# Patient Record
Sex: MALE | Race: WHITE | NOT HISPANIC OR LATINO | Employment: OTHER | URBAN - METROPOLITAN AREA
[De-identification: names, ages, dates, MRNs, and addresses within clinical notes are randomized per-mention and may not be internally consistent; named-entity substitution may affect disease eponyms.]

---

## 2017-01-23 ENCOUNTER — OFFICE VISIT (OUTPATIENT)
Dept: FAMILY MEDICINE CLINIC | Facility: CLINIC | Age: 62
End: 2017-01-23

## 2017-01-23 VITALS
HEIGHT: 68 IN | TEMPERATURE: 98.8 F | DIASTOLIC BLOOD PRESSURE: 76 MMHG | SYSTOLIC BLOOD PRESSURE: 140 MMHG | BODY MASS INDEX: 22.58 KG/M2 | WEIGHT: 149 LBS

## 2017-01-23 DIAGNOSIS — J01.00 ACUTE NON-RECURRENT MAXILLARY SINUSITIS: Primary | ICD-10-CM

## 2017-01-23 PROCEDURE — 99213 OFFICE O/P EST LOW 20 MIN: CPT | Performed by: FAMILY MEDICINE

## 2017-01-23 RX ORDER — AZELASTINE HYDROCHLORIDE, FLUTICASONE PROPIONATE 137; 50 UG/1; UG/1
1 SPRAY, METERED NASAL 2 TIMES DAILY
Qty: 1 BOTTLE | Refills: 11 | Status: SHIPPED | OUTPATIENT
Start: 2017-01-23 | End: 2019-01-04

## 2017-01-23 RX ORDER — AMOXICILLIN 500 MG/1
1000 CAPSULE ORAL 3 TIMES DAILY
Qty: 30 CAPSULE | Refills: 0 | Status: SHIPPED | OUTPATIENT
Start: 2017-01-23 | End: 2017-02-16 | Stop reason: SDUPTHER

## 2017-01-23 RX ORDER — IBUPROFEN 800 MG/1
TABLET ORAL
COMMUNITY
Start: 2017-01-17 | End: 2017-04-18

## 2017-01-23 NOTE — PROGRESS NOTES
Chief Complaint and HPI    I have reviewed the patient's medical history in detail and updated the computerized patient record.    Subjective: Gonzales Kaur Sr. is a 61 y.o. male presents   here today for     Sinusitis (c/o sinus pressure, nasal congestion, mild cough, dull headache, fever, does not remember the temp)  sick 6 days. ER in [facility] with nose spray and motrin  Review of Systems   Constitutional: Negative.    HENT: Positive for congestion (nasal), postnasal drip and sinus pressure.    Eyes: Negative.    Respiratory: Positive for cough (mild).    Cardiovascular: Negative.    Gastrointestinal: Negative.    Endocrine: Negative.    Genitourinary: Negative.    Musculoskeletal: Negative.    Skin: Negative.    Allergic/Immunologic: Negative.    Neurological: Positive for headaches (dull).   Hematological: Negative.    Psychiatric/Behavioral: Negative.        Physical Exam   Constitutional: He appears well-developed and well-nourished.   HENT:   Maxillary sinusitis   Cardiovascular: Normal rate, regular rhythm, normal heart sounds and intact distal pulses.    Pulmonary/Chest: Effort normal and breath sounds normal.   Vitals reviewed.      Procedures    Assessment:   Diagnosis Plan   1. Acute non-recurrent maxillary sinusitis         Plan:  No orders of the defined types were placed in this encounter.      Requested Prescriptions     Signed Prescriptions Disp Refills   • amoxicillin (AMOXIL) 500 MG capsule 30 capsule 0     Sig: Take 2 capsules by mouth 3 (Three) Times a Day.   • Azelastine-Fluticasone (DYMISTA) 137-50 MCG/ACT suspension 1 bottle 11     Si spray into each nostril 2 (Two) Times a Day.       All tests and consults since last visit reviewed with patient    No Follow-up on file.

## 2017-01-23 NOTE — MR AVS SNAPSHOT
Gonzales Kaur Sr.   1/23/2017 11:30 AM   Office Visit    Dept Phone:  [phone]   Encounter #:  [id number]    Provider:  Suhail Pineda MD   Department:  [facility] FAMILY AND INTERNAL MEDICINE                Your Full Care Plan              Today's Medication Changes          These changes are accurate as of: 1/23/17  1:36 PM.  If you have any questions, ask your nurse or doctor.               New Medication(s)Ordered:     amoxicillin 500 MG capsule   Commonly known as:  AMOXIL   Take 2 capsules by mouth 3 (Three) Times a Day.   Started by:  Suhail Pineda MD       Azelastine-Fluticasone 137-50 MCG/ACT suspension   Commonly known as:  DYMISTA   1 spray into each nostril 2 (Two) Times a Day.   Started by:  Suhail Pineda MD         Medication(s)that have changed:     aclidinium bromide 400 MCG/ACT aerosol powder  powder for inhalation   Commonly known as:  TUDORZA PRESSAIR   Inhale 1 puff.   What changed:  Another medication with the same name was removed. Continue taking this medication, and follow the directions you see here.   Changed by:  Suhail Pineda MD            Where to Get Your Medications      These medications were sent to [place] [phone] [place] [phone] [address] [address]     Phone:  [phone]     amoxicillin 500 MG capsule         You can get these medications from any pharmacy     Bring a paper prescription for each of these medications     Azelastine-Fluticasone 137-50 MCG/ACT suspension                  Your Updated Medication List          This list is accurate as of: 1/23/17  1:36 PM.  Always use your most recent med list.                aclidinium bromide 400 MCG/ACT aerosol powder  powder for inhalation   Commonly known as:  TUDORZA PRESSAIR       amoxicillin 500 MG capsule   Commonly known as:  AMOXIL   Take 2 capsules by  mouth 3 (Three) Times a Day.       ANDROGEL PUMP 20.25 MG/ACT (1.62%) gel   Generic drug:  Testosterone       Azelastine-Fluticasone 137-50 MCG/ACT suspension   Commonly known as:  DYMISTA   1 spray into each nostril 2 (Two) Times a Day.       ibuprofen 800 MG tablet   Commonly known as:  ADVIL,MOTRIN       sildenafil 20 MG tablet   Commonly known as:  REVATIO   Take 1 tablet by mouth 3 (Three) Times a Day.       SYMBICORT 160-4.5 MCG/ACT inhaler   Generic drug:  budesonide-formoterol       tamsulosin 0.4 MG capsule 24 hr capsule   Commonly known as:  FLOMAX   Take 1 capsule by mouth 2 (Two) Times a Day.       VIAGRA 100 MG tablet   Generic drug:  sildenafil               You Were Diagnosed With        Codes Comments    Acute non-recurrent maxillary sinusitis    -  Primary ICD-10-CM: J01.00  ICD-9-CM: 461.0       Instructions     None    Patient Instructions History      Upcoming Appointments     Visit Type Date Time Department    ACUTE           2017 11:30 AM [facility]    [facility]      SharedReviews Signup     [facility] SharedReviews allows you to send messages to your doctor, view your test results, renew your prescriptions, schedule appointments, and more. To sign up, go to [employer] and click on the Sign Up Now link in the New User? box. Enter your SharedReviews Activation Code exactly as it appears below along with the last four digits of your Social Security Number and your Date of Birth () to complete the sign-up process. If you do not sign up before the expiration date, you must request a new code.    SharedReviews Activation Code: [email or web address]  Expires: 2017  1:36 PM    If you have questions, you can email [email or web address] or call [phone] to talk to our SharedReviews staff. Remember, SharedReviews is NOT to be used for urgent needs. For medical emergencies, dial 911.               Other Info from Your Visit           Your Appointments     May 03,  "2017 11:45 AM EDT   Follow Up with Suhail Pineda MD   [facility] FAMILY AND INTERNAL MEDICINE (--)    [address] [address] [phone]           Arrive 15 minutes prior to appointment.              Allergies     No Known Allergies      Reason for Visit     Sinusitis c/o sinus pressure, nasal congestion, mild cough, dull headache, fever, does not remember the temp      Vital Signs     Blood Pressure Temperature Height    140/76 (BP Location: Right arm, Patient Position: Sitting, Cuff Size: Adult) 98.8 °F (37.1 °C) (Oral) 68\" (172.7 cm)    Weight Body Mass Index Smoking Status    149 lb (67.6 kg) 22.66 kg/m2 Current Every Day Smoker      Problems and Diagnoses Noted     Acute non-recurrent maxillary sinusitis        "

## 2017-02-16 RX ORDER — AMOXICILLIN 500 MG/1
1000 CAPSULE ORAL 3 TIMES DAILY
Qty: 30 CAPSULE | Refills: 0 | Status: SHIPPED | OUTPATIENT
Start: 2017-02-16 | End: 2017-04-18

## 2017-04-18 ENCOUNTER — OFFICE VISIT (OUTPATIENT)
Dept: FAMILY MEDICINE CLINIC | Facility: CLINIC | Age: 62
End: 2017-04-18

## 2017-04-18 VITALS
HEIGHT: 68 IN | BODY MASS INDEX: 22.91 KG/M2 | DIASTOLIC BLOOD PRESSURE: 88 MMHG | TEMPERATURE: 98.8 F | WEIGHT: 151.2 LBS | OXYGEN SATURATION: 98 % | HEART RATE: 82 BPM | SYSTOLIC BLOOD PRESSURE: 142 MMHG

## 2017-04-18 DIAGNOSIS — H66.91 RIGHT OTITIS MEDIA, UNSPECIFIED CHRONICITY, UNSPECIFIED OTITIS MEDIA TYPE: ICD-10-CM

## 2017-04-18 DIAGNOSIS — J30.1 SEASONAL ALLERGIC RHINITIS DUE TO POLLEN: Primary | ICD-10-CM

## 2017-04-18 DIAGNOSIS — J01.41 ACUTE RECURRENT PANSINUSITIS: ICD-10-CM

## 2017-04-18 PROBLEM — J02.9 ACUTE PHARYNGITIS: Status: RESOLVED | Noted: 2017-04-18 | Resolved: 2017-04-18

## 2017-04-18 PROBLEM — J02.9 ACUTE PHARYNGITIS: Status: ACTIVE | Noted: 2017-04-18

## 2017-04-18 PROCEDURE — 99213 OFFICE O/P EST LOW 20 MIN: CPT | Performed by: FAMILY MEDICINE

## 2017-04-18 RX ORDER — AMOXICILLIN AND CLAVULANATE POTASSIUM 875; 125 MG/1; MG/1
1 TABLET, FILM COATED ORAL 2 TIMES DAILY
Qty: 20 TABLET | Refills: 0 | Status: SHIPPED | OUTPATIENT
Start: 2017-04-18 | End: 2018-03-16

## 2017-04-18 NOTE — PROGRESS NOTES
"  Chief Complaint   Patient presents with   • Earache     pt since last a week has some earche on the right side and went to urgent care they gave prednisone tablets but still has pain and hearing lost       Subjective.../HPI  Patient present today with sinus congestion and right ear ache. Seen in [facility] and given prednisone and still having problems. Finished pred today. Taking zyrtec to dry up sinuses    I have reviewed the patient's medical history in detail and updated the computerized patient record.    Family History   Problem Relation Age of Onset   • Diabetes Mother    • Dementia Father        Social History     Social History   • Marital status:      Spouse name: N/A   • Number of children: N/A   • Years of education: N/A     Occupational History   • Not on file.     Social History Main Topics   • Smoking status: Current Every Day Smoker   • Smokeless tobacco: Never Used   • Alcohol use No   • Drug use: No   • Sexual activity: Defer     Other Topics Concern   • Not on file     Social History Narrative       Review of Systems:   Review of Systems   Constitutional: Negative.    HENT: Positive for congestion, ear discharge, ear pain, rhinorrhea and sinus pressure.    Eyes: Negative.    Respiratory: Positive for cough and shortness of breath.    Cardiovascular: Negative.    Gastrointestinal: Negative.    Endocrine: Negative.    Genitourinary: Negative.    Musculoskeletal: Negative.    Skin: Negative.    Allergic/Immunologic: Positive for environmental allergies.   Neurological: Negative.    Hematological: Negative.    Psychiatric/Behavioral: Negative.        Objective:  Vital Signs     Vitals:    04/18/17 1321   BP: 142/88   BP Location: Left arm   Patient Position: Sitting   Pulse: 82   Temp: 98.8 °F (37.1 °C)   TempSrc: Oral   SpO2: 98%   Weight: 151 lb 3.2 oz (68.6 kg)   Height: 68\" (172.7 cm)     Physical Exam   Constitutional: He is oriented to person, place, and time. He appears well-developed and " well-nourished.   HENT:   Head: Normocephalic.   Left Ear: External ear normal.   Pond. [place]. Sinus congestion. Right ear with decreased light reflex   Eyes: Pupils are equal, round, and reactive to light.   Neck: Normal range of motion.   Cardiovascular: Normal rate and regular rhythm.    Pulmonary/Chest: Effort normal.   Abdominal: Soft.   Musculoskeletal: Normal range of motion.   Neurological: He is alert and oriented to person, place, and time.   Skin: Skin is warm and dry.        Results Review:      REVIEWED AND DISCUSSED CLINICAL RESULTS WITH PATIENT                          Current Outpatient Prescriptions:   •  aclidinium bromide (TUDORZA PRESSAIR) 400 MCG/ACT aerosol powder  powder for inhalation, Inhale 1 puff., Disp: , Rfl:   •  ANDROGEL PUMP 20.25 MG/ACT (1.62%) gel, , Disp: , Rfl: 0  •  Azelastine-Fluticasone (DYMISTA) 137-50 MCG/ACT suspension, 1 spray into each nostril 2 (Two) Times a Day., Disp: 1 bottle, Rfl: 11  •  budesonide-formoterol (SYMBICORT) 160-4.5 MCG/ACT inhaler, Take 1 puff by mouth 2 (Two) Times a Day., Disp: , Rfl:   •  sildenafil (REVATIO) 20 MG tablet, Take 1 tablet by mouth 3 (Three) Times a Day., Disp: 90 tablet, Rfl: 5  •  sildenafil (VIAGRA) 100 MG tablet, Take  by mouth As Needed., Disp: , Rfl:   •  tamsulosin (FLOMAX) 0.4 MG capsule 24 hr capsule, Take 1 capsule by mouth 2 (Two) Times a Day., Disp: 60 capsule, Rfl: 11  •  amoxicillin-clavulanate (AUGMENTIN) 875-125 MG per tablet, Take 1 tablet by mouth 2 (Two) Times a Day., Disp: 20 tablet, Rfl: 0    Procedures    Assessment/Plan     Diagnoses and all orders for this visit:    Seasonal allergic rhinitis due to pollen    Right otitis media, unspecified chronicity, unspecified otitis media type    Acute recurrent pansinusitis    Other orders  -     amoxicillin-clavulanate (AUGMENTIN) 875-125 MG per tablet; Take 1 tablet by mouth 2 (Two) Times a Day.         Enrique Hodges Jr., MD  04/18/17  1:49 PM

## 2017-05-01 RX ORDER — ACLIDINIUM BROMIDE 400 UG/1
POWDER, METERED RESPIRATORY (INHALATION)
Qty: 1 EACH | Refills: 1 | Status: SHIPPED | OUTPATIENT
Start: 2017-05-01 | End: 2017-05-15 | Stop reason: SDUPTHER

## 2017-05-15 ENCOUNTER — OFFICE VISIT (OUTPATIENT)
Dept: FAMILY MEDICINE CLINIC | Facility: CLINIC | Age: 62
End: 2017-05-15

## 2017-05-15 VITALS
DIASTOLIC BLOOD PRESSURE: 72 MMHG | SYSTOLIC BLOOD PRESSURE: 128 MMHG | TEMPERATURE: 98.4 F | HEIGHT: 66 IN | BODY MASS INDEX: 23.96 KG/M2 | HEART RATE: 61 BPM | WEIGHT: 149.1 LBS | OXYGEN SATURATION: 97 %

## 2017-05-15 DIAGNOSIS — J43.9 PULMONARY EMPHYSEMA, UNSPECIFIED EMPHYSEMA TYPE (HCC): Primary | ICD-10-CM

## 2017-05-15 DIAGNOSIS — N40.1 BENIGN NON-NODULAR PROSTATIC HYPERPLASIA WITH LOWER URINARY TRACT SYMPTOMS: ICD-10-CM

## 2017-05-15 DIAGNOSIS — Z79.899 MEDICATION MANAGEMENT: ICD-10-CM

## 2017-05-15 DIAGNOSIS — Z13.220 SCREENING FOR LIPOID DISORDERS: Primary | ICD-10-CM

## 2017-05-15 PROCEDURE — 99213 OFFICE O/P EST LOW 20 MIN: CPT | Performed by: NURSE PRACTITIONER

## 2017-05-15 RX ORDER — TAMSULOSIN HYDROCHLORIDE 0.4 MG/1
2 CAPSULE ORAL 2 TIMES DAILY
Qty: 60 CAPSULE | Refills: 11 | Status: SHIPPED | OUTPATIENT
Start: 2017-05-15 | End: 2018-05-15 | Stop reason: SDUPTHER

## 2017-05-15 RX ORDER — BUDESONIDE AND FORMOTEROL FUMARATE DIHYDRATE 160; 4.5 UG/1; UG/1
1 AEROSOL RESPIRATORY (INHALATION) 2 TIMES DAILY
Qty: 1 INHALER | Refills: 5 | Status: SHIPPED | OUTPATIENT
Start: 2017-05-15 | End: 2018-01-03 | Stop reason: SDUPTHER

## 2017-12-20 RX ORDER — ACLIDINIUM BROMIDE 400 UG/1
POWDER, METERED RESPIRATORY (INHALATION)
Refills: 1 | OUTPATIENT
Start: 2017-12-20

## 2017-12-20 RX ORDER — BUDESONIDE AND FORMOTEROL FUMARATE DIHYDRATE 160; 4.5 UG/1; UG/1
AEROSOL RESPIRATORY (INHALATION)
Qty: 10.2 G | Refills: 5 | OUTPATIENT
Start: 2017-12-20

## 2017-12-21 RX ORDER — BUDESONIDE AND FORMOTEROL FUMARATE DIHYDRATE 160; 4.5 UG/1; UG/1
AEROSOL RESPIRATORY (INHALATION)
Qty: 10.2 G | Refills: 5 | OUTPATIENT
Start: 2017-12-21

## 2017-12-22 RX ORDER — ACLIDINIUM BROMIDE 400 UG/1
POWDER, METERED RESPIRATORY (INHALATION)
Refills: 1 | OUTPATIENT
Start: 2017-12-22

## 2018-01-03 RX ORDER — BUDESONIDE AND FORMOTEROL FUMARATE DIHYDRATE 160; 4.5 UG/1; UG/1
1 AEROSOL RESPIRATORY (INHALATION)
Qty: 1 INHALER | Refills: 5 | Status: SHIPPED | OUTPATIENT
Start: 2018-01-03 | End: 2018-06-21 | Stop reason: SDUPTHER

## 2018-01-03 RX ORDER — ACLIDINIUM BROMIDE 400 UG/1
POWDER, METERED RESPIRATORY (INHALATION)
Refills: 1 | Status: CANCELLED | OUTPATIENT
Start: 2018-01-03

## 2018-01-03 RX ORDER — BUDESONIDE AND FORMOTEROL FUMARATE DIHYDRATE 160; 4.5 UG/1; UG/1
AEROSOL RESPIRATORY (INHALATION)
Qty: 10.2 G | Refills: 5 | Status: CANCELLED | OUTPATIENT
Start: 2018-01-03

## 2018-03-16 ENCOUNTER — HOSPITAL ENCOUNTER (EMERGENCY)
Facility: HOSPITAL | Age: 63
Discharge: HOME | End: 2018-03-16
Attending: EMERGENCY MEDICINE | Admitting: EMERGENCY MEDICINE

## 2018-03-16 VITALS
SYSTOLIC BLOOD PRESSURE: 145 MMHG | WEIGHT: 150 LBS | HEART RATE: 76 BPM | HEIGHT: 67 IN | OXYGEN SATURATION: 95 % | BODY MASS INDEX: 23.54 KG/M2 | DIASTOLIC BLOOD PRESSURE: 107 MMHG | RESPIRATION RATE: 16 BRPM | TEMPERATURE: 98.2 F

## 2018-03-16 DIAGNOSIS — N30.01 ACUTE CYSTITIS WITH HEMATURIA: Primary | ICD-10-CM

## 2018-03-16 LAB
ALBUMIN SERPL-MCNC: 4.1 G/DL (ref 3.5–5.2)
ALBUMIN/GLOB SERPL: 1.3 G/DL
ALP SERPL-CCNC: 70 U/L (ref 39–117)
ALT SERPL W P-5'-P-CCNC: 15 U/L (ref 1–41)
ANION GAP SERPL CALCULATED.3IONS-SCNC: 11.1 MMOL/L
AST SERPL-CCNC: 17 U/L (ref 1–40)
BACTERIA UR QL AUTO: ABNORMAL /HPF
BASOPHILS # BLD AUTO: 0.03 10*3/MM3 (ref 0–0.2)
BASOPHILS NFR BLD AUTO: 0.4 % (ref 0–1.5)
BILIRUB SERPL-MCNC: 0.4 MG/DL (ref 0.1–1.2)
BILIRUB UR QL STRIP: NEGATIVE
BUN BLD-MCNC: 23 MG/DL (ref 8–23)
BUN/CREAT SERPL: 24 (ref 7–25)
CALCIUM SPEC-SCNC: 9.3 MG/DL (ref 8.6–10.5)
CHLORIDE SERPL-SCNC: 102 MMOL/L (ref 98–107)
CLARITY UR: ABNORMAL
CO2 SERPL-SCNC: 28.9 MMOL/L (ref 22–29)
COLOR UR: ABNORMAL
CREAT BLD-MCNC: 0.96 MG/DL (ref 0.76–1.27)
DEPRECATED RDW RBC AUTO: 51.4 FL (ref 37–54)
EOSINOPHIL # BLD AUTO: 0.1 10*3/MM3 (ref 0–0.7)
EOSINOPHIL NFR BLD AUTO: 1.2 % (ref 0.3–6.2)
ERYTHROCYTE [DISTWIDTH] IN BLOOD BY AUTOMATED COUNT: 13.7 % (ref 11.5–14.5)
GFR SERPL CREATININE-BSD FRML MDRD: 80 ML/MIN/1.73
GLOBULIN UR ELPH-MCNC: 3.2 GM/DL
GLUCOSE BLD-MCNC: 102 MG/DL (ref 65–99)
GLUCOSE UR STRIP-MCNC: NEGATIVE MG/DL
HCT VFR BLD AUTO: 44.6 % (ref 40.4–52.2)
HGB BLD-MCNC: 14.6 G/DL (ref 13.7–17.6)
HGB UR QL STRIP.AUTO: ABNORMAL
HYALINE CASTS UR QL AUTO: ABNORMAL /LPF
IMM GRANULOCYTES # BLD: 0.04 10*3/MM3 (ref 0–0.03)
IMM GRANULOCYTES NFR BLD: 0.5 % (ref 0–0.5)
KETONES UR QL STRIP: ABNORMAL
LEUKOCYTE ESTERASE UR QL STRIP.AUTO: ABNORMAL
LYMPHOCYTES # BLD AUTO: 1.65 10*3/MM3 (ref 0.9–4.8)
LYMPHOCYTES NFR BLD AUTO: 19.3 % (ref 19.6–45.3)
MCH RBC QN AUTO: 33.7 PG (ref 27–32.7)
MCHC RBC AUTO-ENTMCNC: 32.7 G/DL (ref 32.6–36.4)
MCV RBC AUTO: 103 FL (ref 79.8–96.2)
MONOCYTES # BLD AUTO: 1.03 10*3/MM3 (ref 0.2–1.2)
MONOCYTES NFR BLD AUTO: 12 % (ref 5–12)
NEUTROPHILS # BLD AUTO: 5.71 10*3/MM3 (ref 1.9–8.1)
NEUTROPHILS NFR BLD AUTO: 66.6 % (ref 42.7–76)
NITRITE UR QL STRIP: NEGATIVE
PH UR STRIP.AUTO: 5.5 [PH] (ref 5–8)
PLATELET # BLD AUTO: 171 10*3/MM3 (ref 140–500)
PMV BLD AUTO: 11.5 FL (ref 6–12)
POTASSIUM BLD-SCNC: 4.5 MMOL/L (ref 3.5–5.2)
PROT SERPL-MCNC: 7.3 G/DL (ref 6–8.5)
PROT UR QL STRIP: ABNORMAL
RBC # BLD AUTO: 4.33 10*6/MM3 (ref 4.6–6)
RBC # UR: ABNORMAL /HPF
REF LAB TEST METHOD: ABNORMAL
SODIUM BLD-SCNC: 142 MMOL/L (ref 136–145)
SP GR UR STRIP: 1.03 (ref 1–1.03)
SQUAMOUS #/AREA URNS HPF: ABNORMAL /HPF
UROBILINOGEN UR QL STRIP: ABNORMAL
WBC NRBC COR # BLD: 8.56 10*3/MM3 (ref 4.5–10.7)
WBC UR QL AUTO: ABNORMAL /HPF

## 2018-03-16 PROCEDURE — 85025 COMPLETE CBC W/AUTO DIFF WBC: CPT | Performed by: NURSE PRACTITIONER

## 2018-03-16 PROCEDURE — 51798 US URINE CAPACITY MEASURE: CPT

## 2018-03-16 PROCEDURE — 80053 COMPREHEN METABOLIC PANEL: CPT | Performed by: NURSE PRACTITIONER

## 2018-03-16 PROCEDURE — 36415 COLL VENOUS BLD VENIPUNCTURE: CPT | Performed by: NURSE PRACTITIONER

## 2018-03-16 PROCEDURE — 87086 URINE CULTURE/COLONY COUNT: CPT | Performed by: NURSE PRACTITIONER

## 2018-03-16 PROCEDURE — 81001 URINALYSIS AUTO W/SCOPE: CPT | Performed by: NURSE PRACTITIONER

## 2018-03-16 PROCEDURE — 99283 EMERGENCY DEPT VISIT LOW MDM: CPT

## 2018-03-16 RX ORDER — CIPROFLOXACIN 500 MG/1
500 TABLET, FILM COATED ORAL 2 TIMES DAILY
Qty: 14 TABLET | Refills: 0 | Status: SHIPPED | OUTPATIENT
Start: 2018-03-16 | End: 2018-12-10

## 2018-03-16 RX ORDER — PHENAZOPYRIDINE HYDROCHLORIDE 100 MG/1
100 TABLET, FILM COATED ORAL 3 TIMES DAILY PRN
Qty: 9 TABLET | Refills: 0 | Status: SHIPPED | OUTPATIENT
Start: 2018-03-16 | End: 2018-12-10

## 2018-03-16 NOTE — ED TRIAGE NOTES
Pt c/o blood in urine with burning with urination since last night. Pt was seen at urgent care in [place] and Pt had a KUB done. MD stated that Pt had 2 kidney stones and gave Pt a choice to be admitted in the hospital there or come back to home town and go to the ED, so Pt came here. Pt was in [place] for work. Pt denies flank pain at time but states that he does have bilateral flank pain intermittently.

## 2018-03-16 NOTE — ED NOTES
Re-eval complete.. Urine cup provided to pt since none was. Pt updated on wait and poc     Chloé Boles RN  03/16/18 1946

## 2018-03-16 NOTE — ED PROVIDER NOTES
EMERGENCY DEPARTMENT ENCOUNTER    CHIEF COMPLAINT  Chief Complaint: Hematuria  History given by: Pt  History limited by: Nothing  Room Number: 06/06  PMD: Suhail Pineda MD      HPI:  Pt is a 62 y.o. male who presents complaining of hematuria that began last night.  Pt reports associated urgency and burning with urination.   Pt states that the pain worsens immediately after urination.  Pt denies blood clots, fevers, or nausea. Pt reports that he had an XR done at urgent care today that shows stones in his bladder.  Pt has a history of kidney stones an he states that this does not feel like a kidney stone.    Duration:  One day  Onset: Sudden  Timing: Constant  Intensity/Severity: Moderate  Progression: Unchanged  Associated Symptoms:  Pt reports associated urgency and burning with urination.   Aggravating Factors: None  Alleviating Factors: None  Previous Episodes: Pt reports history of kidney stones  Treatment before arrival: None    PAST MEDICAL HISTORY  Active Ambulatory Problems     Diagnosis Date Noted   • COPD (chronic obstructive pulmonary disease) with emphysema 10/28/2016   • Benign non-nodular prostatic hyperplasia with lower urinary tract symptoms 10/28/2016   • Acute non-recurrent maxillary sinusitis 01/23/2017     Resolved Ambulatory Problems     Diagnosis Date Noted   • Acute pharyngitis 04/18/2017     Past Medical History:   Diagnosis Date   • Allergic    • BPH (benign prostatic hyperplasia)    • COPD (chronic obstructive pulmonary disease)    • Tobacco use disorder        PAST SURGICAL HISTORY  History reviewed. No pertinent surgical history.    FAMILY HISTORY  Family History   Problem Relation Age of Onset   • Diabetes Mother    • Dementia Father        SOCIAL HISTORY  Social History     Social History   • Marital status:      Spouse name: N/A   • Number of children: N/A   • Years of education: N/A     Occupational History   • Not on file.     Social History Main Topics   • Smoking  status: Current Every Day Smoker     Types: Cigarettes   • Smokeless tobacco: Never Used   • Alcohol use No   • Drug use: No   • Sexual activity: Defer     Other Topics Concern   • Not on file     Social History Narrative   • No narrative on file       ALLERGIES  Review of patient's allergies indicates no known allergies.    REVIEW OF SYSTEMS  Review of Systems   Constitutional: Negative for activity change, appetite change and fever.   HENT: Negative for congestion and sore throat.    Eyes: Negative.    Respiratory: Negative for cough and shortness of breath.    Cardiovascular: Negative for chest pain and leg swelling.   Gastrointestinal: Negative for abdominal pain, diarrhea and vomiting.   Endocrine: Negative.    Genitourinary: Positive for dysuria, hematuria and urgency. Negative for decreased urine volume.   Musculoskeletal: Negative for neck pain.   Skin: Negative for rash and wound.   Allergic/Immunologic: Negative.    Neurological: Negative for weakness, numbness and headaches.   Hematological: Negative.    Psychiatric/Behavioral: Negative.    All other systems reviewed and are negative.      PHYSICAL EXAM  ED Triage Vitals   Temp Heart Rate Resp BP SpO2   03/16/18 1534 03/16/18 1534 03/16/18 1604 03/16/18 1604 03/16/18 1534   99.4 °F (37.4 °C) 99 16 141/73 92 %      Temp src Heart Rate Source Patient Position BP Location FiO2 (%)   03/16/18 1938 03/16/18 1604 03/16/18 1604 03/16/18 1604 --   Tympanic Monitor Sitting Left arm        Physical Exam   Constitutional: He is oriented to person, place, and time and well-developed, well-nourished, and in no distress.   HENT:   Head: Normocephalic and atraumatic.   Eyes: EOM are normal. Pupils are equal, round, and reactive to light.   Neck: Normal range of motion. Neck supple.   Cardiovascular: Normal rate, regular rhythm and normal heart sounds.    Pulmonary/Chest: Effort normal and breath sounds normal. No respiratory distress.   Abdominal: Soft. There is no  tenderness. There is no rebound and no guarding.   Musculoskeletal: Normal range of motion. He exhibits no edema.   Neurological: He is alert and oriented to person, place, and time. He has normal sensation and normal strength.   Skin: Skin is warm and dry.   Psychiatric: Mood and affect normal.   Nursing note and vitals reviewed.      LAB RESULTS  Lab Results (last 24 hours)     Procedure Component Value Units Date/Time    CBC & Differential [[id number]] Collected:  03/16/18 1631    Specimen:  Blood Updated:  03/16/18 1654    Narrative:       The following orders were created for panel order CBC & Differential.  Procedure                               Abnormality         Status                     ---------                               -----------         ------                     CBC Auto Differential[[id number]]         Abnormal            Final result                 Please view results for these tests on the individual orders.    Comprehensive Metabolic Panel [[id number]]  (Abnormal) Collected:  03/16/18 1631    Specimen:  Blood Updated:  03/16/18 1711     Glucose 102 (H) mg/dL      BUN 23 mg/dL      Creatinine 0.96 mg/dL      Sodium 142 mmol/L      Potassium 4.5 mmol/L      Chloride 102 mmol/L      CO2 28.9 mmol/L      Calcium 9.3 mg/dL      Total Protein 7.3 g/dL      Albumin 4.10 g/dL      ALT (SGPT) 15 U/L      AST (SGOT) 17 U/L      Alkaline Phosphatase 70 U/L      Total Bilirubin 0.4 mg/dL      eGFR Non African Amer 80 mL/min/1.73      Globulin 3.2 gm/dL      A/G Ratio 1.3 g/dL      BUN/Creatinine Ratio 24.0     Anion Gap 11.1 mmol/L     CBC Auto Differential [[id number]]  (Abnormal) Collected:  03/16/18 1631    Specimen:  Blood Updated:  03/16/18 1654     WBC 8.56 10*3/mm3      RBC 4.33 (L) 10*6/mm3      Hemoglobin 14.6 g/dL      Hematocrit 44.6 %      .0 (H) fL      MCH 33.7 (H) pg      MCHC 32.7 g/dL      RDW 13.7 %      RDW-SD 51.4 fl      MPV 11.5 fL      Platelets 171 10*3/mm3      Neutrophil %  66.6 %      Lymphocyte % 19.3 (L) %      Monocyte % 12.0 %      Eosinophil % 1.2 %      Basophil % 0.4 %      Immature Grans % 0.5 %      Neutrophils, Absolute 5.71 10*3/mm3      Lymphocytes, Absolute 1.65 10*3/mm3      Monocytes, Absolute 1.03 10*3/mm3      Eosinophils, Absolute 0.10 10*3/mm3      Basophils, Absolute 0.03 10*3/mm3      Immature Grans, Absolute 0.04 (H) 10*3/mm3     Urinalysis With / Culture If Indicated - Urine, Clean Catch [[id number]]  (Abnormal) Collected:  03/16/18 1944    Specimen:  Urine from Urine, Clean Catch Updated:  03/16/18 2006     Color, UA Red (A)     Comment: Any Substance that causes an abnormal urine color can alter the accuracy of the chemical reactions.        Appearance, UA Cloudy (A)     pH, UA 5.5     Specific Gravity, UA 1.028     Glucose, UA Negative     Ketones, UA 15 mg/dL (1+) (A)     Bilirubin, UA Negative     Blood, UA Large (3+) (A)     Protein,  mg/dL (2+) (A)     Leuk Esterase, UA Moderate (2+) (A)     Nitrite, UA Negative     Urobilinogen, UA 1.0 E.U./dL    Urinalysis, Microscopic Only - Urine, Clean Catch [[id number]]  (Abnormal) Collected:  03/16/18 1944    Specimen:  Urine from Urine, Clean Catch Updated:  03/16/18 2006     RBC, UA Too Numerous to Count (A) /HPF      WBC, UA  /HPF      Unable to determine due to loaded field (A)     Bacteria, UA  /HPF      Unable to determine due to loaded field (A)     Squamous Epithelial Cells, UA  /HPF      Unable to determine due to loaded field (A)     Hyaline Casts, UA  /LPF      Unable to determine due to loaded field     Methodology Manual Light Microscopy    Urine Culture - Urine, Urine, Clean Catch [[id number]] Collected:  03/16/18 1944    Specimen:  Urine from Urine, Clean Catch Updated:  03/16/18 1953          I ordered the above labs and reviewed the results    PROCEDURES  Procedures      PROGRESS AND CONSULTS  ED Course   Medications - No data to display    2015  Discussed urinalysis results.  Discussed options for  treatment or further evaluation.  I gave Pt the option to get a CT scan and a bladder scan.  Pt decided to get the bladder scan.     2026  Bladder scan shows no urinary retention.        MEDICAL DECISION MAKING  Results were reviewed/discussed with the patient and they were also made aware of online access. Pt also made aware that some labs, such as cultures, will not be resulted during ER visit and follow up with PMD is necessary.     MDM  Number of Diagnoses or Management Options  Acute cystitis with hematuria:      Amount and/or Complexity of Data Reviewed  Clinical lab tests: reviewed (Urinalysis shows blood in urine. )    Patient Progress  Patient progress: stable         DIAGNOSIS  Final diagnoses:   Acute cystitis with hematuria       DISPOSITION  DISCHARGE    Patient discharged in stable condition.    Reviewed implications of results, diagnosis, meds, responsibility to follow up, warning signs and symptoms of possible worsening, potential complications and reasons to return to ER.    Patient/Family voiced understanding of above instructions.    Discussed plan for discharge, as there is no emergent indication for admission. Patient referred to primary care provider for BP management due to today's BP. Pt/family is agreeable and understands need for follow up and repeat testing.  Pt is aware that discharge does not mean that nothing is wrong but it indicates no emergency is present that requires admission and they must continue care with follow-up as given below or physician of their choice.     FOLLOW-UP  Suhail Pineda MD  [address] [facility] [address]  [facility] [address] [phone]    Schedule an appointment as soon as possible for a visit       Ramin Madsen MD  [address] [facility] [address] [phone]    Schedule an appointment as soon as possible for a visit   As needed         Medication List      New Prescriptions    ciprofloxacin 500 MG tablet  Commonly known as:  CIPRO  Take 1  tablet by mouth 2 (Two) Times a Day.     phenazopyridine 100 MG tablet  Commonly known as:  PYRIDIUM  Take 1 tablet by mouth 3 (Three) Times a Day As Needed for bladder spasms.        Changed    tamsulosin 0.4 MG capsule 24 hr capsule  Commonly known as:  FLOMAX  Take 2 capsules by mouth 2 (Two) Times a Day.  What changed:  how much to take        Stop    amoxicillin-clavulanate 875-125 MG per tablet  Commonly known as:  AUGMENTIN              Latest Documented Vital Signs:  As of 8:34 PM  BP- 144/79 HR- 76 Temp- 98.2 °F (36.8 °C) (Tympanic) O2 sat- 100%    --  Documentation assistance provided by katelyn Howard for Dr. Pan.  Information recorded by the scribe was done at my direction and has been verified and validated by me.            Florinda Howard  03/16/18 3936       Ruslna Pan MD  03/16/18 2493

## 2018-03-17 NOTE — DISCHARGE INSTRUCTIONS
Please return to the ED if you develop a fever, increasing pain or if you are unable to be urinate.

## 2018-03-18 LAB
BACTERIA SPEC AEROBE CULT: NORMAL
BACTERIA SPEC AEROBE CULT: NORMAL

## 2018-05-15 RX ORDER — TAMSULOSIN HYDROCHLORIDE 0.4 MG/1
CAPSULE ORAL
Qty: 60 CAPSULE | Refills: 11 | Status: SHIPPED | OUTPATIENT
Start: 2018-05-15 | End: 2019-05-20 | Stop reason: SDUPTHER

## 2018-06-07 ENCOUNTER — OFFICE VISIT (OUTPATIENT)
Dept: INTERNAL MEDICINE | Facility: CLINIC | Age: 63
End: 2018-06-07

## 2018-06-07 VITALS
RESPIRATION RATE: 16 BRPM | DIASTOLIC BLOOD PRESSURE: 65 MMHG | HEIGHT: 67 IN | HEART RATE: 57 BPM | BODY MASS INDEX: 22.85 KG/M2 | WEIGHT: 145.6 LBS | OXYGEN SATURATION: 94 % | TEMPERATURE: 98.1 F | SYSTOLIC BLOOD PRESSURE: 128 MMHG

## 2018-06-07 DIAGNOSIS — B33.24 VIRAL CARDIOMYOPATHY (HCC): ICD-10-CM

## 2018-06-07 DIAGNOSIS — N40.1 BENIGN NON-NODULAR PROSTATIC HYPERPLASIA WITH LOWER URINARY TRACT SYMPTOMS: Primary | ICD-10-CM

## 2018-06-07 DIAGNOSIS — R53.82 CHRONIC FATIGUE: ICD-10-CM

## 2018-06-07 DIAGNOSIS — R06.09 DYSPNEA ON EXERTION: ICD-10-CM

## 2018-06-07 DIAGNOSIS — G47.33 OSA (OBSTRUCTIVE SLEEP APNEA): ICD-10-CM

## 2018-06-07 PROCEDURE — 99213 OFFICE O/P EST LOW 20 MIN: CPT | Performed by: FAMILY MEDICINE

## 2018-06-07 NOTE — PROGRESS NOTES
CC:1) DOE2) tobacco abuse 3) decresing  memory    Subjective.../HPI  Patient present today with1) 6 mons with MARTIN, decresed fatigue  2) tobacco abuse  I have reviewed the patient's medical history in detail and updated the computerized patient record.1) MARTIN -dcecreased stamina    Past Medical History:   Diagnosis Date   • Allergic    • BPH (benign prostatic hyperplasia)    • COPD (chronic obstructive pulmonary disease)    • Tobacco use disorder        No past surgical history on file.    Family History   Problem Relation Age of Onset   • Diabetes Mother    • Dementia Father        Social History     Social History   • Marital status:      Spouse name: N/A   • Number of children: N/A   • Years of education: N/A     Occupational History   • Not on file.     Social History Main Topics   • Smoking status: Current Every Day Smoker     Types: Cigarettes   • Smokeless tobacco: Never Used   • Alcohol use No   • Drug use: No   • Sexual activity: Defer     Other Topics Concern   • Not on file     Social History Narrative   • No narrative on file         There is no immunization history on file for this patient.    Review of Systems:   Review of Systems   Constitutional: Negative.    HENT: Negative.    Eyes: Negative.    Respiratory: Negative.    Cardiovascular: Negative.    Gastrointestinal: Negative.    Endocrine: Positive for cold intolerance.   Genitourinary: Negative.    Musculoskeletal: Negative.    Skin: Negative.          Physical Exam   Constitutional: He is oriented to person, place, and time. He appears well-developed and well-nourished.   Cardiovascular: Normal rate and regular rhythm.    Pulmonary/Chest: Effort normal and breath sounds normal.   Neurological: He is alert and oriented to person, place, and time.   Psychiatric: He has a normal mood and affect. His behavior is normal.         Vital Signs     Vitals:    06/07/18 1201   BP: 128/65   BP Location: Left arm   Patient Position: Sitting   Cuff Size:  "Adult   Pulse: 57   Resp: 16   Temp: 98.1 °F (36.7 °C)   TempSrc: Oral   SpO2: 94%   Weight: 66 kg (145 lb 9.6 oz)   Height: 170.2 cm (67\")          Results Review:      REVIEWED AND DISCUSSED CLINICAL RESULTS WITH PATIENT      Requested Prescriptions      No prescriptions requested or ordered in this encounter         Current Outpatient Prescriptions:   •  aclidinium bromide (TUDORZA PRESSAIR) 400 MCG/ACT aerosol powder  powder for inhalation, Inhale 1 puff 2 (Two) Times a Day., Disp: 1 each, Rfl: 5  •  ANDROGEL PUMP 20.25 MG/ACT (1.62%) gel, Apply  topically Daily., Disp: , Rfl: 0  •  Azelastine-Fluticasone (DYMISTA) 137-50 MCG/ACT suspension, 1 spray into each nostril 2 (Two) Times a Day., Disp: 1 bottle, Rfl: 11  •  ciprofloxacin (CIPRO) 500 MG tablet, Take 1 tablet by mouth 2 (Two) Times a Day., Disp: 14 tablet, Rfl: 0  •  phenazopyridine (PYRIDIUM) 100 MG tablet, Take 1 tablet by mouth 3 (Three) Times a Day As Needed for bladder spasms., Disp: 9 tablet, Rfl: 0  •  sildenafil (REVATIO) 20 MG tablet, Take 1 tablet by mouth 3 (Three) Times a Day., Disp: 90 tablet, Rfl: 5  •  sildenafil (VIAGRA) 100 MG tablet, Take  by mouth As Needed., Disp: , Rfl:   •  tamsulosin (FLOMAX) 0.4 MG capsule 24 hr capsule, take 1 capsule by mouth twice a day, Disp: 60 capsule, Rfl: 11  •  amoxicillin (AMOXIL) 500 MG capsule, Take 1 capsule by mouth 3 (Three) Times a Day., Disp: 30 capsule, Rfl: 0  •  SYMBICORT 160-4.5 MCG/ACT inhaler, inhale 1 puff by mouth twice a day, Disp: 10.2 g, Rfl: 3  •  valACYclovir (VALTREX) 500 MG tablet, Take 1 tablet by mouth 2 (Two) Times a Day., Disp: 10 tablet, Rfl: 0    Procedures          Diagnoses and all orders for this visit:    Benign non-nodular prostatic hyperplasia with lower urinary tract symptoms    MICHAEL (obstructive sleep apnea)  -     Ambulatory Referral to Sleep Medicine    Viral cardiomyopathy  -     Adult Transthoracic Echo Complete W/ Cont if Necessary Per Protocol; Future    Chronic " fatigue  -     CBC & Differential  -     Comprehensive Metabolic Panel  -     TSH  -     Vitamin B12  -     Folate    Dyspnea on exertion         Return for Recheck.    Suhail Pineda M.D  06/27/18  8:22 PM

## 2018-06-08 LAB
ALBUMIN SERPL-MCNC: 4.4 G/DL (ref 3.5–5.2)
ALBUMIN/GLOB SERPL: 1.5 G/DL
ALP SERPL-CCNC: 79 U/L (ref 39–117)
ALT SERPL-CCNC: 14 U/L (ref 1–41)
AST SERPL-CCNC: 13 U/L (ref 1–40)
BASOPHILS # BLD AUTO: 0.05 10*3/MM3 (ref 0–0.2)
BASOPHILS NFR BLD AUTO: 0.6 % (ref 0–1.5)
BILIRUB SERPL-MCNC: 0.4 MG/DL (ref 0.1–1.2)
BUN SERPL-MCNC: 15 MG/DL (ref 8–23)
BUN/CREAT SERPL: 18.3 (ref 7–25)
CALCIUM SERPL-MCNC: 9.1 MG/DL (ref 8.6–10.5)
CHLORIDE SERPL-SCNC: 101 MMOL/L (ref 98–107)
CO2 SERPL-SCNC: 29.8 MMOL/L (ref 22–29)
CREAT SERPL-MCNC: 0.82 MG/DL (ref 0.76–1.27)
EOSINOPHIL # BLD AUTO: 0.2 10*3/MM3 (ref 0–0.7)
EOSINOPHIL NFR BLD AUTO: 2.4 % (ref 0.3–6.2)
ERYTHROCYTE [DISTWIDTH] IN BLOOD BY AUTOMATED COUNT: 14.2 % (ref 11.5–14.5)
FOLATE SERPL-MCNC: 19.01 NG/ML (ref 4.78–24.2)
GFR SERPLBLD CREATININE-BSD FMLA CKD-EPI: 116 ML/MIN/1.73
GFR SERPLBLD CREATININE-BSD FMLA CKD-EPI: 96 ML/MIN/1.73
GLOBULIN SER CALC-MCNC: 2.9 GM/DL
GLUCOSE SERPL-MCNC: 87 MG/DL (ref 65–99)
HCT VFR BLD AUTO: 46.2 % (ref 40.4–52.2)
HGB BLD-MCNC: 15.2 G/DL (ref 13.7–17.6)
IMM GRANULOCYTES # BLD: 0.06 10*3/MM3 (ref 0–0.03)
IMM GRANULOCYTES NFR BLD: 0.7 % (ref 0–0.5)
LYMPHOCYTES # BLD AUTO: 1.75 10*3/MM3 (ref 0.9–4.8)
LYMPHOCYTES NFR BLD AUTO: 21.4 % (ref 19.6–45.3)
MCH RBC QN AUTO: 33.4 PG (ref 27–32.7)
MCHC RBC AUTO-ENTMCNC: 32.9 G/DL (ref 32.6–36.4)
MCV RBC AUTO: 101.5 FL (ref 79.8–96.2)
MONOCYTES # BLD AUTO: 0.9 10*3/MM3 (ref 0.2–1.2)
MONOCYTES NFR BLD AUTO: 11 % (ref 5–12)
NEUTROPHILS # BLD AUTO: 5.28 10*3/MM3 (ref 1.9–8.1)
NEUTROPHILS NFR BLD AUTO: 64.6 % (ref 42.7–76)
PLATELET # BLD AUTO: 183 10*3/MM3 (ref 140–500)
POTASSIUM SERPL-SCNC: 5 MMOL/L (ref 3.5–5.2)
PROT SERPL-MCNC: 7.3 G/DL (ref 6–8.5)
RBC # BLD AUTO: 4.55 10*6/MM3 (ref 4.6–6)
SODIUM SERPL-SCNC: 143 MMOL/L (ref 136–145)
TSH SERPL DL<=0.005 MIU/L-ACNC: 1.68 MIU/ML (ref 0.27–4.2)
VIT B12 SERPL-MCNC: 586 PG/ML (ref 211–946)
WBC # BLD AUTO: 8.18 10*3/MM3 (ref 4.5–10.7)

## 2018-06-08 RX ORDER — DOXYCYCLINE 50 MG/1
100 CAPSULE ORAL EVERY 12 HOURS SCHEDULED
Qty: 28 CAPSULE | Refills: 0 | Status: SHIPPED | OUTPATIENT
Start: 2018-06-08 | End: 2018-06-08

## 2018-06-08 RX ORDER — AMOXICILLIN 500 MG/1
500 CAPSULE ORAL 3 TIMES DAILY
Qty: 30 CAPSULE | Refills: 0 | Status: SHIPPED | OUTPATIENT
Start: 2018-06-08 | End: 2018-12-10

## 2018-06-08 RX ORDER — VALACYCLOVIR HYDROCHLORIDE 500 MG/1
500 TABLET, FILM COATED ORAL 2 TIMES DAILY
Qty: 10 TABLET | Refills: 0 | Status: SHIPPED | OUTPATIENT
Start: 2018-06-08 | End: 2019-01-04

## 2018-06-11 ENCOUNTER — HOSPITAL ENCOUNTER (OUTPATIENT)
Dept: CARDIOLOGY | Facility: HOSPITAL | Age: 63
Discharge: HOME | End: 2018-06-11
Attending: FAMILY MEDICINE | Admitting: FAMILY MEDICINE

## 2018-06-11 VITALS
DIASTOLIC BLOOD PRESSURE: 64 MMHG | BODY MASS INDEX: 22.76 KG/M2 | SYSTOLIC BLOOD PRESSURE: 130 MMHG | WEIGHT: 145 LBS | HEART RATE: 70 BPM | HEIGHT: 67 IN

## 2018-06-11 DIAGNOSIS — B33.24 VIRAL CARDIOMYOPATHY (HCC): ICD-10-CM

## 2018-06-11 LAB
BH CV ECHO MEAS - ACS: 2.1 CM
BH CV ECHO MEAS - AO MAX PG (FULL): 3.4 MMHG
BH CV ECHO MEAS - AO MAX PG: 11.2 MMHG
BH CV ECHO MEAS - AO MEAN PG (FULL): 2.1 MMHG
BH CV ECHO MEAS - AO MEAN PG: 5.7 MMHG
BH CV ECHO MEAS - AO ROOT AREA (BSA CORRECTED): 1.9
BH CV ECHO MEAS - AO ROOT AREA: 9.1 CM^2
BH CV ECHO MEAS - AO ROOT DIAM: 3.4 CM
BH CV ECHO MEAS - AO V2 MAX: 167.4 CM/SEC
BH CV ECHO MEAS - AO V2 MEAN: 110.3 CM/SEC
BH CV ECHO MEAS - AO V2 VTI: 35 CM
BH CV ECHO MEAS - AVA(I,A): 2.6 CM^2
BH CV ECHO MEAS - AVA(I,D): 2.6 CM^2
BH CV ECHO MEAS - AVA(V,A): 2.3 CM^2
BH CV ECHO MEAS - AVA(V,D): 2.3 CM^2
BH CV ECHO MEAS - BSA(HAYCOCK): 1.8 M^2
BH CV ECHO MEAS - BSA: 1.8 M^2
BH CV ECHO MEAS - BZI_BMI: 22.7 KILOGRAMS/M^2
BH CV ECHO MEAS - BZI_METRIC_HEIGHT: 170.2 CM
BH CV ECHO MEAS - BZI_METRIC_WEIGHT: 65.8 KG
BH CV ECHO MEAS - CONTRAST EF (2CH): 67.2 ML/M^2
BH CV ECHO MEAS - CONTRAST EF 4CH: 67.6 ML/M^2
BH CV ECHO MEAS - EDV(MOD-SP2): 64 ML
BH CV ECHO MEAS - EDV(MOD-SP4): 68 ML
BH CV ECHO MEAS - EDV(TEICH): 133.1 ML
BH CV ECHO MEAS - EF(CUBED): 76.9 %
BH CV ECHO MEAS - EF(MOD-BP): 68 %
BH CV ECHO MEAS - EF(MOD-SP2): 67.2 %
BH CV ECHO MEAS - EF(MOD-SP4): 67.6 %
BH CV ECHO MEAS - EF(TEICH): 68.6 %
BH CV ECHO MEAS - ESV(MOD-SP2): 21 ML
BH CV ECHO MEAS - ESV(MOD-SP4): 22 ML
BH CV ECHO MEAS - ESV(TEICH): 41.8 ML
BH CV ECHO MEAS - FS: 38.7 %
BH CV ECHO MEAS - IVS/LVPW: 0.96
BH CV ECHO MEAS - IVSD: 0.92 CM
BH CV ECHO MEAS - LAT PEAK E' VEL: 8 CM/SEC
BH CV ECHO MEAS - LV DIASTOLIC VOL/BSA (35-75): 38.6 ML/M^2
BH CV ECHO MEAS - LV MASS(C)D: 182.8 GRAMS
BH CV ECHO MEAS - LV MASS(C)DI: 103.6 GRAMS/M^2
BH CV ECHO MEAS - LV MAX PG: 7.8 MMHG
BH CV ECHO MEAS - LV MEAN PG: 3.6 MMHG
BH CV ECHO MEAS - LV SYSTOLIC VOL/BSA (12-30): 12.5 ML/M^2
BH CV ECHO MEAS - LV V1 MAX: 139.4 CM/SEC
BH CV ECHO MEAS - LV V1 MEAN: 87.1 CM/SEC
BH CV ECHO MEAS - LV V1 VTI: 32.8 CM
BH CV ECHO MEAS - LVIDD: 5.3 CM
BH CV ECHO MEAS - LVIDS: 3.2 CM
BH CV ECHO MEAS - LVLD AP2: 6.5 CM
BH CV ECHO MEAS - LVLD AP4: 6.5 CM
BH CV ECHO MEAS - LVLS AP2: 5.3 CM
BH CV ECHO MEAS - LVLS AP4: 5.1 CM
BH CV ECHO MEAS - LVOT AREA (M): 2.8 CM^2
BH CV ECHO MEAS - LVOT AREA: 2.8 CM^2
BH CV ECHO MEAS - LVOT DIAM: 1.9 CM
BH CV ECHO MEAS - LVPWD: 0.96 CM
BH CV ECHO MEAS - MED PEAK E' VEL: 5 CM/SEC
BH CV ECHO MEAS - MV A DUR: 0.11 SEC
BH CV ECHO MEAS - MV A MAX VEL: 71.8 CM/SEC
BH CV ECHO MEAS - MV DEC SLOPE: 331.5 CM/SEC^2
BH CV ECHO MEAS - MV DEC TIME: 0.24 SEC
BH CV ECHO MEAS - MV E MAX VEL: 82 CM/SEC
BH CV ECHO MEAS - MV E/A: 1.1
BH CV ECHO MEAS - MV MAX PG: 3.2 MMHG
BH CV ECHO MEAS - MV MEAN PG: 1.4 MMHG
BH CV ECHO MEAS - MV P1/2T MAX VEL: 81.5 CM/SEC
BH CV ECHO MEAS - MV P1/2T: 72 MSEC
BH CV ECHO MEAS - MV V2 MAX: 90.1 CM/SEC
BH CV ECHO MEAS - MV V2 MEAN: 55.8 CM/SEC
BH CV ECHO MEAS - MV V2 VTI: 24.5 CM
BH CV ECHO MEAS - MVA P1/2T LCG: 2.7 CM^2
BH CV ECHO MEAS - MVA(P1/2T): 3.1 CM^2
BH CV ECHO MEAS - MVA(VTI): 3.7 CM^2
BH CV ECHO MEAS - PA MAX PG (FULL): 2 MMHG
BH CV ECHO MEAS - PA MAX PG: 4.6 MMHG
BH CV ECHO MEAS - PA V2 MAX: 107.2 CM/SEC
BH CV ECHO MEAS - PULM A REVS DUR: 0.11 SEC
BH CV ECHO MEAS - PULM A REVS VEL: 43.2 CM/SEC
BH CV ECHO MEAS - PULM DIAS VEL: 40.7 CM/SEC
BH CV ECHO MEAS - PULM S/D: 1.4
BH CV ECHO MEAS - PULM SYS VEL: 58.7 CM/SEC
BH CV ECHO MEAS - PVA(V,A): 2.8 CM^2
BH CV ECHO MEAS - PVA(V,D): 2.8 CM^2
BH CV ECHO MEAS - QP/QS: 0.61
BH CV ECHO MEAS - RV MAX PG: 2.6 MMHG
BH CV ECHO MEAS - RV MEAN PG: 1.6 MMHG
BH CV ECHO MEAS - RV V1 MAX: 81 CM/SEC
BH CV ECHO MEAS - RV V1 MEAN: 59.2 CM/SEC
BH CV ECHO MEAS - RV V1 VTI: 15 CM
BH CV ECHO MEAS - RVOT AREA: 3.7 CM^2
BH CV ECHO MEAS - RVOT DIAM: 2.2 CM
BH CV ECHO MEAS - SI(AO): 179.5 ML/M^2
BH CV ECHO MEAS - SI(CUBED): 63.6 ML/M^2
BH CV ECHO MEAS - SI(LVOT): 51.3 ML/M^2
BH CV ECHO MEAS - SI(MOD-SP2): 24.4 ML/M^2
BH CV ECHO MEAS - SI(MOD-SP4): 26.1 ML/M^2
BH CV ECHO MEAS - SI(TEICH): 51.8 ML/M^2
BH CV ECHO MEAS - SUP REN AO DIAM: 2 CM
BH CV ECHO MEAS - SV(AO): 316.6 ML
BH CV ECHO MEAS - SV(CUBED): 112.1 ML
BH CV ECHO MEAS - SV(LVOT): 90.5 ML
BH CV ECHO MEAS - SV(MOD-SP2): 43 ML
BH CV ECHO MEAS - SV(MOD-SP4): 46 ML
BH CV ECHO MEAS - SV(RVOT): 55.1 ML
BH CV ECHO MEAS - SV(TEICH): 91.3 ML
BH CV ECHO MEAS - TAPSE (>1.6): 2.9 CM2
BH CV ECHO MEASUREMENTS AVERAGE E/E' RATIO: 12.62
BH CV XLRA - RV BASE: 2.8 CM
BH CV XLRA - TDI S': 11 CM/SEC
LEFT ATRIUM VOLUME INDEX: 25 ML/M2
LV EF 2D ECHO EST: 68 %
MAXIMAL PREDICTED HEART RATE: 159 BPM
SINUS: 3.5 CM
STJ: 3 CM
STRESS TARGET HR: 135 BPM

## 2018-06-11 PROCEDURE — 93306 TTE W/DOPPLER COMPLETE: CPT | Performed by: INTERNAL MEDICINE

## 2018-06-11 PROCEDURE — 93306 TTE W/DOPPLER COMPLETE: CPT

## 2018-06-12 ENCOUNTER — TELEPHONE (OUTPATIENT)
Dept: INTERNAL MEDICINE | Facility: CLINIC | Age: 63
End: 2018-06-12

## 2018-06-12 NOTE — TELEPHONE ENCOUNTER
Please call pt about labs comp 6/7/18 and echocardiogram comp  6/11/18 leave a message if he does not

## 2018-06-21 RX ORDER — BUDESONIDE AND FORMOTEROL FUMARATE DIHYDRATE 160; 4.5 UG/1; UG/1
AEROSOL RESPIRATORY (INHALATION)
Qty: 10.2 G | Refills: 3 | Status: SHIPPED | OUTPATIENT
Start: 2018-06-21 | End: 2018-10-10 | Stop reason: SDUPTHER

## 2018-07-05 ENCOUNTER — OFFICE VISIT (OUTPATIENT)
Dept: SLEEP MEDICINE | Facility: HOSPITAL | Age: 63
End: 2018-07-05
Attending: FAMILY MEDICINE

## 2018-07-05 VITALS
DIASTOLIC BLOOD PRESSURE: 75 MMHG | HEIGHT: 67 IN | BODY MASS INDEX: 23.07 KG/M2 | HEART RATE: 75 BPM | WEIGHT: 147 LBS | OXYGEN SATURATION: 95 % | SYSTOLIC BLOOD PRESSURE: 115 MMHG

## 2018-07-05 DIAGNOSIS — R06.81 WITNESSED APNEIC SPELLS: ICD-10-CM

## 2018-07-05 DIAGNOSIS — G47.10 HYPERSOMNIA: Primary | ICD-10-CM

## 2018-07-05 DIAGNOSIS — J44.9 CHRONIC OBSTRUCTIVE PULMONARY DISEASE, UNSPECIFIED COPD TYPE (HCC): ICD-10-CM

## 2018-07-05 PROCEDURE — G0463 HOSPITAL OUTPT CLINIC VISIT: HCPCS

## 2018-07-05 NOTE — PROGRESS NOTES
[facility]  Telephone: [phone] / Fax: [phone] [place]  Telephone: [phone] / Fax: [phone] [place]    Referring Physician:  Suhail Pineda MD PCP:  Primary Care Provider Suhail Pineda MD    Reason for consult:  sleep apnea    Gonzales Kaur Sr. is a 62 y.o.male  was seen in the Sleep Disorders Center today for evaluation of sleep apnea. He goes to bed at 10pm and is up at 5:30am. He falls asleep within 10min and wakes up feeling tired in the morning.  His wife reports that he snores loudly and is struggling to breathe at night. He wakes himself up gasping/choking from sleep. He reports no weight fluctuation.     He has COPD and is on Symbicort and Tudorza. He is seeing Dr. Pineda for COPD. He is a current smoker-but has decreased to 1 or less cig per day,  He did smoke 1ppd age [age].     ROS- + nasal congestion, + post nasal drip, + cough, + shortness of breath, + excessive thirst.    Gonzales Kaur Sr.  has a past medical history of Allergic; BPH (benign prostatic hyperplasia); COPD (chronic obstructive pulmonary disease) (CMS/[facility]); and Tobacco use disorder.    Current Medications:    Current Outpatient Prescriptions:   •  aclidinium bromide (TUDORZA PRESSAIR) 400 MCG/ACT aerosol powder  powder for inhalation, Inhale 1 puff 2 (Two) Times a Day., Disp: 1 each, Rfl: 5  •  amoxicillin (AMOXIL) 500 MG capsule, Take 1 capsule by mouth 3 (Three) Times a Day., Disp: 30 capsule, Rfl: 0  •  ANDROGEL PUMP 20.25 MG/ACT (1.62%) gel, Apply  topically Daily., Disp: , Rfl: 0  •  Azelastine-Fluticasone (DYMISTA) 137-50 MCG/ACT suspension, 1 spray into each nostril 2 (Two) Times a Day., Disp: 1 bottle, Rfl: 11  •  ciprofloxacin (CIPRO) 500 MG tablet, Take 1 tablet by mouth 2 (Two) Times a Day., Disp: 14 tablet, Rfl: 0  •  phenazopyridine (PYRIDIUM) 100 MG tablet, Take 1 tablet by mouth 3 (Three) Times a Day As Needed for bladder spasms., Disp: 9 tablet, Rfl: 0  •   "sildenafil (REVATIO) 20 MG tablet, Take 1 tablet by mouth 3 (Three) Times a Day., Disp: 90 tablet, Rfl: 5  •  sildenafil (VIAGRA) 100 MG tablet, Take  by mouth As Needed., Disp: , Rfl:   •  SYMBICORT 160-4.5 MCG/ACT inhaler, inhale 1 puff by mouth twice a day, Disp: 10.2 g, Rfl: 3  •  tamsulosin (FLOMAX) 0.4 MG capsule 24 hr capsule, take 1 capsule by mouth twice a day, Disp: 60 capsule, Rfl: 11  •  valACYclovir (VALTREX) 500 MG tablet, Take 1 tablet by mouth 2 (Two) Times a Day., Disp: 10 tablet, Rfl: 0    I have reviewed Past Medical History, Past Surgical History, Medication List, Social History and Family History as entered in Sleep Questionnaire and EPIC.    ESS  7   Vital Signs /75 (BP Location: Left arm, Patient Position: Sitting)   Pulse 75   Ht 170.2 cm (67\")   Wt 66.7 kg (147 lb)   SpO2 95%   BMI 23.02 kg/m²  Body mass index is 23.02 kg/m².    General Alert and oriented. No acute distress noted   Pharynx/Throat Class IV Mallampati airway, large tongue, no evidence of redundant lateral pharyngeal tissue. No oral lesions. No thrush. Moist mucous membranes..   Head Normocephalic. Symmetrical. Atraumatic.    Nose No septal deviation. No drainage   Chest Wall Normal shape. Symmetric expansion with respiration. No tenderness.   Neck Trachea midline, no thyromegaly or adenopathy    Lungs Clear to auscultation bilaterally. No wheezes. No rhonchi. No rales. Respirations regular, even and unlabored.   Heart Regular rhythm and normal rate. Normal S1 and S2. No murmur   Abdomen Soft, non-tender and non-distended. Normal bowel sounds. No masses.   Extremities Moves all extremities well. No edema   Psychiatric Normal mood and affect.       Impression:  1. Hypersomnia    2. Witnessed apneic spells    3. Chronic obstructive pulmonary disease, unspecified COPD type (CMS/HCC)          Plan:  I discussed the pathophysiology of obstructive sleep apnea with the patient.  We discussed the adverse outcomes associated " with untreated sleep-disordered breathing.  We discussed treatment modalities of obstructive sleep apnea including CPAP device as well as oral mandibular advancement device. Sleep study will be scheduled to establish definitive diagnosis of sleep disorder breathing.  Weight loss will be strongly beneficial in order to reduce the severity of sleep-disordered breathing.  Patient has narrow oropharyngeal structure.  Caution during activities that require prolonged concentration is strongly advised.  Patient will be notified of sleep study results after sleep study is completed.  If sleep apnea is only mild,  oral mandibular advancement device may be one of the treatment options.  However if sleep apnea is moderately severe, CPAP treatment will be strongly encouraged.  The patient is not opposed to treatment with CPAP device if we confirm significant obstructive sleep apnea on polysomnography.     I gave him proair inhaler to use prn. Continue Symbicort and Tudorza.    Thank you for allowing me to participate in your patient's care.    The patient will follow up with Dr. Buenrostro after completion of polysomnography.    SHAJI Reardon  [facility]  Phone: [phone]      Part of this note may be an electronic transcription/translation of spoken language to printed text using the Dragon Dictation System. Some errors may exist even though the document was edited.

## 2018-07-09 RX ORDER — ACLIDINIUM BROMIDE 400 UG/1
POWDER, METERED RESPIRATORY (INHALATION)
Qty: 1 EACH | Refills: 2 | Status: SHIPPED | OUTPATIENT
Start: 2018-07-09 | End: 2018-10-10 | Stop reason: SDUPTHER

## 2018-07-20 ENCOUNTER — HOSPITAL ENCOUNTER (OUTPATIENT)
Dept: SLEEP MEDICINE | Facility: HOSPITAL | Age: 63
Discharge: HOME | End: 2018-07-20
Admitting: NURSE PRACTITIONER

## 2018-07-20 DIAGNOSIS — R06.81 WITNESSED APNEIC SPELLS: ICD-10-CM

## 2018-07-20 DIAGNOSIS — G47.10 HYPERSOMNIA: ICD-10-CM

## 2018-07-20 PROCEDURE — 95806 SLEEP STUDY UNATT&RESP EFFT: CPT

## 2018-08-08 ENCOUNTER — TELEPHONE (OUTPATIENT)
Dept: SLEEP MEDICINE | Facility: HOSPITAL | Age: 63
End: 2018-08-08

## 2018-08-08 NOTE — TELEPHONE ENCOUNTER
Tech called home # no answer and no vm available. Tech then called mobile #, no answer. Tech left vm informing pt that study results are in and Dr. Buenrostro would like to F/U with patient to discuss testing results. Patient to call back to schedule F/U appt. MAB

## 2018-08-17 ENCOUNTER — OFFICE VISIT (OUTPATIENT)
Dept: SLEEP MEDICINE | Facility: HOSPITAL | Age: 63
End: 2018-08-17
Attending: INTERNAL MEDICINE

## 2018-08-17 VITALS
OXYGEN SATURATION: 93 % | BODY MASS INDEX: 22.7 KG/M2 | HEIGHT: 67 IN | DIASTOLIC BLOOD PRESSURE: 70 MMHG | HEART RATE: 72 BPM | WEIGHT: 144.6 LBS | SYSTOLIC BLOOD PRESSURE: 125 MMHG

## 2018-08-17 DIAGNOSIS — G47.33 OSA (OBSTRUCTIVE SLEEP APNEA): Primary | ICD-10-CM

## 2018-08-17 DIAGNOSIS — F17.200 CURRENT SMOKER: ICD-10-CM

## 2018-08-17 DIAGNOSIS — G47.10 HYPERSOMNIA: ICD-10-CM

## 2018-08-17 DIAGNOSIS — J44.9 CHRONIC OBSTRUCTIVE PULMONARY DISEASE, UNSPECIFIED COPD TYPE (HCC): ICD-10-CM

## 2018-08-17 PROCEDURE — G0463 HOSPITAL OUTPT CLINIC VISIT: HCPCS

## 2018-08-17 RX ORDER — ZOLPIDEM TARTRATE 5 MG/1
TABLET ORAL
Qty: 2 TABLET | Refills: 0 | Status: SHIPPED | OUTPATIENT
Start: 2018-08-17 | End: 2019-01-04

## 2018-08-17 NOTE — PROGRESS NOTES
[facility] SLEEP MEDICINE  [address]  3rd Floor  [place] [address]  [phone]    PCP: Suhail Pineda MD    Reason for visit:  Sleep disorders: Snoring    Gonzales is a 62 y.o.male who was seen in the Sleep Disorders Center today. His snoring has been long standing. He reports EDS with ESS 16. He sleeps from 10p to 5:30a. He wakes up feeling okay. He feels tired during day. He reports SOA only with severe exertion. He reports wheezing with smoking. Has nasal congestion. He is on inhalers through PCP for COPD.    [place] Sleepiness Scale is 16.     SH- no caffeine during the day. Alcohol - 0 per week.    Gonzales  reports that he has been smoking Cigarettes.  He has never used smokeless tobacco.    Pertinent Positive Review of Systems of nasal congestion, SOA, wheezing, cough  Rest of Review of Systems was negative as recorded in Sleep Questionnaire.    Patient  has a past medical history of Allergic; BPH (benign prostatic hyperplasia); COPD (chronic obstructive pulmonary disease) (CMS/[facility]); and Tobacco use disorder.     Current Medications:    Current Outpatient Prescriptions:   •  amoxicillin (AMOXIL) 500 MG capsule, Take 1 capsule by mouth 3 (Three) Times a Day., Disp: 30 capsule, Rfl: 0  •  ANDROGEL PUMP 20.25 MG/ACT (1.62%) gel, Apply  topically Daily., Disp: , Rfl: 0  •  Azelastine-Fluticasone (DYMISTA) 137-50 MCG/ACT suspension, 1 spray into each nostril 2 (Two) Times a Day., Disp: 1 bottle, Rfl: 11  •  ciprofloxacin (CIPRO) 500 MG tablet, Take 1 tablet by mouth 2 (Two) Times a Day., Disp: 14 tablet, Rfl: 0  •  phenazopyridine (PYRIDIUM) 100 MG tablet, Take 1 tablet by mouth 3 (Three) Times a Day As Needed for bladder spasms., Disp: 9 tablet, Rfl: 0  •  sildenafil (REVATIO) 20 MG tablet, Take 1 tablet by mouth 3 (Three) Times a Day., Disp: 90 tablet, Rfl: 5  •  sildenafil (VIAGRA) 100 MG tablet, Take  by mouth As Needed., Disp: , Rfl:   •  SYMBICORT 160-4.5 MCG/ACT inhaler, inhale 1  "puff by mouth twice a day, Disp: 10.2 g, Rfl: 3  •  tamsulosin (FLOMAX) 0.4 MG capsule 24 hr capsule, take 1 capsule by mouth twice a day, Disp: 60 capsule, Rfl: 11  •  TUDORZA PRESSAIR 400 MCG/ACT aerosol powder  powder for inhalation, inhale 1 puff by mouth twice a day, Disp: 1 each, Rfl: 2  •  valACYclovir (VALTREX) 500 MG tablet, Take 1 tablet by mouth 2 (Two) Times a Day., Disp: 10 tablet, Rfl: 0   also entered in Sleep Questionnaire         Vital Signs: /70 (BP Location: Left arm, Patient Position: Sitting)   Pulse 72   Ht 170.2 cm (67\")   Wt 65.6 kg (144 lb 9.6 oz)   SpO2 93%   BMI 22.65 kg/m²     Body mass index is 22.65 kg/m².       Tongue: normal      Dentition: good       Pharynx: Posterior pharyngeal pillars are narrow   Mallampatti: III (soft and hard palate and base of uvula visible)        General: Alert. Cooperative. Well developed. No acute distress.             Head:  Normocephalic. Symmetrical. Atraumatic.              Nose: No septal deviation. No drainage.          Throat: No oral lesions. No thrush. Moist mucous membranes.    Chest Wall:  Normal shape. Symmetric expansion with respiration. No tenderness.             Neck:  Trachea midline.           Lungs:  Clear to auscultation bilaterally. No wheezes. No rhonchi. No rales. Respirations regular, even and unlabored.            Heart:  Regular rhythm and normal rate. Normal S1 and S2. No murmur.     Abdomen:  Soft, non-tender and non-distended. Normal bowel sounds. No masses.  Extremities:  Moves all extremities well. No edema.    Psychiatric: Normal mood and affect.    Study:  8/2/18- Diagnostic findings: The apnea hypopneas index(AHI) was 1.5 per sleep hour.  The AHI during supine position was 3.6 per sleep hour. Mean heart rate of 59.2 BPM.  Snoring was noted 42.8% of sleep time. Lowest oxygen saturation during the study was 87%. Saturation below 89% was noted for 2.8 mins.     Testing:  · none    DME Company: " none    Impression:  1. MICHAEL (obstructive sleep apnea)    2. Hypersomnia    3. Chronic obstructive pulmonary disease, unspecified COPD type (CMS/HCC)    4. Current smoker        Plan:  Gonzales has no evidence of MICHAEL on HST. However, he did not sleep in supine position during the study. He has significant daytime sleepiness. His wife observes him having difficulty with breathing during sleep. Will check PSG in the lab. He also has COPD. He was advised to quit smoking. He will continue managing the COPD with Dr. Pineda.    Patient will follow up in this clinic after in lab study.    Thank you for allowing me to participate in your patient's care.    Adams Buenrostro MD    Part of this note may be an electronic transcription/translation of spoken language to printed text using the Dragon Dictation System.

## 2018-09-16 ENCOUNTER — HOSPITAL ENCOUNTER (OUTPATIENT)
Dept: SLEEP MEDICINE | Facility: HOSPITAL | Age: 63
Discharge: HOME | End: 2018-09-16
Attending: INTERNAL MEDICINE | Admitting: INTERNAL MEDICINE

## 2018-09-16 DIAGNOSIS — G47.33 OSA (OBSTRUCTIVE SLEEP APNEA): ICD-10-CM

## 2018-09-16 PROCEDURE — 95810 POLYSOM 6/> YRS 4/> PARAM: CPT

## 2018-09-25 ENCOUNTER — TELEPHONE (OUTPATIENT)
Dept: SLEEP MEDICINE | Facility: HOSPITAL | Age: 63
End: 2018-09-25

## 2018-09-25 NOTE — TELEPHONE ENCOUNTER
Tech left message for pt to CB to discuss sleep study results and available treatment options. OMAD + HST vs AutoCPAP trial.  See Split titration results (REM related MICHAEL). jbo

## 2018-09-25 NOTE — TELEPHONE ENCOUNTER
Pt called back and tech went over study results and treatment options. Pt and spouse were both on phone and both could not figure what therapy would be most beneficial. Tech scheduled Follow up appt with sleep Dr to go over study results and again and treatment options. MAB

## 2018-10-10 RX ORDER — ACLIDINIUM BROMIDE 400 UG/1
POWDER, METERED RESPIRATORY (INHALATION)
Qty: 1 EACH | Refills: 2 | Status: SHIPPED | OUTPATIENT
Start: 2018-10-10 | End: 2019-01-12 | Stop reason: SDUPTHER

## 2018-10-10 RX ORDER — BUDESONIDE AND FORMOTEROL FUMARATE DIHYDRATE 160; 4.5 UG/1; UG/1
AEROSOL RESPIRATORY (INHALATION)
Qty: 10.2 G | Refills: 3 | Status: SHIPPED | OUTPATIENT
Start: 2018-10-10 | End: 2019-02-11 | Stop reason: SDUPTHER

## 2018-10-26 ENCOUNTER — OFFICE VISIT (OUTPATIENT)
Dept: SLEEP MEDICINE | Facility: HOSPITAL | Age: 63
End: 2018-10-26
Attending: INTERNAL MEDICINE

## 2018-10-26 VITALS
WEIGHT: 148 LBS | SYSTOLIC BLOOD PRESSURE: 126 MMHG | HEIGHT: 67 IN | BODY MASS INDEX: 23.23 KG/M2 | DIASTOLIC BLOOD PRESSURE: 62 MMHG | HEART RATE: 65 BPM

## 2018-10-26 DIAGNOSIS — G47.33 OBSTRUCTIVE SLEEP APNEA: Primary | ICD-10-CM

## 2018-10-26 DIAGNOSIS — Z72.0 TOBACCO ABUSE: ICD-10-CM

## 2018-10-26 DIAGNOSIS — J44.9 CHRONIC OBSTRUCTIVE PULMONARY DISEASE, UNSPECIFIED COPD TYPE (HCC): ICD-10-CM

## 2018-10-26 PROCEDURE — G0463 HOSPITAL OUTPT CLINIC VISIT: HCPCS

## 2018-10-26 NOTE — PROGRESS NOTES
[facility] SLEEP MEDICINE  [address]  3rd Floor  [place] [address]  [phone]    PCP: Suhail Pineda MD    Reason for visit:  Sleep disorders: MICHAEL    Gonzales is a 63 y.o.male who was seen in the Sleep Disorders Center today. MICHAEL by RDI criteria, here to discuss PSG. He sleeps from 10p to 5:30a. He wakes up feeling tired and un-refreshed.  [place] Sleepiness Scale is 11. Caffeine 0 per day. Alcohol 0 per week.    Gonzales  reports that he has been smoking Cigarettes.  He started smoking about 42 years ago. He has been smoking about 1.00 pack per day. He has never used smokeless tobacco.    Pertinent Positive Review of Systems of nasal congestion, PND, SOA, wheezing, cough  Rest of Review of Systems was negative as recorded in Sleep Questionnaire.    Patient  has a past medical history of Allergic; BPH (benign prostatic hyperplasia); COPD (chronic obstructive pulmonary disease) (CMS/[facility]); and Tobacco use disorder.     Current Medications:    Current Outpatient Prescriptions:   •  amoxicillin (AMOXIL) 500 MG capsule, Take 1 capsule by mouth 3 (Three) Times a Day., Disp: 30 capsule, Rfl: 0  •  ANDROGEL PUMP 20.25 MG/ACT (1.62%) gel, Apply  topically Daily., Disp: , Rfl: 0  •  Azelastine-Fluticasone (DYMISTA) 137-50 MCG/ACT suspension, 1 spray into each nostril 2 (Two) Times a Day., Disp: 1 bottle, Rfl: 11  •  ciprofloxacin (CIPRO) 500 MG tablet, Take 1 tablet by mouth 2 (Two) Times a Day., Disp: 14 tablet, Rfl: 0  •  phenazopyridine (PYRIDIUM) 100 MG tablet, Take 1 tablet by mouth 3 (Three) Times a Day As Needed for bladder spasms., Disp: 9 tablet, Rfl: 0  •  sildenafil (REVATIO) 20 MG tablet, Take 1 tablet by mouth 3 (Three) Times a Day., Disp: 90 tablet, Rfl: 5  •  sildenafil (VIAGRA) 100 MG tablet, Take  by mouth As Needed., Disp: , Rfl:   •  SYMBICORT 160-4.5 MCG/ACT inhaler, inhale 1 puff by mouth twice a day, Disp: 10.2 g, Rfl: 3  •  tamsulosin (FLOMAX) 0.4 MG capsule 24 hr capsule, take  "1 capsule by mouth twice a day, Disp: 60 capsule, Rfl: 11  •  TUDORZA PRESSAIR 400 MCG/ACT aerosol powder  powder for inhalation, inhale 1 puff by mouth twice a day, Disp: 1 each, Rfl: 2  •  valACYclovir (VALTREX) 500 MG tablet, Take 1 tablet by mouth 2 (Two) Times a Day., Disp: 10 tablet, Rfl: 0  •  zolpidem (AMBIEN) 5 MG tablet, Bring to sleep lab DO NOT use at home. PLEASE take if not asleep within 30 mins of start of study., Disp: 2 tablet, Rfl: 0   also entered in Sleep Questionnaire         Vital Signs: /62   Pulse 65   Ht 170.2 cm (67\")   Wt 67.1 kg (148 lb)   BMI 23.18 kg/m²     Body mass index is 23.18 kg/m².       Tongue: large      Dentition: good       Pharynx: Posterior pharyngeal pillars are narrow   Mallampatti: IV (only hard palate visible)        General: Alert. Cooperative. Well developed. No acute distress.             Head:  Normocephalic. Symmetrical. Atraumatic.              Nose: No septal deviation. No drainage.          Throat: No oral lesions. No thrush. Moist mucous membranes.    Chest Wall:  Normal shape. Symmetric expansion with respiration. No tenderness.             Neck:  Trachea midline.           Lungs:  Clear to auscultation bilaterally. No wheezes. No rhonchi. No rales. Respirations regular, even and unlabored.   Decreased BS. Mild wheeze.            Heart:  Regular rhythm and normal rate. Normal S1 and S2. No murmur.     Abdomen:  Soft, non-tender and non-distended. Normal bowel sounds. No masses.  Extremities:  Moves all extremities well. No edema.    Psychiatric: Normal mood and affect.    Study:  · 8/2/18  Diagnostic findings: The apnea hypopneas index(AHI) was 1.5 per sleep hour.  The AHI during supine position was 3.6 per sleep hour. Mean heart rate of 59.2 BPM.  Snoring was noted 42.8% of sleep time. Lowest oxygen saturation during the study was 87%. Saturation below 89% was noted for 2.8 mins.   · 9/16/18  Overnight diagnostic polysomnogram study from 9:25 PM to " 3:39 AM.  Sleep efficiency poor at 71%.  However of note patient requested study to be ended early due to work schedule.  Sleep distribution shows reduced slow-wave sleep and REM sleep.  AHI index 1.3 with RDI 6.2.  Patient had 194 minutes of supine sleep.  AHI 1.9 with RDI 7.8.  In 17 minutes of REM sleep AHI 14.1 with RDI 17.6.  Arousal index 9.9.  Patient had 1.79% time snoring.    Testing:  · Not available    DME Company: Not yet set up    Impression:  1. Obstructive sleep apnea    2. Chronic obstructive pulmonary disease, unspecified COPD type (CMS/[facility])    3. Tobacco abuse        Plan:  Gonzales will try to get autoCPAP for MICHAEL by RDI criteria.  I would like to see if his daytime sleepiness improves with treatment.  Order will be faxed to DME.    Patient is a current smoker.  Audible wheezing heard on exam.  He was advised pulmonary follow-up.  Will obtain chest x-rays and PFTs in our office and thereafter follow-up with my nurse practitioner next week.  Necessity of quitting smoking was discussed.  Further aggressive management of COPD was discussed.    I reiterated the importance of effective treatment of obstructive sleep apnea with PAP machine.  Cardiovascular health risks of untreated sleep apnea were again reviewed.  Patient was asked to remain cautious if there is persistent hypersomnolence. The benefit of weight loss in reducing severity of obstructive sleep apnea was discussed.  Patient would benefit from adhering to a strict diet to achieve ideal BMI.     Change of PAP supplies regularly is important for effective use.  Change of cushion on the mask or plugs on nasal pillows along with disposable filters once every month and change of mask frame, tubing, headgear and Velcro straps every 6 months at the minimum was reiterated.    This patient is compliant with PAP machine and benefits from its use.  Apnea hypopneas index is corrected/improved.  Daytime hypersomnolence has resolved.     Patient will  follow up in this clinic in 2 months.    Thank you for allowing me to participate in your patient's care.    Adams Buenrostro MD    Part of this note may be an electronic transcription/translation of spoken language to printed text using the Dragon Dictation System.

## 2018-11-05 ENCOUNTER — TELEPHONE (OUTPATIENT)
Dept: SLEEP MEDICINE | Facility: HOSPITAL | Age: 63
End: 2018-11-05

## 2018-11-05 NOTE — TELEPHONE ENCOUNTER
Left another message on home number.  Reached pt on his mobile, but was told to contact his wife, who would choose DME.  Spoke to pt's wife who wanted to call [employer] before choose DME provider.  Asked her to CB with DME provider choice and to make follow up appt for compliance. [email or web address]

## 2018-11-09 ENCOUNTER — TELEPHONE (OUTPATIENT)
Dept: SLEEP MEDICINE | Facility: HOSPITAL | Age: 63
End: 2018-11-09

## 2018-11-09 NOTE — TELEPHONE ENCOUNTER
Left message for pt's wife asking if she has chosen a DME supplier per our conversation 11/5/2018.  She was asked to CB to let us know so that the set up can be sent out. [email or web address]

## 2018-11-12 ENCOUNTER — TELEPHONE (OUTPATIENT)
Dept: SLEEP MEDICINE | Facility: HOSPITAL | Age: 63
End: 2018-11-12

## 2018-11-12 NOTE — TELEPHONE ENCOUNTER
Tech called pt's wife again in hopes of getting pt's DME choice.  Per patient, tech was asked to defer to wife's decision as it pertains to CPAP set up.  Pt's wife states that they are unable to act on PAP set up until pt comes home on December 3, 2018.  Per Mrs Kaur, set up and compliance is impossible prior to his return.  Pt's wife insists that CPAP set up be postponed until pt returns to [place] 12/3/18. Pt's wife states that they will call us when they are willing to undertake treatment and request orders to be sent to a DME at that time.  Pt's wife would not commit to a dme choice or allow orders to be sent to an arbitrary participating supplier.  We will wait to hear from patient to move forward. [email or web address]

## 2018-11-30 ENCOUNTER — TELEPHONE (OUTPATIENT)
Dept: SLEEP MEDICINE | Facility: HOSPITAL | Age: 63
End: 2018-11-30

## 2018-11-30 NOTE — TELEPHONE ENCOUNTER
Pt's wife called in today to give [facility] her 's decision to go ahead with CPAP treatment as planned and requested [facility] as DME provider.  Paperwork faxed to [facility].  Pt's wife was reminded that pt will need to schedule follow up visit with MD for compliance.  Pt's wife declined to make appt today, as Mr. Kaur's schedule is very unpredictable.  Tech made certain pt's wife understood importance of follow up with MD and the timing of appt ideally. Pt's wife voiced understanding of all.  maryjo

## 2018-12-10 ENCOUNTER — OFFICE VISIT (OUTPATIENT)
Dept: INTERNAL MEDICINE | Facility: CLINIC | Age: 63
End: 2018-12-10

## 2018-12-10 VITALS
BODY MASS INDEX: 23.95 KG/M2 | WEIGHT: 152.6 LBS | SYSTOLIC BLOOD PRESSURE: 148 MMHG | HEIGHT: 67 IN | HEART RATE: 68 BPM | DIASTOLIC BLOOD PRESSURE: 81 MMHG | OXYGEN SATURATION: 93 % | RESPIRATION RATE: 16 BRPM | TEMPERATURE: 98.7 F

## 2018-12-10 DIAGNOSIS — I15.9 SECONDARY HYPERTENSION: Primary | ICD-10-CM

## 2018-12-10 PROCEDURE — 99213 OFFICE O/P EST LOW 20 MIN: CPT | Performed by: NURSE PRACTITIONER

## 2018-12-10 RX ORDER — LISINOPRIL 10 MG/1
10 TABLET ORAL DAILY
Qty: 40 TABLET | Refills: 0 | Status: SHIPPED | OUTPATIENT
Start: 2018-12-10 | End: 2019-01-04 | Stop reason: SDUPTHER

## 2018-12-10 NOTE — PROGRESS NOTES
"Subjective   Gonzales Kaur Sr. is a 63 y.o. male.   Chief Complaint   Patient presents with   • Hypertension       Patient presents for hypertension evaluation.  This is a 63-year-old male patient of Dr. Pineda with a history of COPD, BPH, tobacco use.  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  He states his blood pressure was in the upper 140s over 80s at that time.  He states that he has not been checking it at home, due to his wrist BP cuff being broken.  Today he presents in the office with blood pressure 148/81.  He denies symptoms of hypertension including shortness of breath, chest pain, visual disturbance, or headache.  He currently takes no medications for hypertension.  He denies swelling in the bilateral legs.  He does state that his diet has \"not been that good recently.\"  He states that he has been eating foods very high in fat and salt.  He states that he does not get exercise outside of work, he works on the [employer].  He denies development of any other new issues at this time.         The following portions of the patient's history were reviewed and updated as appropriate: allergies, current medications, past family history, past medical history, past social history, past surgical history and problem list.    Review of Systems   Constitutional: Negative for activity change, chills, fatigue, fever, unexpected weight gain and unexpected weight loss.   HENT: Negative for congestion, hearing loss, postnasal drip, sinus pressure, sneezing, sore throat and tinnitus.    Eyes: Negative for photophobia, pain and visual disturbance.   Respiratory: Negative for cough, chest tightness, shortness of breath and wheezing.    Cardiovascular: Negative for chest pain, palpitations and leg swelling.   Gastrointestinal: Negative for abdominal distention, abdominal pain, constipation, diarrhea, nausea and vomiting. " "  Endocrine: Negative for polydipsia, polyphagia and polyuria.   Genitourinary: Negative for dysuria, frequency, hematuria and urgency.   Neurological: Negative for dizziness, weakness, numbness and headache.   All other systems reviewed and are negative.      Objective    /81 (BP Location: Left arm, Patient Position: Sitting, Cuff Size: Small Adult)   Pulse 68   Temp 98.7 °F (37.1 °C) (Oral)   Resp 16   Ht 170.2 cm (67\")   Wt 69.2 kg (152 lb 9.6 oz)   SpO2 93%   BMI 23.90 kg/m²     Physical Exam   Constitutional: He is oriented to person, place, and time. He appears well-developed and well-nourished. No distress.   HENT:   Head: Normocephalic and atraumatic.   Right Ear: External ear normal.   Left Ear: External ear normal.   Nose: Nose normal.   Mouth/Throat: Oropharynx is clear and moist.   Eyes: Conjunctivae and EOM are normal. Pupils are equal, round, and reactive to light.   Neck: Normal range of motion. Neck supple.   Cardiovascular: Normal rate, regular rhythm, normal heart sounds and intact distal pulses. Exam reveals no gallop and no friction rub.   No murmur heard.  No edema to bilateral legs   Pulmonary/Chest: Effort normal and breath sounds normal. No stridor. No respiratory distress. He has no wheezes. He has no rales. He exhibits no tenderness.   Lungs are CTA bilaterally   Abdominal: Soft. Bowel sounds are normal. He exhibits no distension. There is no tenderness.   Musculoskeletal: Normal range of motion.   Neurological: He is alert and oriented to person, place, and time.   Skin: Skin is warm and dry. Capillary refill takes less than 2 seconds. He is not diaphoretic.   Psychiatric: He has a normal mood and affect. His behavior is normal. Judgment and thought content normal.   Nursing note and vitals reviewed.        Assessment/Plan   Gonzales was seen today for hypertension.    Diagnoses and all orders for this visit:    Secondary hypertension  -     lisinopril (PRINIVIL,ZESTRIL) 10 MG " tablet; Take 1 tablet by mouth Daily.      - We will start lisinopril 10 mg daily for hypertension.  Asked patient to monitor his blood pressure daily at home and record in a log for follow-up in one month.  We also discussed increasing exercise and making dietary modifications, including decreasing sodium and fat intake in the diet and increasing lean meats in place of red meat.    - Follow-up in 1 month on hypertension and lisinopril.  F/u up if symptoms persist or worsen.

## 2019-01-03 DIAGNOSIS — I15.9 SECONDARY HYPERTENSION: ICD-10-CM

## 2019-01-04 ENCOUNTER — OFFICE VISIT (OUTPATIENT)
Dept: INTERNAL MEDICINE | Facility: CLINIC | Age: 64
End: 2019-01-04

## 2019-01-04 VITALS
SYSTOLIC BLOOD PRESSURE: 115 MMHG | HEIGHT: 67 IN | TEMPERATURE: 98.7 F | RESPIRATION RATE: 16 BRPM | OXYGEN SATURATION: 94 % | WEIGHT: 152.8 LBS | DIASTOLIC BLOOD PRESSURE: 71 MMHG | HEART RATE: 82 BPM | BODY MASS INDEX: 23.98 KG/M2

## 2019-01-04 DIAGNOSIS — I10 ESSENTIAL HYPERTENSION: Primary | ICD-10-CM

## 2019-01-04 DIAGNOSIS — I15.9 SECONDARY HYPERTENSION: ICD-10-CM

## 2019-01-04 PROCEDURE — 99213 OFFICE O/P EST LOW 20 MIN: CPT | Performed by: NURSE PRACTITIONER

## 2019-01-04 RX ORDER — LISINOPRIL 10 MG/1
10 TABLET ORAL DAILY
Qty: 30 TABLET | Refills: 3 | Status: SHIPPED | OUTPATIENT
Start: 2019-01-04 | End: 2019-01-04 | Stop reason: SDUPTHER

## 2019-01-04 RX ORDER — LISINOPRIL 10 MG/1
TABLET ORAL
Qty: 30 TABLET | Refills: 3 | Status: SHIPPED | OUTPATIENT
Start: 2019-01-04 | End: 2019-09-16 | Stop reason: SDUPTHER

## 2019-01-04 NOTE — PROGRESS NOTES
Subjective   Gonzales Kaur Sr. is a 63 y.o. male.   Chief Complaint   Patient presents with   • Follow-up     BP 1 Month       Patient presents for one-month follow-up on hypertension.  This is a 63-year-old male patient of Dr. Pineda.  He was seen by me on 12/10/18 with complaints of blood pressure readings in the upper 140s and 150s systolically.  He previously had not been on any medication for blood pressure.  He was started on lisinopril 10 mg daily.  He has been taking his blood pressure every day, and in response to the medication has been consistently getting readings in the 120s over 60s and 70s.  He presents to the office today with a blood pressure of 115/71.  He reports excellent toleration of the medication, and reports no side effects.  He reports no abnormally high or low blood pressures since starting the medication.  He denies headache, chest pain, shortness of breath, visual changes.  He does state that he is trying to avoid salty foods.  He denies development of any other new issues today.         The following portions of the patient's history were reviewed and updated as appropriate: allergies, current medications, past family history, past medical history, past social history, past surgical history and problem list.    Review of Systems   Constitutional: Negative for activity change, chills, fatigue, fever, unexpected weight gain and unexpected weight loss.   HENT: Negative for congestion, hearing loss, postnasal drip, sinus pressure, sneezing, sore throat and tinnitus.    Eyes: Negative for photophobia, pain and visual disturbance.   Respiratory: Negative for cough, chest tightness, shortness of breath and wheezing.    Cardiovascular: Negative for chest pain, palpitations and leg swelling.   Gastrointestinal: Negative for abdominal distention, abdominal pain, constipation, diarrhea, nausea and vomiting.   Endocrine: Negative for polydipsia, polyphagia and polyuria.   Genitourinary:  "Negative for dysuria, frequency, hematuria and urgency.   Neurological: Negative for dizziness, weakness, numbness and headache.   All other systems reviewed and are negative.      Objective    /71 (BP Location: Left arm, Patient Position: Sitting, Cuff Size: Small Adult)   Pulse 82   Temp 98.7 °F (37.1 °C) (Oral)   Resp 16   Ht 170.2 cm (67\")   Wt 69.3 kg (152 lb 12.8 oz)   SpO2 94%   BMI 23.93 kg/m²     Physical Exam   Constitutional: He is oriented to person, place, and time. He appears well-developed and well-nourished. No distress.   HENT:   Head: Normocephalic and atraumatic.   Right Ear: External ear normal.   Left Ear: External ear normal.   Nose: Nose normal.   Mouth/Throat: Oropharynx is clear and moist. No oropharyngeal exudate.   Eyes: Conjunctivae and EOM are normal. Pupils are equal, round, and reactive to light.   Neck: Normal range of motion. Neck supple. No JVD present. No tracheal deviation present. No thyromegaly present.   Cardiovascular: Normal rate, regular rhythm, normal heart sounds and intact distal pulses. Exam reveals no gallop and no friction rub.   No murmur heard.  No swelling to bilateral lower legs   Pulmonary/Chest: Effort normal and breath sounds normal. No stridor. No respiratory distress. He has no wheezes. He has no rales. He exhibits no tenderness.   Lungs are CTA bilaterally   Musculoskeletal: Normal range of motion.   Lymphadenopathy:     He has no cervical adenopathy.   Neurological: He is alert and oriented to person, place, and time.   Skin: Skin is warm and dry. Capillary refill takes less than 2 seconds. He is not diaphoretic.   Psychiatric: He has a normal mood and affect. His behavior is normal. Judgment and thought content normal.   Nursing note and vitals reviewed.        Assessment/Plan   Gonzales was seen today for follow-up.    Diagnoses and all orders for this visit:    Essential hypertension    Secondary hypertension  -     lisinopril " (PRINIVIL,ZESTRIL) 10 MG tablet; Take 1 tablet by mouth Daily.      -Essential hypertension: Continue lisinopril 10 mg daily.  He is tolerating this well.  Continue to monitor blood pressure periodically at home, 3-4 times per week.  Report any abnormally high or low readings.  He has a follow-up scheduled with his PCP, Dr. Pineda, in March 2019.  Follow-up when necessary in the meantime.

## 2019-01-14 RX ORDER — ACLIDINIUM BROMIDE 400 UG/1
POWDER, METERED RESPIRATORY (INHALATION)
Qty: 1 EACH | Refills: 2 | Status: SHIPPED | OUTPATIENT
Start: 2019-01-14 | End: 2019-04-12 | Stop reason: SDUPTHER

## 2019-02-08 ENCOUNTER — TELEPHONE (OUTPATIENT)
Dept: SLEEP MEDICINE | Facility: HOSPITAL | Age: 64
End: 2019-02-08

## 2019-02-11 RX ORDER — BUDESONIDE AND FORMOTEROL FUMARATE DIHYDRATE 160; 4.5 UG/1; UG/1
AEROSOL RESPIRATORY (INHALATION)
Qty: 10.2 G | Refills: 3 | Status: SHIPPED | OUTPATIENT
Start: 2019-02-11 | End: 2019-06-07 | Stop reason: SDUPTHER

## 2019-02-28 ENCOUNTER — OFFICE VISIT (OUTPATIENT)
Dept: INTERNAL MEDICINE | Facility: CLINIC | Age: 64
End: 2019-02-28

## 2019-02-28 VITALS
TEMPERATURE: 97.2 F | HEART RATE: 70 BPM | SYSTOLIC BLOOD PRESSURE: 119 MMHG | BODY MASS INDEX: 25.74 KG/M2 | WEIGHT: 164 LBS | HEIGHT: 67 IN | DIASTOLIC BLOOD PRESSURE: 70 MMHG | RESPIRATION RATE: 16 BRPM | OXYGEN SATURATION: 93 %

## 2019-02-28 DIAGNOSIS — Z11.59 NEED FOR HEPATITIS C SCREENING TEST: ICD-10-CM

## 2019-02-28 DIAGNOSIS — I10 ESSENTIAL HYPERTENSION: ICD-10-CM

## 2019-02-28 DIAGNOSIS — J43.9 PULMONARY EMPHYSEMA, UNSPECIFIED EMPHYSEMA TYPE (HCC): ICD-10-CM

## 2019-02-28 DIAGNOSIS — J30.9 ALLERGIC RHINITIS, UNSPECIFIED SEASONALITY, UNSPECIFIED TRIGGER: Primary | ICD-10-CM

## 2019-02-28 PROCEDURE — 99214 OFFICE O/P EST MOD 30 MIN: CPT | Performed by: NURSE PRACTITIONER

## 2019-02-28 RX ORDER — FLUTICASONE PROPIONATE 50 MCG
1 SPRAY, SUSPENSION (ML) NASAL DAILY
Refills: 0 | COMMUNITY
Start: 2019-01-17 | End: 2019-02-28 | Stop reason: SDUPTHER

## 2019-02-28 RX ORDER — FLUTICASONE PROPIONATE 50 MCG
1 SPRAY, SUSPENSION (ML) NASAL DAILY
Qty: 15.8 ML | Refills: 2 | Status: SHIPPED | OUTPATIENT
Start: 2019-02-28 | End: 2022-06-29

## 2019-02-28 RX ORDER — ALBUTEROL SULFATE 90 UG/1
2 AEROSOL, METERED RESPIRATORY (INHALATION)
COMMUNITY

## 2019-02-28 RX ORDER — MONTELUKAST SODIUM 10 MG/1
10 TABLET ORAL NIGHTLY
Qty: 30 TABLET | Refills: 1 | Status: SHIPPED | OUTPATIENT
Start: 2019-02-28 | End: 2023-02-02

## 2019-02-28 NOTE — PROGRESS NOTES
Subjective   Gonzales Kaur Sr. is a 63 y.o. male.   Chief Complaint   Patient presents with   • Follow-up     6 MONTHS        Patient presents for six-month follow-up.  This is a 63-year-old male patient of Dr. Pineda with a history of COPD, hypertension, MICHAEL, hypertension, BPH.    COPD: He is a former smoker, and completely stopped smoking in November 2018.  He reports that he is breathing much more clearly and is feeling much better overall since his smoking cessation.  He does take Symbicort and Tudorza, and has an albuterol inhaler as needed.  He has not been having to use the albuterol inhaler.  He follows with Dr. Buenrostro, pulmonology, about every 6 months.    Hypertension: He takes lisinopril 10 mg daily and reports excellent compliance and toleration of this medication.  His blood pressures at home have been in the 1 teens over 70s, and he presents with a blood pressure of 119/70 today.  He reports no headaches, visual changes, shortness of breath, chest pain.    MICHAEL: He follows with Dr. Buenrostro for this as well.  He reports that in December 2018 he began using a CPAP machine at home, and this has helped greatly.    BPH: He follows with urology, Dr. Caballero.  He takes Flomax 0.4 mg daily.  Reports excellent compliance and toleration with this medication, and denies any new urinary symptoms.    Allergic rhinitis: He states that this is an ongoing issue for him.  He does take Zyrtec daily, but reports that he does still have persistent postnasal drip and rhinorrhea.  He does not identify any particular allergic trigger.    He denies development of any other new issues today.         The following portions of the patient's history were reviewed and updated as appropriate: allergies, current medications, past family history, past medical history, past social history, past surgical history and problem list.    Review of Systems   Constitutional: Negative for activity change, chills, fatigue, fever, unexpected  "weight gain and unexpected weight loss.   HENT: Positive for postnasal drip and rhinorrhea. Negative for congestion, hearing loss, sinus pressure, sneezing, sore throat and tinnitus.    Eyes: Negative for photophobia, pain and visual disturbance.   Respiratory: Negative for cough, chest tightness, shortness of breath and wheezing.    Cardiovascular: Negative for chest pain, palpitations and leg swelling.   Gastrointestinal: Negative for abdominal distention, abdominal pain, constipation, diarrhea, nausea and vomiting.   Endocrine: Negative for polydipsia, polyphagia and polyuria.   Genitourinary: Negative for dysuria, frequency, hematuria and urgency.   Neurological: Negative for dizziness, weakness, numbness and headache.   All other systems reviewed and are negative.      Objective    /70 (BP Location: Left arm, Patient Position: Sitting, Cuff Size: Small Adult)   Pulse 70   Temp 97.2 °F (36.2 °C) (Tympanic)   Resp 16   Ht 170.2 cm (67\")   Wt 74.4 kg (164 lb)   SpO2 93%   BMI 25.69 kg/m²     Physical Exam   Constitutional: He is oriented to person, place, and time. He appears well-developed and well-nourished. No distress.   HENT:   Head: Normocephalic and atraumatic.   Right Ear: External ear normal.   Left Ear: External ear normal.   Nose: Nose normal.   Mouth/Throat: Oropharynx is clear and moist. No oropharyngeal exudate.   Eyes: Conjunctivae and EOM are normal. Pupils are equal, round, and reactive to light. Right eye exhibits no discharge. Left eye exhibits no discharge.   Neck: Normal range of motion. Neck supple. No JVD present. No tracheal deviation present. No thyromegaly present.   Cardiovascular: Normal rate, regular rhythm, normal heart sounds and intact distal pulses. Exam reveals no gallop and no friction rub.   No murmur heard.  Pulmonary/Chest: Effort normal and breath sounds normal. No stridor. No respiratory distress. He has no wheezes. He has no rales. He exhibits no tenderness. "   Lungs are CTA bilaterally   Abdominal: Soft. Bowel sounds are normal. He exhibits no distension and no mass. There is no tenderness. There is no rebound and no guarding. No hernia.   Musculoskeletal: Normal range of motion.   Lymphadenopathy:     He has no cervical adenopathy.   Neurological: He is alert and oriented to person, place, and time.   Skin: Skin is warm and dry. Capillary refill takes less than 2 seconds. He is not diaphoretic.   Psychiatric: He has a normal mood and affect. His behavior is normal. Judgment and thought content normal.   Nursing note and vitals reviewed.        Assessment/Plan   Gonzales was seen today for follow-up.    Diagnoses and all orders for this visit:    Allergic rhinitis, unspecified seasonality, unspecified trigger  -     montelukast (SINGULAIR) 10 MG tablet; Take 1 tablet by mouth Every Night.    Pulmonary emphysema, unspecified emphysema type (CMS/HCC)  -     Hepatitis C antibody  -     Tdap (ADACEL) 5-2-15.5 LF-MCG/0.5 injection; Inject 0.5 mL into the appropriate muscle as directed by prescriber 1 (One) Time for 1 dose.  -     CBC & Differential  -     Comprehensive metabolic panel  -     Urinalysis With Microscopic - Urine, Clean Catch  -     montelukast (SINGULAIR) 10 MG tablet; Take 1 tablet by mouth Every Night.  -     fluticasone (FLONASE) 50 MCG/ACT nasal spray; 1 spray by Each Nare route Daily.    Essential hypertension  -     Hepatitis C antibody  -     Tdap (ADACEL) 5-2-15.5 LF-MCG/0.5 injection; Inject 0.5 mL into the appropriate muscle as directed by prescriber 1 (One) Time for 1 dose.  -     CBC & Differential  -     Comprehensive metabolic panel  -     Urinalysis With Microscopic - Urine, Clean Catch  -     montelukast (SINGULAIR) 10 MG tablet; Take 1 tablet by mouth Every Night.  -     fluticasone (FLONASE) 50 MCG/ACT nasal spray; 1 spray by Each Nare route Daily.    Need for hepatitis C screening test  -     Hepatitis C antibody      -Hypertension: Continue  lisinopril 10 mg daily and periodic blood pressure monitoring at home.    -COPD, MICHAEL: Continue to follow routinely with pulmonology, Dr. Buenrostro.  Continue Symbicort and Tudorza, and albuterol as needed.  Continue CPAP nightly.    -Allergic rhinitis: We will add Singulair.  He may continue Zyrtec, and also have asked him to consistently use Flonase.  He will let us know if this condition improves or worsens.    -BPH: Continue Flomax.  Continue to follow with urology, Dr. Caballero.    -He is due for a Tdap today, which he has agreed to.  He works for the [employer], and discussed the importance of maintaining up-to-date status with tetanus boosters.  We discussed getting a flu shot, zoster vaccine, and pneumonia vaccine which she refuses at this time.  He states that he will further discuss this with Dr. Pineda at his next follow-up.    -We will get routine labs today including CBC, CMP, urinalysis, lipid panel, and hep C screen.  We discussed healthy diet habits and exercise, which he has tried to improve, and is making good progress.    -Follow-up in 6 months with Dr. Pineda.  Follow-up PRN in the meantime.

## 2019-03-01 LAB
ALBUMIN SERPL-MCNC: 4.3 G/DL (ref 3.5–5.2)
ALBUMIN/GLOB SERPL: 1.9 G/DL
ALP SERPL-CCNC: 60 U/L (ref 39–117)
ALT SERPL-CCNC: 15 U/L (ref 1–41)
APPEARANCE UR: CLEAR
AST SERPL-CCNC: 13 U/L (ref 1–40)
BACTERIA #/AREA URNS HPF: NORMAL /HPF
BASOPHILS # BLD AUTO: 0.06 10*3/MM3 (ref 0–0.2)
BASOPHILS NFR BLD AUTO: 0.8 % (ref 0–1.5)
BILIRUB SERPL-MCNC: 0.3 MG/DL (ref 0.1–1.2)
BILIRUB UR QL STRIP: NEGATIVE
BUN SERPL-MCNC: 15 MG/DL (ref 8–23)
BUN/CREAT SERPL: 17 (ref 7–25)
CALCIUM SERPL-MCNC: 9.7 MG/DL (ref 8.6–10.5)
CASTS URNS MICRO: NORMAL
CHLORIDE SERPL-SCNC: 103 MMOL/L (ref 98–107)
CO2 SERPL-SCNC: 29.2 MMOL/L (ref 22–29)
COLOR UR: YELLOW
CREAT SERPL-MCNC: 0.88 MG/DL (ref 0.76–1.27)
EOSINOPHIL # BLD AUTO: 0.1 10*3/MM3 (ref 0–0.4)
EOSINOPHIL NFR BLD AUTO: 1.3 % (ref 0.3–6.2)
EPI CELLS #/AREA URNS HPF: NORMAL /HPF
ERYTHROCYTE [DISTWIDTH] IN BLOOD BY AUTOMATED COUNT: 14.1 % (ref 12.3–15.4)
GLOBULIN SER CALC-MCNC: 2.3 GM/DL
GLUCOSE SERPL-MCNC: 95 MG/DL (ref 65–99)
GLUCOSE UR QL: NEGATIVE
HCT VFR BLD AUTO: 44.9 % (ref 37.5–51)
HCV AB S/CO SERPL IA: 0.1 S/CO RATIO (ref 0–0.9)
HGB BLD-MCNC: 14.4 G/DL (ref 13–17.7)
HGB UR QL STRIP: NEGATIVE
IMM GRANULOCYTES # BLD AUTO: 0.07 10*3/MM3 (ref 0–0.05)
IMM GRANULOCYTES NFR BLD AUTO: 0.9 % (ref 0–0.5)
KETONES UR QL STRIP: NEGATIVE
LEUKOCYTE ESTERASE UR QL STRIP: NEGATIVE
LYMPHOCYTES # BLD AUTO: 1.46 10*3/MM3 (ref 0.7–3.1)
LYMPHOCYTES NFR BLD AUTO: 19.2 % (ref 19.6–45.3)
MCH RBC QN AUTO: 33.6 PG (ref 26.6–33)
MCHC RBC AUTO-ENTMCNC: 32.1 G/DL (ref 31.5–35.7)
MCV RBC AUTO: 104.7 FL (ref 79–97)
MONOCYTES # BLD AUTO: 0.78 10*3/MM3 (ref 0.1–0.9)
MONOCYTES NFR BLD AUTO: 10.2 % (ref 5–12)
NEUTROPHILS # BLD AUTO: 5.14 10*3/MM3 (ref 1.4–7)
NEUTROPHILS NFR BLD AUTO: 67.6 % (ref 42.7–76)
NITRITE UR QL STRIP: NEGATIVE
NRBC BLD AUTO-RTO: 0 /100 WBC (ref 0–0)
PH UR STRIP: 7 [PH] (ref 5–8)
PLATELET # BLD AUTO: 156 10*3/MM3 (ref 140–450)
POTASSIUM SERPL-SCNC: 4.9 MMOL/L (ref 3.5–5.2)
PROT SERPL-MCNC: 6.6 G/DL (ref 6–8.5)
PROT UR QL STRIP: NEGATIVE
RBC # BLD AUTO: 4.29 10*6/MM3 (ref 4.14–5.8)
RBC #/AREA URNS HPF: NORMAL /HPF
SODIUM SERPL-SCNC: 142 MMOL/L (ref 136–145)
SP GR UR: 1.01 (ref 1–1.03)
UROBILINOGEN UR STRIP-MCNC: (no result) MG/DL
WBC # BLD AUTO: 7.61 10*3/MM3 (ref 3.4–10.8)
WBC #/AREA URNS HPF: NORMAL /HPF

## 2019-04-12 RX ORDER — ACLIDINIUM BROMIDE 400 UG/1
POWDER, METERED RESPIRATORY (INHALATION)
Qty: 1 EACH | Refills: 2 | Status: SHIPPED | OUTPATIENT
Start: 2019-04-12 | End: 2019-07-14 | Stop reason: SDUPTHER

## 2019-05-11 ENCOUNTER — HOSPITAL ENCOUNTER (EMERGENCY)
Facility: HOSPITAL | Age: 64
Discharge: HOME | End: 2019-05-11
Attending: EMERGENCY MEDICINE | Admitting: EMERGENCY MEDICINE

## 2019-05-11 VITALS
WEIGHT: 162.8 LBS | SYSTOLIC BLOOD PRESSURE: 132 MMHG | OXYGEN SATURATION: 97 % | HEART RATE: 59 BPM | TEMPERATURE: 97.7 F | BODY MASS INDEX: 25.55 KG/M2 | RESPIRATION RATE: 16 BRPM | HEIGHT: 67 IN | DIASTOLIC BLOOD PRESSURE: 71 MMHG

## 2019-05-11 DIAGNOSIS — N39.0 URINARY TRACT INFECTION WITH HEMATURIA, SITE UNSPECIFIED: Primary | ICD-10-CM

## 2019-05-11 DIAGNOSIS — I15.9 SECONDARY HYPERTENSION: ICD-10-CM

## 2019-05-11 DIAGNOSIS — R31.9 URINARY TRACT INFECTION WITH HEMATURIA, SITE UNSPECIFIED: Primary | ICD-10-CM

## 2019-05-11 LAB
BACTERIA UR QL AUTO: ABNORMAL /HPF
BILIRUB UR QL STRIP: NEGATIVE
CLARITY UR: ABNORMAL
COLOR UR: ABNORMAL
GLUCOSE UR STRIP-MCNC: NEGATIVE MG/DL
HGB UR QL STRIP.AUTO: ABNORMAL
HYALINE CASTS UR QL AUTO: ABNORMAL /LPF
KETONES UR QL STRIP: NEGATIVE
LEUKOCYTE ESTERASE UR QL STRIP.AUTO: ABNORMAL
NITRITE UR QL STRIP: NEGATIVE
PH UR STRIP.AUTO: 6 [PH] (ref 5–8)
PROT UR QL STRIP: ABNORMAL
RBC # UR: ABNORMAL /HPF
REF LAB TEST METHOD: ABNORMAL
SP GR UR STRIP: 1.02 (ref 1–1.03)
SQUAMOUS #/AREA URNS HPF: ABNORMAL /HPF
UROBILINOGEN UR QL STRIP: ABNORMAL
WBC UR QL AUTO: ABNORMAL /HPF

## 2019-05-11 PROCEDURE — 99283 EMERGENCY DEPT VISIT LOW MDM: CPT

## 2019-05-11 PROCEDURE — 81001 URINALYSIS AUTO W/SCOPE: CPT | Performed by: EMERGENCY MEDICINE

## 2019-05-11 RX ORDER — PHENAZOPYRIDINE HYDROCHLORIDE 100 MG/1
200 TABLET, FILM COATED ORAL ONCE
Status: COMPLETED | OUTPATIENT
Start: 2019-05-11 | End: 2019-05-11

## 2019-05-11 RX ORDER — SULFAMETHOXAZOLE AND TRIMETHOPRIM 800; 160 MG/1; MG/1
1 TABLET ORAL 2 TIMES DAILY
Qty: 20 TABLET | Refills: 0 | Status: SHIPPED | OUTPATIENT
Start: 2019-05-11 | End: 2019-11-21

## 2019-05-11 RX ORDER — PHENAZOPYRIDINE HYDROCHLORIDE 200 MG/1
200 TABLET, FILM COATED ORAL 3 TIMES DAILY PRN
Qty: 10 TABLET | Refills: 0 | Status: SHIPPED | OUTPATIENT
Start: 2019-05-11 | End: 2019-11-21

## 2019-05-11 RX ORDER — SULFAMETHOXAZOLE AND TRIMETHOPRIM 800; 160 MG/1; MG/1
1 TABLET ORAL ONCE
Status: COMPLETED | OUTPATIENT
Start: 2019-05-11 | End: 2019-05-11

## 2019-05-11 RX ADMIN — PHENAZOPYRIDINE HYDROCHLORIDE 200 MG: 100 TABLET ORAL at 03:16

## 2019-05-11 RX ADMIN — SULFAMETHOXAZOLE AND TRIMETHOPRIM 160 MG: 800; 160 TABLET ORAL at 03:16

## 2019-05-14 RX ORDER — LISINOPRIL 10 MG/1
TABLET ORAL
Qty: 30 TABLET | Refills: 3 | Status: SHIPPED | OUTPATIENT
Start: 2019-05-14 | End: 2019-09-15 | Stop reason: SDUPTHER

## 2019-05-20 RX ORDER — TAMSULOSIN HYDROCHLORIDE 0.4 MG/1
CAPSULE ORAL
Qty: 60 CAPSULE | Refills: 11 | Status: SHIPPED | OUTPATIENT
Start: 2019-05-20 | End: 2020-02-24 | Stop reason: SDUPTHER

## 2019-06-07 RX ORDER — BUDESONIDE AND FORMOTEROL FUMARATE DIHYDRATE 160; 4.5 UG/1; UG/1
AEROSOL RESPIRATORY (INHALATION)
Qty: 10.2 G | Refills: 3 | Status: SHIPPED | OUTPATIENT
Start: 2019-06-07 | End: 2019-09-25 | Stop reason: SDUPTHER

## 2019-07-09 ENCOUNTER — TRANSCRIBE ORDERS (OUTPATIENT)
Dept: ADMINISTRATIVE | Facility: HOSPITAL | Age: 64
End: 2019-07-09

## 2019-07-09 DIAGNOSIS — Z12.2 ENCOUNTER FOR SCREENING FOR LUNG CANCER: Primary | ICD-10-CM

## 2019-07-15 RX ORDER — ACLIDINIUM BROMIDE 400 UG/1
POWDER, METERED RESPIRATORY (INHALATION)
Qty: 1 EACH | Refills: 2 | Status: SHIPPED | OUTPATIENT
Start: 2019-07-15 | End: 2019-11-21

## 2019-07-19 ENCOUNTER — APPOINTMENT (OUTPATIENT)
Dept: PET IMAGING | Facility: HOSPITAL | Age: 64
End: 2019-07-19

## 2019-07-22 ENCOUNTER — HOSPITAL ENCOUNTER (OUTPATIENT)
Dept: PET IMAGING | Facility: HOSPITAL | Age: 64
Discharge: HOME | End: 2019-07-22
Admitting: INTERNAL MEDICINE

## 2019-07-22 DIAGNOSIS — Z12.2 ENCOUNTER FOR SCREENING FOR LUNG CANCER: ICD-10-CM

## 2019-07-22 PROCEDURE — G0297 LDCT FOR LUNG CA SCREEN: HCPCS

## 2019-09-15 DIAGNOSIS — I15.9 SECONDARY HYPERTENSION: ICD-10-CM

## 2019-09-16 RX ORDER — LISINOPRIL 10 MG/1
TABLET ORAL
Qty: 120 TABLET | Refills: 1 | Status: SHIPPED | OUTPATIENT
Start: 2019-09-16 | End: 2019-11-21 | Stop reason: DRUGHIGH

## 2019-09-25 RX ORDER — BUDESONIDE AND FORMOTEROL FUMARATE DIHYDRATE 160; 4.5 UG/1; UG/1
AEROSOL RESPIRATORY (INHALATION)
Qty: 10.2 G | Refills: 3 | Status: SHIPPED | OUTPATIENT
Start: 2019-09-25 | End: 2022-11-29

## 2019-09-30 ENCOUNTER — TELEPHONE (OUTPATIENT)
Dept: INTERNAL MEDICINE | Facility: CLINIC | Age: 64
End: 2019-09-30

## 2019-09-30 NOTE — TELEPHONE ENCOUNTER
Pt hasnt been seen by you since 06/07/2018     He is asking for a refill on Tudorza Pressair 400 MCG/ACT    Please Advise?

## 2019-11-21 ENCOUNTER — OFFICE VISIT (OUTPATIENT)
Dept: INTERNAL MEDICINE | Facility: CLINIC | Age: 64
End: 2019-11-21

## 2019-11-21 VITALS
TEMPERATURE: 98.4 F | WEIGHT: 170 LBS | HEIGHT: 67 IN | OXYGEN SATURATION: 93 % | BODY MASS INDEX: 26.68 KG/M2 | HEART RATE: 77 BPM | SYSTOLIC BLOOD PRESSURE: 143 MMHG | DIASTOLIC BLOOD PRESSURE: 74 MMHG | RESPIRATION RATE: 16 BRPM

## 2019-11-21 DIAGNOSIS — I10 ESSENTIAL HYPERTENSION: Primary | ICD-10-CM

## 2019-11-21 PROCEDURE — 99213 OFFICE O/P EST LOW 20 MIN: CPT | Performed by: NURSE PRACTITIONER

## 2019-11-21 RX ORDER — LISINOPRIL 20 MG/1
20 TABLET ORAL DAILY
Qty: 60 TABLET | Refills: 2 | Status: SHIPPED | OUTPATIENT
Start: 2019-11-21 | End: 2020-02-24 | Stop reason: SDUPTHER

## 2019-11-21 RX ORDER — FINASTERIDE 5 MG/1
5 TABLET, FILM COATED ORAL DAILY
COMMUNITY
End: 2021-05-04

## 2019-11-21 NOTE — PROGRESS NOTES
Subjective   Gonzales Kaur Sr. is a 64 y.o. male.   CC: Hypertension    Patient presents for follow-up on hypertension.  This is a 64-year-old male patient of Dr. Pineda.  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  He had this appointment earlier today and blood pressure was 143 systolically over 70s diastolically per patient.  Currently taking lisinopril 10 mg daily for blood pressure.  No headaches or visual changes, shortness of breath or chest discomfort.  He has gained about 8 pounds since May 2019 when seen last.  He reports that he does not routinely check his blood pressure at home.  He does state that he eats a lot of canned soups and salty foods.  He denies development of any other new issues today.         The following portions of the patient's history were reviewed and updated as appropriate: allergies, current medications, past family history, past medical history, past social history, past surgical history and problem list.    Review of Systems   Constitutional: Negative for activity change, chills, fatigue, fever, unexpected weight gain and unexpected weight loss.   HENT: Negative for congestion, hearing loss, postnasal drip, sinus pressure, sneezing, sore throat, swollen glands and tinnitus.    Eyes: Negative for photophobia, pain and visual disturbance.   Respiratory: Negative for cough, chest tightness, shortness of breath and wheezing.    Cardiovascular: Negative for chest pain, palpitations and leg swelling.   Gastrointestinal: Negative for abdominal distention, abdominal pain, constipation, diarrhea, nausea and vomiting.   Endocrine: Negative for polydipsia, polyphagia and polyuria.   Genitourinary: Negative for dysuria, frequency, hematuria and urgency.   Neurological: Negative for dizziness, weakness, numbness and headache.   All other systems reviewed and are negative.      Objective    /74 (BP Location: Left arm, Patient Position: Sitting, Cuff Size:  "Adult)   Pulse 77   Temp 98.4 °F (36.9 °C) (Oral)   Resp 16   Ht 170.2 cm (67\")   Wt 77.1 kg (170 lb)   SpO2 93%   BMI 26.63 kg/m²     Physical Exam   Constitutional: He is oriented to person, place, and time. He appears well-developed and well-nourished. No distress.   HENT:   Head: Normocephalic and atraumatic.   Right Ear: External ear normal.   Left Ear: External ear normal.   Nose: Nose normal.   Mouth/Throat: Oropharynx is clear and moist.   Eyes: EOM are normal. Pupils are equal, round, and reactive to light.   Neck: Normal range of motion. Neck supple. No JVD present. No tracheal deviation present. No thyromegaly present.   No carotid bruits auscultated   Cardiovascular: Normal rate, regular rhythm, normal heart sounds and intact distal pulses. Exam reveals no gallop and no friction rub.   No murmur heard.  No peripheral edema.  Posterior tib pulses 2+ and equal bilaterally.   Pulmonary/Chest: Effort normal and breath sounds normal. No stridor. No respiratory distress. He has no wheezes. He has no rales. He exhibits no tenderness.   Lungs are CTA bilaterally   Abdominal: Soft. Bowel sounds are normal. He exhibits no distension. There is no tenderness.   Musculoskeletal: Normal range of motion.   Lymphadenopathy:     He has no cervical adenopathy.   Neurological: He is alert and oriented to person, place, and time.   Skin: Skin is warm and dry. Capillary refill takes less than 2 seconds. He is not diaphoretic.   Psychiatric: He has a normal mood and affect. His behavior is normal. Judgment and thought content normal.   Nursing note and vitals reviewed.    Current outpatient and discharge medications have been reconciled for the patient.  Reviewed by: SHAJI Higgins      Assessment/Plan   Gonzales was seen today for blood pressure check.    Diagnoses and all orders for this visit:    Essential hypertension  -     lisinopril (PRINIVIL,ZESTRIL) 20 MG tablet; Take 1 tablet by mouth Daily.    -We discussed " dietary modifications regarding hypertension management.  He will try to cut down on his sodium as well as increase physical activity, striving for 30 minutes/day of cardiovascular exercise 5 days/week.  We will increase lisinopril from 10 to 20 mg daily as well.  I did recommend that he monitor his blood pressures daily in response to this change and thereafter 2-3 times weekly.    -He is provided with written education regarding blood pressure parameters as well as lifestyle modifications.    -Follow-up PRN and we will see him back in 3 months for a physical.

## 2019-11-21 NOTE — PATIENT INSTRUCTIONS

## 2019-12-30 ENCOUNTER — OFFICE VISIT (OUTPATIENT)
Dept: INTERNAL MEDICINE | Facility: CLINIC | Age: 64
End: 2019-12-30

## 2019-12-30 VITALS
SYSTOLIC BLOOD PRESSURE: 145 MMHG | OXYGEN SATURATION: 93 % | WEIGHT: 169 LBS | HEART RATE: 84 BPM | HEIGHT: 67 IN | DIASTOLIC BLOOD PRESSURE: 79 MMHG | BODY MASS INDEX: 26.53 KG/M2 | TEMPERATURE: 97.8 F | RESPIRATION RATE: 16 BRPM

## 2019-12-30 DIAGNOSIS — J01.00 ACUTE NON-RECURRENT MAXILLARY SINUSITIS: Primary | ICD-10-CM

## 2019-12-30 PROCEDURE — 99213 OFFICE O/P EST LOW 20 MIN: CPT | Performed by: NURSE PRACTITIONER

## 2019-12-30 RX ORDER — AMOXICILLIN AND CLAVULANATE POTASSIUM 875; 125 MG/1; MG/1
1 TABLET, FILM COATED ORAL 2 TIMES DAILY
Qty: 20 TABLET | Refills: 0 | Status: SHIPPED | OUTPATIENT
Start: 2019-12-30 | End: 2020-01-09

## 2019-12-30 RX ORDER — METHYLPREDNISOLONE 4 MG/1
TABLET ORAL
Qty: 21 TABLET | Refills: 0 | Status: SHIPPED | OUTPATIENT
Start: 2019-12-30 | End: 2020-01-29

## 2019-12-30 NOTE — PROGRESS NOTES
"Subjective   Gonzales Kaur Sr. is a 64 y.o. male.   CC: Cough, congestion, left ear pain    Patient presents for evaluation of left ear pain and cough with sputum production.  This is a 64-year-old male patient of Dr. Pineda.  He does have COPD and is maintained on daily Symbicort and as needed albuterol.  Symptoms began 7 to 8 days ago.  He endorses cough with production of thick, dark green sputum.  He endorses left ear plugged sensation and \"ache.\"  He has had a bit of wheezing as well.  Denies shortness of breath, chest discomfort, fever or chills.  Denies over-the-counter remedies.  He denies development of any other new issues today.       The following portions of the patient's history were reviewed and updated as appropriate: allergies, current medications, past family history, past medical history, past social history, past surgical history and problem list.    Review of Systems   Constitutional: Negative for activity change, chills, fatigue, fever, unexpected weight gain and unexpected weight loss.   HENT: Positive for congestion, ear pain (  Left), postnasal drip, rhinorrhea and sinus pressure. Negative for hearing loss, sneezing, sore throat, swollen glands and tinnitus.    Eyes: Negative for photophobia, pain and visual disturbance.   Respiratory: Positive for cough and wheezing. Negative for chest tightness and shortness of breath.    Cardiovascular: Negative for chest pain, palpitations and leg swelling.   Gastrointestinal: Negative for abdominal distention, abdominal pain, constipation, diarrhea, nausea and vomiting.   Endocrine: Negative for polydipsia, polyphagia and polyuria.   Genitourinary: Negative for dysuria, frequency, hematuria and urgency.   Neurological: Negative for dizziness, weakness, numbness and headache.   All other systems reviewed and are negative.      Objective    /79 (BP Location: Left arm, Patient Position: Sitting, Cuff Size: Adult)   Pulse 84   Temp 97.8 °F " "(36.6 °C) (Oral)   Resp 16   Ht 170.2 cm (67\")   Wt 76.7 kg (169 lb)   SpO2 93%   BMI 26.47 kg/m²     Physical Exam   Constitutional: He is oriented to person, place, and time. He appears well-developed and well-nourished. No distress.   HENT:   Head: Normocephalic and atraumatic.   Right Ear: External ear normal. Tympanic membrane is not erythematous, not retracted and not bulging. A middle ear effusion is present.   Left Ear: External ear normal. Tympanic membrane is erythematous. Tympanic membrane is not retracted and not bulging. A middle ear effusion is present.   Nose: Mucosal edema, rhinorrhea and congestion present. Right sinus exhibits maxillary sinus tenderness. Right sinus exhibits no frontal sinus tenderness. Left sinus exhibits maxillary sinus tenderness. Left sinus exhibits no frontal sinus tenderness.   Mouth/Throat: Uvula is midline and mucous membranes are normal. Posterior oropharyngeal erythema present. No oropharyngeal exudate, posterior oropharyngeal edema or tonsillar abscesses.   Eyes: Pupils are equal, round, and reactive to light. EOM are normal.   Neck: Normal range of motion. Neck supple. No JVD present. No tracheal deviation present. No thyromegaly present.   Cardiovascular: Normal rate, regular rhythm, normal heart sounds and intact distal pulses. Exam reveals no gallop and no friction rub.   No murmur heard.  Pulmonary/Chest: Effort normal. No stridor. No respiratory distress. He has wheezes ( Right upper lobe). He has no rales. He exhibits no tenderness.   Musculoskeletal: Normal range of motion.   Lymphadenopathy:     He has no cervical adenopathy.   Neurological: He is alert and oriented to person, place, and time.   Skin: Skin is warm and dry. Capillary refill takes less than 2 seconds. He is not diaphoretic.   Psychiatric: He has a normal mood and affect. His behavior is normal. Judgment and thought content normal.   Nursing note and vitals reviewed.    Current outpatient and " discharge medications have been reconciled for the patient.  Reviewed by: SHAJI Higgins    Assessment/Plan   Gonzales was seen today for nasal congestion.    Diagnoses and all orders for this visit:    Acute non-recurrent maxillary sinusitis  -     amoxicillin-clavulanate (AUGMENTIN) 875-125 MG per tablet; Take 1 tablet by mouth 2 (Two) Times a Day for 10 days.  -     methylPREDNISolone (MEDROL, REILLY,) 4 MG tablet; Take as directed on package instructions.      -Sinusitis: We will treat with Augmentin 875-125 twice daily x10 days and Medrol Dosepak.  He will continue to use Flonase, PRN albuterol and his daily Symbicort.  I did recommend adding Mucinex along with increasing fluid intake to help thin secretions.    -Follow-up PRN if symptoms persist or worsen and I will see him back in February for his previously scheduled health maintenance visit.

## 2020-01-29 ENCOUNTER — OFFICE VISIT (OUTPATIENT)
Dept: INTERNAL MEDICINE | Facility: CLINIC | Age: 65
End: 2020-01-29

## 2020-01-29 VITALS
OXYGEN SATURATION: 96 % | HEART RATE: 91 BPM | SYSTOLIC BLOOD PRESSURE: 106 MMHG | BODY MASS INDEX: 26.68 KG/M2 | DIASTOLIC BLOOD PRESSURE: 70 MMHG | RESPIRATION RATE: 16 BRPM | TEMPERATURE: 98.9 F | WEIGHT: 170 LBS | HEIGHT: 67 IN

## 2020-01-29 DIAGNOSIS — J40 BRONCHITIS: Primary | ICD-10-CM

## 2020-01-29 PROCEDURE — 99213 OFFICE O/P EST LOW 20 MIN: CPT | Performed by: NURSE PRACTITIONER

## 2020-01-29 RX ORDER — AMOXICILLIN AND CLAVULANATE POTASSIUM 875; 125 MG/1; MG/1
1 TABLET, FILM COATED ORAL 2 TIMES DAILY
Qty: 20 TABLET | Refills: 0 | Status: SHIPPED | OUTPATIENT
Start: 2020-01-29 | End: 2020-02-08

## 2020-01-29 RX ORDER — METHYLPREDNISOLONE 4 MG/1
TABLET ORAL
Qty: 21 TABLET | Refills: 0 | Status: SHIPPED | OUTPATIENT
Start: 2020-01-29 | End: 2020-06-05

## 2020-01-29 RX ORDER — CETIRIZINE HYDROCHLORIDE 10 MG/1
10 TABLET ORAL DAILY
Qty: 90 TABLET | Refills: 1 | Status: SHIPPED | OUTPATIENT
Start: 2020-01-29 | End: 2020-09-14

## 2020-01-29 NOTE — PROGRESS NOTES
Subjective   Gonzales Kaur Sr. is a 64 y.o. male.   Chief Complaint   Patient presents with   • URI     Pt presents here today with congestion, nasal drip, green flem, and fever.       Patient presents for evaluation of cough and congestion.  This is a 64-year-old male former patient of Dr. Pineda.  He has COPD, MICHAEL, hypertension and BPH.  His symptoms have been present over 1 week.  He endorses cough with production of thick, dark green sputum.  Endorses sore throat, achiness and sinus pressure.  He does take his InCruz Ellipta inhaler daily for maintenance therapy for his COPD as well as albuterol for as needed use.  He denies any increasing shortness of breath.  He has had chills off and on for the past week and states that over the past week he has woken up drenched in sweat at night at times.  He denies visual changes, chest discomfort or palpitations.  He has tried Halls lozenges over-the-counter which have helped with the sore throat as well as Advil.  He denies development of any other new issues today.       The following portions of the patient's history were reviewed and updated as appropriate: allergies, current medications, past family history, past medical history, past social history, past surgical history and problem list.    Review of Systems   Constitutional: Positive for chills. Negative for activity change, fatigue, fever, unexpected weight gain and unexpected weight loss.   HENT: Positive for congestion, postnasal drip, rhinorrhea and sinus pressure. Negative for hearing loss, sneezing, sore throat, swollen glands and tinnitus.    Eyes: Negative for photophobia, pain and visual disturbance.   Respiratory: Positive for cough and wheezing. Negative for chest tightness and shortness of breath.    Cardiovascular: Negative for chest pain, palpitations and leg swelling.   Gastrointestinal: Negative for abdominal distention, abdominal pain, constipation, diarrhea, nausea and vomiting.   Endocrine:  "Negative for polydipsia, polyphagia and polyuria.   Genitourinary: Negative for dysuria, frequency, hematuria and urgency.   Neurological: Negative for dizziness, weakness, numbness and headache.   All other systems reviewed and are negative.      Objective    /70 (BP Location: Left arm, Patient Position: Sitting, Cuff Size: Adult)   Pulse 91   Temp 98.9 °F (37.2 °C) (Oral)   Resp 16   Ht 170.2 cm (67\")   Wt 77.1 kg (170 lb)   SpO2 96%   BMI 26.63 kg/m²     Physical Exam   Constitutional: He is oriented to person, place, and time. He appears well-developed and well-nourished. No distress.   HENT:   Head: Normocephalic and atraumatic.   Eyes: Pupils are equal, round, and reactive to light. EOM are normal.   Neck: Normal range of motion. Neck supple.   Cardiovascular: Normal rate, regular rhythm, normal heart sounds and intact distal pulses. Exam reveals no gallop and no friction rub.   No murmur heard.  No peripheral edema.  Posterior tib pulses 2+ and equal bilaterally.   Pulmonary/Chest: Effort normal. No stridor. No respiratory distress. He has wheezes ( Bilateral upper lobe wheezing). He has no rales. He exhibits no tenderness.   Abdominal: Soft. Bowel sounds are normal.   Musculoskeletal: Normal range of motion.   Neurological: He is alert and oriented to person, place, and time.   Skin: Skin is warm and dry. Capillary refill takes less than 2 seconds. He is not diaphoretic.   Psychiatric: He has a normal mood and affect. His behavior is normal. Judgment and thought content normal.   Nursing note and vitals reviewed.    Current outpatient and discharge medications have been reconciled for the patient.  Reviewed by: SHAJI Higgins      Assessment/Plan   Gonzales was seen today for uri.    Diagnoses and all orders for this visit:    Bronchitis  -     cetirizine (zyrTEC) 10 MG tablet; Take 1 tablet by mouth Daily.  -     amoxicillin-clavulanate (AUGMENTIN) 875-125 MG per tablet; Take 1 tablet by mouth " 2 (Two) Times a Day for 10 days.  -     methylPREDNISolone (MEDROL, REILLY,) 4 MG tablet; Take as directed on package instructions.    -Bronchitis: We will treat with Augmentin 875-125 twice daily x10 days along with Medrol Dosepak.  I have asked him to use Mucinex over-the-counter.    -Follow-up PRN if symptoms persist or worsen and I will see him back next month for his previously scheduled appointment.

## 2020-02-24 DIAGNOSIS — I10 ESSENTIAL HYPERTENSION: ICD-10-CM

## 2020-02-24 RX ORDER — TAMSULOSIN HYDROCHLORIDE 0.4 MG/1
CAPSULE ORAL
Qty: 60 CAPSULE | Refills: 11 | Status: SHIPPED | OUTPATIENT
Start: 2020-02-24 | End: 2020-10-06 | Stop reason: SDUPTHER

## 2020-02-24 RX ORDER — LISINOPRIL 20 MG/1
20 TABLET ORAL DAILY
Qty: 60 TABLET | Refills: 2 | Status: SHIPPED | OUTPATIENT
Start: 2020-02-24 | End: 2020-05-27

## 2020-02-24 NOTE — TELEPHONE ENCOUNTER
[facility] CALLED FOR VERBAL AUTHORIZATION OF REFILL OF      lisinopril (PRINIVIL,ZESTRIL) 20 MG tablet      AND     tamsulosin (FLOMAX) 0.4 MG capsule 24 hr capsule         CALL BACK  [phone]  ASK FOR MESSAGE ON 2/24/20

## 2020-03-02 ENCOUNTER — TRANSCRIBE ORDERS (OUTPATIENT)
Dept: ADMINISTRATIVE | Facility: HOSPITAL | Age: 65
End: 2020-03-02

## 2020-03-02 DIAGNOSIS — R97.20 ELEVATED PSA: Primary | ICD-10-CM

## 2020-03-11 ENCOUNTER — HOSPITAL ENCOUNTER (OUTPATIENT)
Dept: MRI IMAGING | Facility: HOSPITAL | Age: 65
Discharge: HOME | End: 2020-03-11
Admitting: UROLOGY

## 2020-03-11 DIAGNOSIS — R97.20 ELEVATED PSA: ICD-10-CM

## 2020-03-11 PROCEDURE — A9577 INJ MULTIHANCE: HCPCS | Performed by: UROLOGY

## 2020-03-11 PROCEDURE — 72197 MRI PELVIS W/O & W/DYE: CPT

## 2020-03-11 PROCEDURE — 82565 ASSAY OF CREATININE: CPT

## 2020-03-11 PROCEDURE — 0 GADOBENATE DIMEGLUMINE 529 MG/ML SOLUTION: Performed by: UROLOGY

## 2020-03-11 RX ADMIN — GADOBENATE DIMEGLUMINE 16 ML: 529 INJECTION, SOLUTION INTRAVENOUS at 17:54

## 2020-03-12 LAB — CREAT BLDA-MCNC: 1.1 MG/DL (ref 0.6–1.3)

## 2020-05-11 ENCOUNTER — TELEPHONE (OUTPATIENT)
Dept: INTERNAL MEDICINE | Facility: CLINIC | Age: 65
End: 2020-05-11

## 2020-05-11 NOTE — TELEPHONE ENCOUNTER
Patients wife called in on behalf of patient  to schedule an EKG and blood work. Please return call and advise.

## 2020-05-27 DIAGNOSIS — I10 ESSENTIAL HYPERTENSION: ICD-10-CM

## 2020-05-27 RX ORDER — LISINOPRIL 20 MG/1
TABLET ORAL
Qty: 60 TABLET | Refills: 2 | Status: SHIPPED | OUTPATIENT
Start: 2020-05-27 | End: 2020-10-06 | Stop reason: SDUPTHER

## 2020-06-05 ENCOUNTER — TELEPHONE (OUTPATIENT)
Dept: INTERNAL MEDICINE | Facility: CLINIC | Age: 65
End: 2020-06-05

## 2020-06-05 ENCOUNTER — OFFICE VISIT (OUTPATIENT)
Dept: INTERNAL MEDICINE | Facility: CLINIC | Age: 65
End: 2020-06-05

## 2020-06-05 VITALS
TEMPERATURE: 98.2 F | SYSTOLIC BLOOD PRESSURE: 136 MMHG | HEIGHT: 67 IN | DIASTOLIC BLOOD PRESSURE: 82 MMHG | WEIGHT: 177 LBS | OXYGEN SATURATION: 93 % | BODY MASS INDEX: 27.78 KG/M2 | HEART RATE: 63 BPM | RESPIRATION RATE: 16 BRPM

## 2020-06-05 DIAGNOSIS — Z01.818 PREOPERATIVE CLEARANCE: Primary | ICD-10-CM

## 2020-06-05 DIAGNOSIS — R94.31 ABNORMAL EKG: ICD-10-CM

## 2020-06-05 LAB
ALBUMIN SERPL-MCNC: 4.6 G/DL (ref 3.5–5.2)
ALBUMIN/GLOB SERPL: 1.9 G/DL
ALP SERPL-CCNC: 56 U/L (ref 39–117)
ALT SERPL-CCNC: 30 U/L (ref 1–41)
APTT PPP: 32.4 SECONDS (ref 22.7–35.4)
AST SERPL-CCNC: 24 U/L (ref 1–40)
BASOPHILS # BLD AUTO: 0.11 10*3/MM3 (ref 0–0.2)
BASOPHILS NFR BLD AUTO: 1.1 % (ref 0–1.5)
BILIRUB SERPL-MCNC: 0.3 MG/DL (ref 0.2–1.2)
BUN SERPL-MCNC: 18 MG/DL (ref 8–23)
BUN/CREAT SERPL: 20 (ref 7–25)
CALCIUM SERPL-MCNC: 9.8 MG/DL (ref 8.6–10.5)
CHLORIDE SERPL-SCNC: 100 MMOL/L (ref 98–107)
CO2 SERPL-SCNC: 31.8 MMOL/L (ref 22–29)
CREAT SERPL-MCNC: 0.9 MG/DL (ref 0.76–1.27)
EOSINOPHIL # BLD AUTO: 0.22 10*3/MM3 (ref 0–0.4)
EOSINOPHIL NFR BLD AUTO: 2.2 % (ref 0.3–6.2)
ERYTHROCYTE [DISTWIDTH] IN BLOOD BY AUTOMATED COUNT: 13.2 % (ref 12.3–15.4)
GLOBULIN SER CALC-MCNC: 2.4 GM/DL
GLUCOSE SERPL-MCNC: 96 MG/DL (ref 65–99)
HCT VFR BLD AUTO: 39.4 % (ref 37.5–51)
HGB BLD-MCNC: 13.5 G/DL (ref 13–17.7)
IMM GRANULOCYTES # BLD AUTO: 0.16 10*3/MM3 (ref 0–0.05)
IMM GRANULOCYTES NFR BLD AUTO: 1.6 % (ref 0–0.5)
INR PPP: 1.03 (ref 0.9–1.1)
LYMPHOCYTES # BLD AUTO: 1.29 10*3/MM3 (ref 0.7–3.1)
LYMPHOCYTES NFR BLD AUTO: 12.7 % (ref 19.6–45.3)
MCH RBC QN AUTO: 33.9 PG (ref 26.6–33)
MCHC RBC AUTO-ENTMCNC: 34.3 G/DL (ref 31.5–35.7)
MCV RBC AUTO: 99 FL (ref 79–97)
MONOCYTES # BLD AUTO: 1 10*3/MM3 (ref 0.1–0.9)
MONOCYTES NFR BLD AUTO: 9.8 % (ref 5–12)
NEUTROPHILS # BLD AUTO: 7.39 10*3/MM3 (ref 1.7–7)
NEUTROPHILS NFR BLD AUTO: 72.6 % (ref 42.7–76)
NRBC BLD AUTO-RTO: 0 /100 WBC (ref 0–0.2)
PLATELET # BLD AUTO: 176 10*3/MM3 (ref 140–450)
POTASSIUM SERPL-SCNC: 5 MMOL/L (ref 3.5–5.2)
PROT SERPL-MCNC: 7 G/DL (ref 6–8.5)
PROTHROMBIN TIME: 13.2 SECONDS (ref 11.7–14.2)
RBC # BLD AUTO: 3.98 10*6/MM3 (ref 4.14–5.8)
SODIUM SERPL-SCNC: 138 MMOL/L (ref 136–145)
WBC # BLD AUTO: 10.17 10*3/MM3 (ref 3.4–10.8)

## 2020-06-05 PROCEDURE — 93000 ELECTROCARDIOGRAM COMPLETE: CPT | Performed by: NURSE PRACTITIONER

## 2020-06-05 PROCEDURE — 99214 OFFICE O/P EST MOD 30 MIN: CPT | Performed by: NURSE PRACTITIONER

## 2020-06-05 NOTE — PROGRESS NOTES
Please notify patient that his labs look stable and if/when cleared by cardiology may proceed with the urology surgery.

## 2020-06-05 NOTE — TELEPHONE ENCOUNTER
Stacey with Dr. Suarez's office called in stating she couldn't get in contact with the patient to move his appointment up to Monday June 8th.    She would like a call back @ [phone]

## 2020-06-05 NOTE — PROGRESS NOTES
Subjective   Gonzales Kaur Sr. is a 64 y.o. male.   Chief Complaint   Patient presents with   • Surgical Clearance     Pt presents here today for a surery clearence.    Prostate cancer    Patient presents for preoperative clearance.  This is a 64-year-old male with COPD, MICHAEL, hypertension, BPH, prostate cancer.  He is following with Dr. Caballero, urology for early stage prostate cancer and is to have a HIFU procedure for prostate cancer on 6/19/2020.  He needs preoperative clearance from primary care.  He got preop clearance from his pulmonologist, Dr. Buenrostro last week.  Reports no recent illness, shortness of breath, fever, chills or chest discomfort.  No known cardiac history.  He denies development of any other new issues today.       The following portions of the patient's history were reviewed and updated as appropriate: allergies, current medications, past family history, past medical history, past social history, past surgical history and problem list.    Review of Systems   Constitutional: Negative for activity change, chills, fatigue, fever, unexpected weight gain and unexpected weight loss.   HENT: Negative for congestion, hearing loss, postnasal drip, sinus pressure, sneezing, sore throat, swollen glands and tinnitus.    Eyes: Negative for photophobia, pain and visual disturbance.   Respiratory: Negative for cough, chest tightness, shortness of breath and wheezing.    Cardiovascular: Negative for chest pain, palpitations and leg swelling.   Gastrointestinal: Negative for abdominal distention, abdominal pain, constipation, diarrhea, nausea and vomiting.   Endocrine: Negative for polydipsia, polyphagia and polyuria.   Genitourinary: Negative for dysuria, frequency, hematuria and urgency.   Neurological: Negative for dizziness, weakness, numbness and headache.   All other systems reviewed and are negative.      Objective    /82 (BP Location: Left arm, Patient Position: Sitting, Cuff Size: Adult)    "Pulse 63   Temp 98.2 °F (36.8 °C) (Oral)   Resp 16   Ht 170.2 cm (67\")   Wt 80.3 kg (177 lb)   SpO2 93%   BMI 27.72 kg/m²     Physical Exam   Constitutional: He is oriented to person, place, and time. He appears well-developed and well-nourished. No distress.   HENT:   Head: Normocephalic and atraumatic.   Eyes: Pupils are equal, round, and reactive to light. EOM are normal.   Neck: Normal range of motion. Neck supple. No JVD present. No tracheal deviation present. No thyromegaly present.   Cardiovascular: Normal rate, regular rhythm, normal heart sounds and intact distal pulses. Exam reveals no gallop and no friction rub.   No murmur heard.  No peripheral edema.  Posterior tib pulses 2+ and equal bilaterally.   Pulmonary/Chest: Effort normal and breath sounds normal. No stridor. No respiratory distress. He has no wheezes. He has no rales. He exhibits no tenderness.   Lungs are CTA bilaterally   Abdominal: Soft. Bowel sounds are normal. He exhibits no distension. There is no tenderness.   Bowel sounds active x4 quadrants.  No pain to light or deep palpation x4 quadrants.   Musculoskeletal: Normal range of motion.   Lymphadenopathy:     He has no cervical adenopathy.   Neurological: He is alert and oriented to person, place, and time.   Skin: Skin is warm and dry. Capillary refill takes less than 2 seconds. He is not diaphoretic.   Psychiatric: He has a normal mood and affect. His behavior is normal. Judgment and thought content normal.   Nursing note and vitals reviewed.    Current outpatient and discharge medications have been reconciled for the patient.  Reviewed by: SHAJI Higgins      Assessment/Plan   Gonzales was seen today for surgical clearance.    Diagnoses and all orders for this visit:    Preoperative clearance  -     CBC & Differential  -     Comprehensive metabolic panel  -     Protime-INR  -     APTT  -     ECG 12 Lead    Abnormal EKG  -     CBC & Differential  -     Comprehensive metabolic " panel  -     Protime-INR  -     APTT    -Already cleared by Dr. Buenrostro, his pulmonologist.  EKG today showing some abnormalities including right bundle branch block, left axis deviation and sinus arrhythmia.  We will have him see cardiology for preoperative clearance due to abnormal EKG.    -Preop labs today including CBC, CMP, PT, INR, PTT.    We will contact patient with his lab results and any further recommendations.  Follow-up PRN and in 3 to 6 months for routine health maintenance/follow-up on chronic conditions.

## 2020-06-05 NOTE — PROGRESS NOTES
Procedure     ECG 12 Lead  Date/Time: 6/5/2020 10:11 AM  Performed by: Donya Colin APRN  Authorized by: Donya Colin APRN   Comparison: not compared with previous ECG   Previous ECG: no previous ECG available  Rhythm: sinus rhythm and sinus arrhythmia  Rate: normal  Conduction: right bundle branch block  QRS axis: left    Clinical impression: abnormal EKG

## 2020-06-08 ENCOUNTER — OFFICE VISIT (OUTPATIENT)
Dept: CARDIOLOGY | Facility: CLINIC | Age: 65
End: 2020-06-08

## 2020-06-08 VITALS
WEIGHT: 177 LBS | HEIGHT: 67 IN | BODY MASS INDEX: 27.78 KG/M2 | HEART RATE: 71 BPM | RESPIRATION RATE: 18 BRPM | DIASTOLIC BLOOD PRESSURE: 68 MMHG | SYSTOLIC BLOOD PRESSURE: 128 MMHG

## 2020-06-08 DIAGNOSIS — I10 ESSENTIAL HYPERTENSION: ICD-10-CM

## 2020-06-08 DIAGNOSIS — R06.09 DYSPNEA ON EXERTION: ICD-10-CM

## 2020-06-08 DIAGNOSIS — Z01.810 PREOPERATIVE CARDIOVASCULAR EXAMINATION: Primary | ICD-10-CM

## 2020-06-08 PROCEDURE — 99204 OFFICE O/P NEW MOD 45 MIN: CPT | Performed by: INTERNAL MEDICINE

## 2020-06-08 NOTE — PROGRESS NOTES
Cardiology clinic note  Mg Suarez MD, PhD  [facility] cardiology  Subjective:     Encounter Date:06/08/2020      Patient ID: Gonzales Kaur Sr. is a 64 y.o. male.    Chief Complaint:  Chief Complaint   Patient presents with   • Abnormal ECG   • Surgical Clearance       HPI:  History of Present Illness  I had the pleasure to see this very pleasant 64-year-old man today who was referred for cardiac evaluation for perioperative clearance prior to prostate surgery and biopsy secondary to discovery of prostate cancer.  He is a former smoker but has no history of diabetes or hyperlipidemia or stroke or coronary disease.  He is asymptomatic without any chest pain but has chronic shortness of breath likely secondary to prolonged smoking history and COPD.  His baseline EKG demonstrates sinus rhythm with right bundle branch block but no ischemic ST or T wave segments and no Q waves.  He has no history of myocardial infarction or congestive heart failure.  He had a 2D echo done in late 2018 not quite 2 years ago which demonstrated preserved LV systolic function of EF of 60 to 65% with grade 1 diastolic dysfunction no significant valvulopathy was seen.  He has had no change in his cardiovascular status since that time.  No history of peripheral vascular disease or carotid disease.  He is in normal state of health presently.  He has NYHA class II with his shortness of air which is chronic and CCS class I.  He displays no decompensated physical signs or symptoms.  No PND orthopnea no heart failure signs or symptoms no peripheral edema no JVD today.  After long discussion for his perioperative evaluation and clinical assessment it is unlikely he will benefit from any ischemic evaluation at this time with preserved LV systolic function, no history of MI, baseline EKG findings, nonvascular noncardiac surgery with relative low risk and he is without any signs or symptoms of chest pain or palpitations or other concerning  signs or symptoms.  Perioperative risk reduction can be achieved with low-dose beta-blocker, perioperative aspirin and statin therapy if indicated per AHA guidelines.  In his setting revascularization if needed does not decrease perioperative mortality by numerous studies.    Review of systems x14 point review of systems is negative except as mentioned above    Historical data copied forward from previous encounters and EMR is unchanged    The following portions of the patient's history were reviewed and updated as appropriate: allergies, current medications, past family history, past medical history, past social history, past surgical history and problem list.    Problem List:  Patient Active Problem List   Diagnosis   • COPD (chronic obstructive pulmonary disease) with emphysema (CMS/HCC)   • Benign non-nodular prostatic hyperplasia with lower urinary tract symptoms   • Acute non-recurrent maxillary sinusitis   • Chronic fatigue   • Dyspnea on exertion   • MICHAEL (obstructive sleep apnea)   • Essential hypertension   • Preoperative cardiovascular examination       Past Medical History:  Past Medical History:   Diagnosis Date   • Allergic    • BPH (benign prostatic hyperplasia)    • COPD (chronic obstructive pulmonary disease) (CMS/HCC)    • Tobacco use disorder        Past Surgical History:  History reviewed. No pertinent surgical history.    Social History:  Social History     Socioeconomic History   • Marital status:      Spouse name: Not on file   • Number of children: Not on file   • Years of education: Not on file   • Highest education level: Not on file   Tobacco Use   • Smoking status: Former Smoker     Packs/day: 1.00     Types: Cigarettes     Start date: 1976   • Smokeless tobacco: Never Used   Substance and Sexual Activity   • Alcohol use: No   • Drug use: No   • Sexual activity: Defer       Allergies:  No Known Allergies    Immunizations:  Immunization History   Administered Date(s) Administered   •  "Flu Vaccine Quad PF >18YRS 10/13/2019       ROS:  ROS       Objective:         /68 (BP Location: Left arm, Patient Position: Sitting)   Pulse 71   Resp 18   Ht 170.2 cm (67\")   Wt 80.3 kg (177 lb)   BMI 27.72 kg/m²     Physical Exam  No acute distress alert and oriented x3 afebrile vital signs stable  Normocephalic atraumatic pupils equal round extraocular was intact bilateral  Trachea midline supple no carotid bruits  Auscultation regular rate and rhythm no rubs murmurs or gallops on exam, no heave no lift  Clear to auscultation bilaterally with mildly prolonged expiratory phase but no wheezes rhonchi's or rales  Truncal obesity soft nontender nondistended bowel sounds positive  No clubbing cyanosis or edema  Skin is warm and dry  Pulses 2+ anterior tibials and DPs bilaterally as well as radials  Neurologically gross intact bilaterally  Normal mood and affect  No dysarthria or aphasia  In-Office Procedure(s):  Procedures    ASCVD RIsk Score::  The ASCVD Risk score (Boogiejaydon NORMAN Jr., et al., 2013) failed to calculate for the following reasons:    Cannot find a previous HDL lab    Cannot find a previous total cholesterol lab    Recent Radiology:  Imaging Results (Most Recent)     None          Lab Review:   Office Visit on 06/05/2020   Component Date Value   • WBC 06/05/2020 10.17    • RBC 06/05/2020 3.98*   • Hemoglobin 06/05/2020 13.5    • Hematocrit 06/05/2020 39.4    • MCV 06/05/2020 99.0*   • MCH 06/05/2020 33.9*   • MCHC 06/05/2020 34.3    • RDW 06/05/2020 13.2    • Platelets 06/05/2020 176    • Neutrophil Rel % 06/05/2020 72.6    • Lymphocyte Rel % 06/05/2020 12.7*   • Monocyte Rel % 06/05/2020 9.8    • Eosinophil Rel % 06/05/2020 2.2    • Basophil Rel % 06/05/2020 1.1    • Neutrophils Absolute 06/05/2020 7.39*   • Lymphocytes Absolute 06/05/2020 1.29    • Monocytes Absolute 06/05/2020 1.00*   • Eosinophils Absolute 06/05/2020 0.22    • Basophils Absolute 06/05/2020 0.11    • Immature Granulocyte Rel* " 06/05/2020 1.6*   • Immature Grans Absolute 06/05/2020 0.16*   • nRBC 06/05/2020 0.0    • Glucose 06/05/2020 96    • BUN 06/05/2020 18    • Creatinine 06/05/2020 0.90    • eGFR Non African Am 06/05/2020 85    • eGFR  Am 06/05/2020 103    • BUN/Creatinine Ratio 06/05/2020 20.0    • Sodium 06/05/2020 138    • Potassium 06/05/2020 5.0    • Chloride 06/05/2020 100    • Total CO2 06/05/2020 31.8*   • Calcium 06/05/2020 9.8    • Total Protein 06/05/2020 7.0    • Albumin 06/05/2020 4.60    • Globulin 06/05/2020 2.4    • A/G Ratio 06/05/2020 1.9    • Total Bilirubin 06/05/2020 0.3    • Alkaline Phosphatase 06/05/2020 56    • AST (SGOT) 06/05/2020 24    • ALT (SGPT) 06/05/2020 30    • INR 06/05/2020 1.03    • Protime 06/05/2020 13.2    • aPTT 06/05/2020 32.4    Hospital Outpatient Visit on 03/11/2020   Component Date Value   • Creatinine 03/11/2020 1.10         Revised Urbina's cardiac risk index of 0, 30-day risk 3.9% perioperatively.        Assessment:          Diagnosis Plan   1. Preoperative cardiovascular examination     2. Dyspnea on exertion     3. Essential hypertension            Plan:      Perioperative evaluation for nonvascular surgery  Revised Urbina's risk cardiac index of 0, 3.9% perioperative risk of complications death MI or cardiac arrest  Can start perioperative beta-blocker with metoprolol 7 days prior to surgery and continue through surgical postoperative interval for surgical risk reduction from CV standpoint  No need for low-dose aspirin or statin perioperatively  No need for ischemic evaluation asymptomatic patient with preserved LV systolic function  Abnormal EKG with underlying right bundle branch block with underlying lung disease with chronic smoking but otherwise no Q waves no ST or T wave changes consistent with ischemia  He exercises regularly and is NYHA class II by description with deconditioning and former prolonged smoking but quit 2018, CCS class I    Primary prevention goals for  CAD    He can follow-up in clinic with cardiology as needed otherwise follow with primary care in the interim  Lipid studies per [place] guidelines with ASCVD risk calculations advised for any potential initiation of statin therapy or low-dose aspirin for risk reduction    Diet exercise per [place] guidelines    No contraindication for nonvascular surgery at this point    It is a pleasure to be involved in his cardiovascular care.  Please call with any questions or concerns  Mg Suarez MD, PhD    New patient to me with new problems above                     Mg Suarez MD  06/08/20  .

## 2020-06-19 ENCOUNTER — TRANSCRIBE ORDERS (OUTPATIENT)
Dept: ADMINISTRATIVE | Facility: HOSPITAL | Age: 65
End: 2020-06-19

## 2020-06-19 DIAGNOSIS — Z12.2 ENCOUNTER FOR SCREENING FOR LUNG CANCER: Primary | ICD-10-CM

## 2020-07-29 ENCOUNTER — HOSPITAL ENCOUNTER (OUTPATIENT)
Dept: CT IMAGING | Facility: HOSPITAL | Age: 65
Discharge: HOME | End: 2020-07-29
Admitting: INTERNAL MEDICINE

## 2020-07-29 DIAGNOSIS — Z12.2 ENCOUNTER FOR SCREENING FOR LUNG CANCER: ICD-10-CM

## 2020-07-29 PROCEDURE — G0297 LDCT FOR LUNG CA SCREEN: HCPCS

## 2020-09-12 DIAGNOSIS — J40 BRONCHITIS: ICD-10-CM

## 2020-09-14 RX ORDER — CETIRIZINE HYDROCHLORIDE 10 MG/1
10 TABLET ORAL DAILY
Qty: 90 TABLET | Refills: 1 | Status: SHIPPED | OUTPATIENT
Start: 2020-09-14 | End: 2020-10-06 | Stop reason: SDUPTHER

## 2020-10-06 DIAGNOSIS — J40 BRONCHITIS: ICD-10-CM

## 2020-10-06 DIAGNOSIS — I10 ESSENTIAL HYPERTENSION: ICD-10-CM

## 2020-10-07 ENCOUNTER — TELEPHONE (OUTPATIENT)
Dept: INTERNAL MEDICINE | Facility: CLINIC | Age: 65
End: 2020-10-07

## 2020-10-07 RX ORDER — CETIRIZINE HYDROCHLORIDE 10 MG/1
10 TABLET ORAL DAILY
Qty: 90 TABLET | Refills: 1 | Status: SHIPPED | OUTPATIENT
Start: 2020-10-07 | End: 2021-05-04 | Stop reason: SDUPTHER

## 2020-10-07 RX ORDER — LISINOPRIL 20 MG/1
20 TABLET ORAL DAILY
Qty: 90 TABLET | Refills: 0 | Status: SHIPPED | OUTPATIENT
Start: 2020-10-07 | End: 2020-12-14

## 2020-10-07 RX ORDER — TAMSULOSIN HYDROCHLORIDE 0.4 MG/1
CAPSULE ORAL
Qty: 60 CAPSULE | Refills: 11 | Status: SHIPPED | OUTPATIENT
Start: 2020-10-07 | End: 2020-10-08 | Stop reason: SDUPTHER

## 2020-10-07 NOTE — TELEPHONE ENCOUNTER
PATIENTS WIFE CALLED STATED REFILLS OF MEDS WERE CALLED IN BUT WOULD LIKE TO REQUEST THE  tamsulosin (FLOMAX) 0.4 MG capsule 24 hr capsule  BE CHANGED TO A 90 DAYS SUPPLY SO THEY ARE ORDERING THE MEDS ALL AT THE SAME TIME.     [facility] - [place], [place] - [address] [place] [address] [phone] [facility] [phone] FX     PLEASE ADVISE    [phone]

## 2020-10-08 RX ORDER — TAMSULOSIN HYDROCHLORIDE 0.4 MG/1
CAPSULE ORAL
Qty: 180 CAPSULE | Refills: 2 | Status: SHIPPED | OUTPATIENT
Start: 2020-10-08 | End: 2021-05-04

## 2020-10-08 RX ORDER — TAMSULOSIN HYDROCHLORIDE 0.4 MG/1
CAPSULE ORAL
Qty: 180 CAPSULE | Refills: 2 | Status: SHIPPED | OUTPATIENT
Start: 2020-10-08 | End: 2020-10-08 | Stop reason: SDUPTHER

## 2020-10-08 NOTE — TELEPHONE ENCOUNTER
I sent to [facility] but accidentally sent to [facility] as well, can you call and cancel the [facility] one?  Thank you

## 2020-12-14 DIAGNOSIS — I10 ESSENTIAL HYPERTENSION: ICD-10-CM

## 2020-12-14 RX ORDER — LISINOPRIL 20 MG/1
TABLET ORAL
Qty: 90 TABLET | Refills: 3 | Status: SHIPPED | OUTPATIENT
Start: 2020-12-14 | End: 2021-09-20 | Stop reason: SDUPTHER

## 2021-03-22 ENCOUNTER — BULK ORDERING (OUTPATIENT)
Dept: CASE MANAGEMENT | Facility: OTHER | Age: 66
End: 2021-03-22

## 2021-03-22 DIAGNOSIS — Z23 IMMUNIZATION DUE: ICD-10-CM

## 2021-04-13 DIAGNOSIS — J40 BRONCHITIS: ICD-10-CM

## 2021-04-13 RX ORDER — CETIRIZINE HYDROCHLORIDE 10 MG/1
10 TABLET ORAL DAILY
Qty: 90 TABLET | Refills: 1 | Status: CANCELLED | OUTPATIENT
Start: 2021-04-13

## 2021-04-13 NOTE — TELEPHONE ENCOUNTER
Caller: Dominique Kaur    Relationship: Emergency Contact    Best call back number: [phone]    Medication needed:   Requested Prescriptions     Pending Prescriptions Disp Refills   • cetirizine (zyrTEC) 10 MG tablet 90 tablet 1     Sig: Take 1 tablet by mouth Daily.       When do you need the refill by: SEVERAL WEEKS.     What additional details did the patient provide when requesting the medication: THE PATIENT HAS SEVERALL WEEKS  LEFT. THE PATIENTS WIFE STATES THAT [employer] HAS REQUESTED THIS MEDICATION AND THAT HE NEEDS A PRIOR AUTHORIZATION FOR THE MEDIATION. NO REFILLS REMAINING.     Does the patient have less than a 3 day supply:  [] Yes  [x] No    What is the patient's preferred pharmacy: [facility] - [facility], [address] [phone] [facility] [phone]

## 2021-04-13 NOTE — TELEPHONE ENCOUNTER
Pt has an appt on 5/4/21 he has not been seen in a year his refill will be sent at that time, if it is not a covered medication it is available over the counter.

## 2021-05-04 ENCOUNTER — OFFICE VISIT (OUTPATIENT)
Dept: INTERNAL MEDICINE | Facility: CLINIC | Age: 66
End: 2021-05-04

## 2021-05-04 VITALS
HEIGHT: 67 IN | RESPIRATION RATE: 20 BRPM | DIASTOLIC BLOOD PRESSURE: 75 MMHG | BODY MASS INDEX: 28.6 KG/M2 | SYSTOLIC BLOOD PRESSURE: 128 MMHG | OXYGEN SATURATION: 91 % | TEMPERATURE: 97.5 F | HEART RATE: 83 BPM | WEIGHT: 182.2 LBS

## 2021-05-04 DIAGNOSIS — Z85.46 HISTORY OF PROSTATE CANCER: Chronic | ICD-10-CM

## 2021-05-04 DIAGNOSIS — J43.9 PULMONARY EMPHYSEMA, UNSPECIFIED EMPHYSEMA TYPE (HCC): Chronic | ICD-10-CM

## 2021-05-04 DIAGNOSIS — J30.2 SEASONAL ALLERGIES: ICD-10-CM

## 2021-05-04 DIAGNOSIS — I10 ESSENTIAL HYPERTENSION: Primary | Chronic | ICD-10-CM

## 2021-05-04 DIAGNOSIS — G47.33 OSA (OBSTRUCTIVE SLEEP APNEA): ICD-10-CM

## 2021-05-04 PROBLEM — Z01.810 PREOPERATIVE CARDIOVASCULAR EXAMINATION: Status: RESOLVED | Noted: 2020-06-08 | Resolved: 2021-05-04

## 2021-05-04 LAB
ALBUMIN SERPL-MCNC: 4.5 G/DL (ref 3.5–5.2)
ALBUMIN/GLOB SERPL: 2 G/DL
ALP SERPL-CCNC: 63 U/L (ref 39–117)
ALT SERPL-CCNC: 36 U/L (ref 1–41)
AST SERPL-CCNC: 25 U/L (ref 1–40)
BILIRUB SERPL-MCNC: 0.4 MG/DL (ref 0–1.2)
BUN SERPL-MCNC: 17 MG/DL (ref 8–23)
BUN/CREAT SERPL: 21.3 (ref 7–25)
CALCIUM SERPL-MCNC: 9.6 MG/DL (ref 8.6–10.5)
CHLORIDE SERPL-SCNC: 102 MMOL/L (ref 98–107)
CHOLEST SERPL-MCNC: 158 MG/DL (ref 0–200)
CO2 SERPL-SCNC: 27.9 MMOL/L (ref 22–29)
CREAT SERPL-MCNC: 0.8 MG/DL (ref 0.76–1.27)
ERYTHROCYTE [DISTWIDTH] IN BLOOD BY AUTOMATED COUNT: 12.7 % (ref 12.3–15.4)
GLOBULIN SER CALC-MCNC: 2.3 GM/DL
GLUCOSE SERPL-MCNC: 105 MG/DL (ref 65–99)
HCT VFR BLD AUTO: 39.5 % (ref 37.5–51)
HDLC SERPL-MCNC: 43 MG/DL (ref 40–60)
HGB BLD-MCNC: 13.5 G/DL (ref 13–17.7)
LDLC SERPL CALC-MCNC: 101 MG/DL (ref 0–100)
MCH RBC QN AUTO: 33.4 PG (ref 26.6–33)
MCHC RBC AUTO-ENTMCNC: 34.2 G/DL (ref 31.5–35.7)
MCV RBC AUTO: 97.8 FL (ref 79–97)
PLATELET # BLD AUTO: 192 10*3/MM3 (ref 140–450)
POTASSIUM SERPL-SCNC: 4.9 MMOL/L (ref 3.5–5.2)
PROT SERPL-MCNC: 6.8 G/DL (ref 6–8.5)
RBC # BLD AUTO: 4.04 10*6/MM3 (ref 4.14–5.8)
SODIUM SERPL-SCNC: 138 MMOL/L (ref 136–145)
TRIGL SERPL-MCNC: 72 MG/DL (ref 0–150)
TSH SERPL DL<=0.005 MIU/L-ACNC: 1.33 UIU/ML (ref 0.27–4.2)
VLDLC SERPL CALC-MCNC: 14 MG/DL (ref 5–40)
WBC # BLD AUTO: 11.38 10*3/MM3 (ref 3.4–10.8)

## 2021-05-04 PROCEDURE — 99214 OFFICE O/P EST MOD 30 MIN: CPT | Performed by: NURSE PRACTITIONER

## 2021-05-04 RX ORDER — CETIRIZINE HYDROCHLORIDE 10 MG/1
10 TABLET ORAL DAILY
Qty: 90 TABLET | Refills: 1 | Status: SHIPPED | OUTPATIENT
Start: 2021-05-04 | End: 2021-09-17 | Stop reason: ALTCHOICE

## 2021-05-04 NOTE — ASSESSMENT & PLAN NOTE
Hypertension is stable, continue lisinopril 20 mg daily.  Routine home monitoring for goal of less than 130/80 is recommended along with DASH diet and regular exercise.

## 2021-05-04 NOTE — ASSESSMENT & PLAN NOTE
COPD is stable, continue Incruse and Symbicort, Singulair as directed by Dr. Buenrostro, pulmonology with routine follow-ups.

## 2021-05-04 NOTE — PROGRESS NOTES
"Chief Complaint  Follow-up (Pt presents here today for a follow up and a medication refill.) and Med Refill    Subjective          Gonzales Kaur Sr. presents to [facility]  Presents for follow-up on chronic conditions and medication refills.  This is a 65-year-old male.    He has MICHAEL and endorses good compliance with his CPAP.  He follows with Dr. Buenrostro for sleep apnea and COPD.  He takes Incruse ellipta, Symbicort and Singulair daily and as needed albuterol.  Reports overall good control of COPD at this time.  For seasonal allergy control he takes Zyrtec daily and requests a refill of this.    He has hypertension treated with lisinopril 20 mg daily reporting good compliance with this medication and good overall control of blood pressures.    He has a history of prostate cancer treated last summer by Dr. Wynn.  Reports that PSAs are being watched with urology regularly.  He was treated surgically.    Overall reports that he is doing quite well and denies development of any other new issues today.      Objective   Vital Signs:   /75 (BP Location: Left arm, Patient Position: Sitting, Cuff Size: Large Adult)   Pulse 83   Temp 97.5 °F (36.4 °C)   Resp 20   Ht 170.2 cm (67\")   Wt 82.6 kg (182 lb 3.2 oz)   SpO2 91%   BMI 28.54 kg/m²     Physical Exam  Vitals and nursing note reviewed.   Constitutional:       General: He is not in acute distress.     Appearance: Normal appearance. He is well-developed. He is not ill-appearing, toxic-appearing or diaphoretic.   HENT:      Head: Normocephalic and atraumatic.      Right Ear: External ear normal.      Left Ear: External ear normal.   Eyes:      Pupils: Pupils are equal, round, and reactive to light.   Neck:      Vascular: No carotid bruit.   Cardiovascular:      Rate and Rhythm: Normal rate and regular rhythm.      Pulses: Normal pulses.      Heart sounds: Normal heart sounds.      Comments: No peripheral edema  Pulmonary:      " Effort: Pulmonary effort is normal. No respiratory distress.      Breath sounds: Normal breath sounds. No stridor. No wheezing, rhonchi or rales.   Chest:      Chest wall: No tenderness.   Abdominal:      General: Bowel sounds are normal. There is no distension.      Palpations: Abdomen is soft. There is no mass.      Tenderness: There is no abdominal tenderness. There is no right CVA tenderness, left CVA tenderness, guarding or rebound.      Hernia: No hernia is present.   Musculoskeletal:         General: Normal range of motion.      Cervical back: Normal range of motion and neck supple. No rigidity or tenderness.   Lymphadenopathy:      Cervical: No cervical adenopathy.   Skin:     General: Skin is warm and dry.      Capillary Refill: Capillary refill takes less than 2 seconds.   Neurological:      General: No focal deficit present.      Mental Status: He is alert and oriented to person, place, and time. Mental status is at baseline.   Psychiatric:         Mood and Affect: Mood normal.         Behavior: Behavior normal.         Thought Content: Thought content normal.         Judgment: Judgment normal.        Result Review :   The following data was reviewed by: SHAJI Rogers on 05/04/2021:  Common labs    Common Labsle 6/5/20 6/5/20    1032 1032   Glucose  96   BUN  18   Creatinine  0.90   eGFR Non  Am  85   eGFR  Am  103   Sodium  138   Potassium  5.0   Chloride  100   Calcium  9.8   Total Protein  7.0   Albumin  4.60   Total Bilirubin  0.3   Alkaline Phosphatase  56   AST (SGOT)  24   ALT (SGPT)  30   WBC 10.17    Hemoglobin 13.5    Hematocrit 39.4    Platelets 176            Current outpatient and discharge medications have been reconciled for the patient.  Reviewed by: SHAJI Rogers           Assessment and Plan    Diagnoses and all orders for this visit:    1. Essential hypertension (Primary)  Assessment & Plan:  Hypertension is stable, continue lisinopril 20 mg daily.  Routine  home monitoring for goal of less than 130/80 is recommended along with DASH diet and regular exercise.    Orders:  -     CBC No Differential  -     Comprehensive metabolic panel  -     Lipid panel  -     TSH Rfx On Abnormal To Free T4    2. MICHAEL (obstructive sleep apnea)  Assessment & Plan:  Stable, continue good CPAP compliance and routine follow-ups with Dr. Buenrostro.      3. Pulmonary emphysema, unspecified emphysema type (CMS/HCC)  Assessment & Plan:  COPD is stable, continue Incruse and Symbicort, Singulair as directed by Dr. Buenrostro, pulmonology with routine follow-ups.        4. History of prostate cancer  Assessment & Plan:  Followed by Dr. Velasco, urology.  He is getting routine PSAs through urology.      5. Seasonal allergies  Assessment & Plan:  Stable with Singulair and Zyrtec, continue current therapy.    Orders:  -     cetirizine (zyrTEC) 10 MG tablet; Take 1 tablet by mouth Daily.  Dispense: 90 tablet; Refill: 1    Other orders  -     Cancel: PSA DIAGNOSTIC ONLY    We will contact patient with lab results and any further recommendations.  Follow-up as needed and I will see him back in 6 months for a physical.    Follow Up   Return in about 6 months (around 11/4/2021) for Annual physical.  Patient was given instructions and counseling regarding his condition or for health maintenance advice. Please see specific information pulled into the AVS if appropriate.

## 2021-05-05 DIAGNOSIS — D72.829 LEUKOCYTOSIS, UNSPECIFIED TYPE: Primary | ICD-10-CM

## 2021-05-05 NOTE — PROGRESS NOTES
Please call pt-metabolic panel stable including kidney function, liver enzymes and electrolytes.  Mildly elevated fasting blood sugar, please watch carbohydrate and sugar intake.  Cholesterol panel looks good except for mild elevation in LDL cholesterol, continue with regular exercise and healthy diet.  Thyroid numbers are normal.  Blood count showing no anemia.  Normal platelet levels.  He has a slightly elevated white blood cell count and I want to make sure that this normalizes.  Please make him a 2-week lab appointment for recheck.

## 2021-05-06 ENCOUNTER — TELEPHONE (OUTPATIENT)
Dept: INTERNAL MEDICINE | Facility: CLINIC | Age: 66
End: 2021-05-06

## 2021-05-06 NOTE — TELEPHONE ENCOUNTER
----- Message from SHAJI Rogers sent at 5/5/2021  4:33 PM EDT -----  Please call pt-metabolic panel stable including kidney function, liver enzymes and electrolytes.  Mildly elevated fasting blood sugar, please watch carbohydrate and sugar intake.  Cholesterol panel looks good except for mild elevation in LDL choleste  rol, continue with regular exercise and healthy diet.  Thyroid numbers are normal.  Blood count showing no anemia.  Normal platelet levels.  He has a slightly elevated white blood cell count and I want to make sure that this normalizes.  Please make   him a 2-week lab appointment for recheck.

## 2021-05-06 NOTE — TELEPHONE ENCOUNTER
Hub staff attempted to follow warm transfer process and was unsuccessful     Caller: Gonzales Kaur Sr.    Relationship to patient: Self    Best call back number: [phone]    Patient is needing: LAB RESULTS

## 2021-05-10 ENCOUNTER — TELEPHONE (OUTPATIENT)
Dept: INTERNAL MEDICINE | Facility: CLINIC | Age: 66
End: 2021-05-10

## 2021-05-10 NOTE — TELEPHONE ENCOUNTER
Spoke with pt to let him know it may have been an old message from JAZMINE Ku.  I spoke with him last week, gave lab results, and scheduled for 2 week follow up labs.

## 2021-05-10 NOTE — TELEPHONE ENCOUNTER
PT CALLED STATING HE RECEIVED A VM STATING TO CALL THE OFFICE REGARDING LAB RESULTS. ATTEMPTED TO WARM TRANSFER.

## 2021-08-06 ENCOUNTER — TRANSCRIBE ORDERS (OUTPATIENT)
Dept: ADMINISTRATIVE | Facility: HOSPITAL | Age: 66
End: 2021-08-06

## 2021-08-06 DIAGNOSIS — Z12.2 ENCOUNTER FOR SCREENING FOR LUNG CANCER: Primary | ICD-10-CM

## 2021-08-18 ENCOUNTER — TRANSCRIBE ORDERS (OUTPATIENT)
Dept: ADMINISTRATIVE | Facility: HOSPITAL | Age: 66
End: 2021-08-18

## 2021-08-18 DIAGNOSIS — C61 PROSTATE CANCER (HCC): Primary | ICD-10-CM

## 2021-08-31 ENCOUNTER — HOSPITAL ENCOUNTER (OUTPATIENT)
Dept: CT IMAGING | Facility: HOSPITAL | Age: 66
Discharge: HOME | End: 2021-08-31
Admitting: INTERNAL MEDICINE

## 2021-08-31 DIAGNOSIS — Z12.2 ENCOUNTER FOR SCREENING FOR LUNG CANCER: ICD-10-CM

## 2021-08-31 PROCEDURE — 71271 CT THORAX LUNG CANCER SCR C-: CPT

## 2021-09-10 ENCOUNTER — HOSPITAL ENCOUNTER (OUTPATIENT)
Dept: MRI IMAGING | Facility: HOSPITAL | Age: 66
Discharge: HOME | End: 2021-09-10
Admitting: UROLOGY

## 2021-09-10 DIAGNOSIS — C61 PROSTATE CANCER (HCC): ICD-10-CM

## 2021-09-10 LAB — CREAT BLDA-MCNC: 1 MG/DL (ref 0.6–1.3)

## 2021-09-10 PROCEDURE — A9577 INJ MULTIHANCE: HCPCS | Performed by: UROLOGY

## 2021-09-10 PROCEDURE — 82565 ASSAY OF CREATININE: CPT

## 2021-09-10 PROCEDURE — 0 GADOBENATE DIMEGLUMINE 529 MG/ML SOLUTION: Performed by: UROLOGY

## 2021-09-10 PROCEDURE — 72197 MRI PELVIS W/O & W/DYE: CPT

## 2021-09-10 RX ADMIN — GADOBENATE DIMEGLUMINE 17 ML: 529 INJECTION, SOLUTION INTRAVENOUS at 12:11

## 2021-09-17 ENCOUNTER — TELEPHONE (OUTPATIENT)
Dept: INTERNAL MEDICINE | Facility: CLINIC | Age: 66
End: 2021-09-17

## 2021-09-17 ENCOUNTER — APPOINTMENT (OUTPATIENT)
Dept: MRI IMAGING | Facility: HOSPITAL | Age: 66
End: 2021-09-17

## 2021-09-17 RX ORDER — LEVOCETIRIZINE DIHYDROCHLORIDE 5 MG/1
5 TABLET, FILM COATED ORAL EVERY EVENING
Qty: 90 TABLET | Refills: 1 | Status: SHIPPED | OUTPATIENT
Start: 2021-09-17 | End: 2022-03-23

## 2021-09-17 NOTE — TELEPHONE ENCOUNTER
BEST PATIENTS WIFE STATES PATIENTS INSURANCE WILL NOT COVER cetirizine (zyrTEC) 10 MG tablet BUT THEY WILL COVER THE LEVO CETIRIZINE.       PLEASE SEND IN NEW SCRIPT THAT WILL BE COVERED     [facility] - [facility] - [address] [facility], [address] - [phone] ph - [phone] IGNACIO JEWELL CAN BE REACHED AT: [phone]

## 2021-09-20 DIAGNOSIS — I10 ESSENTIAL HYPERTENSION: ICD-10-CM

## 2021-09-20 RX ORDER — LISINOPRIL 20 MG/1
20 TABLET ORAL DAILY
Qty: 90 TABLET | Refills: 3 | Status: SHIPPED | OUTPATIENT
Start: 2021-09-20 | End: 2022-04-20 | Stop reason: SDUPTHER

## 2021-09-20 NOTE — TELEPHONE ENCOUNTER
Caller: Dominique Kaur    Relationship: Emergency Contact    Best call back number: [phone] (M)    Medication needed:   lisinopril (PRINIVIL,ZESTRIL) 20 MG tablet    When do you need the refill by: SOON / MAIL ORDER    What additional details did the patient provide when requesting the medication:     Does the patient have less than a 3 day supply:  [] Yes  [x] No    What is the patient's preferred pharmacy: [facility] - [address], [facility] [address] - [phone] [facility] [phone]

## 2021-11-02 ENCOUNTER — TELEPHONE (OUTPATIENT)
Dept: INTERNAL MEDICINE | Facility: CLINIC | Age: 66
End: 2021-11-02

## 2021-11-02 NOTE — TELEPHONE ENCOUNTER
I spoke with the Pt, he lost his ID and SSC.    I advised the Pt we do not have any type of forms. He may need to ask the  office of these forms.     I also informed the Pt if he needs a new ID, he can go to the DMV present them with a bill with his name and address on it. Or even his Birth certificate with the bill,and they will give him a new ID/lisence. Then he will be able to take both the ID and birth certificate to the Social security office and they will get him a new one sent out VIA mail.     Pt and his wife verbalized understanding, Pt advised to return our call if he may have any concerns.

## 2021-11-02 NOTE — TELEPHONE ENCOUNTER
"----- Message from SHAJI Rogers sent at 10/28/2021 12:06 PM EDT -----  Regarding: FW: Social security card info?  Do you mind letting pt know that I spoke with our practice manager and lead and they are both unaware of a form, but that if they need one filled out, just let me know but the social security office should be able to provide it. Milana says that sometimes the SS office will accept an office note from the PCP as proof of ID, so if they contact them and say this is okay, we can always send that too.  ----- Message -----  From: Thelma Dejesus  Sent: 10/26/2021   9:14 AM EDT  To: Donya Donaldson APRN  Subject: RE: Social security card info?                   The only thing I know is the  office will accept a last office note signed by their PCP to obtain a new card. I am not aware of an actual form. If there is such a form, they will need to send it to us. I recently just had a patient whose birth date was wrong at the SS office. She needed this as proof of identity. I printed out last office note for Dr. Emerson to sign and sent to her. I hope this helps.    ----- Message -----  From: Donya Lomas APRN  Sent: 10/25/2021   4:14 PM EDT  To: Thelma Dejesus  Subject: FW: Social security card info?                   Milana, do you have any idea?  ----- Message -----  From: Marissa Lopez  Sent: 10/25/2021   3:45 PM EDT  To: SHAJI Rogers  Subject: RE: Social security card info?                   I am sorry but I have never heard of this and I have never had anyone ask.  I am not aware of any forms we have either.  ----- Message -----  From: Donya Lomas APRN  Sent: 10/25/2021   9:00 AM EDT  To: Marissa Lopez  Subject: Social security card info?                       This patient's wife was in to see me recently and told me that the patient has lost his social security card and when they contacted the  office, they told them that his PCP is able to fill out a \"proof " "of ID\" form to get it back... do you have any idea what this is or if we have these forms? Sorry, I had never heard of this before and need some guidance.             "

## 2021-11-08 ENCOUNTER — OFFICE VISIT (OUTPATIENT)
Dept: INTERNAL MEDICINE | Facility: CLINIC | Age: 66
End: 2021-11-08

## 2021-11-08 VITALS
SYSTOLIC BLOOD PRESSURE: 123 MMHG | HEART RATE: 85 BPM | TEMPERATURE: 97.8 F | OXYGEN SATURATION: 93 % | WEIGHT: 180 LBS | RESPIRATION RATE: 19 BRPM | DIASTOLIC BLOOD PRESSURE: 78 MMHG | BODY MASS INDEX: 28.25 KG/M2 | HEIGHT: 67 IN

## 2021-11-08 DIAGNOSIS — I10 ESSENTIAL HYPERTENSION: Chronic | ICD-10-CM

## 2021-11-08 DIAGNOSIS — N40.1 BENIGN NON-NODULAR PROSTATIC HYPERPLASIA WITH LOWER URINARY TRACT SYMPTOMS: Chronic | ICD-10-CM

## 2021-11-08 DIAGNOSIS — Z23 NEED FOR 23-POLYVALENT PNEUMOCOCCAL POLYSACCHARIDE VACCINE: ICD-10-CM

## 2021-11-08 DIAGNOSIS — Z00.00 HEALTHCARE MAINTENANCE: ICD-10-CM

## 2021-11-08 DIAGNOSIS — Z85.46 HISTORY OF PROSTATE CANCER: Chronic | ICD-10-CM

## 2021-11-08 DIAGNOSIS — G47.33 OSA (OBSTRUCTIVE SLEEP APNEA): Chronic | ICD-10-CM

## 2021-11-08 DIAGNOSIS — Z00.00 WELCOME TO MEDICARE PREVENTIVE VISIT: Primary | ICD-10-CM

## 2021-11-08 DIAGNOSIS — J30.2 SEASONAL ALLERGIES: Chronic | ICD-10-CM

## 2021-11-08 DIAGNOSIS — J43.9 PULMONARY EMPHYSEMA, UNSPECIFIED EMPHYSEMA TYPE (HCC): Chronic | ICD-10-CM

## 2021-11-08 PROBLEM — J01.00 ACUTE NON-RECURRENT MAXILLARY SINUSITIS: Status: RESOLVED | Noted: 2017-01-23 | Resolved: 2021-11-08

## 2021-11-08 PROCEDURE — 99214 OFFICE O/P EST MOD 30 MIN: CPT | Performed by: NURSE PRACTITIONER

## 2021-11-08 PROCEDURE — 1126F AMNT PAIN NOTED NONE PRSNT: CPT | Performed by: NURSE PRACTITIONER

## 2021-11-08 PROCEDURE — 1170F FXNL STATUS ASSESSED: CPT | Performed by: NURSE PRACTITIONER

## 2021-11-08 PROCEDURE — G0009 ADMIN PNEUMOCOCCAL VACCINE: HCPCS | Performed by: NURSE PRACTITIONER

## 2021-11-08 PROCEDURE — G0402 INITIAL PREVENTIVE EXAM: HCPCS | Performed by: NURSE PRACTITIONER

## 2021-11-08 PROCEDURE — 1159F MED LIST DOCD IN RCRD: CPT | Performed by: NURSE PRACTITIONER

## 2021-11-08 PROCEDURE — 90732 PPSV23 VACC 2 YRS+ SUBQ/IM: CPT | Performed by: NURSE PRACTITIONER

## 2021-11-08 NOTE — ASSESSMENT & PLAN NOTE
Followed by Dr. Wynn, urology for history of prostate cancer and BPH.  Doing well with no new symptoms, PSA every 6 months per urology.

## 2021-11-08 NOTE — ASSESSMENT & PLAN NOTE
Hypertension is stable, continue lisinopril.  Routine home monitoring, goal of less than 130/80 along with DASH diet.

## 2021-11-08 NOTE — ASSESSMENT & PLAN NOTE
PCV 23 today.  Reports that he has not had this at his pulmonary office.    He has had a flu shot this season already.    I endorsed a COVID-19 booster and he is planning on pursuing that within the next few weeks.    I recommended the Shingrix vaccine which he will pursue at his pharmacy.

## 2021-11-08 NOTE — PATIENT INSTRUCTIONS
Medicare Wellness  Personal Prevention Plan of Service     Date of Office Visit:  2021  Encounter Provider:  SHAJI Rogers  Place of Service:  [facility]  Patient Name: Gonzales Kaur Sr.  :  1955    As part of the Medicare Wellness portion of your visit today, we are providing you with this personalized preventive plan of services (PPPS). This plan is based upon recommendations of the [place] Preventive Services Task Force (USPSTF) and the Advisory Committee on Immunization Practices (ACIP).    This lists the preventive care services that should be considered, and provides dates of when you are due. Items listed as completed are up-to-date and do not require any further intervention.    Health Maintenance   Topic Date Due   • Pneumococcal Vaccine 65+ (1 of 2 - PPSV23) Never done   • TDAP/TD VACCINES (1 - Tdap) Never done   • ZOSTER VACCINE (1 of 2) Never done   • COVID-19 Vaccine (3 - Pfizer booster) 2021   • ANNUAL WELLNESS VISIT  2022   • COLORECTAL CANCER SCREENING  2025   • HEPATITIS C SCREENING  Completed   • INFLUENZA VACCINE  Completed       Orders Placed This Encounter   Procedures   • Pneumococcal Polysaccharide Vaccine 23-Valent (PPSV23) Greater Than or Equal To 1yo Subcutaneous / IM   • CBC No Differential     Order Specific Question:   Release to patient     Answer:   Immediate   • Comprehensive metabolic panel     Order Specific Question:   Release to patient     Answer:   Immediate   • Lipid panel     Order Specific Question:   Release to patient     Answer:   Immediate   • TSH Rfx On Abnormal To Free T4     Order Specific Question:   Release to patient     Answer:   Immediate       Return in about 6 months (around 2022).

## 2021-11-08 NOTE — PROGRESS NOTES
The ABCs of the Annual Wellness Visit  Welcome to Medicare Visit    Chief Complaint   Patient presents with   • Welcome To Medicare     Pt presents here today for a welcome to medicare visit.     Subjective {   History of Present Illness:  Gonzales Kaur Sr. is a 66 y.o. male who presents for a  Welcome to Medicare Visit and follow-up on chronic conditions.    He has COPD followed by Dr. Buenrostro pulmonary.  Reports good overall control with his maintenance inhalers at this time.    He has a history of prostate cancer followed by urology routinely.  He gets a PSA drawn every 6 months.    He has MICHAEL and uses his CPAP with good compliance.    For seasonal allergies he takes Xyzal and Flonase and endorses good compliance and efficacy.    He has hypertension well controlled with lisinopril 20 mg daily.    Overall reports that he is doing very well and denies development of any other new issues today.    The following portions of the patient's history were reviewed and   updated as appropriate: allergies, current medications, past family history, past medical history, past social history, past surgical history and problem list.     Compared to one year ago, the patient feels his physical   health is better.    Compared to one year ago, the patient feels his mental   health is better.    Recent Hospitalizations:  He was not admitted to the hospital during the last year.       Current Medical Providers:  Patient Care Team:  Donya Lomas APRN as PCP - General (Nurse Practitioner)  Patience Fournier APRN (Family Medicine)  Kenny Wynn Jr., MD as Consulting Physician (Urology)  Adams Buenrostro MD as Consulting Physician (Pulmonary Disease)    Outpatient Medications Prior to Visit   Medication Sig Dispense Refill   • albuterol sulfate  (90 Base) MCG/ACT inhaler Inhale 2 puffs.     • fluticasone (FLONASE) 50 MCG/ACT nasal spray 1 spray by Each Nare route Daily. 15.8 mL 2   • INCRUSE ELLIPTA 62.5 MCG/INH aerosol  "powder  1 puff Daily.  0   • levocetirizine (XYZAL) 5 MG tablet Take 1 tablet by mouth Every Evening. 90 tablet 1   • lisinopril (PRINIVIL,ZESTRIL) 20 MG tablet Take 1 tablet by mouth Daily. 90 tablet 3   • montelukast (SINGULAIR) 10 MG tablet Take 1 tablet by mouth Every Night. 30 tablet 1   • SYMBICORT 160-4.5 MCG/ACT inhaler inhale 1 puff by mouth twice a day 10.2 g 3     No facility-administered medications prior to visit.       No opioid medication identified on active medication list. I have reviewed chart for other potential  high risk medication/s and harmful drug interactions in the elderly.          Aspirin is not on active medication list.  Aspirin use is not indicated based on review of current medical condition/s. Risk of harm outweighs potential benefits.  .    Patient Active Problem List   Diagnosis   • COPD (chronic obstructive pulmonary disease) with emphysema (HCC)   • Benign non-nodular prostatic hyperplasia with lower urinary tract symptoms   • Chronic fatigue   • Dyspnea on exertion   • MICHAEL (obstructive sleep apnea)   • Essential hypertension   • History of prostate cancer   • Seasonal allergies     Advance Care Planning  Advance Directive is not on file.  ACP discussion was held with the patient during this visit. Patient does not have an advance directive, information provided.    Review of Systems   All other systems reviewed and are negative.       Objective      Vitals:    11/08/21 1033   BP: 123/78   BP Location: Right arm   Patient Position: Sitting   Cuff Size: Large Adult   Pulse: 85   Resp: 19   Temp: 97.8 °F (36.6 °C)   SpO2: 93%   Weight: 81.6 kg (180 lb)   Height: 170.2 cm (67\")   PainSc: 0-No pain     BMI Readings from Last 1 Encounters:   11/08/21 28.19 kg/m²   BMI is above normal parameters. Recommendations include: educational material    Does the patient have evidence of cognitive impairment? No    Physical Exam  Vitals and nursing note reviewed.   Constitutional:       " General: He is not in acute distress.     Appearance: Normal appearance. He is well-developed. He is not ill-appearing, toxic-appearing or diaphoretic.   HENT:      Head: Normocephalic and atraumatic.      Right Ear: Tympanic membrane, ear canal and external ear normal.      Left Ear: Tympanic membrane, ear canal and external ear normal.   Eyes:      Pupils: Pupils are equal, round, and reactive to light.   Neck:      Vascular: No carotid bruit.   Cardiovascular:      Rate and Rhythm: Normal rate and regular rhythm.      Pulses: Normal pulses.      Heart sounds: Normal heart sounds.      Comments: No peripheral edema.  Pulmonary:      Effort: Pulmonary effort is normal. No respiratory distress.      Breath sounds: Normal breath sounds. No stridor. No wheezing, rhonchi or rales.   Chest:      Chest wall: No tenderness.   Abdominal:      General: Bowel sounds are normal. There is no distension.      Palpations: Abdomen is soft. There is no mass.      Tenderness: There is no abdominal tenderness. There is no right CVA tenderness, left CVA tenderness, guarding or rebound.      Hernia: No hernia is present.   Musculoskeletal:         General: Normal range of motion.      Cervical back: Normal range of motion and neck supple. No rigidity or tenderness.   Lymphadenopathy:      Cervical: No cervical adenopathy.   Skin:     General: Skin is warm and dry.      Capillary Refill: Capillary refill takes less than 2 seconds.   Neurological:      General: No focal deficit present.      Mental Status: He is alert and oriented to person, place, and time. Mental status is at baseline.   Psychiatric:         Mood and Affect: Mood normal.         Behavior: Behavior normal.         Thought Content: Thought content normal.         Judgment: Judgment normal.           Current outpatient and discharge medications have been reconciled for the patient.  Reviewed by: SHAJI Rogers      Procedures       HEALTH RISK ASSESSMENT    Smoking  Status:  Social History     Tobacco Use   Smoking Status Former Smoker   • Packs/day: 1.00   • Types: Cigarettes   • Start date: 1976   Smokeless Tobacco Never Used     Alcohol Consumption:  Social History     Substance and Sexual Activity   Alcohol Use No       Fall Risk Screen:    ALETHA Fall Risk Assessment was completed, and patient is at LOW risk for falls.Assessment completed on:11/8/2021    Depression Screen:   PHQ-2/PHQ-9 Depression Screening 11/8/2021   Little interest or pleasure in doing things 0   Feeling down, depressed, or hopeless 0   Total Score 0       Health Habits and Functional and Cognitive Screening:  Functional & Cognitive Status 11/8/2021   Do you have difficulty preparing food and eating? No   Do you have difficulty bathing yourself, getting dressed or grooming yourself? No   Do you have difficulty using the toilet? No   Do you have difficulty moving around from place to place? No   Do you have trouble with steps or getting out of a bed or a chair? No   Current Diet Well Balanced Diet   Dental Exam Up to date   Eye Exam Up to date   Exercise (times per week) 2 times per week   Current Exercises Include Walking   Do you need help using the phone?  No   Are you deaf or do you have serious difficulty hearing?  Yes   Do you need help with transportation? No   Do you need help shopping? No   Do you need help preparing meals?  No   Do you need help with housework?  No   Do you need help with laundry? No   Do you need help taking your medications? No   Do you need help managing money? No   Do you ever drive or ride in a car without wearing a seat belt? No   Have you felt unusual stress, anger or loneliness in the last month? No   Who do you live with? Spouse   If you need help, do you have trouble finding someone available to you? No   Have you been bothered in the last four weeks by sexual problems? No   Do you have difficulty concentrating, remembering or making decisions? No       Visual  Acuity:     Visual Acuity Screening    Right eye Left eye Both eyes   Without correction: 20/25 20/30 20/25   With correction: 20/25 20/25 20/20       Age-appropriate Screening Schedule:  Refer to the list below for future screening recommendations based on patient's age, sex and/or medical conditions. Orders for these recommended tests are listed in the plan section. The patient has been provided with a written plan.    Health Maintenance   Topic Date Due   • TDAP/TD VACCINES (1 - Tdap) Never done   • ZOSTER VACCINE (1 of 2) Never done   • INFLUENZA VACCINE  Completed          Assessment/Plan   CMS Preventative Services Quick Reference  Risk Factors Identified During Encounter  Cardiovascular Disease  Depression/Dysphoria  Fall Risk-High or Moderate  Immunizations Discussed/Encouraged (specific Immunizations; Influenza, Pneumococcal 23 and COVID19  The above risks/problems have been discussed with the patient.  Pertinent information has been shared with the patient in the After Visit Summary.  Follow up plans and orders are seen below in the Assessment/Plan Section.    Diagnoses and all orders for this visit:    1. Welcome to Medicare preventive visit (Primary)    2. MICHAEL (obstructive sleep apnea)  Assessment & Plan:  Continue with good CPAP compliance.      3. Pulmonary emphysema, unspecified emphysema type (HCC)  Assessment & Plan:  Stable, managed by Dr. Buenrostro, pulmonary.  Continue baseline Symbicort, Incruse Ellipta, albuterol per the direction of pulmonary.        4. Seasonal allergies  Assessment & Plan:  Stable, continue as needed Xyzal and Flonase.      5. Essential hypertension  Assessment & Plan:  Hypertension is stable, continue lisinopril.  Routine home monitoring, goal of less than 130/80 along with DASH diet.    Orders:  -     CBC No Differential  -     Comprehensive metabolic panel  -     Lipid panel  -     TSH Rfx On Abnormal To Free T4    6. Benign non-nodular prostatic hyperplasia with lower urinary  tract symptoms  Assessment & Plan:  Followed by Dr. Wynn, urology for history of prostate cancer and BPH.  Doing well with no new symptoms, PSA every 6 months per urology.      7. History of prostate cancer    8. Need for 23-polyvalent pneumococcal polysaccharide vaccine  Comments:  Administered.  Orders:  -     Pneumococcal Polysaccharide Vaccine 23-Valent (PPSV23) Greater Than or Equal To 1yo Subcutaneous / IM    9. Healthcare maintenance  Assessment & Plan:  PCV 23 today.  Reports that he has not had this at his pulmonary office.    He has had a flu shot this season already.    I endorsed a COVID-19 booster and he is planning on pursuing that within the next few weeks.    I recommended the Shingrix vaccine which he will pursue at his pharmacy.        Follow Up:   Return in about 6 months (around 5/8/2022).     We will contact patient with lab results and any further recommendations.  Follow-up as needed and I will see him back in 6 months for recheck of chronic conditions.    An After Visit Summary and PPPS were made available to the patient.

## 2021-11-08 NOTE — ASSESSMENT & PLAN NOTE
Stable, managed by Dr. Buenrostro, pulmonary.  Continue baseline Symbicort, Incruse Ellipta, albuterol per the direction of pulmonary.

## 2021-11-09 LAB
ALBUMIN SERPL-MCNC: 4.5 G/DL (ref 3.8–4.8)
ALBUMIN/GLOB SERPL: 1.8 {RATIO} (ref 1.2–2.2)
ALP SERPL-CCNC: 67 IU/L (ref 44–121)
ALT SERPL-CCNC: 33 IU/L (ref 0–44)
AST SERPL-CCNC: 26 IU/L (ref 0–40)
BILIRUB SERPL-MCNC: 0.3 MG/DL (ref 0–1.2)
BUN SERPL-MCNC: 17 MG/DL (ref 8–27)
BUN/CREAT SERPL: 18 (ref 10–24)
CALCIUM SERPL-MCNC: 9.4 MG/DL (ref 8.6–10.2)
CHLORIDE SERPL-SCNC: 101 MMOL/L (ref 96–106)
CHOLEST SERPL-MCNC: 164 MG/DL (ref 100–199)
CO2 SERPL-SCNC: 24 MMOL/L (ref 20–29)
CREAT SERPL-MCNC: 0.93 MG/DL (ref 0.76–1.27)
ERYTHROCYTE [DISTWIDTH] IN BLOOD BY AUTOMATED COUNT: 13.1 % (ref 11.6–15.4)
GLOBULIN SER CALC-MCNC: 2.5 G/DL (ref 1.5–4.5)
GLUCOSE SERPL-MCNC: 107 MG/DL (ref 65–99)
HCT VFR BLD AUTO: 39.5 % (ref 37.5–51)
HDLC SERPL-MCNC: 35 MG/DL
HGB BLD-MCNC: 13.6 G/DL (ref 13–17.7)
LDLC SERPL CALC-MCNC: 107 MG/DL (ref 0–99)
MCH RBC QN AUTO: 34.7 PG (ref 26.6–33)
MCHC RBC AUTO-ENTMCNC: 34.4 G/DL (ref 31.5–35.7)
MCV RBC AUTO: 101 FL (ref 79–97)
PLATELET # BLD AUTO: 193 X10E3/UL (ref 150–450)
POTASSIUM SERPL-SCNC: 4.9 MMOL/L (ref 3.5–5.2)
PROT SERPL-MCNC: 7 G/DL (ref 6–8.5)
RBC # BLD AUTO: 3.92 X10E6/UL (ref 4.14–5.8)
SODIUM SERPL-SCNC: 139 MMOL/L (ref 134–144)
TRIGL SERPL-MCNC: 122 MG/DL (ref 0–149)
TSH SERPL DL<=0.005 MIU/L-ACNC: 1.36 UIU/ML (ref 0.45–4.5)
VLDLC SERPL CALC-MCNC: 22 MG/DL (ref 5–40)
WBC # BLD AUTO: 11.2 X10E3/UL (ref 3.4–10.8)

## 2021-11-15 DIAGNOSIS — D72.829 LEUKOCYTOSIS, UNSPECIFIED TYPE: Primary | ICD-10-CM

## 2021-11-15 NOTE — PROGRESS NOTES
Please call patient, requested a phone call instead of MyChart for results -white blood cell count is mildly elevated, I would like to recheck white blood cell count in about 1 week, please make him a nonfasting lab appointment.  Otherwise blood count looks good with normal hemoglobin and platelets.  Metabolic panel looks stable including kidney function, liver enzymes and electrolytes.  Cholesterol panel overall looks stable, mild elevation in LDL cholesterol, please watch intake of fatty foods in the diet and engage in regular exercise.  Thyroid numbers are stable.

## 2021-11-16 ENCOUNTER — IMMUNIZATION (OUTPATIENT)
Dept: VACCINE CLINIC | Facility: HOSPITAL | Age: 66
End: 2021-11-16

## 2021-11-16 PROCEDURE — 0004A ADM SARSCOV2 30MCG/0.3ML BOOSTER: CPT | Performed by: INTERNAL MEDICINE

## 2021-11-16 PROCEDURE — 91300 HC SARSCOV02 VAC 30MCG/0.3ML IM: CPT | Performed by: INTERNAL MEDICINE

## 2021-11-24 DIAGNOSIS — D72.829 LEUKOCYTOSIS, UNSPECIFIED TYPE: Primary | ICD-10-CM

## 2021-12-03 ENCOUNTER — OFFICE VISIT (OUTPATIENT)
Dept: INTERNAL MEDICINE | Facility: CLINIC | Age: 66
End: 2021-12-03

## 2021-12-03 VITALS
HEART RATE: 77 BPM | TEMPERATURE: 98.2 F | SYSTOLIC BLOOD PRESSURE: 141 MMHG | BODY MASS INDEX: 27.78 KG/M2 | HEIGHT: 67 IN | OXYGEN SATURATION: 93 % | RESPIRATION RATE: 20 BRPM | DIASTOLIC BLOOD PRESSURE: 71 MMHG | WEIGHT: 177 LBS

## 2021-12-03 DIAGNOSIS — J01.10 ACUTE NON-RECURRENT FRONTAL SINUSITIS: Primary | ICD-10-CM

## 2021-12-03 PROCEDURE — 99213 OFFICE O/P EST LOW 20 MIN: CPT | Performed by: NURSE PRACTITIONER

## 2021-12-03 RX ORDER — AMOXICILLIN AND CLAVULANATE POTASSIUM 875; 125 MG/1; MG/1
1 TABLET, FILM COATED ORAL 2 TIMES DAILY
Qty: 20 TABLET | Refills: 0 | Status: SHIPPED | OUTPATIENT
Start: 2021-12-03 | End: 2021-12-13

## 2021-12-03 NOTE — PROGRESS NOTES
"Chief Complaint  Sore Throat (Pt presents here today with nasal drip, congestion, and occassionally a sore throat.) and Nasal Congestion    Subjective          Gonzales Kaur Sr. presents to [facility]  Patient presents for evaluation of cough, congestion.  This is a 66-year-old male with history of hypertension and COPD.  Symptoms started 2 to 3 weeks ago.  He endorses cough with production of sputum, runny nose, sinus pain and pressure, sore throat.  He has been vaccinated against COVID-19, no known recent exposure.  Nothing over-the-counter for his symptoms so far.  He takes maintenance Symbicort and reports overall good control of COPD at this time.  Denies development of any other new issues today.    Cough  This is a new problem. The current episode started more than 1 month ago. The problem has been waxing and waning. The problem occurs every few hours. The cough is productive of purulent sputum. Associated symptoms include nasal congestion, postnasal drip, rhinorrhea and a sore throat. The symptoms are aggravated by cold air.       Objective   Vital Signs:   /71 (BP Location: Left arm, Patient Position: Sitting, Cuff Size: Large Adult)   Pulse 77   Temp 98.2 °F (36.8 °C)   Resp 20   Ht 170.2 cm (67\")   Wt 80.3 kg (177 lb)   SpO2 93%   BMI 27.72 kg/m²     Physical Exam  Vitals and nursing note reviewed.   Constitutional:       General: He is not in acute distress.     Appearance: Normal appearance. He is well-developed. He is not ill-appearing, toxic-appearing or diaphoretic.   HENT:      Head: Normocephalic and atraumatic.      Right Ear: External ear normal.      Left Ear: External ear normal.   Eyes:      Pupils: Pupils are equal, round, and reactive to light.   Neck:      Vascular: No carotid bruit.   Cardiovascular:      Rate and Rhythm: Normal rate and regular rhythm.      Pulses: Normal pulses.      Heart sounds: Normal heart sounds.   Pulmonary:      " Effort: Pulmonary effort is normal. No respiratory distress.      Breath sounds: Normal breath sounds. No stridor. No wheezing, rhonchi or rales.   Chest:      Chest wall: No tenderness.   Abdominal:      General: Bowel sounds are normal. There is no distension.      Palpations: Abdomen is soft.      Tenderness: There is no abdominal tenderness.   Musculoskeletal:         General: Normal range of motion.      Cervical back: Normal range of motion and neck supple. No rigidity or tenderness.   Lymphadenopathy:      Cervical: No cervical adenopathy.   Skin:     General: Skin is warm and dry.      Capillary Refill: Capillary refill takes less than 2 seconds.   Neurological:      General: No focal deficit present.      Mental Status: He is alert and oriented to person, place, and time. Mental status is at baseline.   Psychiatric:         Mood and Affect: Mood normal.         Behavior: Behavior normal.         Thought Content: Thought content normal.        Result Review :   The following data was reviewed by: SHAJI Rogers on 12/03/2021:  Common labs    Common Labsle 9/10/21 11/8/21 11/8/21 11/8/21 11/23/21     0000 0000 0000    Glucose   107 (A)     BUN   17     Creatinine 1.00  0.93     eGFR Non  Am   86     eGFR African Am   99     Sodium   139     Potassium   4.9     Chloride   101     Calcium   9.4     Total Protein   7.0     Albumin   4.5     Total Bilirubin   0.3     Alkaline Phosphatase   67     AST (SGOT)   26     ALT (SGPT)   33     WBC  11.2 (A)   12.4 (A)   Hemoglobin  13.6   13.9   Hematocrit  39.5   42.0   Platelets  193   213   Total Cholesterol    164    Triglycerides    122    HDL Cholesterol    35 (A)    LDL Cholesterol     107 (A)    (A) Abnormal value       Comments are available for some flowsheets but are not being displayed.           Current outpatient and discharge medications have been reconciled for the patient.  Reviewed by: SHAJI Rogers           Assessment and Plan     Diagnoses and all orders for this visit:    1. Acute non-recurrent frontal sinusitis (Primary)  -     amoxicillin-clavulanate (Augmentin) 875-125 MG per tablet; Take 1 tablet by mouth 2 (Two) Times a Day for 10 days.  Dispense: 20 tablet; Refill: 0  -     COVID-19,[facility] ROUTINE, NP/OP SWAB IN TRANSPORT MEDIA OR ESWAB 72 HR TAT - Swab, Oropharynx      Due to the longevity of his symptoms as well as history of COPD I will treat for possible bacterial contributor with Augmentin.  He is counseled to complete this prescription in full.  Mucinex is recommended along with an antihistamine.    COVID-19 swab sent out today, quarantine until results are back.    We will contact patient with results and further recommendations.  Follow-up as needed and at next scheduled office visit.    Follow Up   No follow-ups on file.  Patient was given instructions and counseling regarding his condition or for health maintenance advice. Please see specific information pulled into the AVS if appropriate.       Answers for HPI/ROS submitted by the patient on 12/3/2021  What is the primary reason for your visit?: Cough

## 2021-12-04 LAB
LABCORP SARS-COV-2, NAA 2 DAY TAT: NORMAL
SARS-COV-2 RNA RESP QL NAA+PROBE: NOT DETECTED

## 2021-12-14 DIAGNOSIS — D72.829 LEUKOCYTOSIS, UNSPECIFIED TYPE: Primary | ICD-10-CM

## 2022-01-07 ENCOUNTER — CONSULT (OUTPATIENT)
Dept: ONCOLOGY | Facility: CLINIC | Age: 67
End: 2022-01-07

## 2022-01-07 ENCOUNTER — LAB (OUTPATIENT)
Dept: LAB | Facility: HOSPITAL | Age: 67
End: 2022-01-07

## 2022-01-07 VITALS
SYSTOLIC BLOOD PRESSURE: 143 MMHG | WEIGHT: 180.3 LBS | DIASTOLIC BLOOD PRESSURE: 76 MMHG | TEMPERATURE: 98.3 F | BODY MASS INDEX: 28.98 KG/M2 | HEART RATE: 66 BPM | OXYGEN SATURATION: 93 % | HEIGHT: 66 IN | RESPIRATION RATE: 16 BRPM

## 2022-01-07 DIAGNOSIS — D72.9 NEUTROPHILIA: Primary | ICD-10-CM

## 2022-01-07 DIAGNOSIS — D72.89 LEFT SHIFT: ICD-10-CM

## 2022-01-07 DIAGNOSIS — D75.89 MACROCYTOSIS: ICD-10-CM

## 2022-01-07 DIAGNOSIS — R79.89 ABNORMAL CBC: Primary | ICD-10-CM

## 2022-01-07 DIAGNOSIS — R25.2 MUSCLE CRAMPS: ICD-10-CM

## 2022-01-07 LAB
BASOPHILS # BLD AUTO: 0.16 10*3/MM3 (ref 0–0.2)
BASOPHILS NFR BLD AUTO: 1.2 % (ref 0–1.5)
DEPRECATED RDW RBC AUTO: 50.3 FL (ref 37–54)
EOSINOPHIL # BLD AUTO: 0.21 10*3/MM3 (ref 0–0.4)
EOSINOPHIL NFR BLD AUTO: 1.6 % (ref 0.3–6.2)
ERYTHROCYTE [DISTWIDTH] IN BLOOD BY AUTOMATED COUNT: 13.7 % (ref 12.3–15.4)
FOLATE SERPL-MCNC: >20 NG/ML (ref 4.78–24.2)
HCT VFR BLD AUTO: 40.8 % (ref 37.5–51)
HGB BLD-MCNC: 13.9 G/DL (ref 13–17.7)
HGB RETIC QN AUTO: 38 PG (ref 29.8–36.1)
IMM GRANULOCYTES # BLD AUTO: 0.23 10*3/MM3 (ref 0–0.05)
IMM GRANULOCYTES NFR BLD AUTO: 1.8 % (ref 0–0.5)
IMM RETICS NFR: 18.1 % (ref 3–15.8)
LDH SERPL-CCNC: 166 U/L (ref 99–259)
LYMPHOCYTES # BLD AUTO: 1.78 10*3/MM3 (ref 0.7–3.1)
LYMPHOCYTES NFR BLD AUTO: 13.6 % (ref 19.6–45.3)
MAGNESIUM SERPL-MCNC: 2.3 MG/DL (ref 1.8–2.5)
MCH RBC QN AUTO: 33.9 PG (ref 26.6–33)
MCHC RBC AUTO-ENTMCNC: 34.1 G/DL (ref 31.5–35.7)
MCV RBC AUTO: 99.5 FL (ref 79–97)
MONOCYTES # BLD AUTO: 1.41 10*3/MM3 (ref 0.1–0.9)
MONOCYTES NFR BLD AUTO: 10.8 % (ref 5–12)
NEUTROPHILS NFR BLD AUTO: 71 % (ref 42.7–76)
NEUTROPHILS NFR BLD AUTO: 9.31 10*3/MM3 (ref 1.7–7)
NRBC BLD AUTO-RTO: 0 /100 WBC (ref 0–0.2)
PLATELET # BLD AUTO: 194 10*3/MM3 (ref 140–450)
PMV BLD AUTO: 11 FL (ref 6–12)
RBC # BLD AUTO: 4.1 10*6/MM3 (ref 4.14–5.8)
RETICS # AUTO: 0.08 10*6/MM3 (ref 0.02–0.13)
RETICS/RBC NFR AUTO: 1.88 % (ref 0.7–1.9)
VIT B12 BLD-MCNC: 612 PG/ML (ref 211–946)
WBC NRBC COR # BLD: 13.1 10*3/MM3 (ref 3.4–10.8)

## 2022-01-07 PROCEDURE — 99204 OFFICE O/P NEW MOD 45 MIN: CPT | Performed by: INTERNAL MEDICINE

## 2022-01-07 PROCEDURE — 85046 RETICYTE/HGB CONCENTRATE: CPT | Performed by: INTERNAL MEDICINE

## 2022-01-07 PROCEDURE — 85025 COMPLETE CBC W/AUTO DIFF WBC: CPT

## 2022-01-07 PROCEDURE — 88184 FLOWCYTOMETRY/ TC 1 MARKER: CPT | Performed by: INTERNAL MEDICINE

## 2022-01-07 PROCEDURE — 88182 CELL MARKER STUDY: CPT | Performed by: INTERNAL MEDICINE

## 2022-01-07 PROCEDURE — 83735 ASSAY OF MAGNESIUM: CPT | Performed by: INTERNAL MEDICINE

## 2022-01-07 PROCEDURE — 36415 COLL VENOUS BLD VENIPUNCTURE: CPT

## 2022-01-07 PROCEDURE — 88185 FLOWCYTOMETRY/TC ADD-ON: CPT | Performed by: INTERNAL MEDICINE

## 2022-01-07 PROCEDURE — 88377 M/PHMTRC ALYS ISHQUANT/SEMIQ: CPT

## 2022-01-07 PROCEDURE — 36415 COLL VENOUS BLD VENIPUNCTURE: CPT | Performed by: INTERNAL MEDICINE

## 2022-01-07 PROCEDURE — 82607 VITAMIN B-12: CPT | Performed by: INTERNAL MEDICINE

## 2022-01-07 PROCEDURE — 82746 ASSAY OF FOLIC ACID SERUM: CPT | Performed by: INTERNAL MEDICINE

## 2022-01-07 PROCEDURE — 83615 LACTATE (LD) (LDH) ENZYME: CPT | Performed by: INTERNAL MEDICINE

## 2022-01-07 RX ORDER — MULTIPLE VITAMINS W/ MINERALS TAB 9MG-400MCG
1 TAB ORAL DAILY
COMMUNITY

## 2022-01-07 NOTE — PROGRESS NOTES
Subjective     REASON FOR CONSULTATION:  Provide an opinion on any further workup or treatment on:    Leukocytosis                       REQUESTING PHYSICIAN: Donya Lomas APRN    RECORDS OBTAINED: Records of the patients history including those obtained from the referring provider were reviewed and summarized in detail.    HISTORY OF PRESENT ILLNESS:    Gonzales Kaur Sr. is a 66 y.o. patient who was referred for evaluation of leukocytosis.  He was noted on labs on 11/23/2021 to have a WBC count of 12,400.  WBC was at 11,500 on 12/13/2021.  His WBC was normal prior to that.    Patient has underlying COPD.  He follows with Dr. Santa.  He reports having recurrent sinus congestion.  He is on antihistamine regularly.  He reports having cough productive of sputum.  When he has worsening of his symptoms, he uses an inhaler.  No recent courses of oral steroids or steroid injections.    Patient has history of smoking.  He quit in 2018.  He started gaining weight after he quit smoking.  He is undergoing annual CT scan for lung cancer screening.  The last study was in August 2021 and he is going to have a follow-up scan in August 2022.  Due to the finding of inflammatory changes in August 2021, he was placed on a course of antibiotic.     REVIEW OF SYSTEMS:  Review of Systems   Constitutional: Negative for fatigue, fever and unexpected weight change.   HENT: Positive for ear pain, postnasal drip, sinus pain, tinnitus and voice change. Negative for nosebleeds.    Eyes: Negative for visual disturbance.   Respiratory: Negative for cough and shortness of breath.    Cardiovascular: Negative for chest pain and leg swelling.   Gastrointestinal: Negative for abdominal pain, blood in stool, constipation, diarrhea, nausea and vomiting.   Genitourinary: Negative for frequency and hematuria.   Musculoskeletal: Negative for back pain and joint swelling.        Muscle cramps   Skin: Negative for rash.   Neurological: Negative for  dizziness and headaches.   Hematological: Negative for adenopathy. Bruises/bleeds easily.   Psychiatric/Behavioral: Negative for dysphoric mood. The patient is not nervous/anxious.        Past Medical History:   Diagnosis Date   • Acute non-recurrent maxillary sinusitis 2017   • Allergic    • BPH (benign prostatic hyperplasia)    • Chronic venous hypertension with ulcer (HCC)    • COPD (chronic obstructive pulmonary disease) (HCC)    • History of cellulitis 2014    Right leg   • History of kidney stones    • MICHAEL (obstructive sleep apnea)    • Osteoarthritis    • Tobacco use disorder    • Varicose veins      Past Surgical History:   Procedure Laterality Date   • KIDNEY STONE SURGERY      Lithotripsy     Social History     Socioeconomic History   • Marital status:      Spouse name: Dominique   Tobacco Use   • Smoking status: Former Smoker     Packs/day: 1.00     Types: Cigarettes     Start date:      Quit date:      Years since quittin.0   • Smokeless tobacco: Never Used   Substance and Sexual Activity   • Alcohol use: No   • Drug use: No   • Sexual activity: Defer     Family History   Problem Relation Age of Onset   • Diabetes Mother    • Arthritis Mother    • Hypertension Mother    • Breast cancer Mother 74   • Dementia Father    • Hypertension Father    • Liver disease Brother       MEDICATIONS:    Current Outpatient Medications:   •  albuterol sulfate  (90 Base) MCG/ACT inhaler, Inhale 2 puffs., Disp: , Rfl:   •  INCRUSE ELLIPTA 62.5 MCG/INH aerosol powder , 1 puff Daily., Disp: , Rfl: 0  •  levocetirizine (XYZAL) 5 MG tablet, Take 1 tablet by mouth Every Evening., Disp: 90 tablet, Rfl: 1  •  lisinopril (PRINIVIL,ZESTRIL) 20 MG tablet, Take 1 tablet by mouth Daily., Disp: 90 tablet, Rfl: 3  •  multivitamin with minerals (CENTRUM PO), Take 1 tablet by mouth Daily., Disp: , Rfl:   •  POTASSIUM PO, Take  by mouth Daily. Nature made, Disp: , Rfl:   •  SYMBICORT 160-4.5 MCG/ACT inhaler, inhale  "1 puff by mouth twice a day, Disp: 10.2 g, Rfl: 3  •  fluticasone (FLONASE) 50 MCG/ACT nasal spray, 1 spray by Each Nare route Daily., Disp: 15.8 mL, Rfl: 2  •  montelukast (SINGULAIR) 10 MG tablet, Take 1 tablet by mouth Every Night., Disp: 30 tablet, Rfl: 1     ALLERGIES:  No Known Allergies     Objective   VITAL SIGNS:  Vitals:    01/07/22 0844   BP: 143/76   Pulse: 66   Resp: 16   Temp: 98.3 °F (36.8 °C)   TempSrc: Oral   SpO2: 93%   Weight: 81.8 kg (180 lb 4.8 oz)   Height: 167 cm (65.75\")  Comment: new ht   PainSc: 0-No pain       Wt Readings from Last 3 Encounters:   01/07/22 81.8 kg (180 lb 4.8 oz)   12/03/21 80.3 kg (177 lb)   11/08/21 81.6 kg (180 lb)       PHYSICAL EXAMINATION  GENERAL:  The patient appears in good general condition, not in acute distress.  SKIN: No skin rashes. No ecchymosis.  HEAD:  Normocephalic.  EYES:  No Jaundice. No Pallor. Pupils equal. EOMI.  NECK:  Supple with Good ROM. No Thyromegaly. No Masses.  LYMPHATICS:  No cervical or supraclavicular lymphadenopathy.  CHEST: Normal respiratory effort. Lungs clear to auscultation.   CARDIAC:  Normal S1 & S2. No murmur.   ABDOMEN:  Soft. No tenderness. No Hepatomegaly. No Splenomegaly. No masses.  EXTREMITIES:  No clubbing. No deforming arthritis in the hands.  NEUROLOGICAL:  No Focal neurological deficits.     RESULT REVIEW:   Results from last 7 days   Lab Units 01/07/22  0833   WBC 10*3/mm3 13.10*   NEUTROS ABS 10*3/mm3 9.31*   HEMOGLOBIN g/dL 13.9   HEMATOCRIT % 40.8   PLATELETS 10*3/mm3 194     I reviewed the peripheral blood smear from today.  There is evidence of neutrophilia and some increase in the basophil count.  No blasts were identified.  RBCs have normal morphology.  Platelets are normal in size and number.    Component      Latest Ref Rng & Units 5/20/2021 11/23/2021 12/13/2021 1/7/2022   Neutrophils Absolute      1.70 - 7.00 10*3/mm3 6.58 9.4 (H) 8.5 (H) 9.31 (H)   Lymphocytes Absolute      0.70 - 3.10 10*3/mm3 1.51 1.6 1.5 " 1.78   Monocytes Absolute      0.10 - 0.90 10*3/mm3 1.12 (H) 1.1 (H) 1.0 (H) 1.41 (H)   Eosinophils Absolute      0.00 - 0.40 10*3/mm3 0.16 0.1 0.2 0.21   Basophils Absolute      0.00 - 0.20 10*3/mm3 0.10 0.1 0.1 0.16   Immature Grans, Absolute      0.00 - 0.05 10*3/mm3 0.11 (H) 0.2 (H) 0.2 (H) 0.23 (H)     Results from last 7 days   Lab Units 01/07/22  0933   MAGNESIUM mg/dL 2.3     CT scan chest on 8/31/2021:  1.  A few sub-4 mm pulmonary nodules bilaterally are new since  07/29/2020. Lung RADS category 2S. Continued follow-up with low-dose  chest CT in 12 months recommended to ensure stability.  2.  Bubbly endobronchial filling defects within the right bronchus  intermedius and bibasilar segmental bronchi are new. Findings of  endobronchial wall thickening involving the lingular and right upper  lobe bronchi is grossly unchanged. Findings most suggestive of acute on  chronic endobronchial based infectious/inflammation and correlation  patient history is recommended to determine most appropriate etiology as  well as exclude acute atypical pneumonia.  3.  Moderate to severe hepatic steatosis.      Assessment/Plan   *Leukocytosis with neutrophilia and monocytosis.  WBC count was normal on 10 November 2021 when his WBC was 12,400.  WBC count increased to 13,000 with today.  Monocytes increased to 1410 today.  There is increase in immature granulocytes to 230 today.    The increase in the counts is likely secondary to the underlying infection/inflammation.  He has been dealing with upper respiratory and sinus infection.  He was placed on a course of antibiotics on 12/3/2021 and he noticed improvement in his symptoms.  However, WBC count increased today.    I recommended further evaluation for underlying bone marrow disorder like leukemia.  I recommended obtaining peripheral blood flow cytometry and BCR ABL testing by FISH.    *Macrocytosis.  MCV is 99.5 today.  Etiology is not clear but may represent B12 or folate  deficiency or may represent underlying liver disease.    *Muscle cramps.  Patient thought that he had low potassium level and was taking a potassium supplement regularly.  Potassium level was at the upper end of normal at 4.9 on 11/8/2021.  Magnesium level from today was normal at 2.3.  Therefore, he does not need potassium or magnesium supplements.    *Lung nodules identified on CT on 8/31/2021.  He was treated with an antibiotic course.  The pulmonologist recommended repeating CT scan in August 2022.    PLAN:    1.  Obtain peripheral blood flow cytometry.  2.  Obtain BCR ABL by FISH.  3.  Obtain B12 folate and methylmalonic acid levels.  4.  Discontinue potassium.  5.  Patient will have a follow-up CT scan of the chest in August 2022.  6.  Patient does not need potassium supplementation at this point.        Quinton Laureano MD  01/07/22

## 2022-01-11 LAB
METHYLMALONATE SERPL-SCNC: 184 NMOL/L (ref 0–378)
REF LAB TEST METHOD: NORMAL
REF LAB TEST METHOD: NORMAL

## 2022-01-12 ENCOUNTER — TELEPHONE (OUTPATIENT)
Dept: ONCOLOGY | Facility: CLINIC | Age: 67
End: 2022-01-12

## 2022-01-12 NOTE — TELEPHONE ENCOUNTER
----- Message from Quinton Laureano MD sent at 1/11/2022  3:38 PM EST -----  Please inform the patient that his labs showed no evidence of leukemia.    Thank you

## 2022-01-17 ENCOUNTER — OFFICE VISIT (OUTPATIENT)
Dept: INTERNAL MEDICINE | Facility: CLINIC | Age: 67
End: 2022-01-17

## 2022-01-17 ENCOUNTER — APPOINTMENT (OUTPATIENT)
Dept: WOMENS IMAGING | Facility: HOSPITAL | Age: 67
End: 2022-01-17

## 2022-01-17 ENCOUNTER — TELEPHONE (OUTPATIENT)
Dept: INTERNAL MEDICINE | Facility: CLINIC | Age: 67
End: 2022-01-17

## 2022-01-17 VITALS
SYSTOLIC BLOOD PRESSURE: 138 MMHG | DIASTOLIC BLOOD PRESSURE: 78 MMHG | HEIGHT: 66 IN | BODY MASS INDEX: 29.25 KG/M2 | TEMPERATURE: 98.2 F | WEIGHT: 182 LBS | OXYGEN SATURATION: 92 % | RESPIRATION RATE: 19 BRPM | HEART RATE: 78 BPM

## 2022-01-17 DIAGNOSIS — H65.93 MIDDLE EAR EFFUSION, BILATERAL: ICD-10-CM

## 2022-01-17 DIAGNOSIS — R05.3 CHRONIC COUGH: Primary | ICD-10-CM

## 2022-01-17 PROCEDURE — 71046 X-RAY EXAM CHEST 2 VIEWS: CPT | Performed by: NURSE PRACTITIONER

## 2022-01-17 PROCEDURE — 71046 X-RAY EXAM CHEST 2 VIEWS: CPT | Performed by: RADIOLOGY

## 2022-01-17 PROCEDURE — 99213 OFFICE O/P EST LOW 20 MIN: CPT | Performed by: NURSE PRACTITIONER

## 2022-01-17 RX ORDER — AZELASTINE 1 MG/ML
2 SPRAY, METERED NASAL 2 TIMES DAILY
Qty: 30 ML | Refills: 0 | Status: SHIPPED | OUTPATIENT
Start: 2022-01-17 | End: 2022-07-21 | Stop reason: SDUPTHER

## 2022-01-17 RX ORDER — COLISTIN SULFATE, NEOMYCIN SULFATE, THONZONIUM BROMIDE AND HYDROCORTISONE ACETATE 3; 3.3; .5; 1 MG/ML; MG/ML; MG/ML; MG/ML
3 SUSPENSION AURICULAR (OTIC) 4 TIMES DAILY
Qty: 10 ML | Refills: 0 | Status: SHIPPED | OUTPATIENT
Start: 2022-01-17 | End: 2022-06-29

## 2022-01-17 RX ORDER — NEOMYCIN AND POLYMYXIN B SULFATES, BACITRACIN ZINC, AND HYDROCORTISONE 3.5; 5000; 400; 1 MG/G; [IU]/G; [IU]/G; MG/G
1 OINTMENT TOPICAL 2 TIMES DAILY
OUTPATIENT
Start: 2022-01-17

## 2022-01-17 NOTE — TELEPHONE ENCOUNTER
Do you mind giving the pharmacy a call and seeing what alternatives they have available that will be covered for pt?

## 2022-01-17 NOTE — TELEPHONE ENCOUNTER
Can we please call the pharmacy and see which ear drops they do have available that would be covered for pt?

## 2022-01-17 NOTE — PROGRESS NOTES
"Chief Complaint  Earache (Pt presents here today with concerns of bilateral earache.) and Cough    Subjective          Gonzales Kaur Sr. presents to [facility]  Patient presents for eval of bilateral ear fullness/ache and chronic cough. This is a 66 YOM. Med hx includes COPD, seasonal allergies. He takes Xyzal daily along with Flonase with good compliance. He endorses chronic cough with sputum production for several months, postnasal drip, bilateral ear pressure with intermittent aching. Has had 3 COVID vaccines, denies any recent exposure or worsening of symptoms. No fever or chills, SOA or chest discomfort. Denies development of any other new issues today.      Objective   Vital Signs:   /78 (BP Location: Right arm, Patient Position: Sitting, Cuff Size: Large Adult)   Pulse 78   Temp 98.2 °F (36.8 °C)   Resp 19   Ht 167 cm (65.75\")   Wt 82.6 kg (182 lb)   SpO2 92%   BMI 29.60 kg/m²     Physical Exam  Vitals and nursing note reviewed.   Constitutional:       General: He is not in acute distress.     Appearance: Normal appearance. He is well-developed. He is not ill-appearing, toxic-appearing or diaphoretic.   HENT:      Head: Normocephalic and atraumatic.      Right Ear: External ear normal. A middle ear effusion is present. There is no impacted cerumen. Tympanic membrane is not erythematous.      Left Ear: External ear normal. A middle ear effusion is present. There is no impacted cerumen. Tympanic membrane is not erythematous.      Ears:      Comments: Irritation bilat canals  Eyes:      Pupils: Pupils are equal, round, and reactive to light.   Neck:      Vascular: No carotid bruit.   Cardiovascular:      Rate and Rhythm: Normal rate and regular rhythm.      Pulses: Normal pulses.      Heart sounds: Normal heart sounds.   Pulmonary:      Effort: Pulmonary effort is normal. No respiratory distress.      Breath sounds: Normal breath sounds. No stridor. No wheezing, " rhonchi or rales.   Chest:      Chest wall: No tenderness.   Abdominal:      General: Bowel sounds are normal. There is no distension.      Palpations: Abdomen is soft.      Tenderness: There is no abdominal tenderness.   Musculoskeletal:         General: Normal range of motion.      Cervical back: Normal range of motion and neck supple. No rigidity or tenderness.   Lymphadenopathy:      Cervical: No cervical adenopathy.   Skin:     General: Skin is warm and dry.      Capillary Refill: Capillary refill takes less than 2 seconds.   Neurological:      General: No focal deficit present.      Mental Status: He is alert and oriented to person, place, and time. Mental status is at baseline.   Psychiatric:         Mood and Affect: Mood normal.         Behavior: Behavior normal.         Thought Content: Thought content normal.         Judgment: Judgment normal.        Result Review :   The following data was reviewed by: SHAJI Rogers on 01/17/2022:  Common labs    Common Labsle 11/23/21 12/13/21 1/7/22   WBC 12.4 (A) 11.5 (A) 13.10 (A)   Hemoglobin 13.9 13.7 13.9   Hematocrit 42.0 40.1 40.8   Platelets 213 196 194   (A) Abnormal value            Current outpatient and discharge medications have been reconciled for the patient.  Reviewed by: SHAJI Rogers           Assessment and Plan    Diagnoses and all orders for this visit:    1. Chronic cough (Primary)  -     XR Chest PA & Lateral (In Office)    2. Middle ear effusion, bilateral  -     neomycin-colistin-hydrocortisone-thonzonium (Cortisporin-TC) 3.3-3-10-0.5 MG/ML otic suspension; Administer 3 drops into both ears 4 (Four) Times a Day.  Dispense: 10 mL; Refill: 0  -     azelastine (ASTELIN) 0.1 % nasal spray; 2 sprays into the nostril(s) as directed by provider 2 (Two) Times a Day. Use in each nostril as directed  Dispense: 30 mL; Refill: 0      CXR today for eval of chronic cough. Treating bilateral ear effusion/irritation of bilateral canals with  cortisporin drops- apply QID x 7 days and notify me if unimproved. May need ENT consult if not improved. Start Astelin nasal spray- likely chronic postnasal drip contributing to cough.     Will contact pt with CXR results and further recommendations. Follow up PRN and I will see him back at next scheduled OV.    Follow Up   No follow-ups on file.  Patient was given instructions and counseling regarding his condition or for health maintenance advice. Please see specific information pulled into the AVS if appropriate.

## 2022-01-17 NOTE — TELEPHONE ENCOUNTER
Caller: Gonzales Kaur Sr.    Relationship: Self    Best call back number: [phone]   What medications are you currently taking:   Current Outpatient Medications on File Prior to Visit   Medication Sig Dispense Refill   • albuterol sulfate  (90 Base) MCG/ACT inhaler Inhale 2 puffs.     • azelastine (ASTELIN) 0.1 % nasal spray 2 sprays into the nostril(s) as directed by provider 2 (Two) Times a Day. Use in each nostril as directed 30 mL 0   • fluticasone (FLONASE) 50 MCG/ACT nasal spray 1 spray by Each Nare route Daily. 15.8 mL 2   • INCRUSE ELLIPTA 62.5 MCG/INH aerosol powder  1 puff Daily.  0   • levocetirizine (XYZAL) 5 MG tablet Take 1 tablet by mouth Every Evening. 90 tablet 1   • lisinopril (PRINIVIL,ZESTRIL) 20 MG tablet Take 1 tablet by mouth Daily. 90 tablet 3   • montelukast (SINGULAIR) 10 MG tablet Take 1 tablet by mouth Every Night. 30 tablet 1   • multivitamin with minerals (CENTRUM PO) Take 1 tablet by mouth Daily.     • neomycin-colistin-hydrocortisone-thonzonium (Cortisporin-TC) 3.3-3-10-0.5 MG/ML otic suspension Administer 3 drops into both ears 4 (Four) Times a Day. 10 mL 0   • SYMBICORT 160-4.5 MCG/ACT inhaler inhale 1 puff by mouth twice a day 10.2 g 3     No current facility-administered medications on file prior to visit.          Which medication are you concerned about:    NEW PRESCRIPTION FOR EAR DROPS- PHARMACY SAID THEY CAN'T GET THIS MEDICATION FOR 3 MONTHS FROM THE SUPPLIER.    THE PHARMACY IS WANTING TO KNOW IF THERE IS SOMETHING DIFFERENT THAT CAN BE PRESCRIBED IN THE PLACE OF.    Who prescribed you this medication:  OLMAN TOMPKINS    ADDITIONAL NOTES:    THE PHARMACY WAS SUPPOSED TO SEND OVER THIS INFORMATION ELECTRONIC.        CALL PATIENT IF YOU HAVE ANY QUESTIONS OR CONCERNS.    PHARMACY OF CHOICE:  [facility] [address] [facility] [phone] [facility] [phone] FX

## 2022-01-17 NOTE — TELEPHONE ENCOUNTER
I spoke with pharmacy, asked for availability on dosages/eardrops.   Verbal order given.    RX filled

## 2022-01-19 NOTE — PROGRESS NOTES
Patient requested a call with results instead of MyChart.  Please let him know that his chest x-ray is clear with no evidence of infection in the lungs.  Please let us know if his symptoms persist or worsen despite our interventions earlier this week.

## 2022-03-14 DIAGNOSIS — D72.9 NEUTROPHILIA: Primary | ICD-10-CM

## 2022-03-18 ENCOUNTER — LAB (OUTPATIENT)
Dept: LAB | Facility: HOSPITAL | Age: 67
End: 2022-03-18

## 2022-03-18 ENCOUNTER — OFFICE VISIT (OUTPATIENT)
Dept: ONCOLOGY | Facility: CLINIC | Age: 67
End: 2022-03-18

## 2022-03-18 VITALS
TEMPERATURE: 97.1 F | DIASTOLIC BLOOD PRESSURE: 77 MMHG | SYSTOLIC BLOOD PRESSURE: 136 MMHG | RESPIRATION RATE: 16 BRPM | OXYGEN SATURATION: 92 % | WEIGHT: 179.9 LBS | BODY MASS INDEX: 28.91 KG/M2 | HEIGHT: 66 IN | HEART RATE: 69 BPM

## 2022-03-18 DIAGNOSIS — D72.821 MONOCYTOSIS: ICD-10-CM

## 2022-03-18 DIAGNOSIS — D75.89 MACROCYTOSIS: ICD-10-CM

## 2022-03-18 DIAGNOSIS — D72.9 NEUTROPHILIA: ICD-10-CM

## 2022-03-18 DIAGNOSIS — D72.9 NEUTROPHILIA: Primary | ICD-10-CM

## 2022-03-18 LAB
BASOPHILS # BLD AUTO: 0.09 10*3/MM3 (ref 0–0.2)
BASOPHILS NFR BLD AUTO: 0.9 % (ref 0–1.5)
DEPRECATED RDW RBC AUTO: 52.8 FL (ref 37–54)
EOSINOPHIL # BLD AUTO: 0.16 10*3/MM3 (ref 0–0.4)
EOSINOPHIL NFR BLD AUTO: 1.6 % (ref 0.3–6.2)
ERYTHROCYTE [DISTWIDTH] IN BLOOD BY AUTOMATED COUNT: 13.9 % (ref 12.3–15.4)
HCT VFR BLD AUTO: 40.9 % (ref 37.5–51)
HGB BLD-MCNC: 13.2 G/DL (ref 13–17.7)
IMM GRANULOCYTES # BLD AUTO: 0.12 10*3/MM3 (ref 0–0.05)
IMM GRANULOCYTES NFR BLD AUTO: 1.2 % (ref 0–0.5)
LDH SERPL-CCNC: 157 U/L (ref 99–259)
LYMPHOCYTES # BLD AUTO: 1.36 10*3/MM3 (ref 0.7–3.1)
LYMPHOCYTES NFR BLD AUTO: 13.8 % (ref 19.6–45.3)
MCH RBC QN AUTO: 33.4 PG (ref 26.6–33)
MCHC RBC AUTO-ENTMCNC: 32.3 G/DL (ref 31.5–35.7)
MCV RBC AUTO: 103.5 FL (ref 79–97)
MONOCYTES # BLD AUTO: 1.14 10*3/MM3 (ref 0.1–0.9)
MONOCYTES NFR BLD AUTO: 11.6 % (ref 5–12)
NEUTROPHILS NFR BLD AUTO: 6.96 10*3/MM3 (ref 1.7–7)
NEUTROPHILS NFR BLD AUTO: 70.9 % (ref 42.7–76)
NRBC BLD AUTO-RTO: 0 /100 WBC (ref 0–0.2)
PLATELET # BLD AUTO: 200 10*3/MM3 (ref 140–450)
PMV BLD AUTO: 10.7 FL (ref 6–12)
RBC # BLD AUTO: 3.95 10*6/MM3 (ref 4.14–5.8)
WBC NRBC COR # BLD: 9.83 10*3/MM3 (ref 3.4–10.8)

## 2022-03-18 PROCEDURE — 36415 COLL VENOUS BLD VENIPUNCTURE: CPT

## 2022-03-18 PROCEDURE — 85025 COMPLETE CBC W/AUTO DIFF WBC: CPT

## 2022-03-18 PROCEDURE — 99214 OFFICE O/P EST MOD 30 MIN: CPT | Performed by: INTERNAL MEDICINE

## 2022-03-18 PROCEDURE — 83615 LACTATE (LD) (LDH) ENZYME: CPT

## 2022-03-18 NOTE — PROGRESS NOTES
Subjective     CHIEF COMPLAINT:      Chief Complaint   Patient presents with   • Follow-up     NO CONCERNS       HISTORY OF PRESENT ILLNESS:     Gonzales Kaur Sr. is a 66 y.o. male patient who returns today for follow up on his leukocytosis and macrocytosis. He returns today for follow up accompanied by his wife. He reports feeling good. He is on inhaler therapy for his COPD. No recent oral steroid therapy.     Patient reports that he does not drink alcohol. He reports that he gained weight.    ROS:  Pertinent ROS is in the HPI.     Past medical, surgical, social and family history were reviewed.     MEDICATIONS:    Current Outpatient Medications:   •  albuterol sulfate  (90 Base) MCG/ACT inhaler, Inhale 2 puffs., Disp: , Rfl:   •  azelastine (ASTELIN) 0.1 % nasal spray, 2 sprays into the nostril(s) as directed by provider 2 (Two) Times a Day. Use in each nostril as directed, Disp: 30 mL, Rfl: 0  •  INCRUSE ELLIPTA 62.5 MCG/INH aerosol powder , 1 puff Daily., Disp: , Rfl: 0  •  levocetirizine (XYZAL) 5 MG tablet, Take 1 tablet by mouth Every Evening., Disp: 90 tablet, Rfl: 1  •  lisinopril (PRINIVIL,ZESTRIL) 20 MG tablet, Take 1 tablet by mouth Daily., Disp: 90 tablet, Rfl: 3  •  multivitamin with minerals tablet tablet, Take 1 tablet by mouth Daily., Disp: , Rfl:   •  SYMBICORT 160-4.5 MCG/ACT inhaler, inhale 1 puff by mouth twice a day, Disp: 10.2 g, Rfl: 3  •  fluticasone (FLONASE) 50 MCG/ACT nasal spray, 1 spray by Each Nare route Daily., Disp: 15.8 mL, Rfl: 2  •  montelukast (SINGULAIR) 10 MG tablet, Take 1 tablet by mouth Every Night., Disp: 30 tablet, Rfl: 1  •  neomycin-colistin-hydrocortisone-thonzonium (Cortisporin-TC) 3.3-3-10-0.5 MG/ML otic suspension, Administer 3 drops into both ears 4 (Four) Times a Day., Disp: 10 mL, Rfl: 0    Objective   VITAL SIGNS:     Vitals:    03/18/22 1056   BP: 136/77   Pulse: 69   Resp: 16   Temp: 97.1 °F (36.2 °C)   TempSrc: Temporal   SpO2: 92%   Weight: 81.6 kg  "(179 lb 14.4 oz)   Height: 167 cm (65.75\")   PainSc: 0-No pain     Body mass index is 29.26 kg/m².     Wt Readings from Last 5 Encounters:   03/18/22 81.6 kg (179 lb 14.4 oz)   01/17/22 82.6 kg (182 lb)   01/07/22 81.8 kg (180 lb 4.8 oz)   12/03/21 80.3 kg (177 lb)   11/08/21 81.6 kg (180 lb)       PHYSICAL EXAMINATION:  GENERAL: The patient appears in good general condition, not in acute distress.   SKIN: No ecchymosis.  EYES: No jaundice. No pallor.  LYMPHATICS: No cervical or supraclavicular lymphadenopathy.  CHEST: Normal respiratory effort. Lungs clear bilaterally. No added sounds.  CVS: Normal S1 and S2. No murmurs.  ABDOMEN: Soft. No tenderness. No Hepatomegaly. No Splenomegaly. No masses.    DIAGNOSTIC DATA:     Results from last 7 days   Lab Units 03/18/22  1025   WBC 10*3/mm3 9.83   NEUTROS ABS 10*3/mm3 6.96   HEMOGLOBIN g/dL 13.2   HEMATOCRIT % 40.9   PLATELETS 10*3/mm3 200     Component      Latest Ref Rng & Units 1/7/2022 3/18/2022   LDH      99 - 259 U/L 166 157     Component      Latest Ref Rng & Units 11/23/2021 12/13/2021 1/7/2022 3/18/2022   Neutrophils Absolute      1.70 - 7.00 10*3/mm3 9.4 (H) 8.5 (H) 9.31 (H) 6.96   Lymphocytes Absolute      0.70 - 3.10 10*3/mm3 1.6 1.5 1.78 1.36   Monocytes Absolute      0.10 - 0.90 10*3/mm3 1.1 (H) 1.0 (H) 1.41 (H) 1.14 (H)   Eosinophils Absolute      0.00 - 0.40 10*3/mm3 0.1 0.2 0.21 0.16   Basophils Absolute      0.00 - 0.20 10*3/mm3 0.1 0.1 0.16 0.09   Immature Grans, Absolute      0.00 - 0.05 10*3/mm3 0.2 (H) 0.2 (H) 0.23 (H) 0.12 (H)     Labs from 1/7/2022:  Flow cytometry - negative.  BCR-ABL - negative.    Assessment/Plan   *Leukocytosis with neutrophilia and monocytosis.    · WBC count was normal on 10 November 2021 when his WBC was 12,400.    · WBC count increased to 13,100 on 1/7/2022.  Monocytes increased to 1410.  Immature granulocytes were 230.  · Flow cytometry on 1/7/2022 revealed no evidence of leukemia or lymphoma.   · BCR-ABL on 1/7/2022 was " negative.  · The increase was tehrefore considered a reactive increase due to underlying inflammation or effect of steroid therapy.   · WBC improved to 9,800 today. Neutrophils improved. Monocytes improved but remain elevated.   · I recommended close monitoring for possible evolving CMML.     *Macrocytosis. MCV increased to 103 today. He does not have evidence of vitamin B12 or folate deficiency today. He does not drink alcohol. I reviewed the CT of the chest he had in August 2021. He was reported to have moderate to severe hepatic steatosis. I explained the findings to the patient and his wife today. I explained that liver disease can be contributing to the increase in the MCV. I explained that SILVEIRA is a reversible condition but may result in him developing cirrhosis if the contributing factors are not corrected.     *Lung nodules identified on CT on 8/31/2021.  He was treated with an antibiotic course.  Patient is going to have a follow up CT scan with Dr. Buenrostro in August.    PLAN:    1.  I recommended reducing weight, reducing intake of fatty and sugary food and increasing activity.  2.  I recommended that he discussed with his primary a referral to a hepatologist.  3.  I will see him in follow up in 1 year with a CBC and LDH.       Quinton Laureano MD  03/18/22

## 2022-03-23 RX ORDER — LEVOCETIRIZINE DIHYDROCHLORIDE 5 MG/1
TABLET, FILM COATED ORAL
Qty: 90 TABLET | Refills: 1 | Status: SHIPPED | OUTPATIENT
Start: 2022-03-23 | End: 2022-07-21

## 2022-04-20 DIAGNOSIS — I10 ESSENTIAL HYPERTENSION: ICD-10-CM

## 2022-04-20 RX ORDER — LISINOPRIL 20 MG/1
20 TABLET ORAL DAILY
Qty: 90 TABLET | Refills: 3 | Status: SHIPPED | OUTPATIENT
Start: 2022-04-20 | End: 2023-03-22 | Stop reason: SDUPTHER

## 2022-04-20 NOTE — TELEPHONE ENCOUNTER
Caller: Gonzales Kaur Sr.    Relationship: Self    Best call back number: [phone]    Requested Prescriptions:   Requested Prescriptions     Pending Prescriptions Disp Refills   • lisinopril (PRINIVIL,ZESTRIL) 20 MG tablet 90 tablet 3     Sig: Take 1 tablet by mouth Daily.        Pharmacy where request should be sent: [facility] - [address], [address] - [phone] ph - [phone] FX       Does the patient have less than a 3 day supply:  [] Yes  [x] No    Hoda Lamb Rep   04/20/22 10:50 EDT

## 2022-05-25 ENCOUNTER — TRANSCRIBE ORDERS (OUTPATIENT)
Dept: ADMINISTRATIVE | Facility: HOSPITAL | Age: 67
End: 2022-05-25

## 2022-05-25 DIAGNOSIS — Z12.2 SCREENING FOR LUNG CANCER: Primary | ICD-10-CM

## 2022-06-23 ENCOUNTER — OFFICE VISIT (OUTPATIENT)
Dept: INTERNAL MEDICINE | Facility: CLINIC | Age: 67
End: 2022-06-23

## 2022-06-23 VITALS
TEMPERATURE: 96.2 F | HEART RATE: 88 BPM | BODY MASS INDEX: 29.92 KG/M2 | HEIGHT: 66 IN | DIASTOLIC BLOOD PRESSURE: 72 MMHG | SYSTOLIC BLOOD PRESSURE: 122 MMHG | WEIGHT: 186.2 LBS | OXYGEN SATURATION: 92 %

## 2022-06-23 DIAGNOSIS — Z85.46 HISTORY OF PROSTATE CANCER: Chronic | ICD-10-CM

## 2022-06-23 DIAGNOSIS — J06.9 VIRAL URI WITH COUGH: Primary | ICD-10-CM

## 2022-06-23 DIAGNOSIS — Z00.00 HEALTHCARE MAINTENANCE: Chronic | ICD-10-CM

## 2022-06-23 DIAGNOSIS — J43.9 PULMONARY EMPHYSEMA, UNSPECIFIED EMPHYSEMA TYPE: Chronic | ICD-10-CM

## 2022-06-23 DIAGNOSIS — N40.1 BENIGN NON-NODULAR PROSTATIC HYPERPLASIA WITH LOWER URINARY TRACT SYMPTOMS: Chronic | ICD-10-CM

## 2022-06-23 DIAGNOSIS — J30.2 SEASONAL ALLERGIES: Chronic | ICD-10-CM

## 2022-06-23 DIAGNOSIS — I10 ESSENTIAL HYPERTENSION: Chronic | ICD-10-CM

## 2022-06-23 LAB
EXPIRATION DATE: NORMAL
INTERNAL CONTROL: NORMAL
Lab: NORMAL
SARS-COV-2 AG UPPER RESP QL IA.RAPID: NOT DETECTED

## 2022-06-23 PROCEDURE — 87426 SARSCOV CORONAVIRUS AG IA: CPT | Performed by: NURSE PRACTITIONER

## 2022-06-23 PROCEDURE — 99214 OFFICE O/P EST MOD 30 MIN: CPT | Performed by: NURSE PRACTITIONER

## 2022-06-23 RX ORDER — DEXTROMETHORPHAN HYDROBROMIDE AND PROMETHAZINE HYDROCHLORIDE 15; 6.25 MG/5ML; MG/5ML
2.5-5 SYRUP ORAL 4 TIMES DAILY PRN
Qty: 180 ML | Refills: 0 | Status: SHIPPED | OUTPATIENT
Start: 2022-06-23 | End: 2022-07-11

## 2022-06-23 NOTE — ASSESSMENT & PLAN NOTE
COPD is stable, continue with routine follow-ups with Dr. Buenrostro, pulmonary as well as his maintenance Symbicort.

## 2022-06-23 NOTE — PROGRESS NOTES
"Chief Complaint  Hypertension (6 month follow up), Cough, and Sore Throat    Subjective        Gonzales Kaur Sr. presents to [facility]  Patient presents for 6-month follow-up on chronic conditions.  This is a 66-year-old male.    He has seasonal allergies which have been well controlled with Flonase and Xyzal daily.    He has COPD followed by Dr. Buenrostro, pulmonary.  He continues to take maintenance Symbicort and denies any recent exacerbations.    He has BPH as well as history of prostate cancer.  [facility]  They check his PSA regularly.    He has hypertension which is stable with lisinopril 20 mg daily.    He endorses a cough and a scratchy/sore throat that started yesterday.  No fever, chills or known recent COVID exposure.  He has had 3 COVID-19 vaccines.    Otherwise reports that he is doing well and denies development of other new issues today.      Objective   Vital Signs:  /72 (BP Location: Left arm, Patient Position: Sitting, Cuff Size: Adult)   Pulse 88   Temp 96.2 °F (35.7 °C) (Temporal)   Ht 167 cm (65.75\")   Wt 84.5 kg (186 lb 3.2 oz)   SpO2 92%   BMI 30.28 kg/m²   Estimated body mass index is 30.28 kg/m² as calculated from the following:    Height as of this encounter: 167 cm (65.75\").    Weight as of this encounter: 84.5 kg (186 lb 3.2 oz).          Physical Exam  Vitals and nursing note reviewed.   Constitutional:       General: He is not in acute distress.     Appearance: Normal appearance. He is well-developed. He is not ill-appearing, toxic-appearing or diaphoretic.   HENT:      Head: Normocephalic and atraumatic.      Right Ear: External ear normal.      Left Ear: External ear normal.   Eyes:      Pupils: Pupils are equal, round, and reactive to light.   Neck:      Vascular: No carotid bruit.   Cardiovascular:      Rate and Rhythm: Normal rate and regular rhythm.      Pulses: Normal pulses.      Heart sounds: Normal heart sounds.     "  Comments: No peripheral edema  Pulmonary:      Effort: Pulmonary effort is normal. No respiratory distress.      Breath sounds: Normal breath sounds. No stridor. No wheezing, rhonchi or rales.   Chest:      Chest wall: No tenderness.   Abdominal:      General: Bowel sounds are normal. There is no distension.      Palpations: Abdomen is soft. There is no mass.      Tenderness: There is no abdominal tenderness. There is no right CVA tenderness, left CVA tenderness, guarding or rebound.      Hernia: No hernia is present.   Musculoskeletal:         General: Normal range of motion.      Cervical back: Normal range of motion and neck supple. No rigidity or tenderness.   Lymphadenopathy:      Cervical: No cervical adenopathy.   Skin:     General: Skin is warm and dry.      Capillary Refill: Capillary refill takes less than 2 seconds.   Neurological:      General: No focal deficit present.      Mental Status: He is alert and oriented to person, place, and time. Mental status is at baseline.   Psychiatric:         Mood and Affect: Mood normal.         Behavior: Behavior normal.         Thought Content: Thought content normal.         Judgment: Judgment normal.        Result Review :  The following data was reviewed by: SHAJI Rogers on 06/23/2022:  Common labs    Common Labsle 12/13/21 1/7/22 3/18/22   WBC 11.5 (A) 13.10 (A) 9.83   Hemoglobin 13.7 13.9 13.2   Hematocrit 40.1 40.8 40.9   Platelets 196 194 200   (A) Abnormal value            Current outpatient and discharge medications have been reconciled for the patient.  Reviewed by: SHAJI Rogers           Assessment and Plan   Diagnoses and all orders for this visit:    1. Cough (Primary)  -     POCT SARS-CoV-2 Antigen MARA    2. Essential hypertension  Assessment & Plan:  Hypertension is stable, continue lisinopril 20 mg daily.    Orders:  -     Comprehensive metabolic panel; Future  -     Lipid panel; Future  -     TSH Rfx On Abnormal To Free T4;  Future    3. Pulmonary emphysema, unspecified emphysema type (HCC)  Assessment & Plan:  COPD is stable, continue with routine follow-ups with Dr. Buenrostro, pulmonary as well as his maintenance Symbicort.          4. Seasonal allergies  Assessment & Plan:  Continue Xyzal and Singulair, overall stable.      5. Benign non-nodular prostatic hyperplasia with lower urinary tract symptoms  Assessment & Plan:  Continue routine urology follow-ups, no new symptoms.      6. Healthcare maintenance  Assessment & Plan:  Routine dental and vision exams are advised.      7. History of prostate cancer  Assessment & Plan:  Following with Dr. Wynn, urology, routine PSAs with urology.      Rapid COVID test is negative.  I was told during the visit that patient's insurance would not cover a PCR on the same day as his rapid COVID test therefore I only ordered the rapid.  I will treat for viral URI with symptom relief.  Encouraged antihistamine, Flonase, nasal saline, warm liquids to drink,.  If he is not improving within 5 days he will call me back.  He does have concurrent COPD and wants to make sure that he is not go into an exacerbation.  Lungs are clear today and symptoms are mild.    He will come back in 1 week for fasting labs.    Follow-up as needed if symptoms persist or worsen in the meantime.     We will contact patient with lab results and any further recommendations.  Follow-up as needed and I will see him back in 6 months for recheck of chronic conditions/AWV.    Follow Up   No follow-ups on file.  Patient was given instructions and counseling regarding his condition or for health maintenance advice. Please see specific information pulled into the AVS if appropriate.

## 2022-06-23 NOTE — PATIENT INSTRUCTIONS
Continue Flonase, Xyzal and Singulair daily.     Add nasal saline rinse, Mucinex and warm liquids to drink (hot tea or water with lemon) for throat soothing.     I am sending in Promethazine DM for cough.    Let me know if symptoms persist or worsen despite these interventions.

## 2022-06-28 ENCOUNTER — TELEPHONE (OUTPATIENT)
Dept: INTERNAL MEDICINE | Facility: CLINIC | Age: 67
End: 2022-06-28

## 2022-06-28 NOTE — TELEPHONE ENCOUNTER
Caller: Gonzales Kaur Sr.    Relationship: Self    Best call back number: [phone]    What medication are you requesting:   COUGH MEDICATION     What are your current symptoms:   RUNNY NOSE, LINGERING COUGH     How long have you been experiencing symptoms:   A WEEK     Have you had these symptoms before:    [x] Yes  [] No    Have you been treated for these symptoms before:   [x] Yes  [] No    If a prescription is needed, what is your preferred pharmacy and phone number:    [facility] [place], [place] - [address] [phone] [facility] [phone]   [phone]    Additional notes:  PATIENT STATED THAT THE MEDICATION   promethazine-dextromethorphan (PROMETHAZINE-DM) 6.25-15 MG/5ML syrup  IS NOT HELPING AND PATIENT WOULD LIKE FOR A NEW COUGH MEDICATION TO BE CALLED INTO THE PHARMACY TO HELP WITH THE LINGERING COUGH

## 2022-06-29 ENCOUNTER — TELEPHONE (OUTPATIENT)
Dept: INTERNAL MEDICINE | Facility: CLINIC | Age: 67
End: 2022-06-29

## 2022-06-29 ENCOUNTER — HOSPITAL ENCOUNTER (OUTPATIENT)
Dept: GENERAL RADIOLOGY | Facility: HOSPITAL | Age: 67
Discharge: HOME | End: 2022-06-29
Admitting: NURSE PRACTITIONER

## 2022-06-29 ENCOUNTER — OFFICE VISIT (OUTPATIENT)
Dept: INTERNAL MEDICINE | Facility: CLINIC | Age: 67
End: 2022-06-29

## 2022-06-29 VITALS
OXYGEN SATURATION: 91 % | SYSTOLIC BLOOD PRESSURE: 119 MMHG | BODY MASS INDEX: 30.32 KG/M2 | RESPIRATION RATE: 18 BRPM | HEIGHT: 65 IN | TEMPERATURE: 98.2 F | DIASTOLIC BLOOD PRESSURE: 73 MMHG | HEART RATE: 94 BPM | WEIGHT: 182 LBS

## 2022-06-29 DIAGNOSIS — R68.83 CHILLS: ICD-10-CM

## 2022-06-29 DIAGNOSIS — R05.9 COUGH: Primary | ICD-10-CM

## 2022-06-29 DIAGNOSIS — R09.81 NASAL CONGESTION: ICD-10-CM

## 2022-06-29 LAB
EXPIRATION DATE: NORMAL
FLUAV AG UPPER RESP QL IA.RAPID: NOT DETECTED
FLUBV AG UPPER RESP QL IA.RAPID: NOT DETECTED
INTERNAL CONTROL: NORMAL
Lab: NORMAL
SARS-COV-2 AG UPPER RESP QL IA.RAPID: NOT DETECTED

## 2022-06-29 PROCEDURE — 87428 SARSCOV & INF VIR A&B AG IA: CPT | Performed by: NURSE PRACTITIONER

## 2022-06-29 PROCEDURE — 99213 OFFICE O/P EST LOW 20 MIN: CPT | Performed by: NURSE PRACTITIONER

## 2022-06-29 PROCEDURE — 71046 X-RAY EXAM CHEST 2 VIEWS: CPT

## 2022-06-29 RX ORDER — DOXYCYCLINE HYCLATE 100 MG/1
100 CAPSULE ORAL 2 TIMES DAILY
Qty: 20 CAPSULE | Refills: 0 | Status: SHIPPED | OUTPATIENT
Start: 2022-06-29 | End: 2022-07-09

## 2022-06-29 RX ORDER — AMOXICILLIN AND CLAVULANATE POTASSIUM 875; 125 MG/1; MG/1
1 TABLET, FILM COATED ORAL 2 TIMES DAILY
Qty: 20 TABLET | Refills: 0 | Status: SHIPPED | OUTPATIENT
Start: 2022-06-29 | End: 2022-06-29

## 2022-06-29 RX ORDER — BENZONATATE 200 MG/1
200 CAPSULE ORAL 3 TIMES DAILY PRN
Qty: 21 CAPSULE | Refills: 0 | Status: SHIPPED | OUTPATIENT
Start: 2022-06-29 | End: 2022-06-29

## 2022-06-29 RX ORDER — BENZONATATE 200 MG/1
200 CAPSULE ORAL 3 TIMES DAILY PRN
Qty: 21 CAPSULE | Refills: 0 | Status: SHIPPED | OUTPATIENT
Start: 2022-06-29 | End: 2022-07-06

## 2022-06-29 RX ORDER — GUAIFENESIN 600 MG/1
1200 TABLET, EXTENDED RELEASE ORAL 2 TIMES DAILY
Qty: 28 TABLET | Refills: 0 | Status: SHIPPED | OUTPATIENT
Start: 2022-06-29 | End: 2022-06-29

## 2022-06-29 RX ORDER — PREDNISONE 20 MG/1
40 TABLET ORAL DAILY
Qty: 10 TABLET | Refills: 0 | Status: SHIPPED | OUTPATIENT
Start: 2022-06-29 | End: 2022-07-04

## 2022-06-29 RX ORDER — PREDNISONE 20 MG/1
40 TABLET ORAL DAILY
Qty: 10 TABLET | Refills: 0 | Status: SHIPPED | OUTPATIENT
Start: 2022-06-29 | End: 2022-06-29

## 2022-06-29 RX ORDER — GUAIFENESIN 600 MG/1
1200 TABLET, EXTENDED RELEASE ORAL 2 TIMES DAILY
Qty: 28 TABLET | Refills: 0 | Status: SHIPPED | OUTPATIENT
Start: 2022-06-29 | End: 2022-07-06

## 2022-06-29 NOTE — TELEPHONE ENCOUNTER
Pharmacy Name: [facility] [place], [place] - [address] - [phone]  - [phone]   [phone]    Pharmacy representative name: MIRIAM    Pharmacy representative phone number: [phone]    What medication are you calling in regards to: ALL NEW MEDICATIONS THAT WAS SENT TODAY    What question does the pharmacy have: PHARMACY CALLING STATING THAT THEY RECEIVED DUPLICATE PRESCRIPTIONS FOR MEDICATIONS SENT TODAY SHE WOULD LIKE FOR SOMEONE TO CALL AND LET HER KNOW WHAT NEEDS TO BE FILLED     Who is the provider that prescribed the medication: ILYA LOPEZ    Additional notes: PLEASE ADVISE

## 2022-06-29 NOTE — PATIENT INSTRUCTIONS
If you develop any worsening shortness of breath, high fever that does not come down with tylenol or ibuprofen please go to the ER.

## 2022-06-29 NOTE — PROGRESS NOTES
"Chief Complaint  Cough, Nasal Congestion, Chills, and Earache (Pt present to us today with complaints of cold chills,right earache, cough, )    Subjective        Gonzales Kaur Sr. presents to [facility]  History of Present Illness    Patient is a pleasant 66 year old male who typically sees SHAJI Rogers here in the office. He is new to me and here today with c/o cough, rhinnorhea x 2 days and he cannot sleep due to this problem.   SOB while coughing and he does have COPD.   Covid tests have been negative in the office and at home.   He denies being around anyone that has been sick.   He has been taking promethazine DM for his cough at home. He denies any side effects of the medication.     Objective   Vital Signs:  /73 (BP Location: Right arm, Patient Position: Sitting, Cuff Size: Large Adult)   Pulse 94   Temp 98.2 °F (36.8 °C)   Resp 18   Ht 165.1 cm (65\")   Wt 82.6 kg (182 lb)   SpO2 91%   BMI 30.29 kg/m²   Estimated body mass index is 30.29 kg/m² as calculated from the following:    Height as of this encounter: 165.1 cm (65\").    Weight as of this encounter: 82.6 kg (182 lb).          Physical Exam  Vitals and nursing note reviewed.   Constitutional:       Appearance: Normal appearance.      Comments: C/o cough x 2 days   HENT:      Head: Normocephalic.      Right Ear: Tympanic membrane, ear canal and external ear normal. There is no impacted cerumen.      Left Ear: Tympanic membrane, ear canal and external ear normal. There is no impacted cerumen.      Nose: Congestion and rhinorrhea present.      Mouth/Throat:      Mouth: Mucous membranes are moist.      Pharynx: No oropharyngeal exudate or posterior oropharyngeal erythema.   Eyes:      Pupils: Pupils are equal, round, and reactive to light.   Cardiovascular:      Rate and Rhythm: Normal rate and regular rhythm.      Pulses: Normal pulses.      Heart sounds: Normal heart sounds.      Comments: No peripheral " edema noted.   Pulmonary:      Effort: No respiratory distress.      Breath sounds: No stridor. Wheezing present. No rhonchi or rales.      Comments: Diminished lung sounds throughout.   SOB noted with coughing   Chest:      Chest wall: No tenderness.   Musculoskeletal:         General: Normal range of motion.   Skin:     General: Skin is warm.      Capillary Refill: Capillary refill takes less than 2 seconds.   Neurological:      Mental Status: He is alert and oriented to person, place, and time.   Psychiatric:         Behavior: Behavior normal.        Result Review :           Office Visit with Donya Lomas APRN (06/23/2022)  CBC & Differential (03/18/2022 10:25 AM)  POCT SARS-CoV-2 Antigen MARA (06/29/2022 9:43 AM)       Assessment and Plan   Diagnoses and all orders for this visit:    1. Cough (Primary)  -     POCT SARS-CoV-2 Antigen MARA  -     XR Chest PA & Lateral    2. Nasal congestion  -     POCT SARS-CoV-2 Antigen MARA    3. Chills  -     POCT SARS-CoV-2 Antigen MARA    Other orders  -     Discontinue: predniSONE (DELTASONE) 20 MG tablet; Take 2 tablets by mouth Daily for 5 days.  Dispense: 10 tablet; Refill: 0  -     Discontinue: benzonatate (TESSALON) 200 MG capsule; Take 1 capsule by mouth 3 (Three) Times a Day As Needed for Cough for up to 7 days.  Dispense: 21 capsule; Refill: 0  -     Discontinue: guaiFENesin (Mucinex) 600 MG 12 hr tablet; Take 2 tablets by mouth 2 (Two) Times a Day for 7 days.  Dispense: 28 tablet; Refill: 0  -     doxycycline (VIBRAMYCIN) 100 MG capsule; Take 1 capsule by mouth 2 (Two) Times a Day for 10 days.  Dispense: 20 capsule; Refill: 0  -     benzonatate (TESSALON) 200 MG capsule; Take 1 capsule by mouth 3 (Three) Times a Day As Needed for Cough for up to 7 days.  Dispense: 21 capsule; Refill: 0  -     guaiFENesin (Mucinex) 600 MG 12 hr tablet; Take 2 tablets by mouth 2 (Two) Times a Day for 7 days.  Dispense: 28 tablet; Refill: 0  -     predniSONE (DELTASONE) 20 MG tablet;  Take 2 tablets by mouth Daily for 5 days.  Dispense: 10 tablet; Refill: 0    Covid and Flu are both negative here in the office.   Patient will go to the hospital now for a chest xray.   Patient will also go  medications and take as directed.   If he develops any worsening shortness of breath, high fever that does not come down with tylenol or ibuprofen please go to the ER.          Follow Up   Return if symptoms worsen or fail to improve.  Patient was given instructions and counseling regarding his condition or for health maintenance advice. Please see specific information pulled into the AVS if appropriate.

## 2022-06-29 NOTE — PROGRESS NOTES
Please let patient know that the CT of the chest shows his mild pulmonary hyperinflation which is suggestive of COPD.  There is no evidence of a pneumonia at this time.  Please continue current medications and go to emergency room if you have increased shortness of breath. Thank you, Brionna

## 2022-07-01 ENCOUNTER — TELEPHONE (OUTPATIENT)
Dept: INTERNAL MEDICINE | Facility: CLINIC | Age: 67
End: 2022-07-01

## 2022-07-01 NOTE — TELEPHONE ENCOUNTER
Caller: Gonzales Kaur Sr.    Relationship to patient: Self    Best call back number: [phone]    Patient is needing: COULD NOT GET A HOLD OF OFFICE. PATIENT NEEDS TO CANCEL AND RESCHEDULE YOUR LABS.

## 2022-07-01 NOTE — TELEPHONE ENCOUNTER
Linda can you please change his no show and either call and r/s or send back to me and I will call next week to r/s    Thank you

## 2022-07-05 ENCOUNTER — TELEPHONE (OUTPATIENT)
Dept: INTERNAL MEDICINE | Facility: CLINIC | Age: 67
End: 2022-07-05

## 2022-07-11 ENCOUNTER — OFFICE VISIT (OUTPATIENT)
Dept: INTERNAL MEDICINE | Facility: CLINIC | Age: 67
End: 2022-07-11

## 2022-07-11 VITALS
DIASTOLIC BLOOD PRESSURE: 74 MMHG | BODY MASS INDEX: 30.19 KG/M2 | OXYGEN SATURATION: 95 % | SYSTOLIC BLOOD PRESSURE: 134 MMHG | TEMPERATURE: 98.1 F | WEIGHT: 181.2 LBS | RESPIRATION RATE: 17 BRPM | HEIGHT: 65 IN | HEART RATE: 62 BPM

## 2022-07-11 DIAGNOSIS — R05.9 COUGH: Primary | ICD-10-CM

## 2022-07-11 DIAGNOSIS — J30.2 SEASONAL ALLERGIES: Chronic | ICD-10-CM

## 2022-07-11 PROCEDURE — 99213 OFFICE O/P EST LOW 20 MIN: CPT | Performed by: NURSE PRACTITIONER

## 2022-07-11 RX ORDER — FLUTICASONE PROPIONATE 50 MCG
2 SPRAY, SUSPENSION (ML) NASAL DAILY
Qty: 16 G | Refills: 0 | Status: SHIPPED | OUTPATIENT
Start: 2022-07-11 | End: 2022-11-29

## 2022-07-11 NOTE — PROGRESS NOTES
"Chief Complaint  Cough (Pt present here today for a bronchitis follow up )    Subjective        Gonzales CELIO Kaur Sr. presents to [facility]  History of Present Illness    Patient is a pleasant 66 year old male who typically sees SHAJI Rogers here in the office.   I have seen him once previously for his recent diagnosis of bronchitis.   Patient is doing much better overall.   He still has some chest congestion and is currently still taking the mucinex.       Objective   Vital Signs:  /74 (BP Location: Right arm, Patient Position: Sitting)   Pulse 62   Temp 98.1 °F (36.7 °C)   Resp 17   Ht 165.1 cm (65\")   Wt 82.2 kg (181 lb 3.2 oz)   SpO2 95%   BMI 30.15 kg/m²   Estimated body mass index is 30.15 kg/m² as calculated from the following:    Height as of this encounter: 165.1 cm (65\").    Weight as of this encounter: 82.2 kg (181 lb 3.2 oz).          Physical Exam  Vitals and nursing note reviewed.   Constitutional:       Appearance: Normal appearance.   HENT:      Head: Normocephalic.      Nose: Nose normal.      Comments: Cough with chest congestion x 2 weeks.      Mouth/Throat:      Mouth: Mucous membranes are moist.      Pharynx: No oropharyngeal exudate or posterior oropharyngeal erythema.   Eyes:      Pupils: Pupils are equal, round, and reactive to light.   Cardiovascular:      Rate and Rhythm: Normal rate and regular rhythm.      Pulses: Normal pulses.      Heart sounds: Normal heart sounds.      Comments: No peripheral edema noted.   Pulmonary:      Effort: Pulmonary effort is normal. No respiratory distress.      Breath sounds: Normal breath sounds. No stridor. No wheezing, rhonchi or rales.      Comments: Denies shortness of breath.  Still has slight chest congestion    Chest:      Chest wall: No tenderness.   Musculoskeletal:         General: Normal range of motion.   Skin:     General: Skin is warm.      Capillary Refill: Capillary refill takes less than 2 " seconds.   Neurological:      Mental Status: He is alert and oriented to person, place, and time.        Result Review :           Comprehensive metabolic panel (07/08/2022 2:10 PM)  Lipid panel (07/08/2022 2:10 PM)  TSH Rfx On Abnormal To Free T4 (07/08/2022 2:10 PM)  Office Visit with Brionna Coombs APRN (06/29/2022)       Assessment and Plan   Diagnoses and all orders for this visit:    1. Cough (Primary)    2. Seasonal allergies    Other orders  -     fluticasone (Flonase) 50 MCG/ACT nasal spray; 2 sprays into the nostril(s) as directed by provider Daily for 14 days.  Dispense: 16 g; Refill: 0      Patient will continue current medications of Mucinex and I have given him a nasal medication/Flonase to use 2 sprays into each nostril daily for 14 days.  Patient will continue to drink plenty of water and deep breathe every hour.  If patient develops any worsening symptoms he knows to contact the office.  Patient agrees with this treatment plan at this time.       Follow Up   Return for Next scheduled follow up.  Patient was given instructions and counseling regarding his condition or for health maintenance advice. Please see specific information pulled into the AVS if appropriate.

## 2022-07-11 NOTE — PATIENT INSTRUCTIONS
Continue Mucinex 1200 mg twice daily.   I have sent in your prescription of Flonase to the pharmacy.

## 2022-07-18 ENCOUNTER — TELEPHONE (OUTPATIENT)
Dept: INTERNAL MEDICINE | Facility: CLINIC | Age: 67
End: 2022-07-18

## 2022-07-18 NOTE — TELEPHONE ENCOUNTER
Caller: Gonzales Kaur Sr.    Relationship: Self    Best call back number:[phone]    What is the best time to reach you:ANYTIME    Who are you requesting to speak with (clinical staff, provider,  specific staff member):CLINICAL      What was the call regarding: PATIENT I REQUESTING MEDICATION TO HELP IS COUGH.HE HAS BEEN SEEN 3 TIMES.     Do you require a callback: YES

## 2022-07-20 NOTE — TELEPHONE ENCOUNTER
If he still has a cough he needs to be seen. Please have him follow up with SHAJI Rogers. Thank you, Brionna

## 2022-07-21 ENCOUNTER — OFFICE VISIT (OUTPATIENT)
Dept: INTERNAL MEDICINE | Facility: CLINIC | Age: 67
End: 2022-07-21

## 2022-07-21 VITALS
SYSTOLIC BLOOD PRESSURE: 120 MMHG | HEIGHT: 65 IN | DIASTOLIC BLOOD PRESSURE: 72 MMHG | OXYGEN SATURATION: 94 % | HEART RATE: 96 BPM | BODY MASS INDEX: 29.36 KG/M2 | TEMPERATURE: 97.5 F | WEIGHT: 176.2 LBS

## 2022-07-21 DIAGNOSIS — J44.1 COPD WITH EXACERBATION: Primary | ICD-10-CM

## 2022-07-21 PROCEDURE — 99213 OFFICE O/P EST LOW 20 MIN: CPT | Performed by: NURSE PRACTITIONER

## 2022-07-21 RX ORDER — AZELASTINE 1 MG/ML
2 SPRAY, METERED NASAL 2 TIMES DAILY
Qty: 30 ML | Refills: 2 | Status: SHIPPED | OUTPATIENT
Start: 2022-07-21 | End: 2023-01-03 | Stop reason: SDUPTHER

## 2022-07-21 RX ORDER — PROMETHAZINE HYDROCHLORIDE AND CODEINE PHOSPHATE 6.25; 1 MG/5ML; MG/5ML
5 SOLUTION ORAL 3 TIMES DAILY PRN
Qty: 118 ML | Refills: 0 | Status: SHIPPED | OUTPATIENT
Start: 2022-07-21 | End: 2022-11-29

## 2022-07-21 NOTE — PROGRESS NOTES
"Chief Complaint  Cough    Subjective        Gonzales Kaur . presents to [facility]  Patient presents with complaints of ongoing cough.  This is a 66-year-old male.  This began in June 2022.  I saw him in the office on 6/23/2022 at which point he had a scratchy throat and cough x1 day.  I advised Flonase, nasal saline and an antihistamine.  He called back with persistent symptoms 6/28/2022 and I prescribed Augmentin and Promethazine DM.  He was then seen by SHAJI Mak on 6/29/2022 who performed an x-ray of the chest which was negative for pneumonia but did show findings of COPD including hyperinflation of the lungs.  COVID and flu tests were negative.  He was prescribed a 5-day course of prednisone.  He he saw SHAJI Mak again on 7/11/2022 and Flonase, Mucinex were advised.  He presents today stating that symptoms have greatly improved but he still has a persistent cough that he describes as \"spasms\" from time to time throughout the day.  He is not feeling short of breath, no chest discomfort, fever or chills.  He does have COPD and uses his Symbicort for maintenance consistently along with his albuterol for as needed use.  His pulmonologist is Dr. Buenrostro.  He wonders what can be done for this periodic cough until it completely subsides.    He denies development of other new issues today.      Objective   Vital Signs:  /72 (BP Location: Left arm, Patient Position: Sitting, Cuff Size: Adult)   Pulse 96   Temp 97.5 °F (36.4 °C) (Temporal)   Ht 165.1 cm (65\")   Wt 79.9 kg (176 lb 3.2 oz)   SpO2 94%   BMI 29.32 kg/m²   Estimated body mass index is 29.32 kg/m² as calculated from the following:    Height as of this encounter: 165.1 cm (65\").    Weight as of this encounter: 79.9 kg (176 lb 3.2 oz).          Physical Exam  Vitals and nursing note reviewed.   Constitutional:       General: He is not in acute distress.     Appearance: Normal appearance. He is " well-developed. He is not ill-appearing, toxic-appearing or diaphoretic.   HENT:      Head: Normocephalic and atraumatic.      Right Ear: External ear normal.      Left Ear: External ear normal.   Eyes:      Pupils: Pupils are equal, round, and reactive to light.   Cardiovascular:      Rate and Rhythm: Normal rate and regular rhythm.      Pulses: Normal pulses.      Heart sounds: Normal heart sounds.      Comments: No peripheral edema  Pulmonary:      Effort: Pulmonary effort is normal.      Breath sounds: Wheezing (  Throughout bilateral lower lobes, scattered) present.   Abdominal:      General: Bowel sounds are normal. There is no distension.      Palpations: Abdomen is soft. There is no mass.      Tenderness: There is no abdominal tenderness. There is no right CVA tenderness, left CVA tenderness, guarding or rebound.      Hernia: No hernia is present.   Musculoskeletal:         General: Normal range of motion.      Cervical back: Normal range of motion and neck supple.   Skin:     General: Skin is warm and dry.      Capillary Refill: Capillary refill takes less than 2 seconds.   Neurological:      Mental Status: He is alert and oriented to person, place, and time.   Psychiatric:         Mood and Affect: Mood normal.         Behavior: Behavior normal.         Thought Content: Thought content normal.         Judgment: Judgment normal.        Result Review :  The following data was reviewed by: SHAJI Rogers on 07/21/2022:  Common labs    Common Labsle 1/7/22 3/18/22 7/8/22 7/8/22      1410 1410   Glucose   91    BUN   19    Creatinine   0.97    Sodium   136    Potassium   4.9    Chloride   96    Calcium   9.2    Total Protein   7.1    Albumin   4.4    Total Bilirubin   0.4    Alkaline Phosphatase   62    AST (SGOT)   27    ALT (SGPT)   39    WBC 13.10 (A) 9.83     Hemoglobin 13.9 13.2     Hematocrit 40.8 40.9     Platelets 194 200     Total Cholesterol    173   Triglycerides    90   HDL Cholesterol    42    LDL Cholesterol     114 (A)   (A) Abnormal value            Current outpatient and discharge medications have been reconciled for the patient.  Reviewed by: SHAJI Rogers           Assessment and Plan   Diagnoses and all orders for this visit:    1. COPD with exacerbation (HCC) (Primary)  -     promethazine-codeine (PHENERGAN with CODEINE) 6.25-10 MG/5ML solution; Take 5 mL by mouth 3 (Three) Times a Day As Needed for Cough.  Dispense: 118 mL; Refill: 0  -     azelastine (ASTELIN) 0.1 % nasal spray; 2 sprays into the nostril(s) as directed by provider 2 (Two) Times a Day. Use in each nostril as directed  Dispense: 30 mL; Refill: 2    I will prescribe promethazine with codeine for very sparing use, 5 mL 3 times daily as needed.  He knows to not drive or operate machinery after taking this medication and this is only to be taken for severe coughing spells.    Start Astelin nasal spray twice per day.  I believe postnasal drip continues to exacerbate his cough.    Continue Mucinex.    He has a course of prednisone x5 days that he has not yet taken at home.  Advised the 5-day course of prednisone.    I advised him to keep his upcoming follow-up with Dr. Buenrostro, his pulmonologist.     Follow-up as needed if symptoms persist or worsen and routinely at next scheduled office visit.      Follow Up   No follow-ups on file.  Patient was given instructions and counseling regarding his condition or for health maintenance advice. Please see specific information pulled into the AVS if appropriate.

## 2022-08-24 ENCOUNTER — TRANSCRIBE ORDERS (OUTPATIENT)
Dept: ADMINISTRATIVE | Facility: HOSPITAL | Age: 67
End: 2022-08-24

## 2022-08-24 DIAGNOSIS — C61 PROSTATE CANCER: Primary | ICD-10-CM

## 2022-09-01 ENCOUNTER — HOSPITAL ENCOUNTER (OUTPATIENT)
Dept: CT IMAGING | Facility: HOSPITAL | Age: 67
Discharge: HOME | End: 2022-09-01
Admitting: NURSE PRACTITIONER

## 2022-09-01 DIAGNOSIS — Z12.2 SCREENING FOR LUNG CANCER: ICD-10-CM

## 2022-09-01 PROCEDURE — 71271 CT THORAX LUNG CANCER SCR C-: CPT

## 2022-09-22 ENCOUNTER — HOSPITAL ENCOUNTER (OUTPATIENT)
Dept: MRI IMAGING | Facility: HOSPITAL | Age: 67
Discharge: HOME | End: 2022-09-22
Admitting: UROLOGY

## 2022-09-22 DIAGNOSIS — C61 PROSTATE CANCER: ICD-10-CM

## 2022-09-22 LAB — CREAT BLDA-MCNC: 0.9 MG/DL (ref 0.6–1.3)

## 2022-09-22 PROCEDURE — 0 GADOBENATE DIMEGLUMINE 529 MG/ML SOLUTION: Performed by: UROLOGY

## 2022-09-22 PROCEDURE — 72197 MRI PELVIS W/O & W/DYE: CPT

## 2022-09-22 PROCEDURE — 82565 ASSAY OF CREATININE: CPT

## 2022-09-22 PROCEDURE — A9577 INJ MULTIHANCE: HCPCS | Performed by: UROLOGY

## 2022-09-22 RX ADMIN — GADOBENATE DIMEGLUMINE 16 ML: 529 INJECTION, SOLUTION INTRAVENOUS at 07:31

## 2022-09-26 RX ORDER — LEVOCETIRIZINE DIHYDROCHLORIDE 5 MG/1
TABLET, FILM COATED ORAL
Qty: 90 TABLET | Refills: 1 | Status: SHIPPED | OUTPATIENT
Start: 2022-09-26 | End: 2023-03-22 | Stop reason: SDUPTHER

## 2022-11-29 ENCOUNTER — OFFICE VISIT (OUTPATIENT)
Dept: INTERNAL MEDICINE | Facility: CLINIC | Age: 67
End: 2022-11-29

## 2022-11-29 VITALS
DIASTOLIC BLOOD PRESSURE: 79 MMHG | BODY MASS INDEX: 30.82 KG/M2 | HEIGHT: 65 IN | WEIGHT: 185 LBS | OXYGEN SATURATION: 92 % | HEART RATE: 76 BPM | SYSTOLIC BLOOD PRESSURE: 131 MMHG | TEMPERATURE: 97.7 F

## 2022-11-29 DIAGNOSIS — J43.9 PULMONARY EMPHYSEMA, UNSPECIFIED EMPHYSEMA TYPE: Chronic | ICD-10-CM

## 2022-11-29 DIAGNOSIS — Z23 NEED FOR PROPHYLACTIC VACCINATION WITH COMBINED DIPHTHERIA-TETANUS-PERTUSSIS (DTP) VACCINE: Primary | ICD-10-CM

## 2022-11-29 DIAGNOSIS — I10 ESSENTIAL HYPERTENSION: Chronic | ICD-10-CM

## 2022-11-29 PROCEDURE — 90715 TDAP VACCINE 7 YRS/> IM: CPT

## 2022-11-29 PROCEDURE — 1126F AMNT PAIN NOTED NONE PRSNT: CPT

## 2022-11-29 PROCEDURE — 1159F MED LIST DOCD IN RCRD: CPT

## 2022-11-29 PROCEDURE — 99999 PR OFFICE/OUTPT VISIT,PROCEDURE ONLY: CPT

## 2022-11-29 PROCEDURE — 1170F FXNL STATUS ASSESSED: CPT

## 2022-11-29 PROCEDURE — 90471 IMMUNIZATION ADMIN: CPT

## 2022-11-29 PROCEDURE — G0439 PPPS, SUBSEQ VISIT: HCPCS

## 2022-11-29 RX ORDER — BUDESONIDE, GLYCOPYRROLATE, AND FORMOTEROL FUMARATE 160; 9; 4.8 UG/1; UG/1; UG/1
AEROSOL, METERED RESPIRATORY (INHALATION)
COMMUNITY
Start: 2022-09-30

## 2022-11-30 LAB
ALBUMIN SERPL-MCNC: 4.3 G/DL (ref 3.5–5.2)
ALBUMIN/GLOB SERPL: 1.5 G/DL
ALP SERPL-CCNC: 66 U/L (ref 39–117)
ALT SERPL-CCNC: 39 U/L (ref 1–41)
APPEARANCE UR: CLEAR
AST SERPL-CCNC: 26 U/L (ref 1–40)
BILIRUB SERPL-MCNC: 0.3 MG/DL (ref 0–1.2)
BILIRUB UR QL STRIP: NEGATIVE
BUN SERPL-MCNC: 16 MG/DL (ref 8–23)
BUN/CREAT SERPL: 18.8 (ref 7–25)
CALCIUM SERPL-MCNC: 9.7 MG/DL (ref 8.6–10.5)
CHLORIDE SERPL-SCNC: 98 MMOL/L (ref 98–107)
CHOLEST SERPL-MCNC: 184 MG/DL (ref 0–200)
CHOLEST/HDLC SERPL: 4.38 {RATIO}
CO2 SERPL-SCNC: 29.4 MMOL/L (ref 22–29)
COLOR UR: YELLOW
CREAT SERPL-MCNC: 0.85 MG/DL (ref 0.76–1.27)
EGFRCR SERPLBLD CKD-EPI 2021: 95.8 ML/MIN/1.73
ERYTHROCYTE [DISTWIDTH] IN BLOOD BY AUTOMATED COUNT: 12.4 % (ref 12.3–15.4)
GLOBULIN SER CALC-MCNC: 2.8 GM/DL
GLUCOSE SERPL-MCNC: 112 MG/DL (ref 65–99)
GLUCOSE UR QL STRIP: NEGATIVE
HCT VFR BLD AUTO: 41.2 % (ref 37.5–51)
HDLC SERPL-MCNC: 42 MG/DL (ref 40–60)
HGB BLD-MCNC: 13.9 G/DL (ref 13–17.7)
HGB UR QL STRIP: NEGATIVE
KETONES UR QL STRIP: NEGATIVE
LDLC SERPL CALC-MCNC: 124 MG/DL (ref 0–100)
LEUKOCYTE ESTERASE UR QL STRIP: NEGATIVE
MCH RBC QN AUTO: 34.2 PG (ref 26.6–33)
MCHC RBC AUTO-ENTMCNC: 33.7 G/DL (ref 31.5–35.7)
MCV RBC AUTO: 101.2 FL (ref 79–97)
NITRITE UR QL STRIP: NEGATIVE
PH UR STRIP: 6 [PH] (ref 5–8)
PLATELET # BLD AUTO: 233 10*3/MM3 (ref 140–450)
POTASSIUM SERPL-SCNC: 5 MMOL/L (ref 3.5–5.2)
PROT SERPL-MCNC: 7.1 G/DL (ref 6–8.5)
PROT UR QL STRIP: NEGATIVE
RBC # BLD AUTO: 4.07 10*6/MM3 (ref 4.14–5.8)
SODIUM SERPL-SCNC: 137 MMOL/L (ref 136–145)
SP GR UR STRIP: 1.01 (ref 1–1.03)
T4 FREE SERPL-MCNC: 1.06 NG/DL (ref 0.93–1.7)
TRIGL SERPL-MCNC: 97 MG/DL (ref 0–150)
TSH SERPL DL<=0.005 MIU/L-ACNC: 1.76 UIU/ML (ref 0.27–4.2)
UROBILINOGEN UR STRIP-MCNC: NORMAL MG/DL
VLDLC SERPL CALC-MCNC: 18 MG/DL (ref 5–40)
WBC # BLD AUTO: 11.59 10*3/MM3 (ref 3.4–10.8)

## 2023-01-03 DIAGNOSIS — J44.1 COPD WITH EXACERBATION: ICD-10-CM

## 2023-01-03 NOTE — TELEPHONE ENCOUNTER
Rx Refill Note  Requested Prescriptions     Pending Prescriptions Disp Refills   • azelastine (ASTELIN) 0.1 % nasal spray 30 mL 2     Si sprays into the nostril(s) as directed by provider 2 (Two) Times a Day. Use in each nostril as directed      Last office visit with prescribing clinician: 2022   Last telemedicine visit with prescribing clinician: 2023   Next office visit with prescribing clinician: 2023                         Would you like a call back once the refill request has been completed: [] Yes [] No    If the office needs to give you a call back, can they leave a voicemail: [] Yes [] No    Florinda Lacey MA  23, 13:18 EST

## 2023-01-03 NOTE — TELEPHONE ENCOUNTER
Caller: Gonzales Kaur Sr.    Relationship: Self    Best call back number: [phone]    Requested Prescriptions:   Requested Prescriptions     Pending Prescriptions Disp Refills   • azelastine (ASTELIN) 0.1 % nasal spray 30 mL 2     Si sprays into the nostril(s) as directed by provider 2 (Two) Times a Day. Use in each nostril as directed        Pharmacy where request should be sent: [facility] [address] [phone] [facility] [phone] FX     Additional details provided by patient:     Does the patient have less than a 3 day supply:  [x] Yes  [] No    Would you like a call back once the refill request has been completed: [x] Yes [] No    If the office needs to give you a call back, can they leave a voicemail: [x] Yes [] No    Hoda Peters Rep   23 12:36 EST

## 2023-01-04 RX ORDER — AZELASTINE 1 MG/ML
2 SPRAY, METERED NASAL 2 TIMES DAILY
Qty: 30 ML | Refills: 2 | Status: SHIPPED | OUTPATIENT
Start: 2023-01-04

## 2023-02-02 ENCOUNTER — OFFICE VISIT (OUTPATIENT)
Dept: INTERNAL MEDICINE | Facility: CLINIC | Age: 68
End: 2023-02-02
Payer: MEDICARE

## 2023-02-02 VITALS
HEIGHT: 65 IN | SYSTOLIC BLOOD PRESSURE: 140 MMHG | OXYGEN SATURATION: 96 % | TEMPERATURE: 97.1 F | WEIGHT: 183.6 LBS | DIASTOLIC BLOOD PRESSURE: 78 MMHG | HEART RATE: 83 BPM | BODY MASS INDEX: 30.59 KG/M2

## 2023-02-02 DIAGNOSIS — R09.82 PND (POST-NASAL DRIP): Primary | ICD-10-CM

## 2023-02-02 DIAGNOSIS — J06.9 VIRAL URI WITH COUGH: ICD-10-CM

## 2023-02-02 DIAGNOSIS — R05.1 ACUTE COUGH: Primary | ICD-10-CM

## 2023-02-02 PROCEDURE — 87428 SARSCOV & INF VIR A&B AG IA: CPT | Performed by: NURSE PRACTITIONER

## 2023-02-02 PROCEDURE — 99213 OFFICE O/P EST LOW 20 MIN: CPT | Performed by: NURSE PRACTITIONER

## 2023-02-02 RX ORDER — DEXTROMETHORPHAN HYDROBROMIDE AND PROMETHAZINE HYDROCHLORIDE 15; 6.25 MG/5ML; MG/5ML
5 SYRUP ORAL 3 TIMES DAILY PRN
Qty: 180 ML | Refills: 0 | Status: SHIPPED | OUTPATIENT
Start: 2023-02-02 | End: 2023-02-02 | Stop reason: SDUPTHER

## 2023-02-02 RX ORDER — DEXTROMETHORPHAN HYDROBROMIDE AND PROMETHAZINE HYDROCHLORIDE 15; 6.25 MG/5ML; MG/5ML
5 SYRUP ORAL 3 TIMES DAILY PRN
Qty: 180 ML | Refills: 0 | Status: SHIPPED | OUTPATIENT
Start: 2023-02-02

## 2023-02-02 NOTE — PROGRESS NOTES
Chief Complaint  Cough, Sore Throat, and Nasal Congestion (4 days )     Subjective:      History of Present Illness {CC  Problem List  Visit  Diagnosis   Encounters  Notes  Medications  Labs  Result Review Imaging  Media :23}     Gonzales Kaur Sr. is a patient of Donya Lomas APRN and presents to [facility] for       Covid/Flu like symptoms:     Onset: 3 DAYS AGO    Most common symptoms include:  [] Fever  [x] Dry cough  [] Tiredness / fatigue     Less common symptoms:  [] Body aches and pains  [x] Sore throat  [] Diarrhea  [] Conjunctivitis  [] Headache  [] Loss of taste or smell  [] a rash on skin, or discoloration of fingers or toes    Serious symptoms: [x] Denies   [] Difficulty breathing or shortness of breath  [] Chest pain or pressure  [] Loss of speech of movement    Symptom onset:   [x] Gradual   [] Sudden     Symptom progression:   [] Improving  [x] Worsening   [] Unchanged     Known COVID exposure:  [] Yes  [x] No  [] Unknown     Vaccinated:   [x] Yes: Flu vaccine 9/8/22   [] No    Sinus drainage- chronic, dry nose with increased drainage. States blood when blowing nose after nose becomes dry. Sore throat and occasional dry cough worsening over past 3 days. Has not used Astelin since last week. States hoarseness in voice. Denies ear pain. Does wear CPAP at night: has humidification     I have reviewed patient's medical history, any new submitted information provided by patient or medical assistant and updated medical record.      Objective:      Physical Exam  Vitals reviewed.   Constitutional:       Appearance: Normal appearance. He is well-developed.   HENT:      Head:      Comments: Wearing mask due to COVID      Nose: Rhinorrhea present.      Comments: Turbinates: red     Mouth/Throat:      Mouth: Mucous membranes are moist.      Pharynx: Posterior oropharyngeal erythema present. No oropharyngeal exudate.   Eyes:      Conjunctiva/sclera:  "Conjunctivae normal.   Neck:      Thyroid: No thyromegaly.   Cardiovascular:      Rate and Rhythm: Normal rate and regular rhythm.      Pulses: Normal pulses.      Heart sounds: Normal heart sounds.   Pulmonary:      Effort: Pulmonary effort is normal.      Breath sounds: Normal breath sounds. No wheezing.      Comments: E/U   Musculoskeletal:      Cervical back: Neck supple.   Lymphadenopathy:      Cervical: No cervical adenopathy.   Skin:     General: Skin is warm and dry.   Neurological:      Mental Status: He is alert and oriented to person, place, and time.   Psychiatric:         Behavior: Behavior is cooperative.        Result Review  Data Reviewed:{ Labs  Result Review  Imaging  Med Tab  Media :23}     POCT SARS-CoV-2 Antigen MARA + Flu (02/02/2023 11:05 AM)    Neg flu and COVID           Vital Signs:   /78 (BP Location: Left arm, Patient Position: Sitting, Cuff Size: Adult)   Pulse 83   Temp 97.1 °F (36.2 °C) (Temporal)   Ht 165.1 cm (65\")   Wt 83.3 kg (183 lb 9.6 oz)   SpO2 96%   BMI 30.55 kg/m²         Requested Prescriptions     Signed Prescriptions Disp Refills   • promethazine-dextromethorphan (PROMETHAZINE-DM) 6.25-15 MG/5ML syrup 180 mL 0     Sig: Take 5 mL by mouth 3 (Three) Times a Day As Needed for Cough.       Routine medications provided by this office will also be refilled via pharmacy request.       Current Outpatient Medications:   •  albuterol sulfate  (90 Base) MCG/ACT inhaler, Inhale 2 puffs., Disp: , Rfl:   •  azelastine (ASTELIN) 0.1 % nasal spray, 2 sprays into the nostril(s) as directed by provider 2 (Two) Times a Day. Use in each nostril as directed, Disp: 30 mL, Rfl: 2  •  Breztri Aerosphere 160-9-4.8 MCG/ACT aerosol inhaler, , Disp: , Rfl:   •  levocetirizine (XYZAL) 5 MG tablet, TAKE 1 TABLET BY MOUTH IN  THE EVENING, Disp: 90 tablet, Rfl: 1  •  lisinopril (PRINIVIL,ZESTRIL) 20 MG tablet, Take 1 tablet by mouth Daily., Disp: 90 tablet, Rfl: 3  •  multivitamin " with minerals tablet tablet, Take 1 tablet by mouth Daily., Disp: , Rfl:   •  promethazine-dextromethorphan (PROMETHAZINE-DM) 6.25-15 MG/5ML syrup, Take 5 mL by mouth 3 (Three) Times a Day As Needed for Cough., Disp: 180 mL, Rfl: 0     Assessment and Plan:      Assessment and Plan {CC Problem List  Visit Diagnosis  ROS  Review (Popup)  Health Maintenance  Quality  BestPractice  Medications  SmartSets  SnapShot Encounters  Media :23}     Problem List Items Addressed This Visit    None  Visit Diagnoses     PND (post-nasal drip)    -  Primary    Viral URI with cough            Self limiting: Start promthDM    Ayr gel for turbinates; can get OTC.     Follow Up {Instructions Charge Capture  Follow-up Communications :23}     Return if symptoms worsen or fail to improve, for Next scheduled follow up.    Follow up with PCP as scheduled.     Patient was given instructions and counseling regarding his condition or for health maintenance advice. Please see specific information pulled into the AVS if appropriate.    Dragon disclaimer:   Much of this encounter note is an electronic transcription/translation of spoken language to printed text. The electronic translation of spoken language may permit erroneous, or at times, nonsensical words or phrases to be inadvertently transcribed; Although I have reviewed the note for such errors, some may still exist.     Additional Patient Counseling:       Patient Instructions   Honey and Lemon Tea for cough  • 1 Tbsp lemon juice   • 2 Tbsp honey   • 1/2 cup or more of hot water  Directions:   Put honey and lemon juice into a tea cup or mug. Add hot water and stir. Add more lemon juice, honey, or hot water to taste.  Sip on this and have two or three per day while cough is present.     Not for children less than 2 years of age.   Caution if diabetic.

## 2023-02-17 ENCOUNTER — TELEPHONE (OUTPATIENT)
Dept: INTERNAL MEDICINE | Facility: CLINIC | Age: 68
End: 2023-02-17

## 2023-02-17 NOTE — TELEPHONE ENCOUNTER
Hub staff attempted to follow warm transfer process and was unsuccessful     Caller: Gonzales Kaur Sr.    Relationship to patient: Self    Best call back number: [phone]    Patient is needing: PATIENT STATES HE HAS A SORE THROAT AND EAR ACHE. PATIENT WOULD LIKE TO KNOW IF HE CAN BE SEEN IN OFFICE TODAY 02.17.23 OR Monday 02.20.23.     PLEASE CALL TO DISCUSS AND ADVISE.

## 2023-03-15 ENCOUNTER — TRANSCRIBE ORDERS (OUTPATIENT)
Dept: ADMINISTRATIVE | Facility: HOSPITAL | Age: 68
End: 2023-03-15
Payer: MEDICARE

## 2023-03-15 DIAGNOSIS — Z87.891 PERSONAL HISTORY OF TOBACCO USE, PRESENTING HAZARDS TO HEALTH: Primary | ICD-10-CM

## 2023-03-22 DIAGNOSIS — I10 ESSENTIAL HYPERTENSION: ICD-10-CM

## 2023-03-22 RX ORDER — LEVOCETIRIZINE DIHYDROCHLORIDE 5 MG/1
5 TABLET, FILM COATED ORAL EVERY EVENING
Qty: 90 TABLET | Refills: 1 | Status: SHIPPED | OUTPATIENT
Start: 2023-03-22

## 2023-03-22 RX ORDER — LISINOPRIL 20 MG/1
20 TABLET ORAL DAILY
Qty: 90 TABLET | Refills: 3 | Status: SHIPPED | OUTPATIENT
Start: 2023-03-22

## 2023-03-22 NOTE — TELEPHONE ENCOUNTER
Caller: Gonzales Kaur Sr.    Relationship: Self    Best call back number: [phone]  Requested Prescriptions:   Requested Prescriptions     Pending Prescriptions Disp Refills   • lisinopril (PRINIVIL,ZESTRIL) 20 MG tablet 90 tablet 3     Sig: Take 1 tablet by mouth Daily.   • levocetirizine (XYZAL) 5 MG tablet 90 tablet 1     Sig: Take 1 tablet by mouth Every Evening.        Pharmacy where request should be sent: [facility] - [facility] [address] [phone] [facility] [phone]      Last office visit with prescribing clinician: 11/29/2022   Last telemedicine visit with prescribing clinician: 5/30/2023   Next office visit with prescribing clinician: 5/30/2023     Additional details provided by patient: PATIENT STATES THAT HE HAS 2 TABLETS OF LISINOPRIL REMAINING    Does the patient have less than a 3 day supply:  [x] Yes  [] No    Would you like a call back once the refill request has been completed: [] Yes [x] No    If the office needs to give you a call back, can they leave a voicemail: [] Yes [x] No    Hoda Cheung Rep   03/22/23 14:31 EDT

## 2023-04-13 ENCOUNTER — LAB (OUTPATIENT)
Dept: LAB | Facility: HOSPITAL | Age: 68
End: 2023-04-13
Payer: MEDICARE

## 2023-04-13 ENCOUNTER — OFFICE VISIT (OUTPATIENT)
Dept: ONCOLOGY | Facility: CLINIC | Age: 68
End: 2023-04-13
Payer: MEDICARE

## 2023-04-13 VITALS
RESPIRATION RATE: 16 BRPM | WEIGHT: 193.2 LBS | HEIGHT: 67 IN | HEART RATE: 72 BPM | SYSTOLIC BLOOD PRESSURE: 133 MMHG | OXYGEN SATURATION: 94 % | BODY MASS INDEX: 30.32 KG/M2 | DIASTOLIC BLOOD PRESSURE: 75 MMHG | TEMPERATURE: 98.2 F

## 2023-04-13 DIAGNOSIS — D72.9 NEUTROPHILIA: Primary | ICD-10-CM

## 2023-04-13 DIAGNOSIS — D75.89 MACROCYTOSIS: ICD-10-CM

## 2023-04-13 DIAGNOSIS — D72.821 MONOCYTOSIS: ICD-10-CM

## 2023-04-13 DIAGNOSIS — J43.9 PULMONARY EMPHYSEMA, UNSPECIFIED EMPHYSEMA TYPE: Primary | ICD-10-CM

## 2023-04-13 DIAGNOSIS — K76.0 NAFLD (NONALCOHOLIC FATTY LIVER DISEASE): ICD-10-CM

## 2023-04-13 LAB
BASOPHILS # BLD AUTO: 0.1 10*3/MM3 (ref 0–0.2)
BASOPHILS NFR BLD AUTO: 0.9 % (ref 0–1.5)
DEPRECATED RDW RBC AUTO: 52.6 FL (ref 37–54)
EOSINOPHIL # BLD AUTO: 0.11 10*3/MM3 (ref 0–0.4)
EOSINOPHIL NFR BLD AUTO: 1 % (ref 0.3–6.2)
ERYTHROCYTE [DISTWIDTH] IN BLOOD BY AUTOMATED COUNT: 13.8 % (ref 12.3–15.4)
HCT VFR BLD AUTO: 40.8 % (ref 37.5–51)
HGB BLD-MCNC: 13.4 G/DL (ref 13–17.7)
IMM GRANULOCYTES # BLD AUTO: 0.09 10*3/MM3 (ref 0–0.05)
IMM GRANULOCYTES NFR BLD AUTO: 0.8 % (ref 0–0.5)
LDH SERPL-CCNC: 162 U/L (ref 99–259)
LYMPHOCYTES # BLD AUTO: 1.42 10*3/MM3 (ref 0.7–3.1)
LYMPHOCYTES NFR BLD AUTO: 13.3 % (ref 19.6–45.3)
MCH RBC QN AUTO: 33.8 PG (ref 26.6–33)
MCHC RBC AUTO-ENTMCNC: 32.8 G/DL (ref 31.5–35.7)
MCV RBC AUTO: 102.8 FL (ref 79–97)
MONOCYTES # BLD AUTO: 1.06 10*3/MM3 (ref 0.1–0.9)
MONOCYTES NFR BLD AUTO: 10 % (ref 5–12)
NEUTROPHILS NFR BLD AUTO: 7.86 10*3/MM3 (ref 1.7–7)
NEUTROPHILS NFR BLD AUTO: 74 % (ref 42.7–76)
NRBC BLD AUTO-RTO: 0 /100 WBC (ref 0–0.2)
PLATELET # BLD AUTO: 190 10*3/MM3 (ref 140–450)
PMV BLD AUTO: 10.6 FL (ref 6–12)
RBC # BLD AUTO: 3.97 10*6/MM3 (ref 4.14–5.8)
WBC NRBC COR # BLD: 10.64 10*3/MM3 (ref 3.4–10.8)

## 2023-04-13 PROCEDURE — 3078F DIAST BP <80 MM HG: CPT | Performed by: INTERNAL MEDICINE

## 2023-04-13 PROCEDURE — 99213 OFFICE O/P EST LOW 20 MIN: CPT | Performed by: INTERNAL MEDICINE

## 2023-04-13 PROCEDURE — 36415 COLL VENOUS BLD VENIPUNCTURE: CPT

## 2023-04-13 PROCEDURE — 83615 LACTATE (LD) (LDH) ENZYME: CPT

## 2023-04-13 PROCEDURE — 1126F AMNT PAIN NOTED NONE PRSNT: CPT | Performed by: INTERNAL MEDICINE

## 2023-04-13 PROCEDURE — 85025 COMPLETE CBC W/AUTO DIFF WBC: CPT

## 2023-04-13 PROCEDURE — 3075F SYST BP GE 130 - 139MM HG: CPT | Performed by: INTERNAL MEDICINE

## 2023-04-13 NOTE — PROGRESS NOTES
"Subjective     CHIEF COMPLAINT:      Chief Complaint   Patient presents with   • Annual Exam     No concerns     HISTORY OF PRESENT ILLNESS:     Gonzales Kaur Sr. is a 67 y.o. male patient who returns today for follow up on his leukocytosis, monocytosis and macrocytosis.  He is accompanied by his wife today.  He reports feeling good today.  He denies having any recent infections.    Patient gained weight since his last visit.  He was 179 pounds at the last visit on 3/18/2022 and his weight increased to 193 pounds today.    ROS:  Pertinent ROS is in the HPI.     Past medical, surgical, social and family history were reviewed.     MEDICATIONS:    Current Outpatient Medications:   •  albuterol sulfate  (90 Base) MCG/ACT inhaler, Inhale 2 puffs., Disp: , Rfl:   •  azelastine (ASTELIN) 0.1 % nasal spray, 2 sprays into the nostril(s) as directed by provider 2 (Two) Times a Day. Use in each nostril as directed, Disp: 30 mL, Rfl: 2  •  Breztri Aerosphere 160-9-4.8 MCG/ACT aerosol inhaler, , Disp: , Rfl:   •  levocetirizine (XYZAL) 5 MG tablet, Take 1 tablet by mouth Every Evening., Disp: 90 tablet, Rfl: 1  •  lisinopril (PRINIVIL,ZESTRIL) 20 MG tablet, Take 1 tablet by mouth Daily., Disp: 90 tablet, Rfl: 3  •  multivitamin with minerals tablet tablet, Take 1 tablet by mouth Daily., Disp: , Rfl:   Objective     VITAL SIGNS:     Vitals:    04/13/23 1137   BP: 133/75   Pulse: 72   Resp: 16   Temp: 98.2 °F (36.8 °C)   TempSrc: Temporal   SpO2: 94%   Weight: 87.6 kg (193 lb 3.2 oz)   Height: 169 cm (66.54\")  Comment: new ht   PainSc: 0-No pain     Body mass index is 30.68 kg/m².     Wt Readings from Last 5 Encounters:   04/13/23 87.6 kg (193 lb 3.2 oz)   02/02/23 83.3 kg (183 lb 9.6 oz)   11/29/22 83.9 kg (185 lb)   07/21/22 79.9 kg (176 lb 3.2 oz)   07/11/22 82.2 kg (181 lb 3.2 oz)     PHYSICAL EXAMINATION:   GENERAL: The patient appears in good general condition, not in acute distress.   SKIN: No ecchymosis.  EYES: No " jaundice. No pallor.  LYMPHATICS: No cervical lymphadenopathy.  CHEST: Normal respiratory effort.   CVS: No edema.  ABDOMEN: Obese.  No hepatomegaly.  No splenomegaly.  No masses.  EXTREMITIES: No noted deformity.     DIAGNOSTIC DATA:     Results from last 7 days   Lab Units 04/13/23  1123   WBC 10*3/mm3 10.64   NEUTROS ABS 10*3/mm3 7.86*   HEMOGLOBIN g/dL 13.4   HEMATOCRIT % 40.8   PLATELETS 10*3/mm3 190     CT chest low-dose on 9/1/2022:  1. ACR Lung-RADS Category 2. Benign appearance or behavior. Continued  annual screening with low-dose CT in 12 months.  2. CTDI 3.0 mGy. .7 mGycm.  3. Diffuse hepatic steatosis.  4. Bilateral bronchial wall thickening and small endobronchial filling  defects most consistent with mucus plugging.    Assessment & Plan    *Leukocytosis with neutrophilia and monocytosis.    · WBC count was normal on 10 November 2021 when his WBC was 12,400.    · WBC count increased to 13,100 on 1/7/2022.  Monocytes increased to 1410.  Immature granulocytes were 230.  · Flow cytometry on 1/7/2022 revealed no evidence of leukemia or lymphoma.   · BCR-ABL on 1/7/2022 was negative.  · The increase was tehrefore considered a reactive increase due to underlying inflammation or effect of steroid therapy.   · WBC improved to 9,800 on 3/18/2023. Neutrophils improved. Monocytes improved but remain elevated.   · 4/13/2023: Neutrophils 7860 monocytes 1060.  Immature granulocytes 90.    *Macrocytosis.   · MCV increased to 103 on 3/18/2022.   · He does not have evidence of vitamin B12 or folate deficiency today.   · He does not drink alcohol.  · CT of the chest he had in August 2021 reported severe hepatic steatosis.   · CT chest on 9/1/2022 revealed diffuse hepatic steatosis.  · MCV is 102.8 today.  · I explained to the patient that the increase in the MCV is attributed to his underlying liver disease.      PLAN:    1.  I reassured the patient about the result of the evaluation of the white blood cells.   Based on the improvement in the neutrophil, monocyte and immature granulocyte counts, no evidence of underlying bone marrow disorder like leukemia.  I did not recommend additional work-up for this.     2.  I recommended referral to Hepatology.  We again discussed the need to lose weight and reduce intake of fatty and sugary food.     I did not schedule the patient for follow-up.  We will be available to see him in the future if the need arises.       Quinton Laureano MD  04/13/23

## 2023-05-30 ENCOUNTER — OFFICE VISIT (OUTPATIENT)
Dept: INTERNAL MEDICINE | Facility: CLINIC | Age: 68
End: 2023-05-30

## 2023-05-30 VITALS
HEIGHT: 67 IN | HEART RATE: 64 BPM | BODY MASS INDEX: 29.03 KG/M2 | SYSTOLIC BLOOD PRESSURE: 152 MMHG | DIASTOLIC BLOOD PRESSURE: 66 MMHG | OXYGEN SATURATION: 95 % | WEIGHT: 185 LBS

## 2023-05-30 DIAGNOSIS — Z00.00 ENCOUNTER FOR MEDICAL EXAMINATION TO ESTABLISH CARE: ICD-10-CM

## 2023-05-30 DIAGNOSIS — R68.2 DRY MOUTH: ICD-10-CM

## 2023-05-30 DIAGNOSIS — J43.9 PULMONARY EMPHYSEMA, UNSPECIFIED EMPHYSEMA TYPE: Chronic | ICD-10-CM

## 2023-05-30 DIAGNOSIS — E78.5 DYSLIPIDEMIA: ICD-10-CM

## 2023-05-30 DIAGNOSIS — I10 ESSENTIAL HYPERTENSION: Primary | Chronic | ICD-10-CM

## 2023-05-30 LAB
ALBUMIN SERPL-MCNC: 4.4 G/DL (ref 3.5–5.2)
ALBUMIN/GLOB SERPL: 1.8 G/DL
ALP SERPL-CCNC: 53 U/L (ref 39–117)
ALT SERPL-CCNC: 41 U/L (ref 1–41)
APPEARANCE UR: CLEAR
AST SERPL-CCNC: 25 U/L (ref 1–40)
BASOPHILS # BLD AUTO: 0.07 10*3/MM3 (ref 0–0.2)
BASOPHILS NFR BLD AUTO: 0.6 % (ref 0–1.5)
BILIRUB SERPL-MCNC: 0.4 MG/DL (ref 0–1.2)
BILIRUB UR QL STRIP: NEGATIVE
BUN SERPL-MCNC: 17 MG/DL (ref 8–23)
BUN/CREAT SERPL: 18.3 (ref 7–25)
CALCIUM SERPL-MCNC: 9.9 MG/DL (ref 8.6–10.5)
CHLORIDE SERPL-SCNC: 103 MMOL/L (ref 98–107)
CHOLEST SERPL-MCNC: 171 MG/DL (ref 0–200)
CHOLEST/HDLC SERPL: 4.5 {RATIO}
CO2 SERPL-SCNC: 27.2 MMOL/L (ref 22–29)
COLOR UR: YELLOW
CREAT SERPL-MCNC: 0.93 MG/DL (ref 0.76–1.27)
EGFRCR SERPLBLD CKD-EPI 2021: 90.6 ML/MIN/1.73
EOSINOPHIL # BLD AUTO: 0.12 10*3/MM3 (ref 0–0.4)
EOSINOPHIL NFR BLD AUTO: 1.1 % (ref 0.3–6.2)
ERYTHROCYTE [DISTWIDTH] IN BLOOD BY AUTOMATED COUNT: 12.5 % (ref 12.3–15.4)
GLOBULIN SER CALC-MCNC: 2.4 GM/DL
GLUCOSE SERPL-MCNC: 123 MG/DL (ref 65–99)
GLUCOSE UR QL STRIP: NEGATIVE
HCT VFR BLD AUTO: 38.7 % (ref 37.5–51)
HDLC SERPL-MCNC: 38 MG/DL (ref 40–60)
HGB BLD-MCNC: 13.3 G/DL (ref 13–17.7)
HGB UR QL STRIP: NEGATIVE
IMM GRANULOCYTES # BLD AUTO: 0.11 10*3/MM3 (ref 0–0.05)
IMM GRANULOCYTES NFR BLD AUTO: 1 % (ref 0–0.5)
KETONES UR QL STRIP: NEGATIVE
LDLC SERPL CALC-MCNC: 117 MG/DL (ref 0–100)
LEUKOCYTE ESTERASE UR QL STRIP: NEGATIVE
LYMPHOCYTES # BLD AUTO: 1.41 10*3/MM3 (ref 0.7–3.1)
LYMPHOCYTES NFR BLD AUTO: 13.1 % (ref 19.6–45.3)
MCH RBC QN AUTO: 33.6 PG (ref 26.6–33)
MCHC RBC AUTO-ENTMCNC: 34.4 G/DL (ref 31.5–35.7)
MCV RBC AUTO: 97.7 FL (ref 79–97)
MONOCYTES # BLD AUTO: 1.13 10*3/MM3 (ref 0.1–0.9)
MONOCYTES NFR BLD AUTO: 10.5 % (ref 5–12)
NEUTROPHILS # BLD AUTO: 7.96 10*3/MM3 (ref 1.7–7)
NEUTROPHILS NFR BLD AUTO: 73.7 % (ref 42.7–76)
NITRITE UR QL STRIP: NEGATIVE
NRBC BLD AUTO-RTO: 0 /100 WBC (ref 0–0.2)
PH UR STRIP: 6 [PH] (ref 5–8)
PLATELET # BLD AUTO: 180 10*3/MM3 (ref 140–450)
POTASSIUM SERPL-SCNC: 4.6 MMOL/L (ref 3.5–5.2)
PROT SERPL-MCNC: 6.8 G/DL (ref 6–8.5)
PROT UR QL STRIP: NEGATIVE
RBC # BLD AUTO: 3.96 10*6/MM3 (ref 4.14–5.8)
SODIUM SERPL-SCNC: 140 MMOL/L (ref 136–145)
SP GR UR STRIP: 1.02 (ref 1–1.03)
TRIGL SERPL-MCNC: 83 MG/DL (ref 0–150)
TSH SERPL DL<=0.005 MIU/L-ACNC: 2.48 UIU/ML (ref 0.27–4.2)
UROBILINOGEN UR STRIP-MCNC: NORMAL MG/DL
VLDLC SERPL CALC-MCNC: 16 MG/DL (ref 5–40)
WBC # BLD AUTO: 10.8 10*3/MM3 (ref 3.4–10.8)

## 2023-05-30 NOTE — PATIENT INSTRUCTIONS
Continue medications as prescribed. Recommend Biotene over-the-counter daily for dry mouth. Monitor blood pressure occasionally. Stay active. Stay hydrated with water. Wear sunscreen. Labs today. Follow-up in Late November for Medicare Wellness and Annual.

## 2023-05-30 NOTE — PROGRESS NOTES
"Chief Complaint  Establish Care and Hypertension    Subjective        Gonzales CELIO Kaur Sr. presents to [facility]  History of Present Illness    67-year-old male presenting with hypertension and to establish care.  Previous patient SHAJI Rogers.  Sees Dr. Buenrostro with [place] pul[facility] care.  Takes Breztri daily.  Also uses albuterol inhaler as needed.  Takes lisinopril for hypertension.  Does not regularly check blood pressure at home.  Takes Astelin nasal spray to help dry up nasal secretions.  This helps with his CPAP usage at home.  Stopped smoking 6 years ago.  Skin bruises easily and states his skin is paperthin.  Treats with CeraVe cream over-the-counter.  Diet includes mostly veggies and chicken.    States the tip of his teeth are starting to thin and get holes.  Was wondering if there is medication that could be causing it.  Also experiences dry mouth that might be an issue.  Hypertension        Objective   Vital Signs:  /66   Pulse 64   Ht 169 cm (66.54\")   Wt 83.9 kg (185 lb)   SpO2 95%   BMI 29.38 kg/m²   Estimated body mass index is 29.38 kg/m² as calculated from the following:    Height as of this encounter: 169 cm (66.54\").    Weight as of this encounter: 83.9 kg (185 lb).       BMI is >= 25 and <30. (Overweight) The following options were offered after discussion;: exercise counseling/recommendations and nutrition counseling/recommendations      Physical Exam  Vitals reviewed.   Constitutional:       Appearance: Normal appearance.   Cardiovascular:      Rate and Rhythm: Normal rate and regular rhythm.      Pulses: Normal pulses.      Heart sounds: Normal heart sounds.   Pulmonary:      Effort: Pulmonary effort is normal.      Breath sounds: Normal breath sounds.   Musculoskeletal:         General: Normal range of motion.      Cervical back: Normal range of motion.   Skin:     General: Skin is warm and dry.      Capillary Refill: Capillary refill " takes less than 2 seconds.   Neurological:      General: No focal deficit present.      Mental Status: He is alert and oriented to person, place, and time.   Psychiatric:         Mood and Affect: Mood normal.         Behavior: Behavior normal.         Thought Content: Thought content normal.         Judgment: Judgment normal.        Result Review :    Common labs        9/22/2022    06:50 11/29/2022    09:58 4/13/2023    11:23   Common Labs   Glucose  112      BUN  16      Creatinine 0.90   0.85      Sodium  137      Potassium  5.0      Chloride  98      Calcium  9.7      Total Protein  7.1      Albumin  4.30      Total Bilirubin  0.3      Alkaline Phosphatase  66      AST (SGOT)  26      ALT (SGPT)  39      WBC  11.59   10.64     Hemoglobin  13.9   13.4     Hematocrit  41.2   40.8     Platelets  233   190     Total Cholesterol  184      Triglycerides  97      HDL Cholesterol  42      LDL Cholesterol   124            Current Outpatient Medications on File Prior to Visit   Medication Sig Dispense Refill   • albuterol sulfate  (90 Base) MCG/ACT inhaler Inhale 2 puffs.     • azelastine (ASTELIN) 0.1 % nasal spray 2 sprays into the nostril(s) as directed by provider 2 (Two) Times a Day. Use in each nostril as directed 30 mL 2   • Breztri Aerosphere 160-9-4.8 MCG/ACT aerosol inhaler      • levocetirizine (XYZAL) 5 MG tablet Take 1 tablet by mouth Every Evening. 90 tablet 1   • lisinopril (PRINIVIL,ZESTRIL) 20 MG tablet Take 1 tablet by mouth Daily. 90 tablet 3   • multivitamin with minerals tablet tablet Take 1 tablet by mouth Daily.       No current facility-administered medications on file prior to visit.              Assessment and Plan   Diagnoses and all orders for this visit:    1. Essential hypertension (Primary)  -     CBC & Differential  -     TSH Rfx On Abnormal To Free T4  -     Lipid Panel With / Chol / HDL Ratio  -     Urinalysis With Microscopic If Indicated (No Culture) - Urine, Clean Catch  -      Comprehensive Metabolic Panel    2. Pulmonary emphysema, unspecified emphysema type    3. Dyslipidemia  -     Lipid Panel With / Chol / HDL Ratio  -     Urinalysis With Microscopic If Indicated (No Culture) - Urine, Clean Catch  -     Comprehensive Metabolic Panel    4. Encounter for medical examination to establish care  -     CBC & Differential  -     TSH Rfx On Abnormal To Free T4  -     Lipid Panel With / Chol / HDL Ratio  -     Urinalysis With Microscopic If Indicated (No Culture) - Urine, Clean Catch  -     Comprehensive Metabolic Panel    5. Dry mouth      Patient Instructions   Continue medications as prescribed. Recommend Biotene over-the-counter daily for dry mouth. Monitor blood pressure occasionally. Stay active. Stay hydrated with water. Wear sunscreen. Labs today. Follow-up in Late November for Medicare Wellness and Annual.              Follow Up   Return in about 6 months (around 11/30/2023) for Medicare Wellness, Annual physical.  Patient was given instructions and counseling regarding his condition or for health maintenance advice. Please see specific information pulled into the AVS if appropriate.

## 2023-05-31 NOTE — PROGRESS NOTES
Thyroid levels within normal limits.  No sign of thyroid disease.  Urinalysis is clear.    LDL (bad cholesterol) elevated 117.  Goal is 100 or less.  Continue to improve diet to decrease high-fat foods.  Increase fiber intake.  Stay active.    Elevated glucose in a fasting lab.  Limit sugar and simple carbohydrate intake.  Stay hydrated with water.  We will continue to monitor in the future.  We will consider hemoglobin A1c lab at next visit.    CBC shows no signs of infection.  Blood work looks stable compared to previous labs.

## 2023-06-01 ENCOUNTER — PATIENT ROUNDING (BHMG ONLY) (OUTPATIENT)
Dept: INTERNAL MEDICINE | Facility: CLINIC | Age: 68
End: 2023-06-01

## 2023-06-12 DIAGNOSIS — J30.2 SEASONAL ALLERGIES: Primary | Chronic | ICD-10-CM

## 2023-06-12 RX ORDER — LEVOCETIRIZINE DIHYDROCHLORIDE 5 MG/1
5 TABLET, FILM COATED ORAL EVERY EVENING
Qty: 90 TABLET | Refills: 0 | Status: SHIPPED | OUTPATIENT
Start: 2023-06-12

## 2023-06-12 NOTE — TELEPHONE ENCOUNTER
Caller:     Relationship:     Best call back number:     Requested Prescriptions:   Requested Prescriptions      No prescriptions requested or ordered in this encounter    LEVOCETIRIZINE 5MG    Pharmacy where request should be sent:  [facility]  [address]  [phone]    Last office visit with prescribing clinician: 5/30/2023   Last telemedicine visit with prescribing clinician: Visit date not found   Next office visit with prescribing clinician: 12/5/2023     Additional details provided by patient:     Does the patient have less than a 3 day supply:  [] Yes  [x] No    Would you like a call back once the refill request has been completed: [] Yes [x] No    If the office needs to give you a call back, can they leave a voicemail: [] Yes [] No    Hoda Keen   06/12/23 12:55 EDT

## 2023-08-14 ENCOUNTER — OFFICE VISIT (OUTPATIENT)
Dept: INTERNAL MEDICINE | Facility: CLINIC | Age: 68
End: 2023-08-14
Payer: MEDICARE

## 2023-08-14 VITALS
TEMPERATURE: 98.2 F | OXYGEN SATURATION: 97 % | HEART RATE: 72 BPM | BODY MASS INDEX: 29.57 KG/M2 | SYSTOLIC BLOOD PRESSURE: 144 MMHG | HEIGHT: 66 IN | WEIGHT: 184 LBS | DIASTOLIC BLOOD PRESSURE: 90 MMHG

## 2023-08-14 DIAGNOSIS — J06.9 UPPER RESPIRATORY TRACT INFECTION, UNSPECIFIED TYPE: Primary | ICD-10-CM

## 2023-08-14 PROCEDURE — 99213 OFFICE O/P EST LOW 20 MIN: CPT

## 2023-08-14 PROCEDURE — 1159F MED LIST DOCD IN RCRD: CPT

## 2023-08-14 PROCEDURE — 1160F RVW MEDS BY RX/DR IN RCRD: CPT

## 2023-08-14 PROCEDURE — 3077F SYST BP >= 140 MM HG: CPT

## 2023-08-14 PROCEDURE — 3080F DIAST BP >= 90 MM HG: CPT

## 2023-08-14 RX ORDER — AZITHROMYCIN 250 MG/1
TABLET, FILM COATED ORAL
Qty: 6 TABLET | Refills: 0 | Status: SHIPPED | OUTPATIENT
Start: 2023-08-14

## 2023-08-14 NOTE — PROGRESS NOTES
"Chief Complaint  URI (Sinus drainage)    Subjective        Gonzales PICKENS Natasha Robins presents to [facility]  History of Present Illness  67-year-old male presenting with congested cough and ear pressure.  Treating with Tylenol for headache.  Cough  This is a recurrent problem. The current episode started in the past 7 days. The problem has been gradually worsening. The problem occurs every few hours. The cough is Productive of sputum. Associated symptoms include ear pain, nasal congestion, rhinorrhea and a sore throat. Pertinent negatives include no chest pain, chills, ear congestion, fever, headaches, heartburn, hemoptysis, myalgias, postnasal drip, rash, shortness of breath, sweats, weight loss or wheezing. The symptoms are aggravated by pollens.     Objective   Vital Signs:  /90   Pulse 72   Temp 98.2 øF (36.8 øC)   Ht 167.6 cm (66\")   Wt 83.5 kg (184 lb)   SpO2 97%   BMI 29.70 kg/mý   Estimated body mass index is 29.7 kg/mý as calculated from the following:    Height as of this encounter: 167.6 cm (66\").    Weight as of this encounter: 83.5 kg (184 lb).               Physical Exam  Vitals reviewed.   Constitutional:       Appearance: Normal appearance.   HENT:      Right Ear: Tympanic membrane normal.      Left Ear: Tympanic membrane normal.      Nose: Congestion present.      Mouth/Throat:      Pharynx: Posterior oropharyngeal erythema present.   Cardiovascular:      Rate and Rhythm: Normal rate and regular rhythm.      Pulses: Normal pulses.      Heart sounds: Normal heart sounds.   Pulmonary:      Effort: Pulmonary effort is normal.      Breath sounds: Normal breath sounds.   Musculoskeletal:         General: Normal range of motion.      Cervical back: Normal range of motion.   Skin:     General: Skin is warm and dry.      Capillary Refill: Capillary refill takes less than 2 seconds.   Neurological:      General: No focal deficit present.      Mental Status: He is alert " and oriented to person, place, and time.   Psychiatric:         Mood and Affect: Mood normal.         Behavior: Behavior normal.         Thought Content: Thought content normal.         Judgment: Judgment normal.      Result Review :    Common labs          11/29/2022    09:58 4/13/2023    11:23 5/30/2023    09:03   Common Labs   Glucose 112   123    BUN 16   17    Creatinine 0.85   0.93    Sodium 137   140    Potassium 5.0   4.6    Chloride 98   103    Calcium 9.7   9.9    Total Protein 7.1   6.8    Albumin 4.30   4.4    Total Bilirubin 0.3   0.4    Alkaline Phosphatase 66   53    AST (SGOT) 26   25    ALT (SGPT) 39   41    WBC 11.59  10.64  10.80    Hemoglobin 13.9  13.4  13.3    Hematocrit 41.2  40.8  38.7    Platelets 233  190  180    Total Cholesterol 184   171    Triglycerides 97   83    HDL Cholesterol 42   38    LDL Cholesterol  124   117        Current Outpatient Medications on File Prior to Visit   Medication Sig Dispense Refill    albuterol sulfate  (90 Base) MCG/ACT inhaler Inhale 2 puffs.      azelastine (ASTELIN) 0.1 % nasal spray 2 sprays into the nostril(s) as directed by provider 2 (Two) Times a Day. Use in each nostril as directed 30 mL 2    Breztri Aerosphere 160-9-4.8 MCG/ACT aerosol inhaler       levocetirizine (XYZAL) 5 MG tablet Take 1 tablet by mouth Every Evening.      lisinopril (PRINIVIL,ZESTRIL) 20 MG tablet Take 1 tablet by mouth Daily. 90 tablet 3    multivitamin with minerals tablet tablet Take 1 tablet by mouth Daily.       No current facility-administered medications on file prior to visit.                Assessment and Plan   Diagnoses and all orders for this visit:    1. Upper respiratory tract infection, unspecified type (Primary)  -     azithromycin (Zithromax Z-Derek) 250 MG tablet; Take 2 tablets by mouth on day 1, then 1 tablet daily on days 2-5  Dispense: 6 tablet; Refill: 0      Patient Instructions   Take antibiotic as directed. Stay hydrated with water. Warm compress  to eyes as needed. Call office if steroid needed for changes in breathing. Follow-up as needed.         Follow Up   Return if symptoms worsen or fail to improve.  Patient was given instructions and counseling regarding his condition or for health maintenance advice. Please see specific information pulled into the AVS if appropriate.

## 2023-08-14 NOTE — PATIENT INSTRUCTIONS
Take antibiotic as directed. Stay hydrated with water. Warm compress to eyes as needed. Call office if steroid needed for changes in breathing. Follow-up as needed.

## 2023-08-18 ENCOUNTER — TELEPHONE (OUTPATIENT)
Dept: INTERNAL MEDICINE | Facility: CLINIC | Age: 68
End: 2023-08-18
Payer: MEDICARE

## 2023-08-18 NOTE — TELEPHONE ENCOUNTER
Caller: Gonzales Kaur Sr.    Relationship: Self    Best call back number: [phone]    What medication are you requesting: ANYTIME     What are your current symptoms: EARACHE, SORE THROAT, SINUS DRAINAGE, COUGH       Have you had these symptoms before:    [x] Yes  [] No    Have you been treated for these symptoms before:   [x] Yes  [] No    If a prescription is needed, what is your preferred pharmacy and phone number:  [facility] [address] [facility] [phone] [facility] [phone] FX     Additional notes: PATIENT STATES THAT THE Z-PAKK HE WAS PRESCRIBED AT HIS MOST RECENT OFFICE VISIT DID NOT HELP AND HE IS REQUESTING A STEROID.

## 2023-08-21 DIAGNOSIS — J06.9 UPPER RESPIRATORY TRACT INFECTION, UNSPECIFIED TYPE: Primary | ICD-10-CM

## 2023-08-21 RX ORDER — METHYLPREDNISOLONE 4 MG/1
TABLET ORAL
Qty: 21 TABLET | Refills: 0 | Status: SHIPPED | OUTPATIENT
Start: 2023-08-21 | End: 2023-08-24 | Stop reason: SDUPTHER

## 2023-08-21 NOTE — TELEPHONE ENCOUNTER
Left message on vm asking how he is feeling, if he had went to immediate care or needed to come back I here. To let us know

## 2023-08-24 ENCOUNTER — TELEPHONE (OUTPATIENT)
Dept: INTERNAL MEDICINE | Facility: CLINIC | Age: 68
End: 2023-08-24
Payer: MEDICARE

## 2023-08-24 DIAGNOSIS — J06.9 UPPER RESPIRATORY TRACT INFECTION, UNSPECIFIED TYPE: ICD-10-CM

## 2023-08-24 RX ORDER — METHYLPREDNISOLONE 4 MG/1
TABLET ORAL
Qty: 21 TABLET | Refills: 0 | Status: SHIPPED | OUTPATIENT
Start: 2023-08-24

## 2023-08-24 NOTE — TELEPHONE ENCOUNTER
Caller: Gonzales Kaur Sr.    Relationship: Self    Best call back number: [phone]    Requested Prescriptions:   Requested Prescriptions     Pending Prescriptions Disp Refills    methylPREDNISolone (MEDROL) 4 MG dose pack 21 tablet 0     Sig: Take as directed on package instructions.        Pharmacy where request should be sent: [facility] [address] [facility] [phone] [facility] [phone] FX     Last office visit with prescribing clinician: 8/14/2023   Last telemedicine visit with prescribing clinician: Visit date not found   Next office visit with prescribing clinician: 12/5/2023     Additional details provided by patient: REQUESTING PRESCRIPTION TO GO TO LOCAL PHARMACY.     Would you like a call back once the refill request has been completed: [x] Yes [] No    If the office needs to give you a call back, can they leave a voicemail: [x] Yes [] No    Hoda Tiwari Rep   08/24/23 10:54 EDT

## 2023-08-30 ENCOUNTER — OFFICE VISIT (OUTPATIENT)
Dept: INTERNAL MEDICINE | Facility: CLINIC | Age: 68
End: 2023-08-30
Payer: MEDICARE

## 2023-08-30 VITALS
WEIGHT: 180 LBS | TEMPERATURE: 98.4 F | BODY MASS INDEX: 28.93 KG/M2 | HEART RATE: 84 BPM | OXYGEN SATURATION: 93 % | SYSTOLIC BLOOD PRESSURE: 124 MMHG | DIASTOLIC BLOOD PRESSURE: 64 MMHG | HEIGHT: 66 IN

## 2023-08-30 DIAGNOSIS — J43.9 PULMONARY EMPHYSEMA, UNSPECIFIED EMPHYSEMA TYPE: Primary | Chronic | ICD-10-CM

## 2023-08-30 PROCEDURE — 1159F MED LIST DOCD IN RCRD: CPT

## 2023-08-30 PROCEDURE — 1160F RVW MEDS BY RX/DR IN RCRD: CPT

## 2023-08-30 PROCEDURE — 3074F SYST BP LT 130 MM HG: CPT

## 2023-08-30 PROCEDURE — 3078F DIAST BP <80 MM HG: CPT

## 2023-08-30 PROCEDURE — 99213 OFFICE O/P EST LOW 20 MIN: CPT

## 2023-08-30 RX ORDER — PREDNISONE 10 MG/1
TABLET ORAL
Qty: 26 TABLET | Refills: 0 | Status: SHIPPED | OUTPATIENT
Start: 2023-08-30

## 2023-08-30 NOTE — PROGRESS NOTES
"Chief Complaint  Cough and URI (Cough and congestion)    Subjective        Gonzales PICKENS Natasha  presents to [facility]  History of Present Illness  67-year-old male presenting with cough and congestion.  Took the last pill of steroid this morning.  States that it is getting loose but still present.  Cough is nonproductive and throughout the day present.  Denies fever, ear pain, chest pain, difficulty breathing.  Cough    URI   Associated symptoms include coughing.     Objective   Vital Signs:  /64   Pulse 84   Temp 98.4 °F (36.9 °C)   Ht 167.6 cm (66\")   Wt 81.6 kg (180 lb)   SpO2 93%   BMI 29.05 kg/m²   Estimated body mass index is 29.05 kg/m² as calculated from the following:    Height as of this encounter: 167.6 cm (66\").    Weight as of this encounter: 81.6 kg (180 lb).               Physical Exam  Vitals reviewed.   Constitutional:       Appearance: Normal appearance.   Cardiovascular:      Rate and Rhythm: Normal rate and regular rhythm.   Pulmonary:      Effort: Pulmonary effort is normal.      Breath sounds: Normal breath sounds.   Musculoskeletal:         General: Normal range of motion.      Cervical back: Normal range of motion.   Skin:     General: Skin is warm and dry.      Capillary Refill: Capillary refill takes less than 2 seconds.   Neurological:      General: No focal deficit present.      Mental Status: He is alert and oriented to person, place, and time.   Psychiatric:         Mood and Affect: Mood normal.         Behavior: Behavior normal.         Thought Content: Thought content normal.         Judgment: Judgment normal.      Result Review :    Common labs          11/29/2022    09:58 4/13/2023    11:23 5/30/2023    09:03   Common Labs   Glucose 112   123    BUN 16   17    Creatinine 0.85   0.93    Sodium 137   140    Potassium 5.0   4.6    Chloride 98   103    Calcium 9.7   9.9    Total Protein 7.1   6.8    Albumin 4.30   4.4    Total Bilirubin 0.3   " 0.4    Alkaline Phosphatase 66   53    AST (SGOT) 26   25    ALT (SGPT) 39   41    WBC 11.59  10.64  10.80    Hemoglobin 13.9  13.4  13.3    Hematocrit 41.2  40.8  38.7    Platelets 233  190  180    Total Cholesterol 184   171    Triglycerides 97   83    HDL Cholesterol 42   38    LDL Cholesterol  124   117          Current Outpatient Medications on File Prior to Visit   Medication Sig Dispense Refill    albuterol sulfate  (90 Base) MCG/ACT inhaler Inhale 2 puffs.      azelastine (ASTELIN) 0.1 % nasal spray 2 sprays into the nostril(s) as directed by provider 2 (Two) Times a Day. Use in each nostril as directed 30 mL 2    Breztri Aerosphere 160-9-4.8 MCG/ACT aerosol inhaler       levocetirizine (XYZAL) 5 MG tablet Take 1 tablet by mouth Every Evening.      lisinopril (PRINIVIL,ZESTRIL) 20 MG tablet Take 1 tablet by mouth Daily. 90 tablet 3    multivitamin with minerals tablet tablet Take 1 tablet by mouth Daily.       No current facility-administered medications on file prior to visit.              Assessment and Plan   Diagnoses and all orders for this visit:    1. Pulmonary emphysema, unspecified emphysema type (Primary)  -     predniSONE (DELTASONE) 10 MG tablet; Take 4 tablets by mouth on days 1-2. Take 3 tablets by mouth on days 3-4. Take 2 tablets by mouth on days 5-8. Take 1 tablet by mouth on days 9-12.  Dispense: 26 tablet; Refill: 0      Patient Instructions   Start prednisone taper and take as directed. Continue medications as prescribed. Recommend nebulizer regularly until for the next 3 days. Recommend regular guaifenesin (Mucinex). Stay hydrated with water.  Follow-up as needed.            Follow Up   Return if symptoms worsen or fail to improve.  Patient was given instructions and counseling regarding his condition or for health maintenance advice. Please see specific information pulled into the AVS if appropriate.

## 2023-08-30 NOTE — PATIENT INSTRUCTIONS
Start prednisone taper and take as directed. Continue medications as prescribed. Recommend nebulizer regularly until for the next 3 days. Recommend regular guaifenesin (Mucinex). Stay hydrated with water.  Follow-up as needed.

## 2023-09-07 ENCOUNTER — HOSPITAL ENCOUNTER (OUTPATIENT)
Dept: CT IMAGING | Facility: HOSPITAL | Age: 68
Discharge: HOME | End: 2023-09-07
Admitting: NURSE PRACTITIONER
Payer: MEDICARE

## 2023-09-07 DIAGNOSIS — Z87.891 PERSONAL HISTORY OF TOBACCO USE, PRESENTING HAZARDS TO HEALTH: ICD-10-CM

## 2023-09-07 PROCEDURE — 71271 CT THORAX LUNG CANCER SCR C-: CPT

## 2023-09-22 RX ORDER — LEVOCETIRIZINE DIHYDROCHLORIDE 5 MG/1
5 TABLET, FILM COATED ORAL EVERY EVENING
Qty: 90 TABLET | Refills: 1 | Status: SHIPPED | OUTPATIENT
Start: 2023-09-22

## 2023-09-22 NOTE — TELEPHONE ENCOUNTER
Caller: Gonzales Kaur Sr.    Relationship: Self    Best call back number: [phone]    Requested Prescriptions:   Requested Prescriptions     Pending Prescriptions Disp Refills    levocetirizine (XYZAL) 5 MG tablet       Sig: Take 1 tablet by mouth Every Evening.        Pharmacy where request should be sent: [facility] - [place] [address] [facility] [phone] [facility] [phone]      Last office visit with prescribing clinician: 8/30/2023   Last telemedicine visit with prescribing clinician: Visit date not found   Next office visit with prescribing clinician: 12/5/2023     Additional details provided by patient: NONE     Does the patient have less than a 3 day supply:  [x] Yes  [] No    Would you like a call back once the refill request has been completed: [x] Yes [] No    If the office needs to give you a call back, can they leave a voicemail: [x] Yes [] No    Hoda Jenkins Rep   09/22/23 13:10 EDT

## 2023-11-29 ENCOUNTER — OFFICE VISIT (OUTPATIENT)
Dept: INTERNAL MEDICINE | Facility: CLINIC | Age: 68
End: 2023-11-29
Payer: MEDICARE

## 2023-11-29 VITALS
TEMPERATURE: 98.9 F | BODY MASS INDEX: 29.51 KG/M2 | WEIGHT: 183.6 LBS | HEART RATE: 92 BPM | DIASTOLIC BLOOD PRESSURE: 78 MMHG | HEIGHT: 66 IN | SYSTOLIC BLOOD PRESSURE: 137 MMHG | OXYGEN SATURATION: 95 %

## 2023-11-29 DIAGNOSIS — R42 DIZZINESS: ICD-10-CM

## 2023-11-29 DIAGNOSIS — R50.9 FEVER, UNSPECIFIED FEVER CAUSE: Primary | ICD-10-CM

## 2023-11-29 PROCEDURE — 3078F DIAST BP <80 MM HG: CPT

## 2023-11-29 PROCEDURE — 3075F SYST BP GE 130 - 139MM HG: CPT

## 2023-11-29 PROCEDURE — 99213 OFFICE O/P EST LOW 20 MIN: CPT

## 2023-11-29 NOTE — PATIENT INSTRUCTIONS
Fever, Adult         A fever is an increase in your body's temperature. It often means a temperature of 100.4°F (38°C) or higher. Brief mild or moderate fevers often have no long-term effects. They often do not need treatment. Moderate or high fevers may make you feel uncomfortable. Sometimes, they can be a sign of a serious illness or disease. A fever that keeps coming back or that lasts a long time may cause you to lose water in your body (get dehydrated).  You can take your temperature with a thermometer to see if you have a fever. Temperature can change with:  Age.  Time of day.  Where the thermometer is put in the body. Readings may vary when the thermometer is put:  In the mouth (oral).  In the butt (rectal).  In the ear (tympanic).  Under the arm (axillary).  On the forehead (temporal).  Follow these instructions at home:  Medicines  Take over-the-counter and prescription medicines only as told by your doctor. Follow the dosing instructions carefully.  If you were prescribed an antibiotic medicine, take it as told by your doctor. Do not stop taking it even if you start to feel better.  General instructions  Watch for any changes in your symptoms. Tell your doctor about them.  Rest as needed.  Drink enough fluid to keep your pee (urine) pale yellow.  Sponge yourself or bathe with room-temperature water as needed. This helps to lower your body temperature. Do not use ice water.  Do not use too many blankets or wear clothes that are too heavy.  If your fever was caused by an infection that spreads from person to person (is contagious), such as a cold or the flu:  You should stay home from work and public places for at least 24 hours after your fever is gone.  Your fever should be gone for at least 24 hours without the need to use medicines.  Contact a doctor if:  You throw up (vomit).  You cannot eat or drink without throwing up.  You have watery poop (diarrhea).  It hurts when you pee.  Your symptoms do not get  better with treatment.  You have new symptoms.  You feel very weak.  Get help right away if:  You are short of breath or have trouble breathing.  You are dizzy or you pass out (faint).  You feel mixed up (confused).  You have signs of not having enough water in your body, such as:  Dark pee, very little pee, or no pee.  Cracked lips.  Dry mouth.  Sunken eyes.  Sleepiness.  Weakness.  You have very bad pain in your belly (abdomen).  You keep throwing up or having watery poop.  You have a rash on your skin.  Your symptoms get worse all of a sudden.  Summary  A fever is an increase in your body's temperature. It often means a temperature of 100.4°F (38°C) or higher.  Watch for any changes in your symptoms. Tell your doctor about them.  Take all medicines only as told by your doctor.  Do not go to work or other public places if your fever was caused by an illness that can spread to other people.  Get help right away if you have signs that you do not have enough water in your body.  This information is not intended to replace advice given to you by your health care provider. Make sure you discuss any questions you have with your health care provider.  Document Revised: 04/17/2023 Document Reviewed: 05/10/2022  Elsevier Patient Education © 2023 [employer] Inc.

## 2023-11-29 NOTE — PROGRESS NOTES
Chief Complaint  Fever and Dizziness     Subjective:      History of Present Illness {CC  Problem List  Visit  Diagnosis   Encounters  Notes  Medications  Labs  Result Review Imaging  Media :23}     Gonzales PICKENS Natasha Amador. presents to [facility] for:      History of Present Illness  Pt states this started today. Pt states his Tmax was 100.2 and pt took two tylenol extra strength. Pt is going for biopsy at urologist in 1 week.   Fever   This is a new problem. The current episode started today. The maximum temperature noted was 100 to 100.9 F. The temperature was taken using an oral thermometer. Pertinent negatives include no congestion, coughing, diarrhea, headaches, nausea, rash, sore throat, urinary pain or vomiting.   Dizziness  This is a new problem. The current episode started today. The problem occurs intermittently. Associated symptoms include a fever. Pertinent negatives include no congestion, coughing, headaches, nausea, rash, sore throat, vertigo or vomiting. The treatment provided mild relief.          I have reviewed patient's medical history, any new submitted information provided by patient or medical assistant and updated medical record.      Objective:      Physical Exam  Vitals reviewed.   Constitutional:       General: He is not in acute distress.     Appearance: Normal appearance. He is not ill-appearing, toxic-appearing or diaphoretic.   HENT:      Head: Normocephalic.      Right Ear: Tympanic membrane, ear canal and external ear normal. There is no impacted cerumen.      Left Ear: Tympanic membrane, ear canal and external ear normal. There is no impacted cerumen.      Nose: Nose normal. No congestion or rhinorrhea.      Mouth/Throat:      Mouth: Mucous membranes are moist.      Pharynx: Oropharynx is clear. No oropharyngeal exudate or posterior oropharyngeal erythema.   Eyes:      General:         Right eye: No discharge.         Left eye: No  "discharge.      Conjunctiva/sclera: Conjunctivae normal.      Pupils: Pupils are equal, round, and reactive to light.   Cardiovascular:      Rate and Rhythm: Normal rate and regular rhythm.      Pulses: Normal pulses.      Heart sounds: Normal heart sounds.   Pulmonary:      Effort: Pulmonary effort is normal.      Breath sounds: Normal breath sounds.   Lymphadenopathy:      Cervical: Cervical adenopathy present.   Skin:     General: Skin is warm and dry.      Capillary Refill: Capillary refill takes less than 2 seconds.   Neurological:      General: No focal deficit present.      Mental Status: He is alert.      Motor: No weakness.   Psychiatric:         Mood and Affect: Mood normal.         Behavior: Behavior normal.        Result Review  Data Reviewed:{ Labs  Result Review  Imaging  Med Tab  Media :23}                Vital Signs:   /78 (BP Location: Left arm, Patient Position: Sitting, Cuff Size: Adult)   Pulse 92   Temp 98.9 °F (37.2 °C) (Oral)   Ht 167.6 cm (66\")   Wt 83.3 kg (183 lb 9.6 oz)   SpO2 95%   BMI 29.63 kg/m²         Requested Prescriptions      No prescriptions requested or ordered in this encounter       Routine medications provided by this office will also be refilled via pharmacy request.       Current Outpatient Medications:     albuterol sulfate  (90 Base) MCG/ACT inhaler, Inhale 2 puffs., Disp: , Rfl:     azelastine (ASTELIN) 0.1 % nasal spray, 2 sprays into the nostril(s) as directed by provider 2 (Two) Times a Day. Use in each nostril as directed, Disp: 30 mL, Rfl: 2    Breztri Aerosphere 160-9-4.8 MCG/ACT aerosol inhaler, , Disp: , Rfl:     levocetirizine (XYZAL) 5 MG tablet, Take 1 tablet by mouth Every Evening., Disp: 90 tablet, Rfl: 1    lisinopril (PRINIVIL,ZESTRIL) 20 MG tablet, Take 1 tablet by mouth Daily., Disp: 90 tablet, Rfl: 3    multivitamin with minerals tablet tablet, Take 1 tablet by mouth Daily., Disp: , Rfl:     predniSONE (DELTASONE) 10 MG tablet, " Take 4 tablets by mouth on days 1-2. Take 3 tablets by mouth on days 3-4. Take 2 tablets by mouth on days 5-8. Take 1 tablet by mouth on days 9-12., Disp: 26 tablet, Rfl: 0  No current facility-administered medications for this visit.     Assessment and Plan:      Assessment and Plan {CC Problem List  Visit Diagnosis  ROS  Review (Popup)  Health Maintenance  Quality  BestPractice  Medications  SmartSets  SnapShot Encounters  Media :23}     Problem List Items Addressed This Visit    None  Visit Diagnoses       Fever, unspecified fever cause    -  Primary    Dizziness              Educated patient this is too early to determine if this is a viral, bacterial or other origin of fever.  Educated patient to be aware of any urine problems, upper respiratory issues.  Educated patient to continue taking Tylenol for his fever as he states this helped with lowering the fever and the dizziness.  I offered meclizine, but patient states these dizziness episodes are very quick and are taking care of by the Tylenol.  Educated patient to stay hydrated.  Patient verbalized understanding and is comfortable with the plan of care.    Follow Up {Instructions Charge Capture  Follow-up Communications :23}     Return if symptoms worsen or fail to improve.          Patient was given instructions and counseling regarding his condition or for health maintenance advice. Please see specific information pulled into the AVS if appropriate.    Dragon disclaimer:   Much of this encounter note is an electronic transcription/translation of spoken language to printed text. The electronic translation of spoken language may permit erroneous, or at times, nonsensical words or phrases to be inadvertently transcribed; Although I have reviewed the note for such errors, some may still exist.     Additional Patient Counseling:       Patient Instructions   Fever, Adult         A fever is an increase in your body's temperature. It often means a  temperature of 100.4°F (38°C) or higher. Brief mild or moderate fevers often have no long-term effects. They often do not need treatment. Moderate or high fevers may make you feel uncomfortable. Sometimes, they can be a sign of a serious illness or disease. A fever that keeps coming back or that lasts a long time may cause you to lose water in your body (get dehydrated).  You can take your temperature with a thermometer to see if you have a fever. Temperature can change with:  Age.  Time of day.  Where the thermometer is put in the body. Readings may vary when the thermometer is put:  In the mouth (oral).  In the butt (rectal).  In the ear (tympanic).  Under the arm (axillary).  On the forehead (temporal).  Follow these instructions at home:  Medicines  Take over-the-counter and prescription medicines only as told by your doctor. Follow the dosing instructions carefully.  If you were prescribed an antibiotic medicine, take it as told by your doctor. Do not stop taking it even if you start to feel better.  General instructions  Watch for any changes in your symptoms. Tell your doctor about them.  Rest as needed.  Drink enough fluid to keep your pee (urine) pale yellow.  Sponge yourself or bathe with room-temperature water as needed. This helps to lower your body temperature. Do not use ice water.  Do not use too many blankets or wear clothes that are too heavy.  If your fever was caused by an infection that spreads from person to person (is contagious), such as a cold or the flu:  You should stay home from work and public places for at least 24 hours after your fever is gone.  Your fever should be gone for at least 24 hours without the need to use medicines.  Contact a doctor if:  You throw up (vomit).  You cannot eat or drink without throwing up.  You have watery poop (diarrhea).  It hurts when you pee.  Your symptoms do not get better with treatment.  You have new symptoms.  You feel very weak.  Get help right away  if:  You are short of breath or have trouble breathing.  You are dizzy or you pass out (faint).  You feel mixed up (confused).  You have signs of not having enough water in your body, such as:  Dark pee, very little pee, or no pee.  Cracked lips.  Dry mouth.  Sunken eyes.  Sleepiness.  Weakness.  You have very bad pain in your belly (abdomen).  You keep throwing up or having watery poop.  You have a rash on your skin.  Your symptoms get worse all of a sudden.  Summary  A fever is an increase in your body's temperature. It often means a temperature of 100.4°F (38°C) or higher.  Watch for any changes in your symptoms. Tell your doctor about them.  Take all medicines only as told by your doctor.  Do not go to work or other public places if your fever was caused by an illness that can spread to other people.  Get help right away if you have signs that you do not have enough water in your body.  This information is not intended to replace advice given to you by your health care provider. Make sure you discuss any questions you have with your health care provider.  Document Revised: 04/17/2023 Document Reviewed: 05/10/2022  [employer] Patient Education © 2023 Elsevier Inc.

## 2023-11-30 ENCOUNTER — APPOINTMENT (OUTPATIENT)
Dept: CT IMAGING | Facility: HOSPITAL | Age: 68
End: 2023-11-30
Payer: MEDICARE

## 2023-11-30 ENCOUNTER — HOSPITAL ENCOUNTER (EMERGENCY)
Facility: HOSPITAL | Age: 68
Discharge: HOME | End: 2023-11-30
Attending: STUDENT IN AN ORGANIZED HEALTH CARE EDUCATION/TRAINING PROGRAM
Payer: MEDICARE

## 2023-11-30 VITALS
WEIGHT: 183.64 LBS | TEMPERATURE: 98.7 F | SYSTOLIC BLOOD PRESSURE: 146 MMHG | HEIGHT: 66 IN | DIASTOLIC BLOOD PRESSURE: 69 MMHG | RESPIRATION RATE: 20 BRPM | OXYGEN SATURATION: 97 % | BODY MASS INDEX: 29.51 KG/M2 | HEART RATE: 81 BPM

## 2023-11-30 DIAGNOSIS — R10.84 GENERALIZED ABDOMINAL PAIN: Primary | ICD-10-CM

## 2023-11-30 LAB
ALBUMIN SERPL-MCNC: 4.2 G/DL (ref 3.5–5.2)
ALBUMIN/GLOB SERPL: 1.3 G/DL
ALP SERPL-CCNC: 56 U/L (ref 39–117)
ALT SERPL W P-5'-P-CCNC: 44 U/L (ref 1–41)
ANION GAP SERPL CALCULATED.3IONS-SCNC: 9.4 MMOL/L (ref 5–15)
AST SERPL-CCNC: 36 U/L (ref 1–40)
BASOPHILS # BLD MANUAL: 0.09 10*3/MM3 (ref 0–0.2)
BASOPHILS NFR BLD MANUAL: 1.2 % (ref 0–1.5)
BILIRUB SERPL-MCNC: 0.3 MG/DL (ref 0–1.2)
BUN SERPL-MCNC: 13 MG/DL (ref 8–23)
BUN/CREAT SERPL: 13.7 (ref 7–25)
CALCIUM SPEC-SCNC: 9.1 MG/DL (ref 8.6–10.5)
CHLORIDE SERPL-SCNC: 98 MMOL/L (ref 98–107)
CO2 SERPL-SCNC: 25.6 MMOL/L (ref 22–29)
CREAT SERPL-MCNC: 0.95 MG/DL (ref 0.76–1.27)
DEPRECATED RDW RBC AUTO: 44.1 FL (ref 37–54)
EGFRCR SERPLBLD CKD-EPI 2021: 87.7 ML/MIN/1.73
ERYTHROCYTE [DISTWIDTH] IN BLOOD BY AUTOMATED COUNT: 12.1 % (ref 12.3–15.4)
GLOBULIN UR ELPH-MCNC: 3.3 GM/DL
GLUCOSE SERPL-MCNC: 107 MG/DL (ref 65–99)
HCT VFR BLD AUTO: 39.3 % (ref 37.5–51)
HGB BLD-MCNC: 13.5 G/DL (ref 13–17.7)
HOLD SPECIMEN: NORMAL
HOLD SPECIMEN: NORMAL
LYMPHOCYTES # BLD MANUAL: 1.12 10*3/MM3 (ref 0.7–3.1)
LYMPHOCYTES NFR BLD MANUAL: 27 % (ref 5–12)
MAGNESIUM SERPL-MCNC: 2.2 MG/DL (ref 1.6–2.4)
MCH RBC QN AUTO: 33.9 PG (ref 26.6–33)
MCHC RBC AUTO-ENTMCNC: 34.4 G/DL (ref 31.5–35.7)
MCV RBC AUTO: 98.7 FL (ref 79–97)
METAMYELOCYTES NFR BLD MANUAL: 0.8 % (ref 0–0)
MONOCYTES # BLD: 2.08 10*3/MM3 (ref 0.1–0.9)
NEUTROPHILS # BLD AUTO: 4.36 10*3/MM3 (ref 1.7–7)
NEUTROPHILS NFR BLD MANUAL: 56.5 % (ref 42.7–76)
NRBC BLD AUTO-RTO: 0 /100 WBC (ref 0–0.2)
PLAT MORPH BLD: NORMAL
PLATELET # BLD AUTO: 187 10*3/MM3 (ref 140–450)
PMV BLD AUTO: 10.3 FL (ref 6–12)
POTASSIUM SERPL-SCNC: 4.4 MMOL/L (ref 3.5–5.2)
PROT SERPL-MCNC: 7.5 G/DL (ref 6–8.5)
QT INTERVAL: 385 MS
QTC INTERVAL: 430 MS
RBC # BLD AUTO: 3.98 10*6/MM3 (ref 4.14–5.8)
RBC MORPH BLD: NORMAL
SODIUM SERPL-SCNC: 133 MMOL/L (ref 136–145)
TROPONIN T SERPL HS-MCNC: 15 NG/L
VARIANT LYMPHS NFR BLD MANUAL: 14.5 % (ref 19.6–45.3)
WBC MORPH BLD: NORMAL
WBC NRBC COR # BLD AUTO: 7.72 10*3/MM3 (ref 3.4–10.8)
WHOLE BLOOD HOLD COAG: NORMAL
WHOLE BLOOD HOLD SPECIMEN: NORMAL

## 2023-11-30 PROCEDURE — 80053 COMPREHEN METABOLIC PANEL: CPT

## 2023-11-30 PROCEDURE — 83735 ASSAY OF MAGNESIUM: CPT

## 2023-11-30 PROCEDURE — 99285 EMERGENCY DEPT VISIT HI MDM: CPT

## 2023-11-30 PROCEDURE — 36415 COLL VENOUS BLD VENIPUNCTURE: CPT

## 2023-11-30 PROCEDURE — 85025 COMPLETE CBC W/AUTO DIFF WBC: CPT

## 2023-11-30 PROCEDURE — 85007 BL SMEAR W/DIFF WBC COUNT: CPT

## 2023-11-30 PROCEDURE — 84484 ASSAY OF TROPONIN QUANT: CPT

## 2023-11-30 PROCEDURE — 25510000001 IOPAMIDOL 61 % SOLUTION: Performed by: STUDENT IN AN ORGANIZED HEALTH CARE EDUCATION/TRAINING PROGRAM

## 2023-11-30 PROCEDURE — 74177 CT ABD & PELVIS W/CONTRAST: CPT

## 2023-11-30 PROCEDURE — 93005 ELECTROCARDIOGRAM TRACING: CPT

## 2023-11-30 RX ORDER — SODIUM CHLORIDE 0.9 % (FLUSH) 0.9 %
10 SYRINGE (ML) INJECTION AS NEEDED
Status: DISCONTINUED | OUTPATIENT
Start: 2023-11-30 | End: 2023-11-30 | Stop reason: HOSPADM

## 2023-11-30 RX ADMIN — IOPAMIDOL 85 ML: 612 INJECTION, SOLUTION INTRAVENOUS at 21:02

## 2023-11-30 NOTE — ED TRIAGE NOTES
"Patient to ED per PV ambulatory to triage w/ reports of possible syncopal episode approx 1 h PTA after muscle cramp in LUQ. Per patient's wife, patient fell to the side after severe coughing and was unable to speak to her. Patient states he was able to see his wife, but was unable to speak b/c the cramping caused him to be SOA. Patient states he urinated on himself \"because I couldn't breathe.\" Per patient, this lasted for approx 1 minute.  "

## 2023-12-01 NOTE — ED PROVIDER NOTES
EMERGENCY DEPARTMENT ENCOUNTER    Room Number:  19/19  PCP: Christiano Bartholomew APRN  Historian: Patient, wife at bedside      HPI:  Chief Complaint: Abdominal pain, syncopeContext: Gonzales Kaur Sr. is a 68 y.o. male who presents to the ED c/o abdominal pain.  Patient states he was sitting on the couch earlier today when he had sudden onset of left lower quadrant abdominal pain that was sharp in nature.  Patient states the pain was so severe it took his breath away.  Patient had a coughing fit which is normal for him and when wife called him she he did not respond.  She walked into the room and states he was staring off into space with glassy eyes.  Patient states he remembers the entire event and said he could not talk to her because the pain was so severe.  Patient does not believe he passed out.  Patient has had 4-5 episodes similar to this that come every few weeks to a month.  Patient states bowel movements have been normal.  Patient is currently asymptomatic with no pain in his abdomen.            PAST MEDICAL HISTORY  Active Ambulatory Problems     Diagnosis Date Noted    COPD (chronic obstructive pulmonary disease) with emphysema 10/28/2016    Benign non-nodular prostatic hyperplasia with lower urinary tract symptoms 10/28/2016    Chronic fatigue 06/07/2018    Dyspnea on exertion 06/07/2018    MICHAEL (obstructive sleep apnea) 06/07/2018    Essential hypertension 02/28/2019    History of prostate cancer 05/04/2021    Seasonal allergies 05/04/2021    Healthcare maintenance 11/08/2021    Cellulitis of right leg 08/01/2014    Venous insufficiency of leg 08/01/2014     Resolved Ambulatory Problems     Diagnosis Date Noted    Acute non-recurrent maxillary sinusitis 01/23/2017    Acute pharyngitis 04/18/2017    Preoperative cardiovascular examination 06/08/2020     Past Medical History:   Diagnosis Date    Allergic     BPH (benign prostatic hyperplasia)     Chronic venous hypertension with ulcer     COPD (chronic  obstructive pulmonary disease)     History of cellulitis 2014    History of kidney stones     Hypertension     Kidney stones     Osteoarthritis     Prostate cancer 2020    Tobacco use disorder     Varicose veins          PAST SURGICAL HISTORY  Past Surgical History:   Procedure Laterality Date    COLONOSCOPY      approx 2013    KIDNEY STONE SURGERY      Lithotripsy         FAMILY HISTORY  Family History   Problem Relation Age of Onset    Diabetes Mother     Arthritis Mother     Hypertension Mother     Breast cancer Mother 74    Dementia Father     Hypertension Father     Liver disease Sister     Liver disease Brother          SOCIAL HISTORY  Social History     Socioeconomic History    Marital status:      Spouse name: Dominique   Tobacco Use    Smoking status: Former     Packs/day: 1.00     Years: 2.00     Additional pack years: 0.00     Total pack years: 2.00     Types: Cigarettes     Start date:      Quit date:      Years since quittin.9     Passive exposure: Past    Smokeless tobacco: Never   Vaping Use    Vaping Use: Never used   Substance and Sexual Activity    Alcohol use: No    Drug use: No    Sexual activity: Defer         ALLERGIES  Patient has no known allergies.        REVIEW OF SYSTEMS  Review of Systems   Gastrointestinal:  Positive for abdominal pain.   All other systems reviewed and are negative.           PHYSICAL EXAM  ED Triage Vitals   Temp Heart Rate Resp BP SpO2   23 1650 23 1650 23 1650 23 1658 23 1650   98.7 °F (37.1 °C) 76 20 154/68 99 %      Temp src Heart Rate Source Patient Position BP Location FiO2 (%)   -- -- 23 1658 23 1658 --     Lying Left arm        Physical Exam      GENERAL: no acute distress  HENT: nares patent  EYES: no scleral icterus  CV: regular rhythm, normal rate  RESPIRATORY: normal effort  ABDOMEN: soft, mild distention but no tenderness to palpation  MUSCULOSKELETAL: no deformity  NEURO: alert, moves all  extremities, follows commands  PSYCH:  calm, cooperative  SKIN: warm, dry    Vital signs and nursing notes reviewed.          LAB RESULTS  Recent Results (from the past 24 hour(s))   ECG 12 Lead Syncope    Collection Time: 11/30/23  4:58 PM   Result Value Ref Range    QT Interval 385 ms    QTC Interval 430 ms   Comprehensive Metabolic Panel    Collection Time: 11/30/23  5:02 PM    Specimen: Arm, Right; Blood   Result Value Ref Range    Glucose 107 (H) 65 - 99 mg/dL    BUN 13 8 - 23 mg/dL    Creatinine 0.95 0.76 - 1.27 mg/dL    Sodium 133 (L) 136 - 145 mmol/L    Potassium 4.4 3.5 - 5.2 mmol/L    Chloride 98 98 - 107 mmol/L    CO2 25.6 22.0 - 29.0 mmol/L    Calcium 9.1 8.6 - 10.5 mg/dL    Total Protein 7.5 6.0 - 8.5 g/dL    Albumin 4.2 3.5 - 5.2 g/dL    ALT (SGPT) 44 (H) 1 - 41 U/L    AST (SGOT) 36 1 - 40 U/L    Alkaline Phosphatase 56 39 - 117 U/L    Total Bilirubin 0.3 0.0 - 1.2 mg/dL    Globulin 3.3 gm/dL    A/G Ratio 1.3 g/dL    BUN/Creatinine Ratio 13.7 7.0 - 25.0    Anion Gap 9.4 5.0 - 15.0 mmol/L    eGFR 87.7 >60.0 mL/min/1.73   Magnesium    Collection Time: 11/30/23  5:02 PM    Specimen: Arm, Right; Blood   Result Value Ref Range    Magnesium 2.2 1.6 - 2.4 mg/dL   Single High Sensitivity Troponin T    Collection Time: 11/30/23  5:02 PM    Specimen: Arm, Right; Blood   Result Value Ref Range    HS Troponin T 15 <22 ng/L   Green Top (Gel)    Collection Time: 11/30/23  5:02 PM   Result Value Ref Range    Extra Tube Hold for add-ons.    Lavender Top    Collection Time: 11/30/23  5:02 PM   Result Value Ref Range    Extra Tube hold for add-on    Gold Top - SST    Collection Time: 11/30/23  5:02 PM   Result Value Ref Range    Extra Tube Hold for add-ons.    Light Blue Top    Collection Time: 11/30/23  5:02 PM   Result Value Ref Range    Extra Tube Hold for add-ons.    CBC Auto Differential    Collection Time: 11/30/23  5:02 PM    Specimen: Arm, Right; Blood   Result Value Ref Range    WBC 7.72 3.40 - 10.80 10*3/mm3     RBC 3.98 (L) 4.14 - 5.80 10*6/mm3    Hemoglobin 13.5 13.0 - 17.7 g/dL    Hematocrit 39.3 37.5 - 51.0 %    MCV 98.7 (H) 79.0 - 97.0 fL    MCH 33.9 (H) 26.6 - 33.0 pg    MCHC 34.4 31.5 - 35.7 g/dL    RDW 12.1 (L) 12.3 - 15.4 %    RDW-SD 44.1 37.0 - 54.0 fl    MPV 10.3 6.0 - 12.0 fL    Platelets 187 140 - 450 10*3/mm3    nRBC 0.0 0.0 - 0.2 /100 WBC   Manual Differential    Collection Time: 11/30/23  5:02 PM    Specimen: Arm, Right; Blood   Result Value Ref Range    Neutrophil % 56.5 42.7 - 76.0 %    Lymphocyte % 14.5 (L) 19.6 - 45.3 %    Monocyte % 27.0 (H) 5.0 - 12.0 %    Basophil % 1.2 0.0 - 1.5 %    Metamyelocyte % 0.8 (H) 0.0 - 0.0 %    Neutrophils Absolute 4.36 1.70 - 7.00 10*3/mm3    Lymphocytes Absolute 1.12 0.70 - 3.10 10*3/mm3    Monocytes Absolute 2.08 (H) 0.10 - 0.90 10*3/mm3    Basophils Absolute 0.09 0.00 - 0.20 10*3/mm3    RBC Morphology Normal Normal    WBC Morphology Normal Normal    Platelet Morphology Normal Normal       Ordered the above labs and reviewed the results.        RADIOLOGY  CT Abdomen Pelvis With Contrast    Result Date: 11/30/2023  CT OF THE ABDOMEN AND PELVIS WITH CONTRAST  HISTORY: Abdominal pain  COMPARISON: None available.  TECHNIQUE: Axial CT imaging was obtained through the abdomen and pelvis. IV contrast was administered.  FINDINGS: Images through the lung bases are clear. The images are degraded by motion artifact. The liver is steatotic. The stomach, duodenum, adrenal glands, spleen, and gallbladder are normal. There is some pancreatic atrophy. The kidneys enhance symmetrically. No hydronephrosis is identified. There are simple appearing right renal cysts. No additional follow-up is necessary. There are aortoiliac calcifications. No distal ureteral or bladder stones are seen. Prostate gland does contain dystrophic calcifications. There is some distention of the urinary bladder. There is colonic diverticulosis. No bowel obstruction is seen. The appendix is normal. No acute  osseous abnormalities are identified. There is a tiny fat-containing supraumbilical hernia.      Distention of the urinary bladder. Otherwise, no acute findings identified within the abdomen or pelvis.  Radiation dose reduction techniques were utilized, including automated exposure control and exposure modulation based on body size.   This report was finalized on 11/30/2023 9:28 PM by Dr. Kathrin Valdovinos M.D on Workstation: [id number]       Ordered the above noted radiological studies. Reviewed by me in PACS.            MEDICATIONS GIVEN IN ER  Medications   sodium chloride 0.9 % flush 10 mL (has no administration in time range)   iopamidol (ISOVUE-300) 61 % injection 100 mL (85 mL Intravenous Given 11/30/23 2102)                   MEDICAL DECISION MAKING, PROGRESS, and CONSULTS    All labs have been independently reviewed by me.  All radiology studies have been reviewed by me and I have also reviewed the radiology report.   EKG's independently viewed and interpreted by me.  Discussion below represents my analysis of pertinent findings related to patient's condition, differential diagnosis, treatment plan and final disposition.      Additional sources:  - Discussed/ obtained information from independent historians: Wife at bedside    - External (non-ED) record review: Office visit with internal medicine from 8/30/2023 reviewed and notable for history of emphysema.  At that time patient was started on prednisone and counseled to follow-up as needed.    - Chronic or social conditions impacting care: Emphysema    - Shared decision making: Utilizing shared decision-making techniques we discussed today's findings and plan going forward.  At this time we elect to proceed with outpatient management and close follow-up with primary care      Orders placed during this visit:  Orders Placed This Encounter   Procedures    CT Abdomen Pelvis With Contrast    [facility] Draw    Comprehensive Metabolic Panel    Magnesium     Single High Sensitivity Troponin T    CBC Auto Differential    Manual Differential    NPO Diet NPO Type: Strict NPO    Undress & Gown    Continuous Pulse Oximetry    Vital Signs    Orthostatic Blood Pressure    Oxygen Therapy- Nasal Cannula; Titrate 1-6 LPM Per SpO2; 90 - 95%    POC Glucose Once    ECG 12 Lead Syncope    Insert Peripheral IV    CBC & Differential    Green Top (Gel)    Lavender Top    Gold Top - SST    Light Blue Top           Differential diagnosis includes but is not limited to:    Stomach cramp, gastroenteritis, small bowel obstruction      Independent interpretation of labs, radiology studies, and discussions with consultants:  ED Course as of 11/30/23 2148   Thu Nov 30, 2023 2038 68-year-old male presenting for evaluation of a brief episode of sharp abdominal pain earlier today with possible syncope.  Patient currently asymptomatic.  Patient states this happens on and off over the last several months.  Laboratory evaluation is overall unremarkable.  Radiological evaluation demonstrates [MW]   2041 EKG interpreted by me demonstrates sinus rhythm, rate of 75, no NH/QT prolongation, right bundle branch block [MW]   2147 CT abdomen interpreted by me and demonstrates no evidence of obstruction or free air. [MW]   2147 Laboratory and radiological evaluations are overall unremarkable.  Patient remains asymptomatic.  Patient has close follow-up as already scheduled with primary care.  Return precautions and supportive care measures discussed and patient discharged in stable condition. [MW]      ED Course User Index  [MW] Mg Reilly MD         DIAGNOSIS  Final diagnoses:   Generalized abdominal pain         DISPOSITION  DISCHARGE    Patient discharged in stable condition.    Reviewed implications of results, diagnosis, meds, responsibility to follow up, warning signs and symptoms of possible worsening, potential complications and reasons to return to ER, including uncontrolled abdominal pain,  nausea, vomiting, fevers, chills.    Patient/Family voiced understanding of above instructions.    Discussed plan for discharge, as there is no emergent indication for admission. Patient referred to primary care provider for BP management due to today's BP. Pt/family is agreeable and understands need for follow up and repeat testing.  Pt is aware that discharge does not mean that nothing is wrong but it indicates no emergency is present that requires admission and they must continue care with follow-up as given below or physician of their choice.     FOLLOW-UP  Christiano Bartholomew, APRN  [address] [place] [address]  [phone]    In 1 week  As needed         Medication List      No changes were made to your prescriptions during this visit.                   Latest Documented Vital Signs:  As of 21:48 EST  BP- 146/69 HR- 81 Temp- 98.7 °F (37.1 °C) O2 sat- 97%              --    Please note that portions of this were completed with a voice recognition program.       Note Disclaimer: At [facility], we believe that sharing information builds trust and better relationships. You are receiving this note because you are receiving care at [facility] or recently visited. It is possible you will see health information before a provider has talked with you about it. This kind of information can be easy to misunderstand. To help you fully understand what it means for your health, we urge you to discuss this note with your provider.             Mg Reilly MD  11/30/23 6265

## 2023-12-01 NOTE — DISCHARGE INSTRUCTIONS
Please follow-up with your primary care physician at your previously scheduled appointment    Please return to the emergency department with new or worsening symptoms including uncontrolled abdominal pain, nausea, vomiting, fevers, chills

## 2023-12-05 ENCOUNTER — OFFICE VISIT (OUTPATIENT)
Dept: INTERNAL MEDICINE | Facility: CLINIC | Age: 68
End: 2023-12-05
Payer: MEDICARE

## 2023-12-05 VITALS
HEART RATE: 89 BPM | DIASTOLIC BLOOD PRESSURE: 84 MMHG | WEIGHT: 183 LBS | HEIGHT: 66 IN | SYSTOLIC BLOOD PRESSURE: 148 MMHG | BODY MASS INDEX: 29.41 KG/M2 | OXYGEN SATURATION: 94 %

## 2023-12-05 DIAGNOSIS — R05.1 ACUTE COUGH: Primary | ICD-10-CM

## 2023-12-05 DIAGNOSIS — U07.1 COVID: ICD-10-CM

## 2023-12-05 LAB
EXPIRATION DATE: ABNORMAL
FLUAV AG UPPER RESP QL IA.RAPID: NOT DETECTED
FLUBV AG UPPER RESP QL IA.RAPID: NOT DETECTED
INTERNAL CONTROL: ABNORMAL
Lab: ABNORMAL
SARS-COV-2 AG UPPER RESP QL IA.RAPID: DETECTED

## 2023-12-05 PROCEDURE — 1159F MED LIST DOCD IN RCRD: CPT

## 2023-12-05 PROCEDURE — 1160F RVW MEDS BY RX/DR IN RCRD: CPT

## 2023-12-05 PROCEDURE — 3077F SYST BP >= 140 MM HG: CPT

## 2023-12-05 PROCEDURE — 99213 OFFICE O/P EST LOW 20 MIN: CPT

## 2023-12-05 PROCEDURE — 87428 SARSCOV & INF VIR A&B AG IA: CPT

## 2023-12-05 PROCEDURE — 3079F DIAST BP 80-89 MM HG: CPT

## 2023-12-05 RX ORDER — DEXTROMETHORPHAN HYDROBROMIDE AND PROMETHAZINE HYDROCHLORIDE 15; 6.25 MG/5ML; MG/5ML
5 SYRUP ORAL 4 TIMES DAILY PRN
Qty: 180 ML | Refills: 1 | Status: SHIPPED | OUTPATIENT
Start: 2023-12-05

## 2023-12-05 RX ORDER — PREDNISONE 10 MG/1
TABLET ORAL
Qty: 26 TABLET | Refills: 0 | Status: SHIPPED | OUTPATIENT
Start: 2023-12-05

## 2023-12-05 NOTE — PROGRESS NOTES
"Chief Complaint  Cough, Generalized Body Aches, and Fever    Subjective        Gonzales Kaur  presents to [facility]  History of Present Illness  68-year-old male presenting with cough, fever and shortness of breath.  Went to ER on 11/30 for generalized abdominal pain.  CT showed tiny supraumbilical fat-containing hernia.  Otherwise CT was normal.  Cough overnight kept him up.  Was unable to sleep.  Has been using inhalers and nebulizer frequently.  Wife has similar symptoms.  Denies productive cough.  Cough  Associated symptoms include a fever.   Fever   Associated symptoms include coughing.       Objective   Vital Signs:  /84 (BP Location: Right arm, Patient Position: Sitting, Cuff Size: Adult)   Pulse 89   Ht 167 cm (65.75\")   Wt 83 kg (183 lb)   SpO2 94%   BMI 29.76 kg/m²   Estimated body mass index is 29.76 kg/m² as calculated from the following:    Height as of this encounter: 167 cm (65.75\").    Weight as of this encounter: 83 kg (183 lb).               Physical Exam  Vitals reviewed.   Constitutional:       Appearance: Normal appearance.   HENT:      Head: Normocephalic.      Right Ear: Tympanic membrane normal.      Left Ear: Tympanic membrane normal.      Nose: Congestion present.   Cardiovascular:      Rate and Rhythm: Normal rate and regular rhythm.      Pulses: Normal pulses.      Heart sounds: Normal heart sounds.   Pulmonary:      Effort: Pulmonary effort is normal.      Breath sounds: Wheezing present.   Musculoskeletal:         General: Normal range of motion.      Cervical back: Normal range of motion.   Skin:     General: Skin is warm and dry.      Capillary Refill: Capillary refill takes less than 2 seconds.   Neurological:      General: No focal deficit present.      Mental Status: He is alert and oriented to person, place, and time.   Psychiatric:         Mood and Affect: Mood normal.         Behavior: Behavior normal.         Thought Content: " Thought content normal.         Judgment: Judgment normal.        Result Review :    Common labs          4/13/2023    11:23 5/30/2023    09:03 11/30/2023    17:02   Common Labs   Glucose  123  107    BUN  17  13    Creatinine  0.93  0.95    Sodium  140  133    Potassium  4.6  4.4    Chloride  103  98    Calcium  9.9  9.1    Total Protein  6.8     Albumin  4.4  4.2    Total Bilirubin  0.4  0.3    Alkaline Phosphatase  53  56    AST (SGOT)  25  36    ALT (SGPT)  41  44    WBC 10.64  10.80  7.72    Hemoglobin 13.4  13.3  13.5    Hematocrit 40.8  38.7  39.3    Platelets 190  180  187    Total Cholesterol  171     Triglycerides  83     HDL Cholesterol  38     LDL Cholesterol   117           Current Outpatient Medications on File Prior to Visit   Medication Sig Dispense Refill    albuterol sulfate  (90 Base) MCG/ACT inhaler Inhale 2 puffs.      azelastine (ASTELIN) 0.1 % nasal spray 2 sprays into the nostril(s) as directed by provider 2 (Two) Times a Day. Use in each nostril as directed 30 mL 2    Breztri Aerosphere 160-9-4.8 MCG/ACT aerosol inhaler       levocetirizine (XYZAL) 5 MG tablet Take 1 tablet by mouth Every Evening. 90 tablet 1    lisinopril (PRINIVIL,ZESTRIL) 20 MG tablet Take 1 tablet by mouth Daily. 90 tablet 3    multivitamin with minerals tablet tablet Take 1 tablet by mouth Daily.      [DISCONTINUED] predniSONE (DELTASONE) 10 MG tablet Take 4 tablets by mouth on days 1-2. Take 3 tablets by mouth on days 3-4. Take 2 tablets by mouth on days 5-8. Take 1 tablet by mouth on days 9-12. 26 tablet 0     No current facility-administered medications on file prior to visit.              Assessment and Plan   Diagnoses and all orders for this visit:    1. Acute cough (Primary)  -     POCT SARS-CoV-2 Antigen MARA + Flu  -     Nirmatrelvir & Ritonavir, 300mg/100mg, (PAXLOVID) 20 x 150 MG & 10 x 100MG tablet therapy pack tablet; Take 3 tablets by mouth 2 (Two) Times a Day.  Dispense: 30 each; Refill: 0  -      predniSONE (DELTASONE) 10 MG tablet; Take 4 tablets by mouth on days 1-2. Take 3 tablets by mouth on days 3-4. Take 2 tablets by mouth on days 5-8. Take 1 tablet by mouth on days 9-12.  Dispense: 26 tablet; Refill: 0  -     promethazine-dextromethorphan (PROMETHAZINE-DM) 6.25-15 MG/5ML syrup; Take 5 mL by mouth 4 (Four) Times a Day As Needed for Cough.  Dispense: 180 mL; Refill: 1    2. COVID  -     Nirmatrelvir & Ritonavir, 300mg/100mg, (PAXLOVID) 20 x 150 MG & 10 x 100MG tablet therapy pack tablet; Take 3 tablets by mouth 2 (Two) Times a Day.  Dispense: 30 each; Refill: 0  -     predniSONE (DELTASONE) 10 MG tablet; Take 4 tablets by mouth on days 1-2. Take 3 tablets by mouth on days 3-4. Take 2 tablets by mouth on days 5-8. Take 1 tablet by mouth on days 9-12.  Dispense: 26 tablet; Refill: 0      Patient Instructions   Start Paxlovid and take as directed. Start prednisone taper and take as directed. Recommend regular Mucinex. Recommend cough syrup at night. Stay hydrated with water.     Isolate at home for 5 days. After 5 days, wear a mask around others for the next 5 days.    If breathing becomes increasingly difficulty, recommend ER.    Follow-up in 2 weeks for medicare wellness.           Follow Up   Return in about 4 weeks (around 1/2/2024) for Medicare Wellness.  Patient was given instructions and counseling regarding his condition or for health maintenance advice. Please see specific information pulled into the AVS if appropriate.

## 2023-12-05 NOTE — PATIENT INSTRUCTIONS
Start Paxlovid and take as directed. Start prednisone taper and take as directed. Recommend regular Mucinex. Recommend cough syrup at night. Stay hydrated with water.     Isolate at home for 5 days. After 5 days, wear a mask around others for the next 5 days.    If breathing becomes increasingly difficulty, recommend ER.    Follow-up in 2 weeks for medicare wellness.

## 2023-12-08 ENCOUNTER — TELEPHONE (OUTPATIENT)
Dept: INTERNAL MEDICINE | Facility: CLINIC | Age: 68
End: 2023-12-08
Payer: MEDICARE

## 2023-12-08 DIAGNOSIS — J06.9 UPPER RESPIRATORY TRACT INFECTION, UNSPECIFIED TYPE: ICD-10-CM

## 2023-12-08 DIAGNOSIS — R05.1 ACUTE COUGH: ICD-10-CM

## 2023-12-08 DIAGNOSIS — R05.1 ACUTE COUGH: Primary | ICD-10-CM

## 2023-12-08 RX ORDER — ACETAMINOPHEN AND CODEINE PHOSPHATE 120; 12 MG/5ML; MG/5ML
5 SOLUTION ORAL NIGHTLY PRN
Qty: 90 ML | Refills: 0 | Status: SHIPPED | OUTPATIENT
Start: 2023-12-08 | End: 2023-12-08 | Stop reason: SDUPTHER

## 2023-12-08 RX ORDER — AZITHROMYCIN 250 MG/1
TABLET, FILM COATED ORAL
Qty: 6 TABLET | Refills: 0 | Status: SHIPPED | OUTPATIENT
Start: 2023-12-08 | End: 2023-12-08 | Stop reason: SDUPTHER

## 2023-12-08 RX ORDER — ACETAMINOPHEN AND CODEINE PHOSPHATE 120; 12 MG/5ML; MG/5ML
5 SOLUTION ORAL NIGHTLY PRN
Qty: 90 ML | Refills: 0 | Status: SHIPPED | OUTPATIENT
Start: 2023-12-08

## 2023-12-08 RX ORDER — AZITHROMYCIN 250 MG/1
TABLET, FILM COATED ORAL
Qty: 6 TABLET | Refills: 0 | Status: SHIPPED | OUTPATIENT
Start: 2023-12-08

## 2023-12-08 NOTE — TELEPHONE ENCOUNTER
Caller: Dominique Kaur    Relationship: Emergency Contact    Best call back number: [phone]     What medication are you requesting: acetaminophen-codeine (TYLENOL with CODEINE) 120-12 MG/5ML solution AND azithromycin (Zithromax Z-Derek) 250 MG tablet     Have you had these symptoms before:    [x] Yes  [] No    Have you been treated for these symptoms before:   [x] Yes  [] No    If a prescription is needed, what is your preferred pharmacy and phone number: [id number] [place], [place] - [address] [facility] [address] [facility] [phone] [facility] [phone]      Additional notes: PATIENT'S WIFE STATES THAT THE PHARMACY THE MEDICATION WAS ORIGINALLY SENT TO  NO LONGER CARRIED THE COUGH MEDICATION, AND HAD NOT RECEIVED THE PRESCRIPTION FOR THE Z-PACK. REQUESTS TO HAVE PRESCRIPTIONS SENT TO [facility] INSTEAD

## 2023-12-08 NOTE — TELEPHONE ENCOUNTER
Caller: Dominique Kaur    Relationship: Emergency Contact    Best call back number: [phone]    Who are you requesting to speak with (clinical staff, provider,  specific staff member): CHRISTIAN MERRITT OR MA    What was the call regarding: PATIENT'S WIFE STATES THAT HE WAS DIAGNOSED WITH COVID EARLIER THIS WEEK, AND CHRISTIAN MERRITT HAD PRESCRIBED A COUGH MEDICATION. HIS SYMPTOMS HAVE NOT IMPROVED, AND HE IS HAVING TROUBLE SLEEPING DUE TO THE COUGH. REQUESTS A CALL BACK TO DISCUSS FURTHER TREATMENT OPTIONS

## 2023-12-15 ENCOUNTER — TELEPHONE (OUTPATIENT)
Dept: INTERNAL MEDICINE | Facility: CLINIC | Age: 68
End: 2023-12-15
Payer: MEDICARE

## 2023-12-15 NOTE — TELEPHONE ENCOUNTER
Caller: Gonzales Kaur Sr.    Relationship: Self    Best call back number: [phone]    What is the best time to reach you: ANYTIME     Who are you requesting to speak with (clinical staff, provider,  specific staff member): CLINICAL STAFF     What was the call regarding: PATIENT STATES THAT THE ZPAK HE WAS PRESCRIBED HAS NOT HELPED, AND HE IS STILL EXPERIENCING  A COUGH, CONGESTION AND SINUS DRAINAGE.     PATIENT WOULD LIKE TO KNOW WHAT HIS NEXT STEPS AND FURTHER TREATMENT OPTIONS ARE.

## 2023-12-18 ENCOUNTER — TELEPHONE (OUTPATIENT)
Dept: INTERNAL MEDICINE | Facility: CLINIC | Age: 68
End: 2023-12-18
Payer: MEDICARE

## 2023-12-18 NOTE — TELEPHONE ENCOUNTER
Patient called again this morning and I told him you was out of the office on Friday but he stated he is better but not all the way and would like a stronger antibiotic called in.  Good call back number is [phone].  Please advise.

## 2023-12-18 NOTE — TELEPHONE ENCOUNTER
No further antibiotic recommended with the improving symptoms. It takes time for remnants of infection to get cleared out. Stay hydrated with water. Continue inhaler as needed.

## 2024-01-04 ENCOUNTER — OFFICE VISIT (OUTPATIENT)
Dept: INTERNAL MEDICINE | Facility: CLINIC | Age: 69
End: 2024-01-04
Payer: MEDICARE

## 2024-01-04 VITALS
DIASTOLIC BLOOD PRESSURE: 78 MMHG | BODY MASS INDEX: 29.82 KG/M2 | HEART RATE: 68 BPM | HEIGHT: 65 IN | OXYGEN SATURATION: 96 % | SYSTOLIC BLOOD PRESSURE: 138 MMHG | WEIGHT: 179 LBS

## 2024-01-04 DIAGNOSIS — J44.1 COPD WITH EXACERBATION: ICD-10-CM

## 2024-01-04 DIAGNOSIS — E78.5 DYSLIPIDEMIA: Primary | ICD-10-CM

## 2024-01-04 DIAGNOSIS — I10 ESSENTIAL HYPERTENSION: ICD-10-CM

## 2024-01-04 PROCEDURE — 3075F SYST BP GE 130 - 139MM HG: CPT

## 2024-01-04 PROCEDURE — 1160F RVW MEDS BY RX/DR IN RCRD: CPT

## 2024-01-04 PROCEDURE — 3078F DIAST BP <80 MM HG: CPT

## 2024-01-04 PROCEDURE — 1159F MED LIST DOCD IN RCRD: CPT

## 2024-01-04 PROCEDURE — G0439 PPPS, SUBSEQ VISIT: HCPCS

## 2024-01-04 PROCEDURE — 1170F FXNL STATUS ASSESSED: CPT

## 2024-01-04 RX ORDER — LEVOCETIRIZINE DIHYDROCHLORIDE 5 MG/1
5 TABLET, FILM COATED ORAL EVERY EVENING
Qty: 90 TABLET | Refills: 1 | Status: SHIPPED | OUTPATIENT
Start: 2024-01-04

## 2024-01-04 RX ORDER — LISINOPRIL 20 MG/1
20 TABLET ORAL DAILY
Qty: 90 TABLET | Refills: 3 | Status: SHIPPED | OUTPATIENT
Start: 2024-01-04

## 2024-01-04 RX ORDER — AZELASTINE 1 MG/ML
2 SPRAY, METERED NASAL 2 TIMES DAILY
Qty: 30 ML | Refills: 2 | Status: SHIPPED | OUTPATIENT
Start: 2024-01-04

## 2024-01-04 NOTE — PATIENT INSTRUCTIONS
Continue medications as prescribed. Increase activity as tolerated. Stay hydrated with water. Improve diet. Lab in the future. Follow-up in 6 months for recheck.     Medicare Wellness  Personal Prevention Plan of Service     Date of Office Visit:    Encounter Provider:  SHAJI Lockhart  Place of Service:  [facility]  Patient Name: Gonzales Kaur Sr.  :  1955    As part of the Medicare Wellness portion of your visit today, we are providing you with this personalized preventive plan of services (PPPS). This plan is based upon recommendations of the [place] Preventive Services Task Force (USPSTF) and the Advisory Committee on Immunization Practices (ACIP).    This lists the preventive care services that should be considered, and provides dates of when you are due. Items listed as completed are up-to-date and do not require any further intervention.    Health Maintenance   Topic Date Due    Pneumococcal Vaccine 65+ (2 of 2 - PCV) 2022    COVID-19 Vaccine (4 - -24 season) 2023    ANNUAL WELLNESS VISIT  2023    ZOSTER VACCINE (2 of 2) 2024    BMI FOLLOWUP  2024    LUNG CANCER SCREENING  2024    COLORECTAL CANCER SCREENING  2025    TDAP/TD VACCINES (2 - Td or Tdap) 2032    HEPATITIS C SCREENING  Completed    INFLUENZA VACCINE  Completed    AAA SCREEN (ONE-TIME)  Completed       Orders Placed This Encounter   Procedures    Lipid Panel With / Chol / HDL Ratio     Standing Status:   Future     Standing Expiration Date:   2025     Order Specific Question:   Release to patient     Answer:   Routine Release [[id number]]       No follow-ups on file.

## 2024-01-04 NOTE — PROGRESS NOTES
The ABCs of the Annual Wellness Visit  Subsequent Medicare Wellness Visit    Subjective    Gonzales Kaur Sr. is a 68 y.o. male who presents for a Subsequent Medicare Wellness Visit.    The following portions of the patient's history were reviewed and   updated as appropriate: allergies, current medications, past family history, past medical history, past social history, past surgical history, and problem list.    Compared to one year ago, the patient feels his physical   health is the same.    Compared to one year ago, the patient feels his mental   health is the same.    Recent Hospitalizations:  He was not admitted to the hospital during the last year.       Current Medical Providers:  Patient Care Team:  Christiano Bartholomew APRN as PCP - General (Nurse Practitioner)  Patience Fournier APRN (Family Medicine)  Kenny Wynn Jr., MD as Consulting Physician (Urology)  Adams Buenrostro MD as Consulting Physician (Pulmonary Disease)  Donya Lomas APRN as Referring Physician (Nurse Practitioner)  Quinton Laureano MD as Consulting Physician (Hematology and Oncology)    Outpatient Medications Prior to Visit   Medication Sig Dispense Refill    albuterol sulfate  (90 Base) MCG/ACT inhaler Inhale 2 puffs.      Breztri Aerosphere 160-9-4.8 MCG/ACT aerosol inhaler       multivitamin with minerals tablet tablet Take 1 tablet by mouth Daily.      azelastine (ASTELIN) 0.1 % nasal spray 2 sprays into the nostril(s) as directed by provider 2 (Two) Times a Day. Use in each nostril as directed 30 mL 2    levocetirizine (XYZAL) 5 MG tablet Take 1 tablet by mouth Every Evening. 90 tablet 1    lisinopril (PRINIVIL,ZESTRIL) 20 MG tablet Take 1 tablet by mouth Daily. 90 tablet 3    acetaminophen-codeine (TYLENOL with CODEINE) 120-12 MG/5ML solution Take 5 mL by mouth At Night As Needed (cough). (Patient not taking: Reported on 1/4/2024) 90 mL 0    azithromycin (Zithromax Z-Derek) 250 MG tablet Take 2 tablets by mouth on day 1,  then 1 tablet daily on days 2-5 (Patient not taking: Reported on 1/4/2024) 6 tablet 0    Nirmatrelvir & Ritonavir, 300mg/100mg, (PAXLOVID) 20 x 150 MG & 10 x 100MG tablet therapy pack tablet Take 3 tablets by mouth 2 (Two) Times a Day. (Patient not taking: Reported on 1/4/2024) 30 each 0    predniSONE (DELTASONE) 10 MG tablet Take 4 tablets by mouth on days 1-2. Take 3 tablets by mouth on days 3-4. Take 2 tablets by mouth on days 5-8. Take 1 tablet by mouth on days 9-12. (Patient not taking: Reported on 1/4/2024) 26 tablet 0    promethazine-dextromethorphan (PROMETHAZINE-DM) 6.25-15 MG/5ML syrup Take 5 mL by mouth 4 (Four) Times a Day As Needed for Cough. (Patient not taking: Reported on 1/4/2024) 180 mL 1     No facility-administered medications prior to visit.       No opioid medication identified on active medication list. I have reviewed chart for other potential  high risk medication/s and harmful drug interactions in the elderly.        Aspirin is not on active medication list.  Aspirin use is not indicated based on review of current medical condition/s. Risk of harm outweighs potential benefits.  .    Patient Active Problem List   Diagnosis    COPD (chronic obstructive pulmonary disease) with emphysema    Benign non-nodular prostatic hyperplasia with lower urinary tract symptoms    Chronic fatigue    Dyspnea on exertion    MICHEAL (obstructive sleep apnea)    Essential hypertension    History of prostate cancer    Seasonal allergies    Healthcare maintenance    Cellulitis of right leg    Venous insufficiency of leg     Advance Care Planning   Advance Care Planning     Advance Directive is not on file.  ACP discussion was declined by the patient. Patient does not have an advance directive, declines further assistance.     Objective    Vitals:    01/04/24 1253   BP: 138/78   BP Location: Right arm   Patient Position: Sitting   Cuff Size: Adult   Pulse: 68   SpO2: 96%   Weight: 81.2 kg (179 lb)   Height: 165.1 cm  "(65\")     Estimated body mass index is 29.79 kg/m² as calculated from the following:    Height as of this encounter: 165.1 cm (65\").    Weight as of this encounter: 81.2 kg (179 lb).           Does the patient have evidence of cognitive impairment? No          HEALTH RISK ASSESSMENT    Smoking Status:  Social History     Tobacco Use   Smoking Status Former    Packs/day: 1.50    Years: 35.00    Additional pack years: 0.00    Total pack years: 52.50    Types: Cigarettes    Start date: 1976    Quit date: 2017    Years since quittin.0    Passive exposure: Past   Smokeless Tobacco Never   Tobacco Comments    Stopped use .     Alcohol Consumption:  Social History     Substance and Sexual Activity   Alcohol Use No     Fall Risk Screen:    STEADI Fall Risk Assessment was completed, and patient is at LOW risk for falls.Assessment completed on:2024    Depression Screenin/4/2024    12:57 PM   PHQ-2/PHQ-9 Depression Screening   Little Interest or Pleasure in Doing Things 0-->not at all   Feeling Down, Depressed or Hopeless 0-->not at all   PHQ-9: Brief Depression Severity Measure Score 0       Health Habits and Functional and Cognitive Screenin/4/2024    12:56 PM   Functional & Cognitive Status   Do you have difficulty preparing food and eating? No   Do you have difficulty bathing yourself, getting dressed or grooming yourself? No   Do you have difficulty using the toilet? No   Do you have difficulty moving around from place to place? No   Do you have trouble with steps or getting out of a bed or a chair? No   Current Diet Well Balanced Diet   Eye Exam Up to date   Exercise (times per week) 0 times per week   Do you need help using the phone?  No   Are you deaf or do you have serious difficulty hearing?  No   Do you need help to go to places out of walking distance? No   Do you need help shopping? No   Do you need help preparing meals?  No   Do you need help with housework?  No   Do you need " help with laundry? No   Do you need help taking your medications? No   Do you need help managing money? No   Do you ever drive or ride in a car without wearing a seat belt? No   Have you felt unusual stress, anger or loneliness in the last month? No   Who do you live with? Spouse   If you need help, do you have trouble finding someone available to you? No   Have you been bothered in the last four weeks by sexual problems? No   Do you have difficulty concentrating, remembering or making decisions? No       Age-appropriate Screening Schedule:  Refer to the list below for future screening recommendations based on patient's age, sex and/or medical conditions. Orders for these recommended tests are listed in the plan section. The patient has been provided with a written plan.    Health Maintenance   Topic Date Due    Pneumococcal Vaccine 65+ (2 of 2 - PCV) 11/08/2022    COVID-19 Vaccine (4 - 2023-24 season) 09/01/2023    ZOSTER VACCINE (2 of 2) 01/19/2024    BMI FOLLOWUP  05/30/2024    LUNG CANCER SCREENING  09/07/2024    ANNUAL WELLNESS VISIT  01/04/2025    COLORECTAL CANCER SCREENING  12/18/2025    TDAP/TD VACCINES (2 - Td or Tdap) 11/29/2032    HEPATITIS C SCREENING  Completed    INFLUENZA VACCINE  Completed    AAA SCREEN (ONE-TIME)  Completed                  CMS Preventative Services Quick Reference  Risk Factors Identified During Encounter  None Identified  The above risks/problems have been discussed with the patient.  Pertinent information has been shared with the patient in the After Visit Summary.  An After Visit Summary and PPPS were made available to the patient.    Follow Up:   Next Medicare Wellness visit to be scheduled in 1 year.       Additional E&M Note during same encounter follows:  Patient has multiple medical problems which are significant and separately identifiable that require additional work above and beyond the Medicare Wellness Visit.      Chief Complaint  Medicare  "Wellness-subsequent    Subjective        68-year-old male presenting for hyperlipidemia follow-up and Medicare wellness.  Has recovered well from recent COVID.  Still has an occasional cough and occasional headache.  Has been taking a cough drop as needed.  Umbilical hernia still present and unchanged.  Discussed when repair would be recommended.      Gonzales Kaur Sr. is also being seen today for hyperlipidemia and Medicare wellness.    Review of Systems   All other systems reviewed and are negative.      Objective   Vital Signs:  /78 (BP Location: Right arm, Patient Position: Sitting, Cuff Size: Adult)   Pulse 68   Ht 165.1 cm (65\")   Wt 81.2 kg (179 lb)   SpO2 96%   BMI 29.79 kg/m²     Physical Exam  Vitals reviewed.   Constitutional:       Appearance: Normal appearance.   Cardiovascular:      Rate and Rhythm: Normal rate and regular rhythm.      Pulses: Normal pulses.      Heart sounds: Normal heart sounds.   Pulmonary:      Effort: Pulmonary effort is normal.      Breath sounds: Normal breath sounds.   Musculoskeletal:         General: Normal range of motion.      Cervical back: Normal range of motion.   Skin:     General: Skin is warm and dry.      Capillary Refill: Capillary refill takes less than 2 seconds.   Neurological:      General: No focal deficit present.      Mental Status: He is alert and oriented to person, place, and time.   Psychiatric:         Mood and Affect: Mood normal.         Behavior: Behavior normal.         Thought Content: Thought content normal.         Judgment: Judgment normal.            Common labs          4/13/2023    11:23 5/30/2023    09:03 11/30/2023    17:02   Common Labs   Glucose  123  107    BUN  17  13    Creatinine  0.93  0.95    Sodium  140  133    Potassium  4.6  4.4    Chloride  103  98    Calcium  9.9  9.1    Total Protein  6.8     Albumin  4.4  4.2    Total Bilirubin  0.4  0.3    Alkaline Phosphatase  53  56    AST (SGOT)  25  36    ALT (SGPT)  41  44  "   WBC 10.64  10.80  7.72    Hemoglobin 13.4  13.3  13.5    Hematocrit 40.8  38.7  39.3    Platelets 190  180  187    Total Cholesterol  171     Triglycerides  83     HDL Cholesterol  38     LDL Cholesterol   117             Current Outpatient Medications on File Prior to Visit   Medication Sig Dispense Refill    albuterol sulfate  (90 Base) MCG/ACT inhaler Inhale 2 puffs.      Breztri Aerosphere 160-9-4.8 MCG/ACT aerosol inhaler       multivitamin with minerals tablet tablet Take 1 tablet by mouth Daily.      [DISCONTINUED] azelastine (ASTELIN) 0.1 % nasal spray 2 sprays into the nostril(s) as directed by provider 2 (Two) Times a Day. Use in each nostril as directed 30 mL 2    [DISCONTINUED] levocetirizine (XYZAL) 5 MG tablet Take 1 tablet by mouth Every Evening. 90 tablet 1    [DISCONTINUED] lisinopril (PRINIVIL,ZESTRIL) 20 MG tablet Take 1 tablet by mouth Daily. 90 tablet 3    [DISCONTINUED] acetaminophen-codeine (TYLENOL with CODEINE) 120-12 MG/5ML solution Take 5 mL by mouth At Night As Needed (cough). (Patient not taking: Reported on 1/4/2024) 90 mL 0    [DISCONTINUED] azithromycin (Zithromax Z-Derek) 250 MG tablet Take 2 tablets by mouth on day 1, then 1 tablet daily on days 2-5 (Patient not taking: Reported on 1/4/2024) 6 tablet 0    [DISCONTINUED] Nirmatrelvir & Ritonavir, 300mg/100mg, (PAXLOVID) 20 x 150 MG & 10 x 100MG tablet therapy pack tablet Take 3 tablets by mouth 2 (Two) Times a Day. (Patient not taking: Reported on 1/4/2024) 30 each 0    [DISCONTINUED] predniSONE (DELTASONE) 10 MG tablet Take 4 tablets by mouth on days 1-2. Take 3 tablets by mouth on days 3-4. Take 2 tablets by mouth on days 5-8. Take 1 tablet by mouth on days 9-12. (Patient not taking: Reported on 1/4/2024) 26 tablet 0    [DISCONTINUED] promethazine-dextromethorphan (PROMETHAZINE-DM) 6.25-15 MG/5ML syrup Take 5 mL by mouth 4 (Four) Times a Day As Needed for Cough. (Patient not taking: Reported on 1/4/2024) 180 mL 1     No  current facility-administered medications on file prior to visit.          Assessment and Plan   Diagnoses and all orders for this visit:    1. Dyslipidemia (Primary)  -     Lipid Panel With / Chol / HDL Ratio; Future    2. Essential hypertension  -     lisinopril (PRINIVIL,ZESTRIL) 20 MG tablet; Take 1 tablet by mouth Daily.  Dispense: 90 tablet; Refill: 3    3. COPD with exacerbation  -     azelastine (ASTELIN) 0.1 % nasal spray; 2 sprays into the nostril(s) as directed by provider 2 (Two) Times a Day. Use in each nostril as directed  Dispense: 30 mL; Refill: 2    Other orders  -     levocetirizine (XYZAL) 5 MG tablet; Take 1 tablet by mouth Every Evening.  Dispense: 90 tablet; Refill: 1      Patient Instructions   Continue medications as prescribed. Increase activity as tolerated. Stay hydrated with water. Improve diet. Lab in the future. Follow-up in 6 months for recheck.     Medicare Wellness  Personal Prevention Plan of Service     Date of Office Visit:    Encounter Provider:  SHAJI Lockhart  Place of Service:  [facility]  Patient Name: Gonzales Kaur Sr.  :  1955    As part of the Medicare Wellness portion of your visit today, we are providing you with this personalized preventive plan of services (PPPS). This plan is based upon recommendations of the [place] Preventive Services Task Force (USPSTF) and the Advisory Committee on Immunization Practices (ACIP).    This lists the preventive care services that should be considered, and provides dates of when you are due. Items listed as completed are up-to-date and do not require any further intervention.    Health Maintenance   Topic Date Due    Pneumococcal Vaccine 65+ (2 of 2 - PCV) 2022    COVID-19 Vaccine (4 - -24 season) 2023    ANNUAL WELLNESS VISIT  2023    ZOSTER VACCINE (2 of 2) 2024    BMI FOLLOWUP  2024    LUNG CANCER SCREENING  2024    COLORECTAL CANCER SCREENING   12/18/2025    TDAP/TD VACCINES (2 - Td or Tdap) 11/29/2032    HEPATITIS C SCREENING  Completed    INFLUENZA VACCINE  Completed    AAA SCREEN (ONE-TIME)  Completed       Orders Placed This Encounter   Procedures    Lipid Panel With / Chol / HDL Ratio     Standing Status:   Future     Standing Expiration Date:   1/4/2025     Order Specific Question:   Release to patient     Answer:   Routine Release [[id number]]       No follow-ups on file.                 Follow Up   Return in about 6 months (around 7/4/2024) for Recheck.  Patient was given instructions and counseling regarding his condition or for health maintenance advice. Please see specific information pulled into the AVS if appropriate.

## 2024-02-12 ENCOUNTER — OFFICE VISIT (OUTPATIENT)
Dept: INTERNAL MEDICINE | Facility: CLINIC | Age: 69
End: 2024-02-12
Payer: MEDICARE

## 2024-02-12 VITALS
WEIGHT: 177 LBS | HEART RATE: 97 BPM | DIASTOLIC BLOOD PRESSURE: 58 MMHG | HEIGHT: 65 IN | OXYGEN SATURATION: 91 % | SYSTOLIC BLOOD PRESSURE: 112 MMHG | BODY MASS INDEX: 29.49 KG/M2 | TEMPERATURE: 99.5 F

## 2024-02-12 DIAGNOSIS — J44.1 COPD WITH EXACERBATION: ICD-10-CM

## 2024-02-12 DIAGNOSIS — R05.1 ACUTE COUGH: Primary | ICD-10-CM

## 2024-02-12 LAB
EXPIRATION DATE: NORMAL
EXPIRATION DATE: NORMAL
FLUAV AG UPPER RESP QL IA.RAPID: NOT DETECTED
FLUBV AG UPPER RESP QL IA.RAPID: NOT DETECTED
INTERNAL CONTROL: NORMAL
INTERNAL CONTROL: NORMAL
Lab: NORMAL
Lab: NORMAL
S PYO AG THROAT QL: NEGATIVE
SARS-COV-2 AG UPPER RESP QL IA.RAPID: NOT DETECTED

## 2024-02-12 PROCEDURE — 99213 OFFICE O/P EST LOW 20 MIN: CPT

## 2024-02-12 PROCEDURE — 1159F MED LIST DOCD IN RCRD: CPT

## 2024-02-12 PROCEDURE — 87880 STREP A ASSAY W/OPTIC: CPT

## 2024-02-12 PROCEDURE — 87428 SARSCOV & INF VIR A&B AG IA: CPT

## 2024-02-12 PROCEDURE — 1160F RVW MEDS BY RX/DR IN RCRD: CPT

## 2024-02-12 PROCEDURE — 3078F DIAST BP <80 MM HG: CPT

## 2024-02-12 PROCEDURE — 3074F SYST BP LT 130 MM HG: CPT

## 2024-02-12 RX ORDER — AZELASTINE 1 MG/ML
2 SPRAY, METERED NASAL 2 TIMES DAILY
Qty: 30 ML | Refills: 2 | Status: SHIPPED | OUTPATIENT
Start: 2024-02-12

## 2024-02-14 ENCOUNTER — TELEPHONE (OUTPATIENT)
Dept: INTERNAL MEDICINE | Facility: CLINIC | Age: 69
End: 2024-02-14
Payer: MEDICARE

## 2024-02-14 DIAGNOSIS — J06.9 UPPER RESPIRATORY TRACT INFECTION, UNSPECIFIED TYPE: Primary | ICD-10-CM

## 2024-02-14 RX ORDER — AZITHROMYCIN 250 MG/1
TABLET, FILM COATED ORAL
Qty: 6 TABLET | Refills: 0 | Status: SHIPPED | OUTPATIENT
Start: 2024-02-14

## 2024-02-14 RX ORDER — METHYLPREDNISOLONE 4 MG/1
TABLET ORAL
Qty: 21 TABLET | Refills: 0 | Status: SHIPPED | OUTPATIENT
Start: 2024-02-14

## 2024-02-14 NOTE — TELEPHONE ENCOUNTER
Caller: Gonzales Kaur Sr.    Relationship: Self    Best call back number: [phone]     Who are you requesting to speak with (clinical staff, provider,  specific staff member): CHRISTIAN MERRITT     What was the call regarding: PATIENT STATES NOT FEELING ANY BETTER STILL HAVING, CHILLS, SORE THROAT, EAR PAIN, COUGH   WAS TOLD TO CALL TODAY IF NOT ANY BETTER FOR SOME MEDICATION TO BE CALLED OUT TO PHARMACY FOR HIM    PHARMACY: [facility] [address] [place], [place] - [address] [facility] [phone] [facility] [phone]  [phone]

## 2024-02-26 ENCOUNTER — OFFICE VISIT (OUTPATIENT)
Dept: INTERNAL MEDICINE | Facility: CLINIC | Age: 69
End: 2024-02-26
Payer: MEDICARE

## 2024-02-26 VITALS
DIASTOLIC BLOOD PRESSURE: 80 MMHG | OXYGEN SATURATION: 96 % | WEIGHT: 176 LBS | HEIGHT: 65 IN | SYSTOLIC BLOOD PRESSURE: 132 MMHG | HEART RATE: 101 BPM | BODY MASS INDEX: 29.32 KG/M2

## 2024-02-26 DIAGNOSIS — R05.8 ALLERGIC COUGH: Primary | ICD-10-CM

## 2024-02-26 PROCEDURE — 99213 OFFICE O/P EST LOW 20 MIN: CPT

## 2024-02-26 PROCEDURE — 3079F DIAST BP 80-89 MM HG: CPT

## 2024-02-26 PROCEDURE — 1160F RVW MEDS BY RX/DR IN RCRD: CPT

## 2024-02-26 PROCEDURE — 3075F SYST BP GE 130 - 139MM HG: CPT

## 2024-02-26 PROCEDURE — 1159F MED LIST DOCD IN RCRD: CPT

## 2024-02-26 RX ORDER — LORATADINE 10 MG/1
10 TABLET ORAL DAILY
Qty: 30 TABLET | Refills: 2 | Status: SHIPPED | OUTPATIENT
Start: 2024-02-26

## 2024-02-26 RX ORDER — FLUTICASONE PROPIONATE 50 MCG
2 SPRAY, SUSPENSION (ML) NASAL DAILY
Qty: 15.8 ML | Refills: 2 | Status: SHIPPED | OUTPATIENT
Start: 2024-02-26

## 2024-02-26 NOTE — PROGRESS NOTES
"Chief Complaint  Cough and Nasal Congestion    Subjective        Gonzales PICKENS Natasha Robins presents to [facility]  History of Present Illness  68-year-old male presenting with cough and postnasal drip.  Recently completed azithromycin and Medrol Dosepak.  He is taking levocetirizine and azelastine for allergies.  Has been taking medication for years.  Taking nebulizer treatments and Breztri as prescribed.  Denies fever, difficulty breathing, shortness of breath, chest pain, ear pain, sinus pressure.  Cough        Objective   Vital Signs:  /80 (BP Location: Right arm, Patient Position: Sitting, Cuff Size: Adult)   Pulse 101   Ht 165.1 cm (65\")   Wt 79.8 kg (176 lb)   SpO2 96%   BMI 29.29 kg/m²   Estimated body mass index is 29.29 kg/m² as calculated from the following:    Height as of this encounter: 165.1 cm (65\").    Weight as of this encounter: 79.8 kg (176 lb).               Physical Exam  Vitals reviewed.   Constitutional:       Appearance: Normal appearance.   Cardiovascular:      Rate and Rhythm: Normal rate and regular rhythm.      Pulses: Normal pulses.      Heart sounds: Normal heart sounds.   Pulmonary:      Effort: Pulmonary effort is normal.      Breath sounds: Normal breath sounds.   Musculoskeletal:         General: Normal range of motion.      Cervical back: Normal range of motion.   Skin:     General: Skin is warm and dry.      Capillary Refill: Capillary refill takes less than 2 seconds.   Neurological:      General: No focal deficit present.      Mental Status: He is alert and oriented to person, place, and time.   Psychiatric:         Mood and Affect: Mood normal.         Behavior: Behavior normal.         Thought Content: Thought content normal.         Judgment: Judgment normal.        Result Review :      Common labs          5/30/2023    09:03 11/30/2023    17:02 1/9/2024    10:04   Common Labs   Glucose 123  107     BUN 17  13     Creatinine 0.93  0.95   "   Sodium 140  133     Potassium 4.6  4.4     Chloride 103  98     Calcium 9.9  9.1     Total Protein 6.8      Albumin 4.4  4.2     Total Bilirubin 0.4  0.3     Alkaline Phosphatase 53  56     AST (SGOT) 25  36     ALT (SGPT) 41  44     WBC 10.80  7.72     Hemoglobin 13.3  13.5     Hematocrit 38.7  39.3     Platelets 180  187     Total Cholesterol 171   170    Triglycerides 83   68    HDL Cholesterol 38   39    LDL Cholesterol  117   118          Current Outpatient Medications on File Prior to Visit   Medication Sig Dispense Refill    albuterol sulfate  (90 Base) MCG/ACT inhaler Inhale 2 puffs.      azelastine (ASTELIN) 0.1 % nasal spray 2 sprays into the nostril(s) as directed by provider 2 (Two) Times a Day. Use in each nostril as directed 30 mL 2    Breztri Aerosphere 160-9-4.8 MCG/ACT aerosol inhaler       lisinopril (PRINIVIL,ZESTRIL) 20 MG tablet Take 1 tablet by mouth Daily. 90 tablet 3    multivitamin with minerals tablet tablet Take 1 tablet by mouth Daily.      [DISCONTINUED] levocetirizine (XYZAL) 5 MG tablet Take 1 tablet by mouth Every Evening. 90 tablet 1    [DISCONTINUED] azithromycin (Zithromax Z-Derek) 250 MG tablet Take 2 tablets by mouth on day 1, then 1 tablet daily on days 2-5 (Patient not taking: Reported on 2/26/2024) 6 tablet 0    [DISCONTINUED] methylPREDNISolone (MEDROL) 4 MG dose pack Take as directed on package instructions. (Patient not taking: Reported on 2/26/2024) 21 tablet 0     No current facility-administered medications on file prior to visit.              Assessment and Plan     Diagnoses and all orders for this visit:    1. Allergic cough (Primary)  -     loratadine (CLARITIN) 10 MG tablet; Take 1 tablet by mouth Daily.  Dispense: 30 tablet; Refill: 2  -     fluticasone (FLONASE) 50 MCG/ACT nasal spray; 2 sprays into the nostril(s) as directed by provider Daily.  Dispense: 15.8 mL; Refill: 2      Patient Instructions   Stop levocetirizine and azelastine. Recommend loratadine  (Claritin) or fexofenadine (Allegra). Recommend cough drops as needed. Stay hydrated with water. Follow-up as needed.            Follow Up     No follow-ups on file.  Patient was given instructions and counseling regarding his condition or for health maintenance advice. Please see specific information pulled into the AVS if appropriate.

## 2024-02-26 NOTE — PATIENT INSTRUCTIONS
Stop levocetirizine and azelastine. Recommend loratadine (Claritin) or fexofenadine (Allegra). Recommend cough drops as needed. Stay hydrated with water. Follow-up as needed.

## 2024-03-15 ENCOUNTER — TRANSCRIBE ORDERS (OUTPATIENT)
Dept: LAB | Facility: HOSPITAL | Age: 69
End: 2024-03-15
Payer: MEDICARE

## 2024-03-15 ENCOUNTER — TRANSCRIBE ORDERS (OUTPATIENT)
Dept: CARDIOLOGY | Facility: HOSPITAL | Age: 69
End: 2024-03-15
Payer: MEDICARE

## 2024-03-15 ENCOUNTER — HOSPITAL ENCOUNTER (OUTPATIENT)
Dept: CARDIOLOGY | Facility: HOSPITAL | Age: 69
Discharge: HOME | End: 2024-03-15
Payer: MEDICARE

## 2024-03-15 ENCOUNTER — LAB (OUTPATIENT)
Dept: LAB | Facility: HOSPITAL | Age: 69
End: 2024-03-15
Payer: MEDICARE

## 2024-03-15 DIAGNOSIS — I10 ESSENTIAL HYPERTENSION, MALIGNANT: Primary | ICD-10-CM

## 2024-03-15 DIAGNOSIS — C61 MALIGNANT NEOPLASM OF PROSTATE: ICD-10-CM

## 2024-03-15 DIAGNOSIS — Z01.811 PRE-OP CHEST EXAM: Primary | ICD-10-CM

## 2024-03-15 DIAGNOSIS — I10 ESSENTIAL HYPERTENSION, MALIGNANT: ICD-10-CM

## 2024-03-15 LAB
ANION GAP SERPL CALCULATED.3IONS-SCNC: 8 MMOL/L (ref 5–15)
BASOPHILS # BLD AUTO: 0.1 10*3/MM3 (ref 0–0.2)
BASOPHILS NFR BLD AUTO: 0.7 % (ref 0–1.5)
BUN SERPL-MCNC: 18 MG/DL (ref 8–23)
BUN/CREAT SERPL: 19.6 (ref 7–25)
CALCIUM SPEC-SCNC: 9.1 MG/DL (ref 8.6–10.5)
CHLORIDE SERPL-SCNC: 99 MMOL/L (ref 98–107)
CO2 SERPL-SCNC: 29 MMOL/L (ref 22–29)
CREAT SERPL-MCNC: 0.92 MG/DL (ref 0.76–1.27)
DEPRECATED RDW RBC AUTO: 46.7 FL (ref 37–54)
EGFRCR SERPLBLD CKD-EPI 2021: 91.2 ML/MIN/1.73
EOSINOPHIL # BLD AUTO: 0.11 10*3/MM3 (ref 0–0.4)
EOSINOPHIL NFR BLD AUTO: 0.7 % (ref 0.3–6.2)
ERYTHROCYTE [DISTWIDTH] IN BLOOD BY AUTOMATED COUNT: 13 % (ref 12.3–15.4)
GLUCOSE SERPL-MCNC: 149 MG/DL (ref 65–99)
HCT VFR BLD AUTO: 37.8 % (ref 37.5–51)
HGB BLD-MCNC: 12.6 G/DL (ref 13–17.7)
IMM GRANULOCYTES # BLD AUTO: 0.18 10*3/MM3 (ref 0–0.05)
IMM GRANULOCYTES NFR BLD AUTO: 1.2 % (ref 0–0.5)
LYMPHOCYTES # BLD AUTO: 1.3 10*3/MM3 (ref 0.7–3.1)
LYMPHOCYTES NFR BLD AUTO: 8.7 % (ref 19.6–45.3)
MCH RBC QN AUTO: 32.8 PG (ref 26.6–33)
MCHC RBC AUTO-ENTMCNC: 33.3 G/DL (ref 31.5–35.7)
MCV RBC AUTO: 98.4 FL (ref 79–97)
MONOCYTES # BLD AUTO: 1.59 10*3/MM3 (ref 0.1–0.9)
MONOCYTES NFR BLD AUTO: 10.6 % (ref 5–12)
NEUTROPHILS NFR BLD AUTO: 11.74 10*3/MM3 (ref 1.7–7)
NEUTROPHILS NFR BLD AUTO: 78.1 % (ref 42.7–76)
NRBC BLD AUTO-RTO: 0 /100 WBC (ref 0–0.2)
PLATELET # BLD AUTO: 255 10*3/MM3 (ref 140–450)
PMV BLD AUTO: 10.8 FL (ref 6–12)
POTASSIUM SERPL-SCNC: 4.6 MMOL/L (ref 3.5–5.2)
QT INTERVAL: 371 MS
QTC INTERVAL: 447 MS
RBC # BLD AUTO: 3.84 10*6/MM3 (ref 4.14–5.8)
SODIUM SERPL-SCNC: 136 MMOL/L (ref 136–145)
WBC NRBC COR # BLD AUTO: 15.02 10*3/MM3 (ref 3.4–10.8)

## 2024-03-15 PROCEDURE — 85025 COMPLETE CBC W/AUTO DIFF WBC: CPT

## 2024-03-15 PROCEDURE — 93005 ELECTROCARDIOGRAM TRACING: CPT

## 2024-03-15 PROCEDURE — 80048 BASIC METABOLIC PNL TOTAL CA: CPT

## 2024-03-15 PROCEDURE — 36415 COLL VENOUS BLD VENIPUNCTURE: CPT

## 2024-03-27 ENCOUNTER — APPOINTMENT (OUTPATIENT)
Dept: GENERAL RADIOLOGY | Facility: HOSPITAL | Age: 69
DRG: 643 | End: 2024-03-27
Payer: MEDICARE

## 2024-03-27 ENCOUNTER — HOSPITAL ENCOUNTER (INPATIENT)
Facility: HOSPITAL | Age: 69
LOS: 3 days | Discharge: HOME | DRG: 643 | End: 2024-03-31
Attending: EMERGENCY MEDICINE | Admitting: STUDENT IN AN ORGANIZED HEALTH CARE EDUCATION/TRAINING PROGRAM
Payer: MEDICARE

## 2024-03-27 ENCOUNTER — TRANSCRIBE ORDERS (OUTPATIENT)
Dept: ADMINISTRATIVE | Facility: HOSPITAL | Age: 69
End: 2024-03-27
Payer: MEDICARE

## 2024-03-27 DIAGNOSIS — Z87.891 PERSONAL HISTORY OF TOBACCO USE, PRESENTING HAZARDS TO HEALTH: Primary | ICD-10-CM

## 2024-03-27 DIAGNOSIS — R33.8 ACUTE URINARY RETENTION: ICD-10-CM

## 2024-03-27 DIAGNOSIS — E87.1 HYPONATREMIA: Primary | ICD-10-CM

## 2024-03-27 PROBLEM — D72.819 LEUKOCYTOPENIA: Status: ACTIVE | Noted: 2024-03-27

## 2024-03-27 PROBLEM — D72.829 LEUKOCYTOSIS: Status: ACTIVE | Noted: 2024-03-27

## 2024-03-27 PROBLEM — C61 PROSTATE CANCER: Status: ACTIVE | Noted: 2024-03-27

## 2024-03-27 LAB
ALBUMIN SERPL-MCNC: 3.4 G/DL (ref 3.5–5.2)
ALBUMIN SERPL-MCNC: 3.8 G/DL (ref 3.5–5.2)
ALBUMIN SERPL-MCNC: 3.9 G/DL (ref 3.5–5.2)
ALBUMIN SERPL-MCNC: 3.9 G/DL (ref 3.5–5.2)
ALBUMIN/GLOB SERPL: 1.1 G/DL
ALP SERPL-CCNC: 64 U/L (ref 39–117)
ALT SERPL W P-5'-P-CCNC: 24 U/L (ref 1–41)
ANION GAP SERPL CALCULATED.3IONS-SCNC: 10 MMOL/L (ref 5–15)
ANION GAP SERPL CALCULATED.3IONS-SCNC: 11 MMOL/L (ref 5–15)
ANION GAP SERPL CALCULATED.3IONS-SCNC: 13 MMOL/L (ref 5–15)
ANION GAP SERPL CALCULATED.3IONS-SCNC: 14.6 MMOL/L (ref 5–15)
AST SERPL-CCNC: 24 U/L (ref 1–40)
B PARAPERT DNA SPEC QL NAA+PROBE: NOT DETECTED
B PERT DNA SPEC QL NAA+PROBE: NOT DETECTED
BACTERIA UR QL AUTO: ABNORMAL /HPF
BASOPHILS # BLD AUTO: 0.1 10*3/MM3 (ref 0–0.2)
BASOPHILS NFR BLD AUTO: 0.6 % (ref 0–1.5)
BILIRUB SERPL-MCNC: 0.4 MG/DL (ref 0–1.2)
BILIRUB UR QL STRIP: NEGATIVE
BUN SERPL-MCNC: 15 MG/DL (ref 8–23)
BUN SERPL-MCNC: 15 MG/DL (ref 8–23)
BUN SERPL-MCNC: 17 MG/DL (ref 8–23)
BUN SERPL-MCNC: 17 MG/DL (ref 8–23)
BUN/CREAT SERPL: 15.8 (ref 7–25)
BUN/CREAT SERPL: 16.5 (ref 7–25)
BUN/CREAT SERPL: 16.7 (ref 7–25)
BUN/CREAT SERPL: 17.2 (ref 7–25)
C PNEUM DNA NPH QL NAA+NON-PROBE: NOT DETECTED
CALCIUM SPEC-SCNC: 8 MG/DL (ref 8.6–10.5)
CALCIUM SPEC-SCNC: 8.1 MG/DL (ref 8.6–10.5)
CALCIUM SPEC-SCNC: 8.6 MG/DL (ref 8.6–10.5)
CALCIUM SPEC-SCNC: 8.7 MG/DL (ref 8.6–10.5)
CHLORIDE SERPL-SCNC: 85 MMOL/L (ref 98–107)
CHLORIDE SERPL-SCNC: 85 MMOL/L (ref 98–107)
CHLORIDE SERPL-SCNC: 86 MMOL/L (ref 98–107)
CHLORIDE SERPL-SCNC: 86 MMOL/L (ref 98–107)
CLARITY UR: CLEAR
CO2 SERPL-SCNC: 18.4 MMOL/L (ref 22–29)
CO2 SERPL-SCNC: 20 MMOL/L (ref 22–29)
CO2 SERPL-SCNC: 21 MMOL/L (ref 22–29)
CO2 SERPL-SCNC: 22 MMOL/L (ref 22–29)
COLOR UR: YELLOW
CORTIS SERPL-MCNC: 18.8 MCG/DL
CREAT SERPL-MCNC: 0.87 MG/DL (ref 0.76–1.27)
CREAT SERPL-MCNC: 0.95 MG/DL (ref 0.76–1.27)
CREAT SERPL-MCNC: 1.02 MG/DL (ref 0.76–1.27)
CREAT SERPL-MCNC: 1.03 MG/DL (ref 0.76–1.27)
D-LACTATE SERPL-SCNC: 1.1 MMOL/L (ref 0.5–2)
DEPRECATED RDW RBC AUTO: 47.1 FL (ref 37–54)
EGFRCR SERPLBLD CKD-EPI 2021: 79.6 ML/MIN/1.73
EGFRCR SERPLBLD CKD-EPI 2021: 80.6 ML/MIN/1.73
EGFRCR SERPLBLD CKD-EPI 2021: 87.7 ML/MIN/1.73
EGFRCR SERPLBLD CKD-EPI 2021: 94.6 ML/MIN/1.73
EOSINOPHIL # BLD AUTO: 0.06 10*3/MM3 (ref 0–0.4)
EOSINOPHIL NFR BLD AUTO: 0.3 % (ref 0.3–6.2)
ERYTHROCYTE [DISTWIDTH] IN BLOOD BY AUTOMATED COUNT: 13.1 % (ref 12.3–15.4)
FLUAV SUBTYP SPEC NAA+PROBE: NOT DETECTED
FLUBV RNA ISLT QL NAA+PROBE: NOT DETECTED
GLOBULIN UR ELPH-MCNC: 3.5 GM/DL
GLUCOSE SERPL-MCNC: 103 MG/DL (ref 65–99)
GLUCOSE SERPL-MCNC: 105 MG/DL (ref 65–99)
GLUCOSE SERPL-MCNC: 131 MG/DL (ref 65–99)
GLUCOSE SERPL-MCNC: 99 MG/DL (ref 65–99)
GLUCOSE UR STRIP-MCNC: NEGATIVE MG/DL
HADV DNA SPEC NAA+PROBE: NOT DETECTED
HCOV 229E RNA SPEC QL NAA+PROBE: NOT DETECTED
HCOV HKU1 RNA SPEC QL NAA+PROBE: NOT DETECTED
HCOV NL63 RNA SPEC QL NAA+PROBE: NOT DETECTED
HCOV OC43 RNA SPEC QL NAA+PROBE: NOT DETECTED
HCT VFR BLD AUTO: 36.4 % (ref 37.5–51)
HGB BLD-MCNC: 12.6 G/DL (ref 13–17.7)
HGB UR QL STRIP.AUTO: ABNORMAL
HMPV RNA NPH QL NAA+NON-PROBE: NOT DETECTED
HPIV1 RNA ISLT QL NAA+PROBE: NOT DETECTED
HPIV2 RNA SPEC QL NAA+PROBE: NOT DETECTED
HPIV3 RNA NPH QL NAA+PROBE: NOT DETECTED
HPIV4 P GENE NPH QL NAA+PROBE: NOT DETECTED
HYALINE CASTS UR QL AUTO: ABNORMAL /LPF
IMM GRANULOCYTES # BLD AUTO: 0.42 10*3/MM3 (ref 0–0.05)
IMM GRANULOCYTES NFR BLD AUTO: 2.4 % (ref 0–0.5)
KETONES UR QL STRIP: ABNORMAL
LEUKOCYTE ESTERASE UR QL STRIP.AUTO: NEGATIVE
LYMPHOCYTES # BLD AUTO: 1.12 10*3/MM3 (ref 0.7–3.1)
LYMPHOCYTES NFR BLD AUTO: 6.4 % (ref 19.6–45.3)
M PNEUMO IGG SER IA-ACNC: NOT DETECTED
MCH RBC QN AUTO: 34 PG (ref 26.6–33)
MCHC RBC AUTO-ENTMCNC: 34.6 G/DL (ref 31.5–35.7)
MCV RBC AUTO: 98.1 FL (ref 79–97)
MONOCYTES # BLD AUTO: 1.32 10*3/MM3 (ref 0.1–0.9)
MONOCYTES NFR BLD AUTO: 7.5 % (ref 5–12)
NEUTROPHILS NFR BLD AUTO: 14.57 10*3/MM3 (ref 1.7–7)
NEUTROPHILS NFR BLD AUTO: 82.8 % (ref 42.7–76)
NITRITE UR QL STRIP: NEGATIVE
NRBC BLD AUTO-RTO: 0 /100 WBC (ref 0–0.2)
NT-PROBNP SERPL-MCNC: 42 PG/ML (ref 0–900)
OSMOLALITY SERPL: 246 MOSM/KG (ref 280–301)
OSMOLALITY UR: 613 MOSM/KG
PH UR STRIP.AUTO: 6.5 [PH] (ref 5–8)
PHOSPHATE SERPL-MCNC: 2.9 MG/DL (ref 2.5–4.5)
PHOSPHATE SERPL-MCNC: 3 MG/DL (ref 2.5–4.5)
PHOSPHATE SERPL-MCNC: 3 MG/DL (ref 2.5–4.5)
PLATELET # BLD AUTO: 249 10*3/MM3 (ref 140–450)
PMV BLD AUTO: 10.5 FL (ref 6–12)
POTASSIUM SERPL-SCNC: 4.9 MMOL/L (ref 3.5–5.2)
POTASSIUM SERPL-SCNC: 5.1 MMOL/L (ref 3.5–5.2)
POTASSIUM SERPL-SCNC: 5.4 MMOL/L (ref 3.5–5.2)
POTASSIUM SERPL-SCNC: 5.4 MMOL/L (ref 3.5–5.2)
PROCALCITONIN SERPL-MCNC: 0.04 NG/ML (ref 0–0.25)
PROT SERPL-MCNC: 7.4 G/DL (ref 6–8.5)
PROT UR QL STRIP: ABNORMAL
RBC # BLD AUTO: 3.71 10*6/MM3 (ref 4.14–5.8)
RBC # UR STRIP: ABNORMAL /HPF
REF LAB TEST METHOD: ABNORMAL
RHINOVIRUS RNA SPEC NAA+PROBE: NOT DETECTED
RSV RNA NPH QL NAA+NON-PROBE: NOT DETECTED
SARS-COV-2 RNA NPH QL NAA+NON-PROBE: NOT DETECTED
SODIUM SERPL-SCNC: 117 MMOL/L (ref 136–145)
SODIUM SERPL-SCNC: 118 MMOL/L (ref 136–145)
SODIUM SERPL-SCNC: 119 MMOL/L (ref 136–145)
SODIUM UR-SCNC: 144 MMOL/L
SP GR UR STRIP: 1.02 (ref 1–1.03)
SQUAMOUS #/AREA URNS HPF: ABNORMAL /HPF
TSH SERPL DL<=0.05 MIU/L-ACNC: 1.52 UIU/ML (ref 0.27–4.2)
URATE SERPL-MCNC: 5.5 MG/DL (ref 3.4–7)
UROBILINOGEN UR QL STRIP: ABNORMAL
WBC # UR STRIP: ABNORMAL /HPF
WBC NRBC COR # BLD AUTO: 17.59 10*3/MM3 (ref 3.4–10.8)

## 2024-03-27 PROCEDURE — 25810000003 SODIUM CHLORIDE 0.9 % SOLUTION: Performed by: EMERGENCY MEDICINE

## 2024-03-27 PROCEDURE — 84300 ASSAY OF URINE SODIUM: CPT | Performed by: NURSE PRACTITIONER

## 2024-03-27 PROCEDURE — P9612 CATHETERIZE FOR URINE SPEC: HCPCS

## 2024-03-27 PROCEDURE — G0378 HOSPITAL OBSERVATION PER HR: HCPCS

## 2024-03-27 PROCEDURE — 84100 ASSAY OF PHOSPHORUS: CPT | Performed by: EMERGENCY MEDICINE

## 2024-03-27 PROCEDURE — 84550 ASSAY OF BLOOD/URIC ACID: CPT | Performed by: NURSE PRACTITIONER

## 2024-03-27 PROCEDURE — 51798 US URINE CAPACITY MEASURE: CPT

## 2024-03-27 PROCEDURE — 25810000003 SODIUM CHLORIDE 0.9 % SOLUTION: Performed by: HOSPITALIST

## 2024-03-27 PROCEDURE — 87040 BLOOD CULTURE FOR BACTERIA: CPT | Performed by: NURSE PRACTITIONER

## 2024-03-27 PROCEDURE — 99285 EMERGENCY DEPT VISIT HI MDM: CPT

## 2024-03-27 PROCEDURE — 84443 ASSAY THYROID STIM HORMONE: CPT | Performed by: HOSPITALIST

## 2024-03-27 PROCEDURE — 83880 ASSAY OF NATRIURETIC PEPTIDE: CPT | Performed by: NURSE PRACTITIONER

## 2024-03-27 PROCEDURE — 0202U NFCT DS 22 TRGT SARS-COV-2: CPT | Performed by: NURSE PRACTITIONER

## 2024-03-27 PROCEDURE — 81001 URINALYSIS AUTO W/SCOPE: CPT | Performed by: EMERGENCY MEDICINE

## 2024-03-27 PROCEDURE — 83930 ASSAY OF BLOOD OSMOLALITY: CPT | Performed by: STUDENT IN AN ORGANIZED HEALTH CARE EDUCATION/TRAINING PROGRAM

## 2024-03-27 PROCEDURE — 71045 X-RAY EXAM CHEST 1 VIEW: CPT

## 2024-03-27 PROCEDURE — 36415 COLL VENOUS BLD VENIPUNCTURE: CPT

## 2024-03-27 PROCEDURE — 80053 COMPREHEN METABOLIC PANEL: CPT | Performed by: EMERGENCY MEDICINE

## 2024-03-27 PROCEDURE — 25810000003 SODIUM CHLORIDE 3 % SOLUTION: Performed by: HOSPITALIST

## 2024-03-27 PROCEDURE — 82533 TOTAL CORTISOL: CPT | Performed by: HOSPITALIST

## 2024-03-27 PROCEDURE — 83935 ASSAY OF URINE OSMOLALITY: CPT | Performed by: NURSE PRACTITIONER

## 2024-03-27 PROCEDURE — 83605 ASSAY OF LACTIC ACID: CPT | Performed by: NURSE PRACTITIONER

## 2024-03-27 PROCEDURE — 85025 COMPLETE CBC W/AUTO DIFF WBC: CPT | Performed by: EMERGENCY MEDICINE

## 2024-03-27 PROCEDURE — 84145 PROCALCITONIN (PCT): CPT | Performed by: NURSE PRACTITIONER

## 2024-03-27 PROCEDURE — 84295 ASSAY OF SERUM SODIUM: CPT | Performed by: STUDENT IN AN ORGANIZED HEALTH CARE EDUCATION/TRAINING PROGRAM

## 2024-03-27 RX ORDER — SODIUM CHLORIDE 9 MG/ML
75 INJECTION, SOLUTION INTRAVENOUS CONTINUOUS
Status: DISCONTINUED | OUTPATIENT
Start: 2024-03-27 | End: 2024-03-31 | Stop reason: HOSPADM

## 2024-03-27 RX ORDER — FLUTICASONE PROPIONATE 50 MCG
2 SPRAY, SUSPENSION (ML) NASAL DAILY
Status: DISCONTINUED | OUTPATIENT
Start: 2024-03-27 | End: 2024-03-31 | Stop reason: HOSPADM

## 2024-03-27 RX ORDER — AZELASTINE 1 MG/ML
2 SPRAY, METERED NASAL 2 TIMES DAILY
Status: DISCONTINUED | OUTPATIENT
Start: 2024-03-27 | End: 2024-03-31 | Stop reason: HOSPADM

## 2024-03-27 RX ORDER — CETIRIZINE HYDROCHLORIDE 10 MG/1
10 TABLET ORAL DAILY
Status: DISCONTINUED | OUTPATIENT
Start: 2024-03-27 | End: 2024-03-31 | Stop reason: HOSPADM

## 2024-03-27 RX ORDER — CALCIUM CARBONATE 500 MG/1
2 TABLET, CHEWABLE ORAL 3 TIMES DAILY PRN
Status: DISCONTINUED | OUTPATIENT
Start: 2024-03-27 | End: 2024-03-31 | Stop reason: HOSPADM

## 2024-03-27 RX ORDER — 3% SODIUM CHLORIDE 3 G/100ML
40 INJECTION, SOLUTION INTRAVENOUS CONTINUOUS
Status: DISPENSED | OUTPATIENT
Start: 2024-03-27 | End: 2024-03-27

## 2024-03-27 RX ORDER — LISINOPRIL 20 MG/1
20 TABLET ORAL DAILY
Status: DISCONTINUED | OUTPATIENT
Start: 2024-03-27 | End: 2024-03-31 | Stop reason: HOSPADM

## 2024-03-27 RX ADMIN — SODIUM CHLORIDE 75 ML/HR: 9 INJECTION, SOLUTION INTRAVENOUS at 18:13

## 2024-03-27 RX ADMIN — ANTACID TABLETS 2 TABLET: 500 TABLET, CHEWABLE ORAL at 21:14

## 2024-03-27 RX ADMIN — SODIUM CHLORIDE 1000 ML: 9 INJECTION, SOLUTION INTRAVENOUS at 10:21

## 2024-03-27 RX ADMIN — LISINOPRIL 20 MG: 20 TABLET ORAL at 15:28

## 2024-03-27 RX ADMIN — SODIUM CHLORIDE 40 ML/HR: 3 INJECTION, SOLUTION INTRAVENOUS at 20:17

## 2024-03-27 NOTE — PLAN OF CARE
Goal Outcome Evaluation:      A/O x4, room air, NSR on tele. Up ad [place]. Pt c/o sore throat, cough, and SOA. RVP negative. [facility] and Nephrology notified of critically low sodium level, no new orders. Nephrology states they will come see patient. FC in place. VSS.

## 2024-03-27 NOTE — ED PROVIDER NOTES
EMERGENCY DEPARTMENT ENCOUNTER    Room Number:  S615/1  PCP: Christiano Bartholomew APRN  Historian: Patient, spouse      HPI:  Chief Complaint: Difficulty urinating  A complete HPI/ROS/PMH/PSH/SH/FH are unobtainable due to: Nothing  Context: Gonzales Kaur Sr. is a 68 y.o. male with a medical history of COPD, hypertension, BPH and prostate cancer who presents to the ED c/o acute difficulty urinating.  Patient had surgery for prostate cancer 6 days ago.  His Marcano catheter was removed yesterday morning.  Since then, he has only been able to urinate a small amount.  He denies abdominal pain, flank pain, vomiting, or fever.  Patient complains of nausea and decreased p.o. intake for the past 2 days.            PAST MEDICAL HISTORY  Active Ambulatory Problems     Diagnosis Date Noted    COPD (chronic obstructive pulmonary disease) with emphysema 10/28/2016    Benign non-nodular prostatic hyperplasia with lower urinary tract symptoms 10/28/2016    Chronic fatigue 06/07/2018    Dyspnea on exertion 06/07/2018    MICHAEL (obstructive sleep apnea) 06/07/2018    Essential hypertension 02/28/2019    History of prostate cancer 05/04/2021    Seasonal allergies 05/04/2021    Healthcare maintenance 11/08/2021    Cellulitis of right leg 08/01/2014    Venous insufficiency of leg 08/01/2014     Resolved Ambulatory Problems     Diagnosis Date Noted    Acute non-recurrent maxillary sinusitis 01/23/2017    Acute pharyngitis 04/18/2017    Preoperative cardiovascular examination 06/08/2020     Past Medical History:   Diagnosis Date    Allergic     BPH (benign prostatic hyperplasia)     Chronic venous hypertension with ulcer     COPD (chronic obstructive pulmonary disease)     History of cellulitis 2014    History of kidney stones     Hypertension     Kidney stones     Prostate cancer 05/2020    Tobacco use disorder     Urinary tract infection Dont know    Varicose veins          PAST SURGICAL HISTORY  Past Surgical History:   Procedure Laterality  Date    COLONOSCOPY      approx     KIDNEY STONE SURGERY      Lithotripsy    PROSTATE SURGERY  2020    None         FAMILY HISTORY  Family History   Problem Relation Age of Onset    Diabetes Mother     Arthritis Mother     Hypertension Mother     Breast cancer Mother 74    Dementia Father     Hypertension Father     Liver disease Sister     Liver disease Brother          SOCIAL HISTORY  Social History     Socioeconomic History    Marital status:      Spouse name: Dominique   Tobacco Use    Smoking status: Former     Current packs/day: 0.00     Average packs/day: 1.5 packs/day for 41.0 years (61.5 ttl pk-yrs)     Types: Cigarettes     Start date: 1976     Quit date: 2017     Years since quittin.2     Passive exposure: Past    Smokeless tobacco: Never    Tobacco comments:     Stopped use .   Vaping Use    Vaping status: Never Used   Substance and Sexual Activity    Alcohol use: No    Drug use: No    Sexual activity: Not Currently     Partners: Female     Birth control/protection: None         ALLERGIES  Patient has no known allergies.    REVIEW OF SYSTEMS  Review of Systems  Included in HPI  All systems reviewed and negative except for those discussed in HPI.      PHYSICAL EXAM  ED Triage Vitals [24 0841]   Temp Heart Rate Resp BP SpO2   96 °F (35.6 °C) 90 20 -- 90 %      Temp src Heart Rate Source Patient Position BP Location FiO2 (%)   -- -- -- -- --       Physical Exam      GENERAL: Awake, alert, oriented x 3.  Nontoxic-appearing elderly male.    HENT: NCAT, nares patent  EYES: no scleral icterus  CV: regular rhythm, normal rate  RESPIRATORY: normal effort, clear to auscultation bilaterally  ABDOMEN: soft, nondistended, nontender, no CVA tenderness  : Normal external genitalia  MUSCULOSKELETAL: Extremities are nontender with full range of motion  NEURO: Speech is normal.  No facial droop.  PSYCH:  calm, cooperative  SKIN: warm, dry    Vital signs and nursing notes reviewed.          LAB  RESULTS  Recent Results (from the past 24 hour(s))   Comprehensive Metabolic Panel    Collection Time: 03/27/24  9:13 AM    Specimen: Blood   Result Value Ref Range    Glucose 103 (H) 65 - 99 mg/dL    BUN 17 8 - 23 mg/dL    Creatinine 1.03 0.76 - 1.27 mg/dL    Sodium 118 (C) 136 - 145 mmol/L    Potassium 5.4 (H) 3.5 - 5.2 mmol/L    Chloride 86 (L) 98 - 107 mmol/L    CO2 21.0 (L) 22.0 - 29.0 mmol/L    Calcium 8.7 8.6 - 10.5 mg/dL    Total Protein 7.4 6.0 - 8.5 g/dL    Albumin 3.9 3.5 - 5.2 g/dL    ALT (SGPT) 24 1 - 41 U/L    AST (SGOT) 24 1 - 40 U/L    Alkaline Phosphatase 64 39 - 117 U/L    Total Bilirubin 0.4 0.0 - 1.2 mg/dL    Globulin 3.5 gm/dL    A/G Ratio 1.1 g/dL    BUN/Creatinine Ratio 16.5 7.0 - 25.0    Anion Gap 11.0 5.0 - 15.0 mmol/L    eGFR 79.6 >60.0 mL/min/1.73   Urinalysis With Microscopic If Indicated (No Culture) - Urine, Catheter    Collection Time: 03/27/24  9:13 AM    Specimen: Urine, Catheter   Result Value Ref Range    Color, UA Yellow Yellow, Straw    Appearance, UA Clear Clear    pH, UA 6.5 5.0 - 8.0    Specific Gravity, UA 1.017 1.005 - 1.030    Glucose, UA Negative Negative    Ketones, UA 40 mg/dL (2+) (A) Negative    Bilirubin, UA Negative Negative    Blood, UA Small (1+) (A) Negative    Protein,  mg/dL (2+) (A) Negative    Leuk Esterase, UA Negative Negative    Nitrite, UA Negative Negative    Urobilinogen, UA 1.0 E.U./dL 0.2 - 1.0 E.U./dL   CBC Auto Differential    Collection Time: 03/27/24  9:13 AM    Specimen: Blood   Result Value Ref Range    WBC 17.59 (H) 3.40 - 10.80 10*3/mm3    RBC 3.71 (L) 4.14 - 5.80 10*6/mm3    Hemoglobin 12.6 (L) 13.0 - 17.7 g/dL    Hematocrit 36.4 (L) 37.5 - 51.0 %    MCV 98.1 (H) 79.0 - 97.0 fL    MCH 34.0 (H) 26.6 - 33.0 pg    MCHC 34.6 31.5 - 35.7 g/dL    RDW 13.1 12.3 - 15.4 %    RDW-SD 47.1 37.0 - 54.0 fl    MPV 10.5 6.0 - 12.0 fL    Platelets 249 140 - 450 10*3/mm3    Neutrophil % 82.8 (H) 42.7 - 76.0 %    Lymphocyte % 6.4 (L) 19.6 - 45.3 %     Monocyte % 7.5 5.0 - 12.0 %    Eosinophil % 0.3 0.3 - 6.2 %    Basophil % 0.6 0.0 - 1.5 %    Immature Grans % 2.4 (H) 0.0 - 0.5 %    Neutrophils, Absolute 14.57 (H) 1.70 - 7.00 10*3/mm3    Lymphocytes, Absolute 1.12 0.70 - 3.10 10*3/mm3    Monocytes, Absolute 1.32 (H) 0.10 - 0.90 10*3/mm3    Eosinophils, Absolute 0.06 0.00 - 0.40 10*3/mm3    Basophils, Absolute 0.10 0.00 - 0.20 10*3/mm3    Immature Grans, Absolute 0.42 (H) 0.00 - 0.05 10*3/mm3    nRBC 0.0 0.0 - 0.2 /100 WBC   Urinalysis, Microscopic Only - Urine, Catheter    Collection Time: 03/27/24  9:13 AM    Specimen: Urine, Catheter   Result Value Ref Range    RBC, UA 21-50 (A) None Seen, 0-2 /HPF    WBC, UA 3-5 (A) None Seen, 0-2 /HPF    Bacteria, UA None Seen None Seen /HPF    Squamous Epithelial Cells, UA 0-2 None Seen, 0-2 /HPF    Hyaline Casts, UA 0-2 None Seen /LPF    Methodology Automated Microscopy    Renal Function Panel    Collection Time: 03/27/24  9:13 AM    Specimen: Blood   Result Value Ref Range    Glucose 105 (H) 65 - 99 mg/dL    BUN 17 8 - 23 mg/dL    Creatinine 1.02 0.76 - 1.27 mg/dL    Sodium 118 (C) 136 - 145 mmol/L    Potassium 5.4 (H) 3.5 - 5.2 mmol/L    Chloride 85 (L) 98 - 107 mmol/L    CO2 20.0 (L) 22.0 - 29.0 mmol/L    Calcium 8.6 8.6 - 10.5 mg/dL    Albumin 3.9 3.5 - 5.2 g/dL    Phosphorus 3.0 2.5 - 4.5 mg/dL    Anion Gap 13.0 5.0 - 15.0 mmol/L    BUN/Creatinine Ratio 16.7 7.0 - 25.0    eGFR 80.6 >60.0 mL/min/1.73   Uric Acid    Collection Time: 03/27/24  9:13 AM    Specimen: Blood   Result Value Ref Range    Uric Acid 5.5 3.4 - 7.0 mg/dL   BNP    Collection Time: 03/27/24  9:13 AM    Specimen: Blood   Result Value Ref Range    proBNP 42.0 0.0 - 900.0 pg/mL   Procalcitonin    Collection Time: 03/27/24  9:13 AM    Specimen: Blood   Result Value Ref Range    Procalcitonin 0.04 0.00 - 0.25 ng/mL   Sodium, Urine, Random - Indwelling Urethral Catheter    Collection Time: 03/27/24 10:33 AM    Specimen: Indwelling Urethral Catheter; Urine    Result Value Ref Range    Sodium, Urine 144 mmol/L   Osmolality, Urine - Indwelling Urethral Catheter    Collection Time: 03/27/24 10:33 AM    Specimen: Indwelling Urethral Catheter; Urine   Result Value Ref Range    Osmolality, Urine 613 mOsm/kg   Lactic Acid, Plasma    Collection Time: 03/27/24 11:24 AM    Specimen: Arm, Right; Blood   Result Value Ref Range    Lactate 1.1 0.5 - 2.0 mmol/L   Renal Function Panel    Collection Time: 03/27/24  2:21 PM    Specimen: Blood   Result Value Ref Range    Glucose 131 (H) 65 - 99 mg/dL    BUN 15 8 - 23 mg/dL    Creatinine 0.87 0.76 - 1.27 mg/dL    Sodium 119 (C) 136 - 145 mmol/L    Potassium 4.9 3.5 - 5.2 mmol/L    Chloride 86 (L) 98 - 107 mmol/L    CO2 18.4 (L) 22.0 - 29.0 mmol/L    Calcium 8.0 (L) 8.6 - 10.5 mg/dL    Albumin 3.4 (L) 3.5 - 5.2 g/dL    Phosphorus 3.0 2.5 - 4.5 mg/dL    Anion Gap 14.6 5.0 - 15.0 mmol/L    BUN/Creatinine Ratio 17.2 7.0 - 25.0    eGFR 94.6 >60.0 mL/min/1.73       Ordered the above labs and reviewed the results.        RADIOLOGY  No Radiology Exams Resulted Within Past 24 Hours    Ordered the above noted radiological studies. Reviewed by me in PACS.            PROCEDURES  Procedures        OUTPATIENT MEDICATION MANAGEMENT:  Current Facility-Administered Medications Ordered in Epic   Medication Dose Route Frequency Provider Last Rate Last Admin    azelastine (ASTELIN) nasal spray 2 spray  2 spray Each Nare BID Chantell Mackenzie APRN        benzocaine-menthol (CEPACOL) lozenge 1 each  1 lozenge Mouth/Throat Q2H PRN Chantell Mackenzie APRN        Budeson-Glycopyrrol-Formoterol (BREZTRI) inhaler 1 puff  1 puff Inhalation Daily - RT Chantell Mackenzie APRN        cetirizine (zyrTEC) tablet 10 mg  10 mg Oral Daily Chantell Mackenzie APRN        fluticasone (FLONASE) 50 MCG/ACT nasal spray 2 spray  2 spray Nasal Daily Chantell Mackenzie APRN        lisinopril (PRINIVIL,ZESTRIL) tablet 20 mg  20 mg Oral Daily Chantell Mackenzie APRN   20 mg at  03/27/24 1528     No current [facility]-ordered outpatient medications on file.           MEDICATIONS GIVEN IN ER  Medications   azelastine (ASTELIN) nasal spray 2 spray (has no administration in time range)   fluticasone (FLONASE) 50 MCG/ACT nasal spray 2 spray (2 sprays Nasal Not Given 3/27/24 1528)   lisinopril (PRINIVIL,ZESTRIL) tablet 20 mg (20 mg Oral Given 3/27/24 1528)   cetirizine (zyrTEC) tablet 10 mg (10 mg Oral Not Given 3/27/24 1528)   Budeson-Glycopyrrol-Formoterol (BREZTRI) inhaler 1 puff (1 puff Inhalation Not Given 3/27/24 1421)   benzocaine-menthol (CEPACOL) lozenge 1 each (has no administration in time range)   sodium chloride 0.9 % bolus 1,000 mL (0 mL Intravenous Stopped 3/27/24 1235)                   MEDICAL DECISION MAKING, PROGRESS, and CONSULTS    All labs have been independently reviewed by me.  All radiology studies have been reviewed by me and I have also reviewed the radiology report.   EKG's independently viewed and interpreted by me.  Discussion below represents my analysis of pertinent findings related to patient's condition, differential diagnosis, treatment plan and final disposition.      Additional sources:    - Discussed/ obtained information from independent historians: Spouse at bedside    - External (non-ED) record review: Patient saw his PCP in February 2024 for cough and nasal congestion.  He does not have any prior admissions here.    -Prescription drug monitoring program review:     N/A    - Chronic or social conditions impacting patient care (Social Determinants of Health): None          Orders placed during this visit:  Orders Placed This Encounter   Procedures    Blood Culture - Blood,    Blood Culture - Blood,    Respiratory Panel PCR w/COVID-19(SARS-CoV-2) VERNON/BILLY/NIKO/PAD/COR/AYDEN In-House, NP Swab in UTM/VTM, 2 HR TAT - Swab, Nasopharynx    XR Chest 1 View    Comprehensive Metabolic Panel    Urinalysis With Microscopic If Indicated (No Culture) - Urine, Catheter    CBC  Auto Differential    Urinalysis, Microscopic Only - Urine, Clean Catch    Sodium, Urine, Random - Urine, Clean Catch    Osmolality, Urine - Urine, Clean Catch    Renal Function Panel    Renal Function Panel    CBC (No Diff)    Uric Acid    BNP    Procalcitonin    Lactic Acid, Plasma    Renal Function Panel    Diet: Regular/House, Renal, Fluid Restriction (240 mL/tray); Low Potassium; 1500 mL/day; Texture: Regular (IDDSI 7); Fluid Consistency: Thin (IDDSI 0)    Bladder scan    Insert Indwelling Urinary Catheter    Assess Need for Indwelling Urinary Catheter - Follow Removal Protocol    Urinary Catheter Care    LHA (on-call MD unless specified) Details    Inpatient Urology Consult    Inpatient Nephrology Consult    Telemetry Scan    Initiate Observation Status    CBC & Differential         Additional orders considered but not ordered:          Differential diagnosis includes, but is not limited to:    Urinary retention, UTI, cystitis, dehydration      Independent interpretation of labs, radiology studies, and discussions with consultants:  ED Course as of 03/27/24 1538   Wed Mar 27, 2024   0856 Bladder scan was 220.  This was after the patient tried to urinate but can only void a few mL.  Marcano catheter will be placed. [WH]   0915 Marcano catheter was placed by DARIUS Trevino.  There was immediate return of approximately 300 mL of urine. [WH]   0932 Leukocytes, UA: Negative [WH]   0932 Nitrite, UA: Negative [WH]   0932 RBC, UA(!): 21-50 [WH]   0932 WBC, UA(!): 3-5 [WH]   0932 Bacteria, UA: None Seen [WH]   0932 WBC(!): 17.59  15.0 twelve days ago [WH]   0953 Glucose(!): 103 [WH]   0953 BUN: 17 [WH]   0953 Creatinine: 1.03 [WH]   0953 Sodium(!!): 118  136 twelve days ago [WH]   0958 Test results and plans for admission were discussed with the patient and his wife.  Urine output is approximately 500. [WH]   1024 Case discussed with REJI Amos, and she agrees to admit the patient to Dr. Mojica.  Pertinent history, exam findings,  test results, ED course, and diagnoses were discussed with her. []   1041 MDM: Patient presented the ED complaining of urinary retention.  He had prostate surgery several days ago and had his Marcano catheter removed yesterday.  Since then, he was only able to urinate a small amount.  Bladder scan was around 200.  Marcano catheter was placed with return of 500 cc of urine.  Patient was found to be hyponatremic.  He does not have a UTI.  Renal function was normal.  He was started on IV fluids.  Patient will be admitted. []      ED Course User Index  [] Torey Wisdom MD         COMPLEXITY OF CARE  The patient requires admission.      DIAGNOSIS  Final diagnoses:   Hyponatremia   Acute urinary retention         DISPOSITION  ADMISSION    Discussed treatment plan and reason for admission with pt/family and admitting physician.  Pt/family voiced understanding of the plan for admission for further testing/treatment as needed.               Latest Documented Vital Signs:  AS OF 15:38 EDT VITALS:    BP - 151/63  HR - 88  TEMP - 97.7 °F (36.5 °C) (Oral)  O2 SATS - 92%            --    Please note that portions of this were completed with a voice recognition program.       Note Disclaimer: At [facility], we believe that sharing information builds trust and better relationships. You are receiving this note because you are receiving care at [facility] or recently visited. It is possible you will see health information before a provider has talked with you about it. This kind of information can be easy to misunderstand. To help you fully understand what it means for your health, we urge you to discuss this note with your provider.             Torey Wisdom MD  03/27/24 [id number]

## 2024-03-27 NOTE — ED NOTES
"Nursing report ED to floor  Gonzales Kaur Sr.  68 y.o.  male    HPI (triage note):   Chief Complaint   Patient presents with    Urinary Retention    Post-op Problem       Admitting doctor:   Carlton Mojica MD    Admitting diagnosis:   The primary encounter diagnosis was Hyponatremia. A diagnosis of Acute urinary retention was also pertinent to this visit.    Code status:   Current Code Status       Date Active Code Status Order ID Comments User Context       Not on file            Allergies:   Patient has no known allergies.    Past Medical History:  Past Medical History:   Diagnosis Date    Acute non-recurrent maxillary sinusitis 01/23/2017    Allergic     BPH (benign prostatic hyperplasia)     Chronic venous hypertension with ulcer     COPD (chronic obstructive pulmonary disease)     History of cellulitis 2014    Right leg    History of kidney stones     Hypertension     Kidney stones     MICHAEL (obstructive sleep apnea)     Osteoarthritis     Prostate cancer 05/2020    Tobacco use disorder     Urinary tract infection Dont know    Varicose veins         Weight:       03/27/24  0859   Weight: 81.6 kg (180 lb)       Most recent vitals:   Vitals:    03/27/24 0841 03/27/24 0859 03/27/24 1001 03/27/24 1101   BP:  140/93 139/70 137/86   Pulse: 90  78 80   Resp: 20      Temp: 96 °F (35.6 °C)      SpO2: 90%  94% 94%   Weight:  81.6 kg (180 lb)     Height:  165.1 cm (65\")         Active LDAs/IV Access:   Lines, Drains & Airways       Active LDAs       Name Placement date Placement time Site Days    Peripheral IV 03/27/24 0921 Anterior;Right Forearm 03/27/24  0921  Forearm  less than 1    Urethral Catheter Coude 16 Fr. 03/27/24  0917  -- less than 1                        Labs (abnormal labs have a star):   Labs Reviewed   COMPREHENSIVE METABOLIC PANEL - Abnormal; Notable for the following components:       Result Value    Glucose 103 (*)     Sodium 118 (*)     Potassium 5.4 (*)     Chloride 86 (*)     CO2 21.0 (*)     All " other components within normal limits    Narrative:     GFR Normal >60  Chronic Kidney Disease <60  Kidney Failure <15     URINALYSIS W/ MICROSCOPIC IF INDICATED (NO CULTURE) - Abnormal; Notable for the following components:    Ketones, UA 40 mg/dL (2+) (*)     Blood, UA Small (1+) (*)     Protein,  mg/dL (2+) (*)     All other components within normal limits   CBC WITH AUTO DIFFERENTIAL - Abnormal; Notable for the following components:    WBC 17.59 (*)     RBC 3.71 (*)     Hemoglobin 12.6 (*)     Hematocrit 36.4 (*)     MCV 98.1 (*)     MCH 34.0 (*)     Neutrophil % 82.8 (*)     Lymphocyte % 6.4 (*)     Immature Grans % 2.4 (*)     Neutrophils, Absolute 14.57 (*)     Monocytes, Absolute 1.32 (*)     Immature Grans, Absolute 0.42 (*)     All other components within normal limits   URINALYSIS, MICROSCOPIC ONLY - Abnormal; Notable for the following components:    RBC, UA 21-50 (*)     WBC, UA 3-5 (*)     All other components within normal limits   URIC ACID - Normal   BNP (IN-HOUSE) - Normal    Narrative:     This assay is used as an aid in the diagnosis of individuals suspected of having heart failure. It can be used as an aid in the diagnosis of acute decompensated heart failure (ADHF) in patients presenting with signs and symptoms of ADHF to the emergency department (ED). In addition, NT-proBNP of <300 pg/mL indicates ADHF is not likely.    Age Range Result Interpretation  NT-proBNP Concentration (pg/mL:      <50             Positive            >450                   Gray                 300-450                    Negative             <300    50-75           Positive            >900                  Gray                300-900                  Negative            <300      >75             Positive            >1800                  Gray                300-1800                  Negative            <300   BLOOD CULTURE   BLOOD CULTURE   SODIUM, URINE, RANDOM    Narrative:     Reference intervals for random urine  have not been established.  Clinical usage is dependent upon physician's interpretation in combination with other laboratory tests.      OSMOLALITY, URINE    Narrative:     Osmo Normal Reference Ranges:    Random:  mOsm/kg H2O, depending on fluid intake.  Random: >850 mOsm/kg H20, after 12 hour fluid restriction.    24 Hour: 300-900 mOsm/kg H2O.   RENAL FUNCTION PANEL   PROCALCITONIN   LACTIC ACID, PLASMA   CBC AND DIFFERENTIAL    Narrative:     The following orders were created for panel order CBC & Differential.  Procedure                               Abnormality         Status                     ---------                               -----------         ------                     CBC Auto Differential[[id number]]        Abnormal            Final result                 Please view results for these tests on the individual orders.       EKG:   No orders to display       Meds given in ED:   Medications   sodium chloride 0.9 % bolus 1,000 mL (1,000 mL Intravenous New Bag 3/27/24 1021)       Imaging results:  No radiology results for the last day    Ambulatory status:   - ad sanford    Social issues:   Social History     Socioeconomic History    Marital status:      Spouse name: Dominique   Tobacco Use    Smoking status: Former     Current packs/day: 0.00     Average packs/day: 1.5 packs/day for 41.0 years (61.5 ttl pk-yrs)     Types: Cigarettes     Start date: 1976     Quit date: 2017     Years since quittin.2     Passive exposure: Past    Smokeless tobacco: Never    Tobacco comments:     Stopped use .   Vaping Use    Vaping status: Never Used   Substance and Sexual Activity    Alcohol use: No    Drug use: No    Sexual activity: Not Currently     Partners: Female     Birth control/protection: None          NIH Stroke Scale:         Uriel Rosario RN  24 11:10 EDT    Nurse Direct line for any questions: [phone]

## 2024-03-27 NOTE — H&P
"    Patient Name:  Gonzales Kaur Sr.  YOB: 1955  MRN:  [id number]  Admit Date:  3/27/2024  Patient Care Team:  Christiano Bartholomew APRN as PCP - General (Nurse Practitioner)  Patience Fournier APRN (Family Medicine)  Kenny Wynn Jr., MD as Consulting Physician (Urology)  Adams Buenrostro MD as Consulting Physician (Pulmonary Disease)  Donya Lomas APRN as Referring Physician (Nurse Practitioner)  Quinton Laureano MD as Consulting Physician (Hematology and Oncology)      Subjective   History Present Illness     Chief Complaint   Patient presents with    Urinary Retention    Post-op Problem       Mr. Kaur is a 68 y.o. with a history of prostate cancer, BPH, COPD, HTN, MICHAEL that presents with urinary retention.He is status post left hemisphere HIFU per urology last Thursday with cassidy removed in office yesterday.  Since Cassidy removal he has only had small \" dribbles\" urine denies significant bladder discomfort, abdominal pain, flank pain, fever, chills, blood in the urine.  Cassidy catheter placed in the ER with over 500 cc output.  He denies other infectious symptoms, fever or chills.  His appetite has been slightly decreased in the past few days but no significant anorexia.  He denies nausea, vomiting, malaise, lethargy or headache.      History of Present Illness    Review of Systems   Constitutional:  Negative for appetite change, chills, diaphoresis and fatigue.   HENT:  Negative for trouble swallowing.    Respiratory:  Negative for choking.    Cardiovascular:  Negative for chest pain.   Gastrointestinal:  Negative for abdominal distention, abdominal pain, blood in stool, constipation, diarrhea, nausea and vomiting.   Genitourinary:  Positive for difficulty urinating. Negative for dysuria, flank pain, frequency, hematuria and testicular pain.   Musculoskeletal:  Negative for back pain.   Skin:  Negative for pallor.   Neurological:  Negative for dizziness and headaches.   Psychiatric/Behavioral: "  Negative for confusion.         Personal History     Past Medical History:   Diagnosis Date    Acute non-recurrent maxillary sinusitis 2017    Allergic     BPH (benign prostatic hyperplasia)     Chronic venous hypertension with ulcer     COPD (chronic obstructive pulmonary disease)     History of cellulitis     Right leg    History of kidney stones     Hypertension     Kidney stones     MICHAEL (obstructive sleep apnea)     Prostate cancer 2020    Tobacco use disorder     Urinary tract infection Dont know    Varicose veins      Past Surgical History:   Procedure Laterality Date    COLONOSCOPY      approx 2013    KIDNEY STONE SURGERY      Lithotripsy    PROSTATE SURGERY  2020    None     Family History   Problem Relation Age of Onset    Diabetes Mother     Arthritis Mother     Hypertension Mother     Breast cancer Mother 74    Dementia Father     Hypertension Father     Liver disease Sister     Liver disease Brother      Social History     Tobacco Use    Smoking status: Former     Current packs/day: 0.00     Average packs/day: 1.5 packs/day for 41.0 years (61.5 ttl pk-yrs)     Types: Cigarettes     Start date: 1976     Quit date: 2017     Years since quittin.2     Passive exposure: Past    Smokeless tobacco: Never    Tobacco comments:     Stopped use .   Vaping Use    Vaping status: Never Used   Substance Use Topics    Alcohol use: No    Drug use: No     No current facility-administered medications on file prior to encounter.     Current Outpatient Medications on File Prior to Encounter   Medication Sig Dispense Refill    albuterol sulfate  (90 Base) MCG/ACT inhaler Inhale 2 puffs.      azelastine (ASTELIN) 0.1 % nasal spray 2 sprays into the nostril(s) as directed by provider 2 (Two) Times a Day. Use in each nostril as directed 30 mL 2    Breztri Aerosphere 160-9-4.8 MCG/ACT aerosol inhaler       fluticasone (FLONASE) 50 MCG/ACT nasal spray 2 sprays into the nostril(s) as directed by  provider Daily. 15.8 mL 2    lisinopril (PRINIVIL,ZESTRIL) 20 MG tablet Take 1 tablet by mouth Daily. 90 tablet 3    loratadine (CLARITIN) 10 MG tablet Take 1 tablet by mouth Daily. 30 tablet 2    multivitamin with minerals tablet tablet Take 1 tablet by mouth Daily.       No Known Allergies    Objective    Objective     Vital Signs  Temp:  [96 °F (35.6 °C)-97.7 °F (36.5 °C)] 97.7 °F (36.5 °C)  Heart Rate:  [78-90] 88  Resp:  [18-20] 18  BP: (137-151)/(63-93) 151/63  SpO2:  [90 %-94 %] 92 %  on   ;   Device (Oxygen Therapy): room air  Body mass index is 29.95 kg/m².    Physical Exam  Vitals and nursing note reviewed.   Constitutional:       General: He is not in acute distress.     Appearance: He is well-developed. He is obese. He is not ill-appearing or toxic-appearing.   HENT:      Head: Normocephalic and atraumatic.   Eyes:      Conjunctiva/sclera: Conjunctivae normal.   Neck:      Trachea: No tracheal deviation.   Cardiovascular:      Rate and Rhythm: Normal rate and regular rhythm.   Pulmonary:      Effort: Pulmonary effort is normal. No respiratory distress.      Breath sounds: Normal breath sounds.   Abdominal:      General: Bowel sounds are normal. There is no distension.      Palpations: Abdomen is soft. There is no mass.      Tenderness: There is no abdominal tenderness. There is no guarding or rebound.   Genitourinary:     Comments: Marcano catheter in place with clear yellow urine output  Musculoskeletal:         General: Normal range of motion.      Cervical back: Normal range of motion.   Skin:     General: Skin is warm and dry.   Neurological:      Mental Status: He is alert and oriented to person, place, and time.   Psychiatric:         Mood and Affect: Mood normal.         Behavior: Behavior normal.         Results Review:  I reviewed the patient's new clinical results.  I reviewed the patient's new imaging results and agree with the interpretation.  I reviewed the patient's other test results and  agree with the interpretation  I personally viewed and interpreted the patient's EKG/Telemetry data  Discussed with ED provider.    Lab Results (last 24 hours)       Procedure Component Value Units Date/Time    CBC & Differential [[id number]]  (Abnormal) Collected: 03/27/24 0913    Specimen: Blood Updated: 03/27/24 0923    Narrative:      The following orders were created for panel order CBC & Differential.  Procedure                               Abnormality         Status                     ---------                               -----------         ------                     CBC Auto Differential[[id number]]        Abnormal            Final result                 Please view results for these tests on the individual orders.    Comprehensive Metabolic Panel [[id number]]  (Abnormal) Collected: 03/27/24 0913    Specimen: Blood Updated: 03/27/24 0951     Glucose 103 mg/dL      BUN 17 mg/dL      Creatinine 1.03 mg/dL      Sodium 118 mmol/L      Potassium 5.4 mmol/L      Chloride 86 mmol/L      CO2 21.0 mmol/L      Calcium 8.7 mg/dL      Total Protein 7.4 g/dL      Albumin 3.9 g/dL      ALT (SGPT) 24 U/L      AST (SGOT) 24 U/L      Alkaline Phosphatase 64 U/L      Total Bilirubin 0.4 mg/dL      Globulin 3.5 gm/dL      A/G Ratio 1.1 g/dL      BUN/Creatinine Ratio 16.5     Anion Gap 11.0 mmol/L      eGFR 79.6 mL/min/1.73     Narrative:      GFR Normal >60  Chronic Kidney Disease <60  Kidney Failure <15      Urinalysis With Microscopic If Indicated (No Culture) - Urine, Catheter [[id number]]  (Abnormal) Collected: 03/27/24 0913    Specimen: Urine, Catheter Updated: 03/27/24 0927     Color, UA Yellow     Appearance, UA Clear     pH, UA 6.5     Specific Gravity, UA 1.017     Glucose, UA Negative     Ketones, UA 40 mg/dL (2+)     Bilirubin, UA Negative     Blood, UA Small (1+)     Protein,  mg/dL (2+)     Leuk Esterase, UA Negative     Nitrite, UA Negative     Urobilinogen, UA 1.0 E.U./dL    CBC Auto Differential  [[id number]]  (Abnormal) Collected: 03/27/24 0913    Specimen: Blood Updated: 03/27/24 0923     WBC 17.59 10*3/mm3      RBC 3.71 10*6/mm3      Hemoglobin 12.6 g/dL      Hematocrit 36.4 %      MCV 98.1 fL      MCH 34.0 pg      MCHC 34.6 g/dL      RDW 13.1 %      RDW-SD 47.1 fl      MPV 10.5 fL      Platelets 249 10*3/mm3      Neutrophil % 82.8 %      Lymphocyte % 6.4 %      Monocyte % 7.5 %      Eosinophil % 0.3 %      Basophil % 0.6 %      Immature Grans % 2.4 %      Neutrophils, Absolute 14.57 10*3/mm3      Lymphocytes, Absolute 1.12 10*3/mm3      Monocytes, Absolute 1.32 10*3/mm3      Eosinophils, Absolute 0.06 10*3/mm3      Basophils, Absolute 0.10 10*3/mm3      Immature Grans, Absolute 0.42 10*3/mm3      nRBC 0.0 /100 WBC     Urinalysis, Microscopic Only - Urine, Catheter [[id number]]  (Abnormal) Collected: 03/27/24 0913    Specimen: Urine, Catheter Updated: 03/27/24 0928     RBC, UA 21-50 /HPF      WBC, UA 3-5 /HPF      Bacteria, UA None Seen /HPF      Squamous Epithelial Cells, UA 0-2 /HPF      Hyaline Casts, UA 0-2 /LPF      Methodology Automated Microscopy    Renal Function Panel [[id number]]  (Abnormal) Collected: 03/27/24 0913    Specimen: Blood Updated: 03/27/24 1152     Glucose 105 mg/dL      BUN 17 mg/dL      Creatinine 1.02 mg/dL      Sodium 118 mmol/L      Potassium 5.4 mmol/L      Chloride 85 mmol/L      CO2 20.0 mmol/L      Calcium 8.6 mg/dL      Albumin 3.9 g/dL      Phosphorus 3.0 mg/dL      Anion Gap 13.0 mmol/L      BUN/Creatinine Ratio 16.7     eGFR 80.6 mL/min/1.73     Narrative:      GFR Normal >60  Chronic Kidney Disease <60  Kidney Failure <15      Uric Acid [[id number]]  (Normal) Collected: 03/27/24 0913    Specimen: Blood Updated: 03/27/24 1109     Uric Acid 5.5 mg/dL     BNP [[id number]]  (Normal) Collected: 03/27/24 0913    Specimen: Blood Updated: 03/27/24 1106     proBNP 42.0 pg/mL     Narrative:      This assay is used as an aid in the diagnosis of individuals suspected of having heart  "failure. It can be used as an aid in the diagnosis of acute decompensated heart failure (ADHF) in patients presenting with signs and symptoms of ADHF to the emergency department (ED). In addition, NT-proBNP of <300 pg/mL indicates ADHF is not likely.    Age Range Result Interpretation  NT-proBNP Concentration (pg/mL:      <50             Positive            >450                   Gray                 300-450                    Negative             <300    50-75           Positive            >900                  Gray                300-900                  Negative            <300      >75             Positive            >1800                  Gray                300-1800                  Negative            <300    Procalcitonin [[id number]]  (Normal) Collected: 03/27/24 0913    Specimen: Blood Updated: 03/27/24 1135     Procalcitonin 0.04 ng/mL     Narrative:      As a Marker for Sepsis (Non-Neonates):    1. <0.5 ng/mL represents a low risk of severe sepsis and/or septic shock.  2. >2 ng/mL represents a high risk of severe sepsis and/or septic shock.    As a Marker for Lower Respiratory Tract Infections that require antibiotic therapy:    PCT on Admission    Antibiotic Therapy       6-12 Hrs later    >0.5                Strongly Recommended  >0.25 - <0.5        Recommended   0.1 - 0.25          Discouraged              Remeasure/reassess PCT  <0.1                Strongly Discouraged     Remeasure/reassess PCT    As 28 day mortality risk marker: \"Change in Procalcitonin Result\" (>80% or <=80%) if Day 0 (or Day 1) and Day 4 values are available. Refer to http://www.[employer]-pct-calculator.com    Change in PCT <=80%  A decrease of PCT levels below or equal to 80% defines a positive change in PCT test result representing a higher risk for 28-day all-cause mortality of patients diagnosed with severe sepsis for septic shock.    Change in PCT >80%  A decrease of PCT levels of more than 80% defines a negative change in PCT " result representing a lower risk for 28-day all-cause mortality of patients diagnosed with severe sepsis or septic shock.       Sodium, Urine, Random - Indwelling Urethral Catheter [[id number]] Collected: 03/27/24 1033    Specimen: Urine from Indwelling Urethral Catheter Updated: 03/27/24 1047     Sodium, Urine 144 mmol/L     Narrative:      Reference intervals for random urine have not been established.  Clinical usage is dependent upon physician's interpretation in combination with other laboratory tests.       Osmolality, Urine - Indwelling Urethral Catheter [[id number]] Collected: 03/27/24 1033    Specimen: Urine from Indwelling Urethral Catheter Updated: 03/27/24 1042     Osmolality, Urine 613 mOsm/kg     Narrative:      Osmo Normal Reference Ranges:    Random:  mOsm/kg H2O, depending on fluid intake.  Random: >850 mOsm/kg H20, after 12 hour fluid restriction.    24 Hour: 300-900 mOsm/kg H2O.    Blood Culture - Blood, Arm, Right [[id number]] Collected: 03/27/24 1124    Specimen: Blood from Arm, Right Updated: 03/27/24 1132    Blood Culture - Blood, Arm, Left [[id number]] Collected: 03/27/24 1124    Specimen: Blood from Arm, Left Updated: 03/27/24 1132    Lactic Acid, Plasma [[id number]]  (Normal) Collected: 03/27/24 1124    Specimen: Blood from Arm, Right Updated: 03/27/24 1156     Lactate 1.1 mmol/L             Imaging Results (Last 24 Hours)       ** No results found for the last 24 hours. **            Results for orders placed during the hospital encounter of 06/11/18    Adult Transthoracic Echo Complete W/ Cont if Necessary Per Protocol    Interpretation Summary  · Left ventricular systolic function is normal. Calculated EF = 68%. Estimated EF was in agreement with the calculated EF. Estimated EF = 68%. Normal left ventricular cavity size noted. All left ventricular wall segments contract normally. Left ventricular wall thickness is consistent with mild concentric hypertrophy. Left ventricular diastolic  dysfunction is noted (grade II w/high LAP) consistent with pseudonormalization.  · Right ventricular wall thickness is consistent with mild hypertrophy.  · Mild mitral valve regurgitation is present.      No orders to display        Assessment/Plan     Active Hospital Problems    Diagnosis  POA    **Hyponatremia [E87.1]  Yes    Acute urinary retention [R33.8]  Yes     Priority: High    Prostate cancer [C61]  Yes    Leukocytosis [D72.829]  Yes    Essential hypertension [I10]  Yes    MICHAEL (obstructive sleep apnea) [G47.33]  Yes    COPD (chronic obstructive pulmonary disease) with emphysema [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       Mr. Kaur is a 68 y.o. that presents with urinary retention.  He is status post left hemisphere HIFU per urology last Thursday with cassidy removed in office yesterday.      Urinary retention//prostate cancer  Continue Cassidy catheter.  Monitor output.  Consult urology.    Hyponatremia/hyperkalemia  . Given IV fluids with normal saline in ER.  Await repeat renal function panel.  Consult nephrology for assistance.  Urine studies pending. Not on diuretics  K5.4.  Repeat renal function panel following IV fluids  Leukocytosis   Etiology unclear.  WBC elevated at 15 on 3/15/2024 and currently 17.6.  No fever, chills, infection symptoms.  UA with RBC likely trauma without LUTS.      COPD/ MICHAEL- stable. Former smoker. Okay for home inhaler breztri. PRN duonebs.    VTE Prophylaxis - SCDs.  Code Status - Full code.       SHAJI Santana  [facility]  03/27/24  12:44 EDT

## 2024-03-27 NOTE — CONSULTS
[facility] Consult Note      Patient Name: Gonzales Kaur Sr.  : 1955  MRN: [id number]  Primary Care Physician:  Christiano Bartholomew APRN  Referring Physician: Torey Wisdom MD  Date of admission: 3/27/2024    Subjective     Reason for Consult:   Hyponatremia     HPI:   Gonzales Kaur Sr. is a 68 y.o. male known to have history of hypertension, obstructive sleep apnea, history of COPD and history of prostate cancer who presents to the hospital with urine retention.  Noted that he recently underwent high intensity frequency ultrasound for prostate cancer 1 week prior to today and has his Marcano catheter removed yesterday in the clinic.  Noted inability to pass urine after Marcano removal.  Denies any fever or chills no chest pain or shortness of breath.  Patient presents to the ER and was found to have a sodium of 118, potassium 5.4, CO2 of 20, creatinine 1.02 nephrology was consulted for management.  Marcano catheter was placed in ER    Review of Systems:   14 point review of systems is otherwise negative except for mentioned above on HPI    Personal History     Past Medical History:   Diagnosis Date    Acute non-recurrent maxillary sinusitis 2017    Allergic     BPH (benign prostatic hyperplasia)     Chronic venous hypertension with ulcer     COPD (chronic obstructive pulmonary disease)     History of cellulitis     Right leg    History of kidney stones     Hypertension     Kidney stones     MICHAEL (obstructive sleep apnea)     Prostate cancer 2020    Tobacco use disorder     Urinary tract infection Dont know    Varicose veins        Past Surgical History:   Procedure Laterality Date    COLONOSCOPY      approx     KIDNEY STONE SURGERY      Lithotripsy    PROSTATE SURGERY  2020    None       Family History: family history includes Arthritis in his mother; Breast cancer (age of onset: 74) in his mother; Dementia in his father; Diabetes in his mother; Hypertension in  his father and mother; Liver disease in his brother and sister.    Social History:  reports that he quit smoking about 7 years ago. His smoking use included cigarettes. He started smoking about 48 years ago. He has a 61.5 pack-year smoking history. He has been exposed to tobacco smoke. He has never used smokeless tobacco. He reports that he does not drink alcohol and does not use drugs.    Home Medications:  Prior to Admission medications    Medication Sig Start Date End Date Taking? Authorizing Provider   albuterol sulfate  (90 Base) MCG/ACT inhaler Inhale 2 puffs.   Yes ProviderAsia MD   azelastine (ASTELIN) 0.1 % nasal spray 2 sprays into the nostril(s) as directed by provider 2 (Two) Times a Day. Use in each nostril as directed 2/12/24  Yes Christiano Bartholomew APRN   Breztri Aerosphere 160-9-4.8 MCG/ACT aerosol inhaler  9/30/22  Yes ProviderAsia MD   fluticasone (FLONASE) 50 MCG/ACT nasal spray 2 sprays into the nostril(s) as directed by provider Daily. 2/26/24  Yes Christiano Bartholomew APRN   lisinopril (PRINIVIL,ZESTRIL) 20 MG tablet Take 1 tablet by mouth Daily. 1/4/24  Yes Christiano Bartholomew APRN   loratadine (CLARITIN) 10 MG tablet Take 1 tablet by mouth Daily. 2/26/24  Yes Christiano Bartholomew APRN   multivitamin with minerals tablet tablet Take 1 tablet by mouth Daily.   Yes ProviderAsia MD       Allergies:  No Known Allergies    Objective     Vitals:   Temp:  [96 °F (35.6 °C)-97.7 °F (36.5 °C)] 97.7 °F (36.5 °C)  Heart Rate:  [78-90] 88  Resp:  [18-20] 18  BP: (137-151)/(63-93) 151/63    Intake/Output Summary (Last 24 hours) at 3/27/2024 1750  Last data filed at 3/27/2024 1714  Gross per 24 hour   Intake 240 ml   Output 1000 ml   Net -760 ml       Physical Exam:   Constitutional: Awake, alert, no acute distress.  HEENT: Sclera anicteric, no conjunctival injection  Neck: Supple, no thyromegaly, no lymphadenopathy, trachea at midline, no JVD  Respiratory: Clear to auscultation bilaterally,  nonlabored respiration  Cardiovascular: RRR, no murmurs, no rubs or gallops, no carotid bruit  Gastrointestinal: Positive bowel sounds, abdomen is soft, nontender and nondistended  : No palpable bladder  Musculoskeletal: No edema, no clubbing or cyanosis  Psychiatric: Appropriate affect, cooperative  Neurologic: Oriented x3, moving all extremities, normal speech and mental status  Skin: Warm and dry       Scheduled Meds:     azelastine, 2 spray, Each Nare, BID  Budeson-Glycopyrrol-Formoterol, 1 puff, Inhalation, Daily - RT  cetirizine, 10 mg, Oral, Daily  fluticasone, 2 spray, Nasal, Daily  lisinopril, 20 mg, Oral, Daily      IV Meds:        Results Reviewed:   I have personally reviewed the results from the time of this admission to 3/27/2024 17:50 EDT     Lab Results   Component Value Date    GLUCOSE 131 (H) 03/27/2024    CALCIUM 8.0 (L) 03/27/2024     (C) 03/27/2024    K 4.9 03/27/2024    CO2 18.4 (L) 03/27/2024    CL 86 (L) 03/27/2024    BUN 15 03/27/2024    CREATININE 0.87 03/27/2024    EGFRIFAFRI 99 11/08/2021    EGFRIFNONA 86 11/08/2021    BCR 17.2 03/27/2024    ANIONGAP 14.6 03/27/2024      Lab Results   Component Value Date    MG 2.2 11/30/2023    PHOS 3.0 03/27/2024    ALBUMIN 3.4 (L) 03/27/2024           Assessment / Plan     ASSESSMENT:  Acute hyponatremia likely secondary to SIADH triggered by bladder distention and pain this is exacerbated by excessive fluid intake.  Lisinopril is on differential diagnosis but his sodium level was normal 1 week ago while he was on lisinopril.  High urine sodium and high urine osmolality in favor of ADH.  History of hypertension on lisinopril.  Blood pressure acceptable  History of prostate cancer and BPH status post high intensity frequency ablation  Urinary retention status post Marcano catheter insertion  Leukocytosis likely secondary to urinary retention cannot rule out UTI        PLAN:  Will place patient on fluid restriction of 1500 cc per 24 hours  Will  check cortisol and TSH for completion  We will start gentle IV hydration given high risk of postobstructive polyuria  Labs in a.m.      Thank you for involving us in the care of Gonzales Kaur Sr..  Please feel free to call with any questions.    John Danielle MD  03/27/24  17:50 EDT    [facility]  [phone]    Parts of this note may be an electronic transcription/translation of spoken language to printed text using the Dragon dictation system.

## 2024-03-27 NOTE — ED NOTES
Pt to ER via PV from home for urinary retention x nausea. PT hd prostate surgery Monday and had his catheter removed yesterday with minimal output

## 2024-03-27 NOTE — CONSULTS
[facility] CONSULT      Patient Identification:  NAME:  Gonzales Kaur Sr.  Age:  68 y.o.   Sex:  male   :  1955   MRN:  [id number]     Chief complaint: Difficulty urinating    History of present illness:      Pt is a 68 y.o. male with a history of BPH with LUTS, kidney stones and prostate cancer s/p HIFU on 3/21/24 that presented to the ED on 3/27/24 with complaints of urinary retention and nausea. Cassidy catheter was removed in office yesterday. Reports dribbling of urine since cassidy removed. Patient with low volume void in ED. Emptied 20 cc of urine with PVR of 215 cc. A 16 fr coude catheter was placed in the ED with over 500 cc of urine return. Urology was consulted for evaluation and treatment of post-operative urinary retention. Reports difficulty urinating. Patient denies dysuria, fever, chills, vomiting, flank pain, gross hematuria or abdominal pain. Patient currently sees Dr. Wynn with [facility].     In hospital:  -AVSS, good UOP  -WBC - 17.59  -Creat - 1.02  -UA - Negative leukocytes, negative nitrites, 21-50 RBC, 3-5 WBC, no bacteria  -Blood culture - Pending      Past medical history:  Past Medical History:   Diagnosis Date    Acute non-recurrent maxillary sinusitis 2017    Allergic     BPH (benign prostatic hyperplasia)     Chronic venous hypertension with ulcer     COPD (chronic obstructive pulmonary disease)     History of cellulitis     Right leg    History of kidney stones     Hypertension     Kidney stones     MICHAEL (obstructive sleep apnea)     Prostate cancer 2020    Tobacco use disorder     Urinary tract infection Dont know    Varicose veins        Past surgical history:  Past Surgical History:   Procedure Laterality Date    COLONOSCOPY      approx     KIDNEY STONE SURGERY      Lithotripsy    PROSTATE SURGERY  2020    None       Allergies:  Patient has no known allergies.    Home medications:  Medications Prior to Admission   Medication Sig Dispense  Refill Last Dose    albuterol sulfate  (90 Base) MCG/ACT inhaler Inhale 2 puffs.   3/27/2024    azelastine (ASTELIN) 0.1 % nasal spray 2 sprays into the nostril(s) as directed by provider 2 (Two) Times a Day. Use in each nostril as directed 30 mL 2 3/26/2024    Breztri Aerosphere 160-9-4.8 MCG/ACT aerosol inhaler    3/27/2024    fluticasone (FLONASE) 50 MCG/ACT nasal spray 2 sprays into the nostril(s) as directed by provider Daily. 15.8 mL 2 3/26/2024    lisinopril (PRINIVIL,ZESTRIL) 20 MG tablet Take 1 tablet by mouth Daily. 90 tablet 3 3/27/2024    loratadine (CLARITIN) 10 MG tablet Take 1 tablet by mouth Daily. 30 tablet 2 Past Week    multivitamin with minerals tablet tablet Take 1 tablet by mouth Daily.   3/27/2024        Hospital medications:  azelastine, 2 spray, Each Nare, BID  Budeson-Glycopyrrol-Formoterol, 1 puff, Inhalation, Daily - RT  cetirizine, 10 mg, Oral, Daily  fluticasone, 2 spray, Nasal, Daily  lisinopril, 20 mg, Oral, Daily             Family history:  Family History   Problem Relation Age of Onset    Diabetes Mother     Arthritis Mother     Hypertension Mother     Breast cancer Mother 74    Dementia Father     Hypertension Father     Liver disease Sister     Liver disease Brother        Social history:  Social History     Tobacco Use    Smoking status: Former     Current packs/day: 0.00     Average packs/day: 1.5 packs/day for 41.0 years (61.5 ttl pk-yrs)     Types: Cigarettes     Start date: 1976     Quit date: 2017     Years since quittin.2     Passive exposure: Past    Smokeless tobacco: Never    Tobacco comments:     Stopped use .   Vaping Use    Vaping status: Never Used   Substance Use Topics    Alcohol use: No    Drug use: No       Review of systems:      12 point negative except as in HPI    Objective:  TMax 24 hours:   Temp (24hrs), Av.9 °F (36.1 °C), Min:96 °F (35.6 °C), Max:97.7 °F (36.5 °C)      Vitals Ranges:   Temp:  [96 °F (35.6 °C)-97.7 °F (36.5 °C)] 97.7  °F (36.5 °C)  Heart Rate:  [78-90] 88  Resp:  [18-20] 18  BP: (137-151)/(63-93) 151/63    Intake/Output Last 3 shifts:  No intake/output data recorded.     Physical Exam:    General Appearance:    Alert, cooperative, NAD   Back:     No CVA tenderness   Lungs:     Respirations unlabored, no wheezing   Abdomen:  :      Soft, NDNT, no masses, no guarding    Marcano catheter in place draining clear, yellow urine; bladder non-distended, non-tender   Neuro/Psych:   Orientation intact, mood/affect pleasant       Results review:   I reviewed the patient's new clinical results.    Data review:  Lab Results (last 24 hours)       Procedure Component Value Units Date/Time    Lactic Acid, Plasma [[id number]]  (Normal) Collected: 03/27/24 1124    Specimen: Blood from Arm, Right Updated: 03/27/24 1156     Lactate 1.1 mmol/L     Renal Function Panel [[id number]]  (Abnormal) Collected: 03/27/24 0913    Specimen: Blood Updated: 03/27/24 1152     Glucose 105 mg/dL      BUN 17 mg/dL      Creatinine 1.02 mg/dL      Sodium 118 mmol/L      Potassium 5.4 mmol/L      Chloride 85 mmol/L      CO2 20.0 mmol/L      Calcium 8.6 mg/dL      Albumin 3.9 g/dL      Phosphorus 3.0 mg/dL      Anion Gap 13.0 mmol/L      BUN/Creatinine Ratio 16.7     eGFR 80.6 mL/min/1.73     Narrative:      GFR Normal >60  Chronic Kidney Disease <60  Kidney Failure <15      Procalcitonin [[id number]]  (Normal) Collected: 03/27/24 0913    Specimen: Blood Updated: 03/27/24 1135     Procalcitonin 0.04 ng/mL     Narrative:      As a Marker for Sepsis (Non-Neonates):    1. <0.5 ng/mL represents a low risk of severe sepsis and/or septic shock.  2. >2 ng/mL represents a high risk of severe sepsis and/or septic shock.    As a Marker for Lower Respiratory Tract Infections that require antibiotic therapy:    PCT on Admission    Antibiotic Therapy       6-12 Hrs later    >0.5                Strongly Recommended  >0.25 - <0.5        Recommended   0.1 - 0.25          Discouraged         "      Remeasure/reassess PCT  <0.1                Strongly Discouraged     Remeasure/reassess PCT    As 28 day mortality risk marker: \"Change in Procalcitonin Result\" (>80% or <=80%) if Day 0 (or Day 1) and Day 4 values are available. Refer to [email or web address]-pct-[email or web address]    Change in PCT <=80%  A decrease of PCT levels below or equal to 80% defines a positive change in PCT test result representing a higher risk for 28-day all-cause mortality of patients diagnosed with severe sepsis for septic shock.    Change in PCT >80%  A decrease of PCT levels of more than 80% defines a negative change in PCT result representing a lower risk for 28-day all-cause mortality of patients diagnosed with severe sepsis or septic shock.       Blood Culture - Blood, Arm, Right [[id number]] Collected: 03/27/24 1124    Specimen: Blood from Arm, Right Updated: 03/27/24 1132    Blood Culture - Blood, Arm, Left [[id number]] Collected: 03/27/24 1124    Specimen: Blood from Arm, Left Updated: 03/27/24 1132    Uric Acid [[id number]]  (Normal) Collected: 03/27/24 0913    Specimen: Blood Updated: 03/27/24 1109     Uric Acid 5.5 mg/dL     BNP [[id number]]  (Normal) Collected: 03/27/24 0913    Specimen: Blood Updated: 03/27/24 1106     proBNP 42.0 pg/mL     Narrative:      This assay is used as an aid in the diagnosis of individuals suspected of having heart failure. It can be used as an aid in the diagnosis of acute decompensated heart failure (ADHF) in patients presenting with signs and symptoms of ADHF to the emergency department (ED). In addition, NT-proBNP of <300 pg/mL indicates ADHF is not likely.    Age Range Result Interpretation  NT-proBNP Concentration (pg/mL:      <50             Positive            >450                   Gray                 300-450                    Negative             <300    50-75           Positive            >900                  Gray                300-900                  Negative            <300      >75   "           Positive            >1800                  Gray                300-1800                  Negative            <300    Sodium, Urine, Random - Indwelling Urethral Catheter [[id number]] Collected: 03/27/24 1033    Specimen: Urine from Indwelling Urethral Catheter Updated: 03/27/24 1047     Sodium, Urine 144 mmol/L     Narrative:      Reference intervals for random urine have not been established.  Clinical usage is dependent upon physician's interpretation in combination with other laboratory tests.       Osmolality, Urine - Indwelling Urethral Catheter [[id number]] Collected: 03/27/24 1033    Specimen: Urine from Indwelling Urethral Catheter Updated: 03/27/24 1042     Osmolality, Urine 613 mOsm/kg     Narrative:      Osmo Normal Reference Ranges:    Random:  mOsm/kg H2O, depending on fluid intake.  Random: >850 mOsm/kg H20, after 12 hour fluid restriction.    24 Hour: 300-900 mOsm/kg H2O.    Comprehensive Metabolic Panel [[id number]]  (Abnormal) Collected: 03/27/24 0913    Specimen: Blood Updated: 03/27/24 0951     Glucose 103 mg/dL      BUN 17 mg/dL      Creatinine 1.03 mg/dL      Sodium 118 mmol/L      Potassium 5.4 mmol/L      Chloride 86 mmol/L      CO2 21.0 mmol/L      Calcium 8.7 mg/dL      Total Protein 7.4 g/dL      Albumin 3.9 g/dL      ALT (SGPT) 24 U/L      AST (SGOT) 24 U/L      Alkaline Phosphatase 64 U/L      Total Bilirubin 0.4 mg/dL      Globulin 3.5 gm/dL      A/G Ratio 1.1 g/dL      BUN/Creatinine Ratio 16.5     Anion Gap 11.0 mmol/L      eGFR 79.6 mL/min/1.73     Narrative:      GFR Normal >60  Chronic Kidney Disease <60  Kidney Failure <15      Urinalysis, Microscopic Only - Urine, Catheter [[id number]]  (Abnormal) Collected: 03/27/24 0913    Specimen: Urine, Catheter Updated: 03/27/24 0928     RBC, UA 21-50 /HPF      WBC, UA 3-5 /HPF      Bacteria, UA None Seen /HPF      Squamous Epithelial Cells, UA 0-2 /HPF      Hyaline Casts, UA 0-2 /LPF      Methodology Automated Microscopy     Urinalysis With Microscopic If Indicated (No Culture) - Urine, Catheter [[id number]]  (Abnormal) Collected: 03/27/24 0913    Specimen: Urine, Catheter Updated: 03/27/24 0927     Color, UA Yellow     Appearance, UA Clear     pH, UA 6.5     Specific Gravity, UA 1.017     Glucose, UA Negative     Ketones, UA 40 mg/dL (2+)     Bilirubin, UA Negative     Blood, UA Small (1+)     Protein,  mg/dL (2+)     Leuk Esterase, UA Negative     Nitrite, UA Negative     Urobilinogen, UA 1.0 E.U./dL    CBC & Differential [[id number]]  (Abnormal) Collected: 03/27/24 0913    Specimen: Blood Updated: 03/27/24 0923    Narrative:      The following orders were created for panel order CBC & Differential.  Procedure                               Abnormality         Status                     ---------                               -----------         ------                     CBC Auto Differential[[id number]]        Abnormal            Final result                 Please view results for these tests on the individual orders.    CBC Auto Differential [[id number]]  (Abnormal) Collected: 03/27/24 0913    Specimen: Blood Updated: 03/27/24 0923     WBC 17.59 10*3/mm3      RBC 3.71 10*6/mm3      Hemoglobin 12.6 g/dL      Hematocrit 36.4 %      MCV 98.1 fL      MCH 34.0 pg      MCHC 34.6 g/dL      RDW 13.1 %      RDW-SD 47.1 fl      MPV 10.5 fL      Platelets 249 10*3/mm3      Neutrophil % 82.8 %      Lymphocyte % 6.4 %      Monocyte % 7.5 %      Eosinophil % 0.3 %      Basophil % 0.6 %      Immature Grans % 2.4 %      Neutrophils, Absolute 14.57 10*3/mm3      Lymphocytes, Absolute 1.12 10*3/mm3      Monocytes, Absolute 1.32 10*3/mm3      Eosinophils, Absolute 0.06 10*3/mm3      Basophils, Absolute 0.10 10*3/mm3      Immature Grans, Absolute 0.42 10*3/mm3      nRBC 0.0 /100 WBC              Imaging:  Imaging Results (Last 24 Hours)       ** No results found for the last 24 hours. **               Assessment:     Acute urinary retention  BPH  with LUTS  CaP s/p HIFU 3/21/24    Plan:     - No acute urologic surgical procedure recommended  - Continue indwelling cassidy catheter  - Start Tamsulosin 0.4 mg QD  - Recommend cassidy remain in place at discharge  - Will schedule for voiding trial in our [facility] early next week- our office will call to schedule  - Keep scheduled appointment with Dr. Wynn on 4/15/24  - Urology will sign-off. Call for any questions, concerns or clinical change    Tammie Willams, SHAJI  03/27/24  13:37 EDT

## 2024-03-28 LAB
ALBUMIN SERPL-MCNC: 3.8 G/DL (ref 3.5–5.2)
ALBUMIN SERPL-MCNC: 3.9 G/DL (ref 3.5–5.2)
ALBUMIN/GLOB SERPL: 1.1 G/DL
ALP SERPL-CCNC: 63 U/L (ref 39–117)
ALT SERPL W P-5'-P-CCNC: 31 U/L (ref 1–41)
ANION GAP SERPL CALCULATED.3IONS-SCNC: 12.5 MMOL/L (ref 5–15)
ANION GAP SERPL CALCULATED.3IONS-SCNC: 9.9 MMOL/L (ref 5–15)
AST SERPL-CCNC: 33 U/L (ref 1–40)
BILIRUB SERPL-MCNC: 0.2 MG/DL (ref 0–1.2)
BUN SERPL-MCNC: 15 MG/DL (ref 8–23)
BUN SERPL-MCNC: 31 MG/DL (ref 8–23)
BUN/CREAT SERPL: 15.6 (ref 7–25)
BUN/CREAT SERPL: 28.4 (ref 7–25)
CALCIUM SPEC-SCNC: 8.8 MG/DL (ref 8.6–10.5)
CALCIUM SPEC-SCNC: 8.9 MG/DL (ref 8.6–10.5)
CHLORIDE SERPL-SCNC: 86 MMOL/L (ref 98–107)
CHLORIDE SERPL-SCNC: 86 MMOL/L (ref 98–107)
CO2 SERPL-SCNC: 19.5 MMOL/L (ref 22–29)
CO2 SERPL-SCNC: 22.1 MMOL/L (ref 22–29)
CREAT SERPL-MCNC: 0.96 MG/DL (ref 0.76–1.27)
CREAT SERPL-MCNC: 1.09 MG/DL (ref 0.76–1.27)
DEPRECATED RDW RBC AUTO: 45.8 FL (ref 37–54)
EGFRCR SERPLBLD CKD-EPI 2021: 74.4 ML/MIN/1.73
EGFRCR SERPLBLD CKD-EPI 2021: 86.6 ML/MIN/1.73
ERYTHROCYTE [DISTWIDTH] IN BLOOD BY AUTOMATED COUNT: 13 % (ref 12.3–15.4)
GLOBULIN UR ELPH-MCNC: 3.4 GM/DL
GLUCOSE SERPL-MCNC: 108 MG/DL (ref 65–99)
GLUCOSE SERPL-MCNC: 80 MG/DL (ref 65–99)
HCT VFR BLD AUTO: 36 % (ref 37.5–51)
HGB BLD-MCNC: 12.5 G/DL (ref 13–17.7)
MCH RBC QN AUTO: 33.6 PG (ref 26.6–33)
MCHC RBC AUTO-ENTMCNC: 34.7 G/DL (ref 31.5–35.7)
MCV RBC AUTO: 96.8 FL (ref 79–97)
OSMOLALITY UR: 576 MOSM/KG
PHOSPHATE SERPL-MCNC: 3.3 MG/DL (ref 2.5–4.5)
PLATELET # BLD AUTO: 245 10*3/MM3 (ref 140–450)
PMV BLD AUTO: 10.7 FL (ref 6–12)
POTASSIUM SERPL-SCNC: 4.9 MMOL/L (ref 3.5–5.2)
POTASSIUM SERPL-SCNC: 5.2 MMOL/L (ref 3.5–5.2)
PROCALCITONIN SERPL-MCNC: 0.04 NG/ML (ref 0–0.25)
PROT SERPL-MCNC: 7.2 G/DL (ref 6–8.5)
RBC # BLD AUTO: 3.72 10*6/MM3 (ref 4.14–5.8)
SODIUM SERPL-SCNC: 118 MMOL/L (ref 136–145)
SODIUM SERPL-SCNC: 118 MMOL/L (ref 136–145)
SODIUM SERPL-SCNC: 120 MMOL/L (ref 136–145)
SODIUM UR-SCNC: 123 MMOL/L
WBC NRBC COR # BLD AUTO: 19.59 10*3/MM3 (ref 3.4–10.8)

## 2024-03-28 PROCEDURE — 80053 COMPREHEN METABOLIC PANEL: CPT | Performed by: HOSPITALIST

## 2024-03-28 PROCEDURE — 94799 UNLISTED PULMONARY SVC/PX: CPT

## 2024-03-28 PROCEDURE — 25810000003 SODIUM CHLORIDE 3 % SOLUTION: Performed by: HOSPITALIST

## 2024-03-28 PROCEDURE — 84300 ASSAY OF URINE SODIUM: CPT | Performed by: HOSPITALIST

## 2024-03-28 PROCEDURE — 84100 ASSAY OF PHOSPHORUS: CPT | Performed by: HOSPITALIST

## 2024-03-28 PROCEDURE — 85027 COMPLETE CBC AUTOMATED: CPT | Performed by: HOSPITALIST

## 2024-03-28 PROCEDURE — 94761 N-INVAS EAR/PLS OXIMETRY MLT: CPT

## 2024-03-28 PROCEDURE — 84145 PROCALCITONIN (PCT): CPT | Performed by: STUDENT IN AN ORGANIZED HEALTH CARE EDUCATION/TRAINING PROGRAM

## 2024-03-28 PROCEDURE — 25010000002 CEFTRIAXONE PER 250 MG: Performed by: STUDENT IN AN ORGANIZED HEALTH CARE EDUCATION/TRAINING PROGRAM

## 2024-03-28 PROCEDURE — 87040 BLOOD CULTURE FOR BACTERIA: CPT | Performed by: STUDENT IN AN ORGANIZED HEALTH CARE EDUCATION/TRAINING PROGRAM

## 2024-03-28 PROCEDURE — 84295 ASSAY OF SERUM SODIUM: CPT | Performed by: STUDENT IN AN ORGANIZED HEALTH CARE EDUCATION/TRAINING PROGRAM

## 2024-03-28 PROCEDURE — 25010000002 FUROSEMIDE PER 20 MG: Performed by: HOSPITALIST

## 2024-03-28 PROCEDURE — 83935 ASSAY OF URINE OSMOLALITY: CPT | Performed by: HOSPITALIST

## 2024-03-28 RX ORDER — FUROSEMIDE 10 MG/ML
20 INJECTION INTRAMUSCULAR; INTRAVENOUS ONCE
Status: COMPLETED | OUTPATIENT
Start: 2024-03-28 | End: 2024-03-28

## 2024-03-28 RX ORDER — 3% SODIUM CHLORIDE 3 G/100ML
40 INJECTION, SOLUTION INTRAVENOUS CONTINUOUS
Status: DISPENSED | OUTPATIENT
Start: 2024-03-28 | End: 2024-03-28

## 2024-03-28 RX ORDER — DEXTROSE MONOHYDRATE 50 MG/ML
6 INJECTION, SOLUTION INTRAVENOUS CONTINUOUS PRN
Status: DISCONTINUED | OUTPATIENT
Start: 2024-03-28 | End: 2024-03-31 | Stop reason: HOSPADM

## 2024-03-28 RX ADMIN — CETIRIZINE HYDROCHLORIDE 10 MG: 10 TABLET ORAL at 08:41

## 2024-03-28 RX ADMIN — Medication 15 G: at 10:39

## 2024-03-28 RX ADMIN — LISINOPRIL 20 MG: 20 TABLET ORAL at 08:41

## 2024-03-28 RX ADMIN — FUROSEMIDE 20 MG: 10 INJECTION, SOLUTION INTRAMUSCULAR; INTRAVENOUS at 20:39

## 2024-03-28 RX ADMIN — FLUTICASONE PROPIONATE 2 SPRAY: 50 SPRAY, METERED NASAL at 08:41

## 2024-03-28 RX ADMIN — Medication 15 G: at 20:39

## 2024-03-28 RX ADMIN — AZELASTINE HYDROCHLORIDE 2 SPRAY: 137 SPRAY, METERED NASAL at 20:40

## 2024-03-28 RX ADMIN — SODIUM CHLORIDE 40 ML/HR: 3 INJECTION, SOLUTION INTRAVENOUS at 17:29

## 2024-03-28 RX ADMIN — SODIUM CHLORIDE 40 ML/HR: 3 INJECTION, SOLUTION INTRAVENOUS at 08:45

## 2024-03-28 RX ADMIN — CEFTRIAXONE 2000 MG: 2 INJECTION, POWDER, FOR SOLUTION INTRAMUSCULAR; INTRAVENOUS at 12:06

## 2024-03-28 NOTE — PROGRESS NOTES
Name: Gonzales Kaur . ADMIT: 3/27/2024   : 1955  PCP: Christiano Bartholomew APRN    MRN: [id number] LOS: 0 days   AGE/SEX: 68 y.o. male  ROOM: [facility]     Subjective     He does not have any acute complaints.  His sodium did remain at 118.  Hypertonic saline has been given however it remained at 118 upon repeat check    X-ray showed patchy areas of right mid to lower lung as well as left suprahilar lesion consistent with bilateral pneumonia.    Objective   Objective   Vital Signs  Temp:  [97.5 °F (36.4 °C)-98.6 °F (37 °C)] 98.1 °F (36.7 °C)  Heart Rate:  [80-87] 81  Resp:  [16] 16  BP: (108-159)/(58-70) 108/58  SpO2:  [92 %-95 %] 92 %  on   ;   Device (Oxygen Therapy): room air  Body mass index is 29.95 kg/m².  Physical Exam  Constitutional:       General: He is not in acute distress.  HENT:      Head: Normocephalic and atraumatic.   Eyes:      Extraocular Movements: Extraocular movements intact.      Pupils: Pupils are equal, round, and reactive to light.   Cardiovascular:      Rate and Rhythm: Normal rate and regular rhythm.   Pulmonary:      Effort: Pulmonary effort is normal. No respiratory distress.   Abdominal:      General: There is no distension.      Palpations: Abdomen is soft.      Tenderness: There is no abdominal tenderness.   Skin:     General: Skin is warm and dry.   Neurological:      General: No focal deficit present.      Mental Status: He is alert and oriented to person, place, and time.         Results Review     I reviewed the patient's new clinical results.  Results from last 7 days   Lab Units 24  0550 24  0913   WBC 10*3/mm3 19.59* 17.59*   HEMOGLOBIN g/dL 12.5* 12.6*   PLATELETS 10*3/mm3 245 249     Results from last 7 days   Lab Units 24  1445 24  0550 24  2252 24  1607 24  1421   SODIUM mmol/L 118* 118* 118* 117* 119*   POTASSIUM mmol/L 5.2 4.9  --  5.1 4.9   CHLORIDE mmol/L 86* 86*  --  85* 86*   CO2 mmol/L 22.1 19.5*  --  22.0 18.4*   BUN  mg/dL 31* 15  --  15 15   CREATININE mg/dL 1.09 0.96  --  0.95 0.87   GLUCOSE mg/dL 80 108*  --  99 131*   Estimated Creatinine Clearance: 64.6 mL/min (by C-G formula based on SCr of 1.09 mg/dL).  Results from last 7 days   Lab Units 03/28/24  1445 03/28/24  0550 03/27/24  1607 03/27/24  1421 03/27/24  0913   ALBUMIN g/dL 3.8 3.9 3.8 3.4* 3.9  3.9   BILIRUBIN mg/dL 0.2  --   --   --  0.4   ALK PHOS U/L 63  --   --   --  64   AST (SGOT) U/L 33  --   --   --  24   ALT (SGPT) U/L 31  --   --   --  24     Results from last 7 days   Lab Units 03/28/24  1445 03/28/24  0550 03/27/24  1607 03/27/24  1421 03/27/24  0913   CALCIUM mg/dL 8.9 8.8 8.1* 8.0* 8.6  8.7   ALBUMIN g/dL 3.8 3.9 3.8 3.4* 3.9  3.9   PHOSPHORUS mg/dL  --  3.3 2.9 3.0 3.0     Results from last 7 days   Lab Units 03/28/24  0550 03/27/24  1124 03/27/24  0913   PROCALCITONIN ng/mL 0.04  --  0.04   LACTATE mmol/L  --  1.1  --      COVID19   Date Value Ref Range Status   03/27/2024 Not Detected Not Detected - Ref. Range Final     SARS-CoV-2, ALONDRA   Date Value Ref Range Status   12/03/2021 Not Detected Not Detected Final     Comment:     This nucleic acid amplification test was developed and its performance  characteristics determined by [employer]. Nucleic acid  amplification tests include RT-PCR and TMA. This test has not been  FDA cleared or approved. This test has been authorized by FDA under  an Emergency Use Authorization (EUA). This test is only authorized  for the duration of time the declaration that circumstances exist  justifying the authorization of the emergency use of in vitro  diagnostic tests for detection of SARS-CoV-2 virus and/or diagnosis  of COVID-19 infection under section 564(b)(1) of the Act, 21 U.S.C.  360bbb-3(b) (1), unless the authorization is terminated or revoked  sooner.  When diagnostic testing is negative, the possibility of a false  negative result should be considered in the context of a patient's  recent exposures  "and the presence of clinical signs and symptoms  consistent with COVID-19. An individual without symptoms of COVID-19  and who is not shedding SARS-CoV-2 virus would expect to have a  negative (not detected) result in this assay.       No results found for: \"HGBA1C\", \"POCGLU\"  Results for orders placed or performed during the hospital encounter of 03/27/24   Blood Culture - Blood, Arm, Left    Specimen: Arm, Left; Blood   Result Value Ref Range    Blood Culture No growth at 24 hours          XR Chest 1 View  Narrative: XR CHEST 1 VW-3/27/2024     HISTORY: Leukocytosis.     Heart size is within normal limits. There is mild patchy increased  density in the right mid to lower lung which is new since the 6/29/2022  study. There are some minimal patchy increased density in the left  midlung there is mild left perihilar stranding which appears similar to  the 6/29/2022 study.     Impression: 1. Patchy areas of increased density in the right mid to lower lung and  left suprahilar region are consistent with bilateral pneumonia.  2. Please correlate with the clinical findings. Short-term follow-up  films recommended as well.        This report was finalized on 3/27/2024 4:03 PM by Dr. Hollis Falcon M.D on Workstation: [id number]       Scheduled Medications  azelastine, 2 spray, Each Nare, BID  Budeson-Glycopyrrol-Formoterol, 2 puff, Inhalation, BID - RT  cefTRIAXone, 2,000 mg, Intravenous, Q24H  cetirizine, 10 mg, Oral, Daily  fluticasone, 2 spray, Nasal, Daily  lisinopril, 20 mg, Oral, Daily  Urea, 15 g, Oral, BID    Infusions  dextrose, 6 mL/kg (Ideal)  [Held by provider] sodium chloride, 75 mL/hr, Last Rate: Stopped (03/27/24 1929)    Diet  Diet: Regular/House, Renal, Fluid Restriction (240 mL/tray); Low Potassium; Other (Specify mL/day) (1200 mL/day); Texture: Regular (IDDSI 7); Fluid Consistency: Thin (IDDSI 0)       Assessment/Plan     Active Hospital Problems    Diagnosis  POA    **Hyponatremia [E87.1]  Yes    " Prostate cancer [C61]  Yes    Acute urinary retention [R33.8]  Yes    Leukocytosis [D72.829]  Yes    Essential hypertension [I10]  Yes    MICHAEL (obstructive sleep apnea) [G47.33]  Yes    COPD (chronic obstructive pulmonary disease) with emphysema [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       68 y.o. male admitted with Hyponatremia.    Hyponatremia  Hypochloremia  In the setting of urological procedure however this occurred about 1 week ago. sodium level on 3/15 was normal at 136.  Neurology has been consulted.  Urine osmolality, serum osmolality, urine sodium  Urine sodium 123, urine osmolality 576, serum osmolality 246-findings consistent with SIADH.  TSH and cortisol within normal limits  Urea sodium started    Pneumonia  Noted on chest x-ray.  Procalcitonin is negative however he has worsening leukocytosis and SIADH does raise suspicion for a pulmonary process.  Obtain CT of the chest without contrast and start ceftriaxone.    Blood cultures are no growth to date.       Leukocytosis  Urinalysis was obtained which only had 3-5 WBCs without any bacteria.  Procalcitonin is also negative.  Suspect that this is a reactive response.  Continue to trend in the morning     Prostate cancer status post HIFU  Urinary Retention  Marcano catheter inserted, urology consulted.  No need for any surgical intervention at this time     COPD  Continue home inhaler     Hypertension  Lisinopril was restarted.  I am going to hold it for now as he did have a low reading.  Can be reinitiated in the morning.      SCDs for DVT prophylaxis.  Full code.  Discussed with patient and nursing staff.  Anticipate discharge home later this week      Carlton Mojica MD  [facility]  03/28/24  15:26 EDT

## 2024-03-28 NOTE — PLAN OF CARE
Problem: Adult Inpatient Plan of Care  Goal: Plan of Care Review  Outcome: Ongoing, Progressing  Flowsheets (Taken 3/28/2024 1700)  Progress: improving  Plan of Care Reviewed With: patient  Goal: Patient-Specific Goal (Individualized)  Outcome: Ongoing, Progressing  Goal: Absence of Hospital-Acquired Illness or Injury  Outcome: Ongoing, Progressing  Intervention: Identify and Manage Fall Risk  Recent Flowsheet Documentation  Taken 3/28/2024 1600 by Humaira Storey RN  Safety Promotion/Fall Prevention:   activity supervised   assistive device/personal items within reach  Taken 3/28/2024 1400 by Humaira Storey RN  Safety Promotion/Fall Prevention:   assistive device/personal items within reach   activity supervised   clutter free environment maintained   fall prevention program maintained   nonskid shoes/slippers when out of bed   safety round/check completed  Taken 3/28/2024 1200 by Humaira Storey RN  Safety Promotion/Fall Prevention:   assistive device/personal items within reach   activity supervised   clutter free environment maintained   fall prevention program maintained   nonskid shoes/slippers when out of bed   safety round/check completed  Taken 3/28/2024 1000 by Humaira Storey RN  Safety Promotion/Fall Prevention:   activity supervised   assistive device/personal items within reach   clutter free environment maintained   fall prevention program maintained   nonskid shoes/slippers when out of bed   safety round/check completed  Taken 3/28/2024 0800 by Humaira Storey RN  Safety Promotion/Fall Prevention:   activity supervised   clutter free environment maintained   assistive device/personal items within reach   fall prevention program maintained   nonskid shoes/slippers when out of bed   safety round/check completed  Intervention: Prevent and Manage VTE (Venous Thromboembolism) Risk  Recent Flowsheet Documentation  Taken 3/28/2024 1600 by Humaira Storey, RN  Activity Management: activity  encouraged  Taken 3/28/2024 1400 by Humaira Storey RN  Activity Management: activity encouraged  Taken 3/28/2024 1200 by Humaira Storey RN  Activity Management: activity encouraged  Taken 3/28/2024 1000 by Humaira Storey RN  Activity Management: activity encouraged  Taken 3/28/2024 0800 by Humaira Storey RN  Activity Management: activity encouraged  VTE Prevention/Management:   compression stockings on   sequential compression devices on  Intervention: Prevent Infection  Recent Flowsheet Documentation  Taken 3/28/2024 1600 by Humaira Storey RN  Infection Prevention: single patient room provided  Taken 3/28/2024 1400 by Humaira Storey RN  Infection Prevention: single patient room provided  Taken 3/28/2024 1200 by Humaira Storey RN  Infection Prevention: single patient room provided  Taken 3/28/2024 1000 by Humaira Storey RN  Infection Prevention: single patient room provided  Taken 3/28/2024 0800 by Humaira Storey RN  Infection Prevention: single patient room provided  Goal: Optimal Comfort and Wellbeing  Outcome: Ongoing, Progressing  Intervention: Provide Person-Centered Care  Recent Flowsheet Documentation  Taken 3/28/2024 1400 by Humaira Storey RN  Trust Relationship/Rapport:   care explained   choices provided  Taken 3/28/2024 0800 by Humaira Storey RN  Trust Relationship/Rapport:   care explained   choices provided  Goal: Readiness for Transition of Care  Outcome: Ongoing, Progressing   Goal Outcome Evaluation:  Plan of Care Reviewed With: patient        Progress: improving

## 2024-03-28 NOTE — PLAN OF CARE
Problem: Adult Inpatient Plan of Care  Goal: Plan of Care Review  Outcome: Ongoing, Progressing  Flowsheets (Taken 3/28/2024 6507)  Progress: no change  Plan of Care Reviewed With: patient  Patient from ER with hyponatremia. Patient had multiple complaints overnight. Complained of heartburn relieved by tums. Complains of sore throat, cough drops at bs. Hypertonic saline given, NA rechecked 118. Awaiting next recheck. A&O x4. VSS. Marcano remains in place. All needs met.

## 2024-03-28 NOTE — PROGRESS NOTES
[facility] Progress Note      Patient Name: Gonzales Kaur Sr.  : 1955  MRN: [id number]  Primary Care Physician:  Christiano Bartholomew APRN  Date of admission: 3/27/2024    Subjective     Interval History:   The patient was seen and examined today for follow-up on hyponatremia.    Patient stated doing well.  No chest pain, shortness of breath, fever or chills, nausea vomiting diarrhea.  No difficulty urinating, UOP 2.2 L yesterday.    Review of Systems:   As noted above    Objective     Vitals:   Temp:  [97.5 °F (36.4 °C)-98.6 °F (37 °C)] 97.5 °F (36.4 °C)  Heart Rate:  [78-88] 86  Resp:  [16-18] 16  BP: (135-159)/(63-86) 159/67    Intake/Output Summary (Last 24 hours) at 3/28/2024 0920  Last data filed at 3/28/2024 0531  Gross per 24 hour   Intake 450 ml   Output 2200 ml   Net -1750 ml       Physical Exam:    General Appearance: alert, awake, no acute distress   Skin: warm and dry  HEENT: oral mucosa normal, nonicteric sclera  Neck: supple, no JVD  Lungs: Clear bilaterally, nonlabored breathing on RA  Heart: RRR, normal S1 and S2  Abdomen: soft, nontender, nondistended  : no palpable bladder, Marcano catheter in place  Extremities: no edema, cyanosis or clubbing  Neuro: normal speech and mental status     Scheduled Meds:     azelastine, 2 spray, Each Nare, BID  Budeson-Glycopyrrol-Formoterol, 2 puff, Inhalation, BID - RT  cetirizine, 10 mg, Oral, Daily  fluticasone, 2 spray, Nasal, Daily  lisinopril, 20 mg, Oral, Daily  Urea, 15 g, Oral, BID      IV Meds:   dextrose, 6 mL/kg (Ideal)  sodium chloride, 40 mL/hr, Last Rate: 40 mL/hr (24 0845)  [Held by provider] sodium chloride, 75 mL/hr, Last Rate: Stopped (24)        Results Reviewed:   I have personally reviewed the results from the time of this admission to 3/28/2024 09:20 EDT     Results from last 7 days   Lab Units 24  0550 24  2252 24  1607 24  1421 24  0913   SODIUM mmol/L 118*  118* 117* 119* 118*  118*   POTASSIUM mmol/L 4.9  --  5.1 4.9 5.4*  5.4*   CHLORIDE mmol/L 86*  --  85* 86* 85*  86*   CO2 mmol/L 19.5*  --  22.0 18.4* 20.0*  21.0*   BUN mg/dL 15  --  15 15 17  17   CREATININE mg/dL 0.96  --  0.95 0.87 1.02  1.03   CALCIUM mg/dL 8.8  --  8.1* 8.0* 8.6  8.7   BILIRUBIN mg/dL  --   --   --   --  0.4   ALK PHOS U/L  --   --   --   --  64   ALT (SGPT) U/L  --   --   --   --  24   AST (SGOT) U/L  --   --   --   --  24   GLUCOSE mg/dL 108*  --  99 131* 105*  103*       Estimated Creatinine Clearance: 73.4 mL/min (by C-G formula based on SCr of 0.96 mg/dL).    Results from last 7 days   Lab Units 03/28/24  0550 03/27/24  1607 03/27/24  1421   PHOSPHORUS mg/dL 3.3 2.9 3.0       Results from last 7 days   Lab Units 03/27/24  0913   URIC ACID mg/dL 5.5       Results from last 7 days   Lab Units 03/28/24  0550 03/27/24  0913   WBC 10*3/mm3 19.59* 17.59*   HEMOGLOBIN g/dL 12.5* 12.6*   PLATELETS 10*3/mm3 245 249             Assessment / Plan     ASSESSMENT:  Acute hyponatremia likely secondary to SIADH triggered by bladder distention and pain this is exacerbated by excessive fluid intake.  Lisinopril is on differential diagnosis but his sodium level was normal 1 week ago while he was on lisinopril.  High urine sodium and high urine osmolality in favor of SIADH.  Sodium this morning remains to be low despite receiving hypertonic solution yesterday.  History of hypertension on lisinopril.  Blood pressure acceptable  History of prostate cancer and BPH status post high intensity frequency ablation  Urinary retention status post Marcano catheter insertion  Leukocytosis likely secondary to urinary retention cannot rule out UTI        PLAN:  Increase with fluid restriction of 1200 cc per 24 hours  Will give 3% normal saline again at 40 cc/h x 3 hours, with a repeat sodium at 1400.  We will stop Lisinopril as could be contributing to dual hyponatremia  May consider adding Lasix to hypertonic  saline if he continues to be resistant to treatment.  We will add urea 15 g twice daily  Repeat urine Sodium and urine osmolality   Seizure precautions.  Surveillance labs      Thank you for involving us in the care of Gonzales Kaur Sr..  Please feel free to call with any questions.    John Danielle MD  03/28/24  09:20 EDT    [facility]  [phone]    Parts of this note may be an electronic transcription/translation of spoken language to printed text using the Dragon dictation system.

## 2024-03-29 ENCOUNTER — APPOINTMENT (OUTPATIENT)
Dept: CT IMAGING | Facility: HOSPITAL | Age: 69
DRG: 643 | End: 2024-03-29
Payer: MEDICARE

## 2024-03-29 LAB
ALBUMIN SERPL-MCNC: 3.7 G/DL (ref 3.5–5.2)
ALBUMIN SERPL-MCNC: 3.8 G/DL (ref 3.5–5.2)
ANION GAP SERPL CALCULATED.3IONS-SCNC: 10.6 MMOL/L (ref 5–15)
ANION GAP SERPL CALCULATED.3IONS-SCNC: 9 MMOL/L (ref 5–15)
BASOPHILS # BLD AUTO: 0.14 10*3/MM3 (ref 0–0.2)
BASOPHILS NFR BLD AUTO: 0.9 % (ref 0–1.5)
BUN SERPL-MCNC: 38 MG/DL (ref 8–23)
BUN SERPL-MCNC: 38 MG/DL (ref 8–23)
BUN/CREAT SERPL: 37.3 (ref 7–25)
BUN/CREAT SERPL: 40 (ref 7–25)
CALCIUM SPEC-SCNC: 8.9 MG/DL (ref 8.6–10.5)
CALCIUM SPEC-SCNC: 9.1 MG/DL (ref 8.6–10.5)
CHLORIDE SERPL-SCNC: 90 MMOL/L (ref 98–107)
CHLORIDE SERPL-SCNC: 90 MMOL/L (ref 98–107)
CO2 SERPL-SCNC: 23 MMOL/L (ref 22–29)
CO2 SERPL-SCNC: 23.4 MMOL/L (ref 22–29)
CREAT SERPL-MCNC: 0.95 MG/DL (ref 0.76–1.27)
CREAT SERPL-MCNC: 1.02 MG/DL (ref 0.76–1.27)
DEPRECATED RDW RBC AUTO: 46.6 FL (ref 37–54)
EGFRCR SERPLBLD CKD-EPI 2021: 80.6 ML/MIN/1.73
EGFRCR SERPLBLD CKD-EPI 2021: 87.7 ML/MIN/1.73
EOSINOPHIL # BLD AUTO: 0.03 10*3/MM3 (ref 0–0.4)
EOSINOPHIL NFR BLD AUTO: 0.2 % (ref 0.3–6.2)
ERYTHROCYTE [DISTWIDTH] IN BLOOD BY AUTOMATED COUNT: 13 % (ref 12.3–15.4)
GLUCOSE SERPL-MCNC: 110 MG/DL (ref 65–99)
GLUCOSE SERPL-MCNC: 89 MG/DL (ref 65–99)
HCT VFR BLD AUTO: 35.2 % (ref 37.5–51)
HGB BLD-MCNC: 12.2 G/DL (ref 13–17.7)
IMM GRANULOCYTES # BLD AUTO: 0.46 10*3/MM3 (ref 0–0.05)
IMM GRANULOCYTES NFR BLD AUTO: 2.8 % (ref 0–0.5)
LYMPHOCYTES # BLD AUTO: 1.45 10*3/MM3 (ref 0.7–3.1)
LYMPHOCYTES NFR BLD AUTO: 9 % (ref 19.6–45.3)
MAGNESIUM SERPL-MCNC: 2.3 MG/DL (ref 1.6–2.4)
MCH RBC QN AUTO: 33.7 PG (ref 26.6–33)
MCHC RBC AUTO-ENTMCNC: 34.7 G/DL (ref 31.5–35.7)
MCV RBC AUTO: 97.2 FL (ref 79–97)
MONOCYTES # BLD AUTO: 2.01 10*3/MM3 (ref 0.1–0.9)
MONOCYTES NFR BLD AUTO: 12.4 % (ref 5–12)
MRSA DNA SPEC QL NAA+PROBE: NORMAL
NEUTROPHILS NFR BLD AUTO: 12.07 10*3/MM3 (ref 1.7–7)
NEUTROPHILS NFR BLD AUTO: 74.7 % (ref 42.7–76)
NRBC BLD AUTO-RTO: 0 /100 WBC (ref 0–0.2)
PHOSPHATE SERPL-MCNC: 3.3 MG/DL (ref 2.5–4.5)
PHOSPHATE SERPL-MCNC: 3.8 MG/DL (ref 2.5–4.5)
PLATELET # BLD AUTO: 218 10*3/MM3 (ref 140–450)
PMV BLD AUTO: 11 FL (ref 6–12)
POTASSIUM SERPL-SCNC: 4.4 MMOL/L (ref 3.5–5.2)
POTASSIUM SERPL-SCNC: 4.8 MMOL/L (ref 3.5–5.2)
RBC # BLD AUTO: 3.62 10*6/MM3 (ref 4.14–5.8)
SODIUM SERPL-SCNC: 122 MMOL/L (ref 136–145)
SODIUM SERPL-SCNC: 124 MMOL/L (ref 136–145)
WBC NRBC COR # BLD AUTO: 16.16 10*3/MM3 (ref 3.4–10.8)

## 2024-03-29 PROCEDURE — 94761 N-INVAS EAR/PLS OXIMETRY MLT: CPT

## 2024-03-29 PROCEDURE — 87641 MR-STAPH DNA AMP PROBE: CPT | Performed by: STUDENT IN AN ORGANIZED HEALTH CARE EDUCATION/TRAINING PROGRAM

## 2024-03-29 PROCEDURE — 25010000002 CEFTRIAXONE PER 250 MG: Performed by: STUDENT IN AN ORGANIZED HEALTH CARE EDUCATION/TRAINING PROGRAM

## 2024-03-29 PROCEDURE — 85025 COMPLETE CBC W/AUTO DIFF WBC: CPT | Performed by: HOSPITALIST

## 2024-03-29 PROCEDURE — 71250 CT THORAX DX C-: CPT

## 2024-03-29 PROCEDURE — 83735 ASSAY OF MAGNESIUM: CPT | Performed by: HOSPITALIST

## 2024-03-29 PROCEDURE — 80069 RENAL FUNCTION PANEL: CPT | Performed by: HOSPITALIST

## 2024-03-29 PROCEDURE — 94799 UNLISTED PULMONARY SVC/PX: CPT

## 2024-03-29 RX ADMIN — FLUTICASONE PROPIONATE 2 SPRAY: 50 SPRAY, METERED NASAL at 08:28

## 2024-03-29 RX ADMIN — Medication 30 G: at 20:22

## 2024-03-29 RX ADMIN — AZELASTINE HYDROCHLORIDE 2 SPRAY: 137 SPRAY, METERED NASAL at 08:28

## 2024-03-29 RX ADMIN — CETIRIZINE HYDROCHLORIDE 10 MG: 10 TABLET ORAL at 08:28

## 2024-03-29 RX ADMIN — AZELASTINE HYDROCHLORIDE 2 SPRAY: 137 SPRAY, METERED NASAL at 20:22

## 2024-03-29 RX ADMIN — Medication 15 G: at 08:28

## 2024-03-29 RX ADMIN — CEFTRIAXONE 2000 MG: 2 INJECTION, POWDER, FOR SOLUTION INTRAMUSCULAR; INTRAVENOUS at 11:02

## 2024-03-29 NOTE — PLAN OF CARE
Goal Outcome Evaluation:  Plan of Care Reviewed With: patient        Progress: improving  Outcome Evaluation: A&Ox4, no c/o pain. Pt had CT chest done today. Repeat renal function panel drawn, Na was 122, spoke with Dr. Danielle and recieved orders to increase urea packet to 30 g and drop fluid restriction to 1000cc. Adequate urine out, up ad sanford. BM today.

## 2024-03-29 NOTE — PROGRESS NOTES
[facility] Progress Note      Patient Name: Gonzales Kaur Sr.  : 1955  MRN: [id number]  Primary Care Physician:  Christiano Bartholomew APRN  Date of admission: 3/27/2024    Subjective     Interval History:   The patient was seen and examined today for follow-up on hyponatremia.  Has no new complaints today.  Denies any nausea or vomiting.  No fever or chills.  Urine output of 3.2 L  Review of Systems:   As noted above    Objective     Vitals:   Temp:  [97.7 °F (36.5 °C)-98.1 °F (36.7 °C)] 97.7 °F (36.5 °C)  Heart Rate:  [69-99] 81  Resp:  [16-20] 20  BP: ()/(55-86) 145/76    Intake/Output Summary (Last 24 hours) at 3/29/2024 0929  Last data filed at 3/29/2024 0900  Gross per 24 hour   Intake 720 ml   Output 3200 ml   Net -2480 ml       Physical Exam:    General Appearance: alert, awake, no acute distress   Skin: warm and dry  HEENT: oral mucosa normal, nonicteric sclera  Neck: supple, no JVD  Lungs: Clear bilaterally, nonlabored breathing on RA  Heart: RRR, normal S1 and S2  Abdomen: soft, nontender, nondistended  : no palpable bladder, Marcano catheter in place  Extremities: no edema, cyanosis or clubbing  Neuro: normal speech and mental status     Scheduled Meds:     azelastine, 2 spray, Each Nare, BID  Budeson-Glycopyrrol-Formoterol, 2 puff, Inhalation, BID - RT  cefTRIAXone, 2,000 mg, Intravenous, Q24H  cetirizine, 10 mg, Oral, Daily  fluticasone, 2 spray, Nasal, Daily  [Held by provider] lisinopril, 20 mg, Oral, Daily  Urea, 15 g, Oral, BID      IV Meds:   dextrose, 6 mL/kg (Ideal)  [Held by provider] sodium chloride, 75 mL/hr, Last Rate: Stopped (24)        Results Reviewed:   I have personally reviewed the results from the time of this admission to 3/29/2024 09:29 EDT     Results from last 7 days   Lab Units 24  0250 24  2244 24  1445 24  0550 24  1421 24  0913   SODIUM mmol/L 124* 120* 118* 118*   < > 118*  118*    POTASSIUM mmol/L 4.4  --  5.2 4.9   < > 5.4*  5.4*   CHLORIDE mmol/L 90*  --  86* 86*   < > 85*  86*   CO2 mmol/L 23.4  --  22.1 19.5*   < > 20.0*  21.0*   BUN mg/dL 38*  --  31* 15   < > 17  17   CREATININE mg/dL 1.02  --  1.09 0.96   < > 1.02  1.03   CALCIUM mg/dL 9.1  --  8.9 8.8   < > 8.6  8.7   BILIRUBIN mg/dL  --   --  0.2  --   --  0.4   ALK PHOS U/L  --   --  63  --   --  64   ALT (SGPT) U/L  --   --  31  --   --  24   AST (SGOT) U/L  --   --  33  --   --  24   GLUCOSE mg/dL 89  --  80 108*   < > 105*  103*    < > = values in this interval not displayed.       Estimated Creatinine Clearance: 66.9 mL/min (by C-G formula based on SCr of 1.02 mg/dL).    Results from last 7 days   Lab Units 03/29/24  0250 03/28/24  0550 03/27/24  1607   MAGNESIUM mg/dL 2.3  --   --    PHOSPHORUS mg/dL 3.8 3.3 2.9       Results from last 7 days   Lab Units 03/27/24  0913   URIC ACID mg/dL 5.5       Results from last 7 days   Lab Units 03/29/24  0250 03/28/24  0550 03/27/24  0913   WBC 10*3/mm3 16.16* 19.59* 17.59*   HEMOGLOBIN g/dL 12.2* 12.5* 12.6*   PLATELETS 10*3/mm3 218 245 249             Assessment / Plan     ASSESSMENT:  Acute hyponatremia likely secondary to SIADH possibly triggered by bladder distention and pain this is exacerbated by excessive fluid intake.  Lisinopril is on differential diagnosis but his sodium level was normal 1 week ago while he was on lisinopril.  High urine sodium and high urine osmolality in favor of SIADH.    History of hypertension on lisinopril.  Blood pressure acceptable  History of prostate cancer and BPH status post high intensity frequency ablation  Urinary retention status post Marcano catheter insertion  Leukocytosis likely secondary to urinary retention cannot rule out UTI        PLAN:  Sodium improved overnight with dose of Lasix and starting urea 15 g twice daily  Will continue current treatment with urea.  Continue fluid restriction  Avoid hypotonic fluids  Repeat sodium in  afternoon.  Labs in a.m.  Discussed with Dr. Mojica.  Agree with proceeding with CT scan of the chest    Thank you for involving us in the care of Gonzales Kaur Sr..  Please feel free to call with any questions.    John Danielle MD  03/29/24  09:29 EDT    [facility]  [phone]    Parts of this note may be an electronic transcription/translation of spoken language to printed text using the Dragon dictation system.

## 2024-03-29 NOTE — PROGRESS NOTES
Discharge Planning Assessment  [facility]     Patient Name: Gonzales Kaur Sr.  MRN: [id number]  Today's Date: 3/29/2024    Admit Date: 3/27/2024    Plan: Home with spouse   Discharge Needs Assessment       Row Name 03/29/24 1425       Living Environment    People in Home spouse    Name(s) of People in Home Dominique    Current Living Arrangements home    Potentially Unsafe Housing Conditions none    In the past 12 months has the electric, gas, oil, or water company threatened to shut off services in your home? No    Primary Care Provided by self    Provides Primary Care For no one    Family Caregiver if Needed spouse    Quality of Family Relationships helpful;involved;supportive    Able to Return to Prior Arrangements yes       Resource/Environmental Concerns    Resource/Environmental Concerns none    Transportation Concerns none       Transportation Needs    In the past 12 months, has lack of transportation kept you from medical appointments or from getting medications? no    In the past 12 months, has lack of transportation kept you from meetings, work, or from getting things needed for daily living? No       Food Insecurity    Within the past 12 months, you worried that your food would run out before you got the money to buy more. Never true    Within the past 12 months, the food you bought just didn't last and you didn't have money to get more. Never true       Transition Planning    Patient/Family Anticipates Transition to home with family    Patient/Family Anticipated Services at Transition none       Discharge Needs Assessment    Readmission Within the Last 30 Days no previous admission in last 30 days    Equipment Currently Used at Home none    Concerns to be Addressed adjustment to diagnosis/illness    Equipment Needed After Discharge other (see comments)  f/c supplies, leg bag    Provided Post Acute Provider List? N/A    Provided Post Acute Provider Quality & Resource List? N/A    Patient's Choice of  Community Agency(s) denies needs at this time                   Discharge Plan       Row Name 03/29/24 1427       Plan    Plan Home with spouse    Plan Comments Met with patient at the bedside, verified facesheet. Patient lives with his spouse. He is IADL, he does not use any DME. PCP is Christiano Bartholomew. Patient would like to use [facility] for any new dc meds or Valu/Outer Loop. Plan is to return home with the help of spouse. Patient denies HH needs. Spouse will transport home and will transport to urology f/u.                  Continued Care and Services - Admitted Since 3/27/2024    No active coordination exists for this encounter.       Expected Discharge Date and Time       Expected Discharge Date Expected Discharge Time    Mar 31, 2024            Demographic Summary    No documentation.                  Functional Status       Row Name 03/29/24 1427       Functional Status    Usual Activity Tolerance good    Current Activity Tolerance moderate       Assessment of Health Literacy    Health Literacy Excellent       Functional Status, IADL    Medications independent    Meal Preparation independent    Housekeeping independent    Laundry independent    Shopping independent       Mental Status    General Appearance WDL WDL       Mental Status Summary    Recent Changes in Mental Status/Cognitive Functioning no changes       Employment/    Employment Status retired                   Psychosocial    No documentation.                  Abuse/Neglect    No documentation.                  Legal    No documentation.                  Substance Abuse    No documentation.                  Patient Forms    No documentation.                     Betty Tellez, RN

## 2024-03-29 NOTE — PROGRESS NOTES
[facility] Progress Note      Patient Name: Gonzales Kaur Sr.  : 1955  MRN: [id number]  Primary Care Physician:  Christiano Bartholomew APRN  Date of admission: 3/27/2024    Subjective     Interval History:   The patient was seen and examined today for follow-up on hyponatremia.  Has no new complaints today.  Denies any nausea or vomiting.  No fever or chills.  Urine output of 3.2 L  Review of Systems:   As noted above    Objective     Vitals:   Temp:  [97.7 °F (36.5 °C)-98.1 °F (36.7 °C)] 97.9 °F (36.6 °C)  Heart Rate:  [69-99] 69  Resp:  [16-20] 20  BP: ()/(55-86) 91/55    Intake/Output Summary (Last 24 hours) at 3/29/2024 0831  Last data filed at 3/29/2024 0449  Gross per 24 hour   Intake 720 ml   Output 3200 ml   Net -2480 ml       Physical Exam:    General Appearance: alert, awake, no acute distress   Skin: warm and dry  HEENT: oral mucosa normal, nonicteric sclera  Neck: supple, no JVD  Lungs: Clear bilaterally, nonlabored breathing on RA  Heart: RRR, normal S1 and S2  Abdomen: soft, nontender, nondistended  : no palpable bladder, Marcano catheter in place  Extremities: no edema, cyanosis or clubbing  Neuro: normal speech and mental status     Scheduled Meds:     azelastine, 2 spray, Each Nare, BID  Budeson-Glycopyrrol-Formoterol, 2 puff, Inhalation, BID - RT  cefTRIAXone, 2,000 mg, Intravenous, Q24H  cetirizine, 10 mg, Oral, Daily  fluticasone, 2 spray, Nasal, Daily  [Held by provider] lisinopril, 20 mg, Oral, Daily  Urea, 15 g, Oral, BID      IV Meds:   dextrose, 6 mL/kg (Ideal)  [Held by provider] sodium chloride, 75 mL/hr, Last Rate: Stopped (24 192)        Results Reviewed:   I have personally reviewed the results from the time of this admission to 3/29/2024 08:31 EDT     Results from last 7 days   Lab Units 24  0250 24  2244 24  1445 24  0550 24  1421 24  0913   SODIUM mmol/L 124* 120* 118* 118*   < > 118*  118*   POTASSIUM  mmol/L 4.4  --  5.2 4.9   < > 5.4*  5.4*   CHLORIDE mmol/L 90*  --  86* 86*   < > 85*  86*   CO2 mmol/L 23.4  --  22.1 19.5*   < > 20.0*  21.0*   BUN mg/dL 38*  --  31* 15   < > 17  17   CREATININE mg/dL 1.02  --  1.09 0.96   < > 1.02  1.03   CALCIUM mg/dL 9.1  --  8.9 8.8   < > 8.6  8.7   BILIRUBIN mg/dL  --   --  0.2  --   --  0.4   ALK PHOS U/L  --   --  63  --   --  64   ALT (SGPT) U/L  --   --  31  --   --  24   AST (SGOT) U/L  --   --  33  --   --  24   GLUCOSE mg/dL 89  --  80 108*   < > 105*  103*    < > = values in this interval not displayed.       Estimated Creatinine Clearance: 66.9 mL/min (by C-G formula based on SCr of 1.02 mg/dL).    Results from last 7 days   Lab Units 03/29/24  0250 03/28/24  0550 03/27/24  1607   MAGNESIUM mg/dL 2.3  --   --    PHOSPHORUS mg/dL 3.8 3.3 2.9       Results from last 7 days   Lab Units 03/27/24  0913   URIC ACID mg/dL 5.5       Results from last 7 days   Lab Units 03/29/24  0250 03/28/24  0550 03/27/24  0913   WBC 10*3/mm3 16.16* 19.59* 17.59*   HEMOGLOBIN g/dL 12.2* 12.5* 12.6*   PLATELETS 10*3/mm3 218 245 249             Assessment / Plan     ASSESSMENT:  Acute hyponatremia likely secondary to SIADH triggered by bladder distention and pain this is exacerbated by excessive fluid intake.  Lisinopril is on differential diagnosis but his sodium level was normal 1 week ago while he was on lisinopril.  High urine sodium and high urine osmolality in favor of SIADH.    History of hypertension on lisinopril.  Blood pressure acceptable  History of prostate cancer and BPH status post high intensity frequency ablation  Urinary retention status post Marcano catheter insertion  Leukocytosis likely secondary to urinary retention cannot rule out UTI        PLAN:  Sodium improved overnight with dose of Lasix and starting urea 15 g twice daily  Will continue current treatment with urea.  Continue fluid restriction  Avoid hypotonic fluids  Repeat sodium in afternoon.  Labs in  nirmal      Thank you for involving us in the care of Gonzales Kaur Sr..  Please feel free to call with any questions.    John Danielle MD  03/29/24  08:31 EDT    [facility]  [phone]    Parts of this note may be an electronic transcription/translation of spoken language to printed text using the Dragon dictation system.

## 2024-03-29 NOTE — PROGRESS NOTES
Name: Gonzales Kaur . ADMIT: 3/27/2024   : 1955  PCP: Christiano Bartholomew APRN    MRN: [id number] LOS: 1 days   AGE/SEX: 68 y.o. male  ROOM: [facility]     Subjective       Sodium is improving today.  A CT of the chest was obtained due to concern that the SIADH was the other degrees X would be expected for urological procedure.  CT of the chest did reveal multifocal pneumonia.  Blood cultures have been ordered    Objective   Objective   Vital Signs  Temp:  [97.7 °F (36.5 °C)-98.1 °F (36.7 °C)] 97.7 °F (36.5 °C)  Heart Rate:  [69-99] 81  Resp:  [16-20] 20  BP: ()/(55-86) 145/76  SpO2:  [88 %-93 %] 93 %  on   ;   Device (Oxygen Therapy): room air  Body mass index is 27.92 kg/m².  Physical Exam  Constitutional:       General: He is not in acute distress.  HENT:      Head: Normocephalic and atraumatic.   Eyes:      Extraocular Movements: Extraocular movements intact.      Pupils: Pupils are equal, round, and reactive to light.   Cardiovascular:      Rate and Rhythm: Normal rate and regular rhythm.   Pulmonary:      Effort: Pulmonary effort is normal. No respiratory distress.   Abdominal:      General: There is no distension.      Palpations: Abdomen is soft.      Tenderness: There is no abdominal tenderness.   Skin:     General: Skin is warm and dry.   Neurological:      General: No focal deficit present.      Mental Status: He is alert and oriented to person, place, and time.         Results Review     I reviewed the patient's new clinical results.  Results from last 7 days   Lab Units 24  0250 24  0550 24  0913   WBC 10*3/mm3 16.16* 19.59* 17.59*   HEMOGLOBIN g/dL 12.2* 12.5* 12.6*   PLATELETS 10*3/mm3 218 245 249     Results from last 7 days   Lab Units 24  0250 24  2244 24  1445 24  0550 24  2252 24  1607   SODIUM mmol/L 124* 120* 118* 118*   < > 117*   POTASSIUM mmol/L 4.4  --  5.2 4.9  --  5.1   CHLORIDE mmol/L 90*  --  86* 86*  --  85*   CO2  mmol/L 23.4  --  22.1 19.5*  --  22.0   BUN mg/dL 38*  --  31* 15  --  15   CREATININE mg/dL 1.02  --  1.09 0.96  --  0.95   GLUCOSE mg/dL 89  --  80 108*  --  99    < > = values in this interval not displayed.   Estimated Creatinine Clearance: 66.9 mL/min (by C-G formula based on SCr of 1.02 mg/dL).  Results from last 7 days   Lab Units 03/29/24  0250 03/28/24  1445 03/28/24  0550 03/27/24  1607 03/27/24  1421 03/27/24  0913   ALBUMIN g/dL 3.8 3.8 3.9 3.8   < > 3.9  3.9   BILIRUBIN mg/dL  --  0.2  --   --   --  0.4   ALK PHOS U/L  --  63  --   --   --  64   AST (SGOT) U/L  --  33  --   --   --  24   ALT (SGPT) U/L  --  31  --   --   --  24    < > = values in this interval not displayed.     Results from last 7 days   Lab Units 03/29/24  0250 03/28/24  1445 03/28/24  0550 03/27/24  1607 03/27/24  1421   CALCIUM mg/dL 9.1 8.9 8.8 8.1* 8.0*   ALBUMIN g/dL 3.8 3.8 3.9 3.8 3.4*   MAGNESIUM mg/dL 2.3  --   --   --   --    PHOSPHORUS mg/dL 3.8  --  3.3 2.9 3.0     Results from last 7 days   Lab Units 03/28/24  0550 03/27/24  1124 03/27/24  0913   PROCALCITONIN ng/mL 0.04  --  0.04   LACTATE mmol/L  --  1.1  --      COVID19   Date Value Ref Range Status   03/27/2024 Not Detected Not Detected - Ref. Range Final     SARS-CoV-2, ALONDRA   Date Value Ref Range Status   12/03/2021 Not Detected Not Detected Final     Comment:     This nucleic acid amplification test was developed and its performance  characteristics determined by [employer]. Nucleic acid  amplification tests include RT-PCR and TMA. This test has not been  FDA cleared or approved. This test has been authorized by FDA under  an Emergency Use Authorization (EUA). This test is only authorized  for the duration of time the declaration that circumstances exist  justifying the authorization of the emergency use of in vitro  diagnostic tests for detection of SARS-CoV-2 virus and/or diagnosis  of COVID-19 infection under section 564(b)(1) of the Act, 21  "U.S.C.  360bbb-3(b) (1), unless the authorization is terminated or revoked  sooner.  When diagnostic testing is negative, the possibility of a false  negative result should be considered in the context of a patient's  recent exposures and the presence of clinical signs and symptoms  consistent with COVID-19. An individual without symptoms of COVID-19  and who is not shedding SARS-CoV-2 virus would expect to have a  negative (not detected) result in this assay.       No results found for: \"HGBA1C\", \"POCGLU\"  Results for orders placed or performed during the hospital encounter of 03/27/24   Blood Culture - Blood, Arm, Left    Specimen: Arm, Left; Blood   Result Value Ref Range    Blood Culture No growth at 24 hours          CT Chest Without Contrast Diagnostic  Narrative: CT CHEST WO CONTRAST DIAGNOSTIC-     INDICATION: Pneumonia, possible complication     COMPARISON: CT chest September 7, 2023     TECHNIQUE:  Routine CT chest without IV contrast. Coronal and sagittal reformats.  Radiation dose reduction techniques were utilized, including automated  exposure control and exposure modulation based on body size.     FINDINGS:      Chest wall: No lymphadenopathy.     Thyroid: Normal.     Mediastinum: Coronary artery atherosclerotic calcifications. Heart is  normal in size. No pericardial effusion. No mediastinal or hilar  lymphadenopathy.     Lungs/pleura: No effusions. Airways wall thickening. Segmental and  subsegmental airway occlusions in the posterior basilar segment of the  right lower lobe. Segmental and subsegmental airway occlusions in the  posterior lateral basilar segments of the left lower lobe. Moderate  centrilobular and paraseptal emphysema. Biapical pleural-parenchymal  thickening. Multifocal peripheral heterogeneous lung opacities seen in  the bilateral lungs, greatest in the right lung. For example,  heterogeneous peripheral opacities in the lateral right lower lobe,  series 3, axial mage 59. Solid " pulmonary nodule in the inferior right  upper lobe, series 3, axial mage 56, measures 10 mm, new. Spiculated  nodular opacity in the lateral segment right middle lobe, series 3,  axial image 66, measures 1.4 cm. Small centrilobular pulmonary nodules  in the posterior basilar right lower lobe. Small centrilobular and  tree-in-bud nodules in the posterior lateral basilar left lower lobe.     Upper abdomen: Hepatic steatosis. Mild pancreatic lipomatosis. Colonic  diverticulosis. Fluid attenuating right renal cysts. Possible punctate  nonobstructing nephrolithiasis in the superior pole right kidney.     Osseous structures: No destructive osseous lesions.     Impression:    1. Multifocal heterogeneous peripheral lung opacities. Suspect  multifocal pneumonia. Can be followed to resolution.  2. Airways disease with thickened airway walls and with  segmental/subsegmental airway occlusions in the basilar lower lobes.  3. Moderate emphysema.  4. Pulmonary nodules and nodular lung opacities are most likely related  to multifocal pneumonia. 3-6-month follow-up chest CT recommended to  ensure resolution of these nodules/opacities.  5. Centrilobular and tree-in-bud nodules in the lower lobes, consistent  with infectious bronchiolitis.  6. Hepatic steatosis     This report was finalized on 3/29/2024 11:13 AM by Dr. Bandar Sam M.D on Workstation: [id number]       Scheduled Medications  azelastine, 2 spray, Each Nare, BID  Budeson-Glycopyrrol-Formoterol, 2 puff, Inhalation, BID - RT  cefTRIAXone, 2,000 mg, Intravenous, Q24H  cetirizine, 10 mg, Oral, Daily  fluticasone, 2 spray, Nasal, Daily  [Held by provider] lisinopril, 20 mg, Oral, Daily  Urea, 15 g, Oral, BID    Infusions  dextrose, 6 mL/kg (Ideal)  [Held by provider] sodium chloride, 75 mL/hr, Last Rate: Stopped (03/27/24 1929)    Diet  Diet: Regular/House, Renal, Fluid Restriction (240 mL/tray); Low Potassium; Other (Specify mL/day) (1200 mL/day); Texture: Regular  (IDDSI 7); Fluid Consistency: Thin (IDDSI 0)       Assessment/Plan     Active Hospital Problems    Diagnosis  POA    **Hyponatremia [E87.1]  Yes    Prostate cancer [C61]  Yes    Acute urinary retention [R33.8]  Yes    Leukocytosis [D72.829]  Yes    Essential hypertension [I10]  Yes    MICHAEL (obstructive sleep apnea) [G47.33]  Yes    COPD (chronic obstructive pulmonary disease) with emphysema [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       68 y.o. male admitted with Hyponatremia.    Hyponatremia  Hypochloremia  In the setting of urological procedure however this occurred about 1 week ago. sodium level on 3/15 was normal at 136.  Neurology has been consulted.  Urine osmolality, serum osmolality, urine sodium  Urine sodium 123, urine osmolality 576, serum osmolality 246-findings consistent with SIADH.  TSH and cortisol within normal limits  Urea sodium started    Pneumonia  Noted on chest x-ray.  Procalcitonin is negative however he has worsening leukocytosis and SIADH does raise suspicion for a pulmonary process.    Continue rocephin  CT chest  without contrast  Blood cultures are no growth to date.    Prostate cancer status post HIFU  Urinary Retention  Marcano catheter inserted, urology consulted.  No need for any surgical intervention at this time     COPD  Continue home inhaler     Hypertension  Resume home meds       SCDs for DVT prophylaxis.  Full code.  Discussed with patient and nursing staff.  Anticipate discharge home later this week      Carlton Mojica MD  [facility]  03/29/24  12:45 EDT

## 2024-03-30 LAB
ALBUMIN SERPL-MCNC: 3.7 G/DL (ref 3.5–5.2)
ANION GAP SERPL CALCULATED.3IONS-SCNC: 9.4 MMOL/L (ref 5–15)
BUN SERPL-MCNC: 42 MG/DL (ref 8–23)
BUN/CREAT SERPL: 51.9 (ref 7–25)
CALCIUM SPEC-SCNC: 9 MG/DL (ref 8.6–10.5)
CHLORIDE SERPL-SCNC: 93 MMOL/L (ref 98–107)
CO2 SERPL-SCNC: 25.6 MMOL/L (ref 22–29)
CREAT SERPL-MCNC: 0.81 MG/DL (ref 0.76–1.27)
DEPRECATED RDW RBC AUTO: 46.4 FL (ref 37–54)
EGFRCR SERPLBLD CKD-EPI 2021: 96.6 ML/MIN/1.73
ERYTHROCYTE [DISTWIDTH] IN BLOOD BY AUTOMATED COUNT: 13.1 % (ref 12.3–15.4)
GLUCOSE SERPL-MCNC: 111 MG/DL (ref 65–99)
HCT VFR BLD AUTO: 35.8 % (ref 37.5–51)
HGB BLD-MCNC: 12.3 G/DL (ref 13–17.7)
MCH RBC QN AUTO: 33.6 PG (ref 26.6–33)
MCHC RBC AUTO-ENTMCNC: 34.4 G/DL (ref 31.5–35.7)
MCV RBC AUTO: 97.8 FL (ref 79–97)
PHOSPHATE SERPL-MCNC: 4.1 MG/DL (ref 2.5–4.5)
PLATELET # BLD AUTO: 224 10*3/MM3 (ref 140–450)
PMV BLD AUTO: 10.9 FL (ref 6–12)
POTASSIUM SERPL-SCNC: 4.2 MMOL/L (ref 3.5–5.2)
RBC # BLD AUTO: 3.66 10*6/MM3 (ref 4.14–5.8)
SODIUM SERPL-SCNC: 128 MMOL/L (ref 136–145)
WBC NRBC COR # BLD AUTO: 12.36 10*3/MM3 (ref 3.4–10.8)

## 2024-03-30 PROCEDURE — 80069 RENAL FUNCTION PANEL: CPT | Performed by: HOSPITALIST

## 2024-03-30 PROCEDURE — 25010000002 CEFTRIAXONE PER 250 MG: Performed by: STUDENT IN AN ORGANIZED HEALTH CARE EDUCATION/TRAINING PROGRAM

## 2024-03-30 PROCEDURE — 94799 UNLISTED PULMONARY SVC/PX: CPT

## 2024-03-30 PROCEDURE — 94761 N-INVAS EAR/PLS OXIMETRY MLT: CPT

## 2024-03-30 PROCEDURE — 85027 COMPLETE CBC AUTOMATED: CPT | Performed by: STUDENT IN AN ORGANIZED HEALTH CARE EDUCATION/TRAINING PROGRAM

## 2024-03-30 RX ADMIN — CEFTRIAXONE 2000 MG: 2 INJECTION, POWDER, FOR SOLUTION INTRAMUSCULAR; INTRAVENOUS at 09:44

## 2024-03-30 RX ADMIN — CETIRIZINE HYDROCHLORIDE 10 MG: 10 TABLET ORAL at 09:38

## 2024-03-30 RX ADMIN — Medication 30 G: at 09:39

## 2024-03-30 RX ADMIN — LISINOPRIL 20 MG: 20 TABLET ORAL at 09:38

## 2024-03-30 RX ADMIN — FLUTICASONE PROPIONATE 2 SPRAY: 50 SPRAY, METERED NASAL at 09:39

## 2024-03-30 RX ADMIN — Medication 30 G: at 20:02

## 2024-03-30 RX ADMIN — AZELASTINE HYDROCHLORIDE 2 SPRAY: 137 SPRAY, METERED NASAL at 09:52

## 2024-03-30 RX ADMIN — AZELASTINE HYDROCHLORIDE 2 SPRAY: 137 SPRAY, METERED NASAL at 20:02

## 2024-03-30 NOTE — PROGRESS NOTES
[facility] Progress Note      Patient Name: Gonzales Kaur Sr.  : 1955  MRN: [id number]  Primary Care Physician:  Christiano Bartholomew APRN  Date of admission: 3/27/2024    Subjective     Interval History:   The patient was seen and examined today for follow-up on hyponatremia.    Patient accompanied by wife  Denies any chest pain, shortness of breath or palpitations  Tolerating oral intake  No abdominal pain  Urinating without any difficulty    Review of Systems:   As noted above    Objective     Vitals:   Temp:  [97.5 °F (36.4 °C)-98.1 °F (36.7 °C)] 97.7 °F (36.5 °C)  Heart Rate:  [71-92] 90  Resp:  [18] 18  BP: (104-151)/(44-72) 104/54  Flow (L/min):  [2] 2    Intake/Output Summary (Last 24 hours) at 3/30/2024 1612  Last data filed at 3/30/2024 1100  Gross per 24 hour   Intake 480 ml   Output 2575 ml   Net -2095 ml       Physical Exam:    General Appearance: alert, awake, no acute distress   Skin: warm and dry  HEENT: oral mucosa normal, nonicteric sclera  Neck: supple, no JVD  Lungs: Clear bilaterally, nonlabored breathing on RA  Heart: RRR, normal S1 and S2  Abdomen: soft, nontender, nondistended  : no palpable bladder, Marcano catheter in place  Extremities: no edema, cyanosis or clubbing  Neuro: normal speech and mental status     Scheduled Meds:     azelastine, 2 spray, Each Nare, BID  Budeson-Glycopyrrol-Formoterol, 2 puff, Inhalation, BID - RT  cefTRIAXone, 2,000 mg, Intravenous, Q24H  cetirizine, 10 mg, Oral, Daily  fluticasone, 2 spray, Nasal, Daily  lisinopril, 20 mg, Oral, Daily  Urea, 30 g, Oral, BID      IV Meds:   dextrose, 6 mL/kg (Ideal)  [Held by provider] sodium chloride, 75 mL/hr, Last Rate: Stopped (24)        Results Reviewed:   I have personally reviewed the results from the time of this admission to 3/30/2024 16:12 EDT     Results from last 7 days   Lab Units 24  3658 24  1437 24  0250 24  2244 24  1445 24  1421  03/27/24  0913   SODIUM mmol/L 128* 122* 124*   < > 118*   < > 118*  118*   POTASSIUM mmol/L 4.2 4.8 4.4  --  5.2   < > 5.4*  5.4*   CHLORIDE mmol/L 93* 90* 90*  --  86*   < > 85*  86*   CO2 mmol/L 25.6 23.0 23.4  --  22.1   < > 20.0*  21.0*   BUN mg/dL 42* 38* 38*  --  31*   < > 17  17   CREATININE mg/dL 0.81 0.95 1.02  --  1.09   < > 1.02  1.03   CALCIUM mg/dL 9.0 8.9 9.1  --  8.9   < > 8.6  8.7   BILIRUBIN mg/dL  --   --   --   --  0.2  --  0.4   ALK PHOS U/L  --   --   --   --  63  --  64   ALT (SGPT) U/L  --   --   --   --  31  --  24   AST (SGOT) U/L  --   --   --   --  33  --  24   GLUCOSE mg/dL 111* 110* 89  --  80   < > 105*  103*    < > = values in this interval not displayed.       Estimated Creatinine Clearance: 83.5 mL/min (by C-G formula based on SCr of 0.81 mg/dL).    Results from last 7 days   Lab Units 03/30/24  0428 03/29/24  1437 03/29/24  0250   MAGNESIUM mg/dL  --   --  2.3   PHOSPHORUS mg/dL 4.1 3.3 3.8       Results from last 7 days   Lab Units 03/27/24  0913   URIC ACID mg/dL 5.5       Results from last 7 days   Lab Units 03/30/24  0428 03/29/24  0250 03/28/24  0550 03/27/24  0913   WBC 10*3/mm3 12.36* 16.16* 19.59* 17.59*   HEMOGLOBIN g/dL 12.3* 12.2* 12.5* 12.6*   PLATELETS 10*3/mm3 224 218 245 249             Assessment / Plan     ASSESSMENT:    Acute hyponatremia likely secondary to SIADH possibly triggered by bladder distention and pain this is exacerbated by excessive fluid intake.  Lisinopril is on differential diagnosis but his sodium level was normal 1 week ago while he was on lisinopril.  High urine sodium and high urine osmolality in favor of SIADH.      History of hypertension on lisinopril.  Blood pressure acceptable    History of prostate cancer and BPH status post high intensity frequency ablation    Urinary retention status post Marcano catheter insertion    Urinary tract infection currently on ceftriaxone    PLAN:  -Sodium slowly improving with medical management  including urea and fluid restriction, avoiding overcorrection  -Continue surveillance labs    Thank you for involving us in the care of Gonzales Kaur Sr..  Please feel free to call with any questions.    Juan Luis Loaiza MD  03/30/24  16:12 EDT    [facility]  [phone]    Parts of this note may be an electronic transcription/translation of spoken language to printed text using the Dragon dictation system.

## 2024-03-30 NOTE — PLAN OF CARE
AAOX4. RA. SR w/bbb. Continues fluid restriction. Sodium trending up. Continues IV abx. Marcano discontinued. Voiding without difficulty. PVR <100 ml.     Goal Outcome Evaluation:  Plan of Care Reviewed With: patient, spouse        Progress: improving       Problem: Adult Inpatient Plan of Care  Goal: Plan of Care Review  Outcome: Ongoing, Progressing  Flowsheets (Taken 3/30/2024 1824)  Progress: improving  Plan of Care Reviewed With:   patient   spouse  Goal: Patient-Specific Goal (Individualized)  Outcome: Ongoing, Progressing  Goal: Absence of Hospital-Acquired Illness or Injury  Outcome: Ongoing, Progressing  Intervention: Identify and Manage Fall Risk  Recent Flowsheet Documentation  Taken 3/30/2024 1817 by Michelle Merida, RN  Safety Promotion/Fall Prevention:   safety round/check completed   room organization consistent   nonskid shoes/slippers when out of bed   lighting adjusted   clutter free environment maintained   assistive device/personal items within reach   activity supervised  Taken 3/30/2024 1600 by Michelle Merida, RN  Safety Promotion/Fall Prevention:   safety round/check completed   room organization consistent   nonskid shoes/slippers when out of bed   lighting adjusted   clutter free environment maintained   assistive device/personal items within reach   activity supervised  Taken 3/30/2024 1420 by Michelle Merida, RN  Safety Promotion/Fall Prevention:   safety round/check completed   room organization consistent   nonskid shoes/slippers when out of bed   lighting adjusted   assistive device/personal items within reach   clutter free environment maintained   activity supervised  Taken 3/30/2024 1200 by Michelle Merida, RN  Safety Promotion/Fall Prevention:   safety round/check completed   room organization consistent   nonskid shoes/slippers when out of bed   lighting adjusted   clutter free environment maintained   assistive device/personal items within reach   activity supervised  Taken  3/30/2024 1055 by Michelle Merida RN  Safety Promotion/Fall Prevention:   safety round/check completed   room organization consistent   nonskid shoes/slippers when out of bed   lighting adjusted   clutter free environment maintained   assistive device/personal items within reach   activity supervised  Taken 3/30/2024 0938 by Michelle Merida RN  Safety Promotion/Fall Prevention:   safety round/check completed   room organization consistent   nonskid shoes/slippers when out of bed   lighting adjusted   clutter free environment maintained   assistive device/personal items within reach   activity supervised  Taken 3/30/2024 0800 by Michelle Merida RN  Safety Promotion/Fall Prevention:   room organization consistent   safety round/check completed   nonskid shoes/slippers when out of bed   lighting adjusted   clutter free environment maintained   assistive device/personal items within reach   activity supervised  Intervention: Prevent Skin Injury  Recent Flowsheet Documentation  Taken 3/30/2024 1817 by Michelle Merida RN  Body Position: position changed independently  Taken 3/30/2024 1600 by Michelle Merida RN  Body Position: position changed independently  Taken 3/30/2024 1420 by Michelle Merida RN  Body Position: position changed independently  Skin Protection:   adhesive use limited   tubing/devices free from skin contact   transparent dressing maintained   protective footwear used  Taken 3/30/2024 1200 by Michelle Merida RN  Body Position: position changed independently  Taken 3/30/2024 1055 by Michelle Merida RN  Body Position: position changed independently  Taken 3/30/2024 0938 by Michelle Merida RN  Body Position: position changed independently  Skin Protection:   tubing/devices free from skin contact   transparent dressing maintained   skin-to-skin areas padded   protective footwear used   incontinence pads utilized   adhesive use limited  Taken 3/30/2024 0800 by Michelle Merida RN  Body  Position: position changed independently  Intervention: Prevent and Manage VTE (Venous Thromboembolism) Risk  Recent Flowsheet Documentation  Taken 3/30/2024 1817 by Michelle Merida RN  Activity Management: up ad sanford  Taken 3/30/2024 1600 by Michelle Merida RN  Activity Management: up ad sanford  Taken 3/30/2024 1420 by Michelle Merida RN  Activity Management: up ad sanford  VTE Prevention/Management:   bilateral   sequential compression devices off   patient refused intervention  Range of Motion: active ROM (range of motion) encouraged  Taken 3/30/2024 1200 by Michelle Merida RN  Activity Management: up ad sanford  Taken 3/30/2024 1055 by Michelle Merida RN  Activity Management: up ad sanford  Taken 3/30/2024 0938 by Michelle Merida RN  Activity Management: up ad sanford  VTE Prevention/Management:   bilateral   sequential compression devices off   patient refused intervention  Range of Motion: active ROM (range of motion) encouraged  Taken 3/30/2024 0800 by Michelle Merida RN  Activity Management: up ad sanford  Intervention: Prevent Infection  Recent Flowsheet Documentation  Taken 3/30/2024 1817 by Michelle Merida RN  Infection Prevention:   single patient room provided   rest/sleep promoted   personal protective equipment utilized   hand hygiene promoted  Taken 3/30/2024 1600 by Michelle Merida RN  Infection Prevention:   single patient room provided   rest/sleep promoted   personal protective equipment utilized   equipment surfaces disinfected  Taken 3/30/2024 1420 by Michelle Merida RN  Infection Prevention:   single patient room provided   rest/sleep promoted   personal protective equipment utilized   hand hygiene promoted  Taken 3/30/2024 1200 by Michelle Merida RN  Infection Prevention:   single patient room provided   rest/sleep promoted   personal protective equipment utilized   hand hygiene promoted  Taken 3/30/2024 1055 by Michelle Merida RN  Infection Prevention:   rest/sleep promoted   personal  protective equipment utilized   single patient room provided  Taken 3/30/2024 0938 by Michelle Merida RN  Infection Prevention:   single patient room provided   personal protective equipment utilized   rest/sleep promoted   hand hygiene promoted  Taken 3/30/2024 0800 by Michelle Merida RN  Infection Prevention:   single patient room provided   rest/sleep promoted   personal protective equipment utilized   hand hygiene promoted  Goal: Optimal Comfort and Wellbeing  Outcome: Ongoing, Progressing  Intervention: Provide Person-Centered Care  Recent Flowsheet Documentation  Taken 3/30/2024 1420 by Michelle Merida RN  Trust Relationship/Rapport:   care explained   choices provided   emotional support provided   questions answered   empathic listening provided   questions encouraged   reassurance provided   thoughts/feelings acknowledged  Taken 3/30/2024 0938 by Michelle Merida RN  Trust Relationship/Rapport:   care explained   emotional support provided   choices provided   empathic listening provided   questions answered   questions encouraged   reassurance provided   thoughts/feelings acknowledged  Goal: Readiness for Transition of Care  Outcome: Ongoing, Progressing

## 2024-03-30 NOTE — PLAN OF CARE
"Goal Outcome Evaluation:  Plan of Care Reviewed With: patient        Progress: improving  Outcome Evaluation: Pt A&Ox4. VSS. Pt denies pain or SOA. Pt's O2 sat did decrease to mid 80's while sleeping. Pt states he has sleep apnea and has a CPAP machine at home but reports he hasn't \"used it in quite a while\". Pt placed on O2 @ 2L per NC while sleeping. Pt educated on sleep apnea and need to follow up with his physician regarding his stoppage of his home CPAP compliance. Pt remains on fluid restriction of 1000 cc and tolerating well. F/C remains in place with adequate urine output.                               "

## 2024-03-30 NOTE — PROGRESS NOTES
Name: Gonzales Kaur Sr. ADMIT: 3/27/2024   : 1955  PCP: Christiano Bartholomew APRN    MRN: [id number] LOS: 2 days   AGE/SEX: 68 y.o. male  ROOM: [facility]     Subjective     He is resting comfortably in bed.  There are no new changes or concerns.  Sodium is improving.  WBC continues to downtrend.    Objective   Objective   Vital Signs  Temp:  [97.5 °F (36.4 °C)-98.1 °F (36.7 °C)] 97.7 °F (36.5 °C)  Heart Rate:  [71-92] 90  Resp:  [18] 18  BP: (104-151)/(44-72) 104/54  SpO2:  [89 %-96 %] 92 %  on  Flow (L/min):  [2] 2;   Device (Oxygen Therapy): room air  Body mass index is 27.37 kg/m².  Physical Exam  Constitutional:       General: He is not in acute distress.  HENT:      Head: Normocephalic and atraumatic.   Eyes:      Extraocular Movements: Extraocular movements intact.      Pupils: Pupils are equal, round, and reactive to light.   Cardiovascular:      Rate and Rhythm: Normal rate and regular rhythm.   Pulmonary:      Effort: Pulmonary effort is normal. No respiratory distress.   Abdominal:      General: There is no distension.      Palpations: Abdomen is soft.      Tenderness: There is no abdominal tenderness.   Skin:     General: Skin is warm and dry.   Neurological:      General: No focal deficit present.      Mental Status: He is alert and oriented to person, place, and time.     Exam reviewed and updated on 3/30/24    Results Review     I reviewed the patient's new clinical results.  Results from last 7 days   Lab Units 24  0428 24  0250 24  0550 24  0913   WBC 10*3/mm3 12.36* 16.16* 19.59* 17.59*   HEMOGLOBIN g/dL 12.3* 12.2* 12.5* 12.6*   PLATELETS 10*3/mm3 224 218 245 249     Results from last 7 days   Lab Units 24  0428 24  1437 24  0250 24  2244 24  1445   SODIUM mmol/L 128* 122* 124* 120* 118*   POTASSIUM mmol/L 4.2 4.8 4.4  --  5.2   CHLORIDE mmol/L 93* 90* 90*  --  86*   CO2 mmol/L 25.6 23.0 23.4  --  22.1   BUN mg/dL 42* 38* 38*  --  31*    CREATININE mg/dL 0.81 0.95 1.02  --  1.09   GLUCOSE mg/dL 111* 110* 89  --  80   Estimated Creatinine Clearance: 83.5 mL/min (by C-G formula based on SCr of 0.81 mg/dL).  Results from last 7 days   Lab Units 03/30/24  0428 03/29/24  1437 03/29/24  0250 03/28/24  1445 03/27/24  1421 03/27/24  0913   ALBUMIN g/dL 3.7 3.7 3.8 3.8   < > 3.9  3.9   BILIRUBIN mg/dL  --   --   --  0.2  --  0.4   ALK PHOS U/L  --   --   --  63  --  64   AST (SGOT) U/L  --   --   --  33  --  24   ALT (SGPT) U/L  --   --   --  31  --  24    < > = values in this interval not displayed.     Results from last 7 days   Lab Units 03/30/24  0428 03/29/24  1437 03/29/24  0250 03/28/24  1445 03/28/24  0550   CALCIUM mg/dL 9.0 8.9 9.1 8.9 8.8   ALBUMIN g/dL 3.7 3.7 3.8 3.8 3.9   MAGNESIUM mg/dL  --   --  2.3  --   --    PHOSPHORUS mg/dL 4.1 3.3 3.8  --  3.3     Results from last 7 days   Lab Units 03/28/24  0550 03/27/24  1124 03/27/24  0913   PROCALCITONIN ng/mL 0.04  --  0.04   LACTATE mmol/L  --  1.1  --      COVID19   Date Value Ref Range Status   03/27/2024 Not Detected Not Detected - Ref. Range Final     SARS-CoV-2, ALONDRA   Date Value Ref Range Status   12/03/2021 Not Detected Not Detected Final     Comment:     This nucleic acid amplification test was developed and its performance  characteristics determined by [employer]. Nucleic acid  amplification tests include RT-PCR and TMA. This test has not been  FDA cleared or approved. This test has been authorized by FDA under  an Emergency Use Authorization (EUA). This test is only authorized  for the duration of time the declaration that circumstances exist  justifying the authorization of the emergency use of in vitro  diagnostic tests for detection of SARS-CoV-2 virus and/or diagnosis  of COVID-19 infection under section 564(b)(1) of the Act, 21 U.S.C.  360bbb-3(b) (1), unless the authorization is terminated or revoked  sooner.  When diagnostic testing is negative, the possibility of a  "false  negative result should be considered in the context of a patient's  recent exposures and the presence of clinical signs and symptoms  consistent with COVID-19. An individual without symptoms of COVID-19  and who is not shedding SARS-CoV-2 virus would expect to have a  negative (not detected) result in this assay.       No results found for: \"HGBA1C\", \"POCGLU\"  Results for orders placed or performed during the hospital encounter of 03/27/24   Blood Culture - Blood, Arm, Left    Specimen: Arm, Left; Blood   Result Value Ref Range    Blood Culture No growth at 2 days          CT Chest Without Contrast Diagnostic  Narrative: CT CHEST WO CONTRAST DIAGNOSTIC-     INDICATION: Pneumonia, possible complication     COMPARISON: CT chest September 7, 2023     TECHNIQUE:  Routine CT chest without IV contrast. Coronal and sagittal reformats.  Radiation dose reduction techniques were utilized, including automated  exposure control and exposure modulation based on body size.     FINDINGS:      Chest wall: No lymphadenopathy.     Thyroid: Normal.     Mediastinum: Coronary artery atherosclerotic calcifications. Heart is  normal in size. No pericardial effusion. No mediastinal or hilar  lymphadenopathy.     Lungs/pleura: No effusions. Airways wall thickening. Segmental and  subsegmental airway occlusions in the posterior basilar segment of the  right lower lobe. Segmental and subsegmental airway occlusions in the  posterior lateral basilar segments of the left lower lobe. Moderate  centrilobular and paraseptal emphysema. Biapical pleural-parenchymal  thickening. Multifocal peripheral heterogeneous lung opacities seen in  the bilateral lungs, greatest in the right lung. For example,  heterogeneous peripheral opacities in the lateral right lower lobe,  series 3, axial mage 59. Solid pulmonary nodule in the inferior right  upper lobe, series 3, axial mage 56, measures 10 mm, new. Spiculated  nodular opacity in the lateral segment " right middle lobe, series 3,  axial image 66, measures 1.4 cm. Small centrilobular pulmonary nodules  in the posterior basilar right lower lobe. Small centrilobular and  tree-in-bud nodules in the posterior lateral basilar left lower lobe.     Upper abdomen: Hepatic steatosis. Mild pancreatic lipomatosis. Colonic  diverticulosis. Fluid attenuating right renal cysts. Possible punctate  nonobstructing nephrolithiasis in the superior pole right kidney.     Osseous structures: No destructive osseous lesions.     Impression:    1. Multifocal heterogeneous peripheral lung opacities. Suspect  multifocal pneumonia. Can be followed to resolution.  2. Airways disease with thickened airway walls and with  segmental/subsegmental airway occlusions in the basilar lower lobes.  3. Moderate emphysema.  4. Pulmonary nodules and nodular lung opacities are most likely related  to multifocal pneumonia. 3-6-month follow-up chest CT recommended to  ensure resolution of these nodules/opacities.  5. Centrilobular and tree-in-bud nodules in the lower lobes, consistent  with infectious bronchiolitis.  6. Hepatic steatosis     This report was finalized on 3/29/2024 11:13 AM by Dr. Bandar Sam M.D on Workstation: [id number]       Scheduled Medications  azelastine, 2 spray, Each Nare, BID  Budeson-Glycopyrrol-Formoterol, 2 puff, Inhalation, BID - RT  cefTRIAXone, 2,000 mg, Intravenous, Q24H  cetirizine, 10 mg, Oral, Daily  fluticasone, 2 spray, Nasal, Daily  lisinopril, 20 mg, Oral, Daily  Urea, 30 g, Oral, BID    Infusions  dextrose, 6 mL/kg (Ideal)  [Held by provider] sodium chloride, 75 mL/hr, Last Rate: Stopped (03/27/24 1929)    Diet  Diet: Regular/House, Renal, Fluid Restriction (240 mL/tray); Low Potassium; 1000 mL/day; Texture: Regular (IDDSI 7); Fluid Consistency: Thin (IDDSI 0)       Assessment/Plan     Active Hospital Problems    Diagnosis  POA    **Hyponatremia [E87.1]  Yes    Prostate cancer [C61]  Yes    Acute urinary  retention [R33.8]  Yes    Leukocytosis [D72.829]  Yes    Essential hypertension [I10]  Yes    MICHAEL (obstructive sleep apnea) [G47.33]  Yes    COPD (chronic obstructive pulmonary disease) with emphysema [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       68 y.o. male admitted with Hyponatremia.    Hyponatremia  Hypochloremia  In the setting of urological procedure however this occurred about 1 week ago. sodium level on 3/15 was normal at 136.  Neurology has been consulted.  Urine osmolality, serum osmolality, urine sodium  Urine sodium 123, urine osmolality 576, serum osmolality 246-findings consistent with SIADH.  TSH and cortisol within normal limits  Urea sodium started    Pneumonia  Confirmed on CT scan.  Leukocytosis is improving with IV antibiotics.     Prostate cancer status post HIFU  Urinary Retention  Marcano catheter inserted, urology consulted.  No need for any surgical intervention at this time  Voiding trial today     COPD  Continue home inhaler     Hypertension  Resume home meds       SCDs for DVT prophylaxis.  Full code.  Discussed with patient and nursing staff.  Anticipate discharge home later this week      Carlton Mojica MD  [facility]  03/30/24  15:39 EDT

## 2024-03-31 ENCOUNTER — READMISSION MANAGEMENT (OUTPATIENT)
Dept: CALL CENTER | Facility: HOSPITAL | Age: 69
End: 2024-03-31
Payer: MEDICARE

## 2024-03-31 VITALS
HEART RATE: 71 BPM | OXYGEN SATURATION: 93 % | RESPIRATION RATE: 18 BRPM | BODY MASS INDEX: 27.63 KG/M2 | DIASTOLIC BLOOD PRESSURE: 71 MMHG | WEIGHT: 165.8 LBS | HEIGHT: 65 IN | SYSTOLIC BLOOD PRESSURE: 100 MMHG | TEMPERATURE: 97.7 F

## 2024-03-31 LAB
ANION GAP SERPL CALCULATED.3IONS-SCNC: 12 MMOL/L (ref 5–15)
BUN SERPL-MCNC: 54 MG/DL (ref 8–23)
BUN/CREAT SERPL: 55.7 (ref 7–25)
CALCIUM SPEC-SCNC: 9.1 MG/DL (ref 8.6–10.5)
CHLORIDE SERPL-SCNC: 94 MMOL/L (ref 98–107)
CO2 SERPL-SCNC: 25 MMOL/L (ref 22–29)
CREAT SERPL-MCNC: 0.97 MG/DL (ref 0.76–1.27)
EGFRCR SERPLBLD CKD-EPI 2021: 85.6 ML/MIN/1.73
GLUCOSE SERPL-MCNC: 208 MG/DL (ref 65–99)
POTASSIUM SERPL-SCNC: 4.3 MMOL/L (ref 3.5–5.2)
SODIUM SERPL-SCNC: 131 MMOL/L (ref 136–145)

## 2024-03-31 PROCEDURE — 80048 BASIC METABOLIC PNL TOTAL CA: CPT | Performed by: INTERNAL MEDICINE

## 2024-03-31 PROCEDURE — 25010000002 CEFTRIAXONE PER 250 MG: Performed by: STUDENT IN AN ORGANIZED HEALTH CARE EDUCATION/TRAINING PROGRAM

## 2024-03-31 RX ORDER — CEFDINIR 300 MG/1
300 CAPSULE ORAL 2 TIMES DAILY
Qty: 8 CAPSULE | Refills: 0 | Status: SHIPPED | OUTPATIENT
Start: 2024-03-31

## 2024-03-31 RX ORDER — CEFDINIR 300 MG/1
300 CAPSULE ORAL 2 TIMES DAILY
Qty: 8 CAPSULE | Refills: 0 | Status: SHIPPED | OUTPATIENT
Start: 2024-03-31 | End: 2024-03-31

## 2024-03-31 RX ADMIN — LISINOPRIL 20 MG: 20 TABLET ORAL at 08:42

## 2024-03-31 RX ADMIN — CETIRIZINE HYDROCHLORIDE 10 MG: 10 TABLET ORAL at 08:42

## 2024-03-31 RX ADMIN — FLUTICASONE PROPIONATE 2 SPRAY: 50 SPRAY, METERED NASAL at 08:42

## 2024-03-31 RX ADMIN — CEFTRIAXONE 2000 MG: 2 INJECTION, POWDER, FOR SOLUTION INTRAMUSCULAR; INTRAVENOUS at 10:06

## 2024-03-31 RX ADMIN — AZELASTINE HYDROCHLORIDE 2 SPRAY: 137 SPRAY, METERED NASAL at 08:42

## 2024-03-31 RX ADMIN — Medication 30 G: at 08:42

## 2024-03-31 NOTE — DISCHARGE SUMMARY
Patient Name: Gonzales Kaur .  : 1955  MRN: [id number]    Date of Admission: 3/27/2024  Date of Discharge:  3/31/2024  Primary Care Physician: Christiano Bartholomew APRN      Chief Complaint:   Urinary Retention and Post-op Problem      Discharge Diagnoses     Active Hospital Problems    Diagnosis  POA    **Hyponatremia [E87.1]  Yes    Prostate cancer [C61]  Yes    Acute urinary retention [R33.8]  Yes    Leukocytosis [D72.829]  Yes    Essential hypertension [I10]  Yes    MICHAEL (obstructive sleep apnea) [G47.33]  Yes    COPD (chronic obstructive pulmonary disease) with emphysema [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        Hospital Course       This is a 68-year-old male who recently underwent a procedure for prostate cancer and discharged with a Marcano catheter after the procedure.  He was seen at his urologist office the day prior to presentation at which time the Marcano catheter was removed.  Since then he started experiencing worsening discomfort and urinary retention.  As the discomfort worsened he presented to the hospital for further evaluation at which time he was noted to have a distended bladder as well as a sodium level of 118 and a leukocytosis of 17,000.  A Marcano catheter was reinserted which relieved approximately 500 cc of urine.  Nephrology was consulted.  He was diagnosed with SIADH.  There was some concern that the degree of SIADH was disproportionate to his clinical presentation.  As such a CT of the chest was obtained that showed findings consistent with multifocal pneumonia.  After being initiated on IV antibiotics his white blood cell continue to decrease and his sodium levels continue to improve.  On the day of discharge he had a normal white blood cell count as well as a normal sodium level.  He was counseled extensively on the need to monitor his fluid intake due to the need for fluid restriction as well as increase his protein intake.    I have  referred him to nephrology as an outpatient to ensure that his hyponatremia continues to improve/resolve.      Physical Exam:  Temp:  [97.7 °F (36.5 °C)-98.2 °F (36.8 °C)] 97.7 °F (36.5 °C)  Heart Rate:  [70-90] 71  Resp:  [18] 18  BP: ()/(46-74) 100/71  Body mass index is 27.59 kg/m².  Physical Exam    General: Alert and oriented x3, no acute distress  HEENT: Normocephalic, atraumatic  CV: Regular rate and rhythm, no murmurs rubs or gallops  Lungs: Clear to auscultation bilaterally, no crackles or wheezes  Abdomen: Soft, nontender, nondistended  Neuro: CN II-XII intact, no FND appreciated     Consultants     Consult Orders (all) (From admission, onward)       Start     Ordered    03/27/24 1026  Inpatient Nephrology Consult  Once        Specialty:  Nephrology  Provider:  Gabrielle Marrufo MD    03/27/24 1026    03/27/24 1025  Inpatient Urology Consult  Once        Specialty:  Urology  Provider:  Kenny Wynn Jr., MD    03/27/24 1026    03/27/24 0959  [facility] (on-call MD unless specified) Details  Once        Specialty:  Hospitalist  Provider:  (Not yet assigned)    03/27/24 0958                  Procedures     Imaging Results (All)       Procedure Component Value Units Date/Time    CT Chest Without Contrast Diagnostic [[id number]] Collected: 03/29/24 1104     Updated: 03/29/24 1116    Narrative:      CT CHEST WO CONTRAST DIAGNOSTIC-     INDICATION: Pneumonia, possible complication     COMPARISON: CT chest September 7, 2023     TECHNIQUE:  Routine CT chest without IV contrast. Coronal and sagittal reformats.  Radiation dose reduction techniques were utilized, including automated  exposure control and exposure modulation based on body size.     FINDINGS:      Chest wall: No lymphadenopathy.     Thyroid: Normal.     Mediastinum: Coronary artery atherosclerotic calcifications. Heart is  normal in size. No pericardial effusion. No mediastinal or hilar  lymphadenopathy.     Lungs/pleura: No effusions. Airways wall  thickening. Segmental and  subsegmental airway occlusions in the posterior basilar segment of the  right lower lobe. Segmental and subsegmental airway occlusions in the  posterior lateral basilar segments of the left lower lobe. Moderate  centrilobular and paraseptal emphysema. Biapical pleural-parenchymal  thickening. Multifocal peripheral heterogeneous lung opacities seen in  the bilateral lungs, greatest in the right lung. For example,  heterogeneous peripheral opacities in the lateral right lower lobe,  series 3, axial mage 59. Solid pulmonary nodule in the inferior right  upper lobe, series 3, axial mage 56, measures 10 mm, new. Spiculated  nodular opacity in the lateral segment right middle lobe, series 3,  axial image 66, measures 1.4 cm. Small centrilobular pulmonary nodules  in the posterior basilar right lower lobe. Small centrilobular and  tree-in-bud nodules in the posterior lateral basilar left lower lobe.     Upper abdomen: Hepatic steatosis. Mild pancreatic lipomatosis. Colonic  diverticulosis. Fluid attenuating right renal cysts. Possible punctate  nonobstructing nephrolithiasis in the superior pole right kidney.     Osseous structures: No destructive osseous lesions.       Impression:         1. Multifocal heterogeneous peripheral lung opacities. Suspect  multifocal pneumonia. Can be followed to resolution.  2. Airways disease with thickened airway walls and with  segmental/subsegmental airway occlusions in the basilar lower lobes.  3. Moderate emphysema.  4. Pulmonary nodules and nodular lung opacities are most likely related  to multifocal pneumonia. 3-6-month follow-up chest CT recommended to  ensure resolution of these nodules/opacities.  5. Centrilobular and tree-in-bud nodules in the lower lobes, consistent  with infectious bronchiolitis.  6. Hepatic steatosis     This report was finalized on 3/29/2024 11:13 AM by Dr. Bandar Sam M.D on Workstation: [id number]       XR Chest 1 View  [[id number]] Collected: 03/27/24 1556     Updated: 03/27/24 1606    Narrative:      XR CHEST 1 VW-3/27/2024     HISTORY: Leukocytosis.     Heart size is within normal limits. There is mild patchy increased  density in the right mid to lower lung which is new since the 6/29/2022  study. There are some minimal patchy increased density in the left  midlung there is mild left perihilar stranding which appears similar to  the 6/29/2022 study.       Impression:      1. Patchy areas of increased density in the right mid to lower lung and  left suprahilar region are consistent with bilateral pneumonia.  2. Please correlate with the clinical findings. Short-term follow-up  films recommended as well.        This report was finalized on 3/27/2024 4:03 PM by Dr. Hollis Falcon M.D on Workstation: [id number]               Pertinent Labs     Results from last 7 days   Lab Units 03/30/24  0428 03/29/24  0250 03/28/24  0550 03/27/24  0913   WBC 10*3/mm3 12.36* 16.16* 19.59* 17.59*   HEMOGLOBIN g/dL 12.3* 12.2* 12.5* 12.6*   PLATELETS 10*3/mm3 224 218 245 249     Results from last 7 days   Lab Units 03/31/24  0847 03/30/24  0428 03/29/24  1437 03/29/24  0250   SODIUM mmol/L 131* 128* 122* 124*   POTASSIUM mmol/L 4.3 4.2 4.8 4.4   CHLORIDE mmol/L 94* 93* 90* 90*   CO2 mmol/L 25.0 25.6 23.0 23.4   BUN mg/dL 54* 42* 38* 38*   CREATININE mg/dL 0.97 0.81 0.95 1.02   GLUCOSE mg/dL 208* 111* 110* 89   Estimated Creatinine Clearance: 70 mL/min (by C-G formula based on SCr of 0.97 mg/dL).  Results from last 7 days   Lab Units 03/30/24  0428 03/29/24  1437 03/29/24  0250 03/28/24  1445 03/27/24  1421 03/27/24  0913   ALBUMIN g/dL 3.7 3.7 3.8 3.8   < > 3.9  3.9   BILIRUBIN mg/dL  --   --   --  0.2  --  0.4   ALK PHOS U/L  --   --   --  63  --  64   AST (SGOT) U/L  --   --   --  33  --  24   ALT (SGPT) U/L  --   --   --  31  --  24    < > = values in this interval not displayed.     Results from last 7 days   Lab Units 03/31/24  0859  "03/30/24  0428 03/29/24  1437 03/29/24  0250 03/28/24  1445 03/28/24  0550   CALCIUM mg/dL 9.1 9.0 8.9 9.1 8.9 8.8   ALBUMIN g/dL  --  3.7 3.7 3.8 3.8 3.9   MAGNESIUM mg/dL  --   --   --  2.3  --   --    PHOSPHORUS mg/dL  --  4.1 3.3 3.8  --  3.3       Results from last 7 days   Lab Units 03/27/24  0913   PROBNP pg/mL 42.0     Results from last 7 days   Lab Units 03/28/24  0855 03/27/24  1033 03/27/24  0913   SODIUM UR mmol/L 123   < >  --    OSMOLALITY UR mOsm/kg 576   < >  --    URIC ACID mg/dL  --   --  5.5    < > = values in this interval not displayed.         Invalid input(s): \"LDLCALC\"  Results from last 7 days   Lab Units 03/28/24  1000 03/28/24  0954 03/27/24  1124   BLOODCX  No growth at 2 days No growth at 2 days No growth at 3 days  No growth at 3 days       Test Results Pending at Discharge     Pending Labs       Order Current Status    Blood Culture - Blood, Arm, Left Preliminary result    Blood Culture - Blood, Arm, Left Preliminary result    Blood Culture - Blood, Arm, Right Preliminary result    Blood Culture - Blood, Hand, Right Preliminary result            Discharge Details        Discharge Medications        New Medications        Instructions Start Date   cefdinir 300 MG capsule  Commonly known as: OMNICEF   300 mg, Oral, 2 Times Daily      Urea 15 g pack packet  Commonly known as: URE-NA   30 g, Oral, 2 Times Daily             Continue These Medications        Instructions Start Date   albuterol sulfate  (90 Base) MCG/ACT inhaler  Commonly known as: PROVENTIL HFA;VENTOLIN HFA;PROAIR HFA   2 puffs, Inhalation      azelastine 0.1 % nasal spray  Commonly known as: ASTELIN   2 sprays, Nasal, 2 Times Daily, Use in each nostril as directed      Breztri Aerosphere 160-9-4.8 MCG/ACT aerosol inhaler  Generic drug: Budeson-Glycopyrrol-Formoterol   2 puffs, Inhalation, 2 Times Daily      fluticasone 50 MCG/ACT nasal spray  Commonly known as: FLONASE   2 sprays, Nasal, Daily      lisinopril 20 MG " tablet  Commonly known as: PRINIVIL,ZESTRIL   20 mg, Oral, Daily      loratadine 10 MG tablet  Commonly known as: CLARITIN   10 mg, Oral, Daily      multivitamin with minerals tablet tablet   1 tablet, Oral, Daily               No Known Allergies      Discharge Disposition:  Home or Self Care    Discharge Diet:  Diet Order   Procedures    Diet: Regular/House, Renal, Fluid Restriction (240 mL/tray); Low Potassium; 1000 mL/day; Texture: Regular (IDDSI 7); Fluid Consistency: Thin (IDDSI 0)       Discharge Activity:       CODE STATUS:    Code Status and Medical Interventions:   Ordered at: 03/28/24 1526     Code Status (Patient has no pulse and is not breathing):    CPR (Attempt to Resuscitate)     Medical Interventions (Patient has pulse or is breathing):    Full Support       Future Appointments   Date Time Provider Department Center   7/10/2024 10:15 AM [facility]      Follow-up Information       John Danielle MD .    Specialty: Nephrology  Contact information:  [address]  [facility] [id number] [phone]               Christiano Bartholomew APRN .    Specialty: Nurse Practitioner  Contact information:  [address]  [address]  [place] [id number] [phone]                             Time Spent on Discharge:  Greater than 30 minutes      Carlton Mojica MD  [facility]  03/31/24  10:12 EDT

## 2024-03-31 NOTE — PLAN OF CARE
Goal Outcome Evaluation:  Plan of Care Reviewed With: patient        Progress: improving  Outcome Evaluation: Pt A&Ox4. VSS. Pt denies pain or SOA. Pt remains on fluid restriction of 1000 cc. Voiding without difficulty.

## 2024-03-31 NOTE — CASE MANAGEMENT/SOCIAL WORK
Case Management Discharge Note      Final Note: Home    Provided Post Acute Provider List?: N/A  Provided Post Acute Provider Quality & Resource List?: N/A    Selected Continued Care - Discharged on 3/31/2024 Admission date: 3/27/2024 - Discharge disposition: Home or Self Care      Destination    No services have been selected for the patient.                Durable Medical Equipment    No services have been selected for the patient.                Dialysis/Infusion    No services have been selected for the patient.                Home Medical Care    No services have been selected for the patient.                Therapy    No services have been selected for the patient.                Community Resources    No services have been selected for the patient.                Community & DME    No services have been selected for the patient.                         Final Discharge Disposition Code: 01 - home or self-care

## 2024-03-31 NOTE — OUTREACH NOTE
Prep Survey      Flowsheet Row Responses   [facility] [place] patient discharged from? [place]   Is LACE score < 7 ? No   Eligibility [facility]   Date of Admission 03/27/24   Date of Discharge 03/31/24   Discharge Disposition Home or Self Care   Discharge diagnosis *Hyponatremia   Does the patient have one of the following disease processes/diagnoses(primary or secondary)? Other   Does the patient have Home health ordered? No   Is there a DME ordered? No   Prep survey completed? Yes            FERNY PRESCOTT - Registered Nurse

## 2024-03-31 NOTE — PROGRESS NOTES
[facility] Progress Note      Patient Name: Gonzales Kaur Sr.  : 1955  MRN: [id number]  Primary Care Physician:  Christiano Bartholomew APRN  Date of admission: 3/27/2024    Subjective     Interval History:   The patient was seen and examined today for follow-up on hyponatremia.    Overnight no event  Patient denies any chest pain, or shortness of breath  No abdominal pain without nausea or emesis  Urinating spontaneously    No fevers or chills      Review of Systems:   As noted above    Objective     Vitals:   Temp:  [97.7 °F (36.5 °C)-98.2 °F (36.8 °C)] 97.7 °F (36.5 °C)  Heart Rate:  [70-90] 71  Resp:  [18] 18  BP: ()/(46-74) 100/71  Flow (L/min):  [2] 2    Intake/Output Summary (Last 24 hours) at 3/31/2024 1248  Last data filed at 3/31/2024 1100  Gross per 24 hour   Intake 550 ml   Output 1125 ml   Net -575 ml       Physical Exam:    General Appearance: alert, awake, no acute distress   Skin: warm and dry  HEENT: oral mucosa normal, nonicteric sclera  Neck: supple, no JVD  Lungs: Clear bilaterally, nonlabored breathing on RA  Heart: RRR, normal S1 and S2  Abdomen: soft, nontender, nondistended  : no palpable bladder, Marcano catheter in place  Extremities: no edema, cyanosis or clubbing  Neuro: normal speech and mental status     Scheduled Meds:     azelastine, 2 spray, Each Nare, BID  Budeson-Glycopyrrol-Formoterol, 2 puff, Inhalation, BID - RT  cefTRIAXone, 2,000 mg, Intravenous, Q24H  cetirizine, 10 mg, Oral, Daily  fluticasone, 2 spray, Nasal, Daily  lisinopril, 20 mg, Oral, Daily  Urea, 30 g, Oral, BID      IV Meds:   dextrose, 6 mL/kg (Ideal)  [Held by provider] sodium chloride, 75 mL/hr, Last Rate: Stopped (24)        Results Reviewed:   I have personally reviewed the results from the time of this admission to 3/31/2024 12:48 EDT     Results from last 7 days   Lab Units 24  0847 24  0428 24  1437 24  2244 24  1444  03/27/24  1421 03/27/24  0913   SODIUM mmol/L 131* 128* 122*   < > 118*   < > 118*  118*   POTASSIUM mmol/L 4.3 4.2 4.8   < > 5.2   < > 5.4*  5.4*   CHLORIDE mmol/L 94* 93* 90*   < > 86*   < > 85*  86*   CO2 mmol/L 25.0 25.6 23.0   < > 22.1   < > 20.0*  21.0*   BUN mg/dL 54* 42* 38*   < > 31*   < > 17  17   CREATININE mg/dL 0.97 0.81 0.95   < > 1.09   < > 1.02  1.03   CALCIUM mg/dL 9.1 9.0 8.9   < > 8.9   < > 8.6  8.7   BILIRUBIN mg/dL  --   --   --   --  0.2  --  0.4   ALK PHOS U/L  --   --   --   --  63  --  64   ALT (SGPT) U/L  --   --   --   --  31  --  24   AST (SGOT) U/L  --   --   --   --  33  --  24   GLUCOSE mg/dL 208* 111* 110*   < > 80   < > 105*  103*    < > = values in this interval not displayed.       Estimated Creatinine Clearance: 70 mL/min (by C-G formula based on SCr of 0.97 mg/dL).    Results from last 7 days   Lab Units 03/30/24  0428 03/29/24  1437 03/29/24  0250   MAGNESIUM mg/dL  --   --  2.3   PHOSPHORUS mg/dL 4.1 3.3 3.8       Results from last 7 days   Lab Units 03/27/24  0913   URIC ACID mg/dL 5.5       Results from last 7 days   Lab Units 03/30/24  0428 03/29/24  0250 03/28/24  0550 03/27/24  0913   WBC 10*3/mm3 12.36* 16.16* 19.59* 17.59*   HEMOGLOBIN g/dL 12.3* 12.2* 12.5* 12.6*   PLATELETS 10*3/mm3 224 218 245 249             Assessment / Plan     ASSESSMENT:    Acute hyponatremia likely secondary to SIADH possibly triggered by bladder distention and pain this is exacerbated by excessive fluid intake.  Lisinopril is on differential diagnosis but his sodium level was normal 1 week ago while he was on lisinopril.  High urine sodium and high urine osmolality in favor of SIADH.      History of hypertension on lisinopril.  Blood pressure acceptable    History of prostate cancer and BPH status post high intensity frequency ablation    Urinary retention status post Marcano catheter insertion    Urinary tract infection currently on ceftriaxone    PLAN:  -Sodium slowly improving with  medical management  -Patient can be safely be discharged to continue patient care while on fluid restriction  -Extensive counseling recommendation regarding fluid restriction and increase protein intake was discussed with patient and wife at bedside    Thank you for involving us in the care of Gonzales Kaur Sr..  Please feel free to call with any questions.    Juan Luis Loaiza MD  03/31/24  12:48 EDT    [facility]  [phone]    Parts of this note may be an electronic transcription/translation of spoken language to printed text using the Dragon dictation system.

## 2024-04-01 ENCOUNTER — TRANSITIONAL CARE MANAGEMENT TELEPHONE ENCOUNTER (OUTPATIENT)
Dept: CALL CENTER | Facility: HOSPITAL | Age: 69
End: 2024-04-01
Payer: MEDICARE

## 2024-04-01 LAB
BACTERIA SPEC AEROBE CULT: NORMAL
BACTERIA SPEC AEROBE CULT: NORMAL

## 2024-04-01 NOTE — OUTREACH NOTE
Call Center TCM Note      Flowsheet Row Responses   [facility] patient discharged from? [place]   Does the patient have one of the following disease processes/diagnoses(primary or secondary)? Other   TCM attempt successful? Yes   Call start time 1533   Call end time 1540   Discharge diagnosis Acute urinary retention and hyponatremia   Person spoke with today (if not patient) and relationship Patient   Meds reviewed with patient/caregiver? Yes  [New: cefdinir, urea]   Does the patient have all medications ordered at discharge? Yes   Is the patient taking all medications as directed (includes completed medication regime)? Yes   Comments PCP Christiano GIRARD. Hospital follow up appt scheduled for 4/4/24  945am   Does the patient have an appointment with their PCP within 7-14 days of discharge? No   Nursing Interventions Assisted patient with making appointment per protocol, Routed TCM call to PCP office   Has home health visited the patient within 72 hours of discharge? N/A   Psychosocial issues? No   Did the patient receive a copy of their discharge instructions? Yes   Nursing interventions Reviewed instructions with patient   What is the patient's perception of their health status since discharge? Improving  [Patient reports no issues with passing urine today.]   Is the patient/caregiver able to teach back signs and symptoms related to disease process for when to call PCP? Yes   Is the patient/caregiver able to teach back signs and symptoms related to disease process for when to call 911? Yes   Is the patient/caregiver able to teach back the hierarchy of who to call/visit for symptoms/problems? PCP, Specialist, Home health nurse, Urgent Care, ED, 911 Yes   If the patient is a current smoker, are they able to teach back resources for cessation? Not a smoker   TCM call completed? Yes   Call end time 1540   Would this patient benefit from a Referral to Amb Social Work? No   Is the patient interested in  additional calls from an ambulatory ? No            Dominique García RN    4/1/2024, 15:42 EDT

## 2024-04-02 LAB
BACTERIA SPEC AEROBE CULT: NORMAL
BACTERIA SPEC AEROBE CULT: NORMAL

## 2024-04-04 ENCOUNTER — OFFICE VISIT (OUTPATIENT)
Dept: INTERNAL MEDICINE | Facility: CLINIC | Age: 69
End: 2024-04-04
Payer: MEDICARE

## 2024-04-04 VITALS
WEIGHT: 169.8 LBS | HEART RATE: 93 BPM | DIASTOLIC BLOOD PRESSURE: 58 MMHG | HEIGHT: 65 IN | BODY MASS INDEX: 28.29 KG/M2 | OXYGEN SATURATION: 92 % | TEMPERATURE: 98.2 F | SYSTOLIC BLOOD PRESSURE: 114 MMHG

## 2024-04-04 DIAGNOSIS — E22.2 SIADH (SYNDROME OF INAPPROPRIATE ADH PRODUCTION): Primary | ICD-10-CM

## 2024-04-04 DIAGNOSIS — Z09 HOSPITAL DISCHARGE FOLLOW-UP: ICD-10-CM

## 2024-04-04 NOTE — PROGRESS NOTES
Transitional Care Follow Up Visit  Subjective     Gonzales CELIO Kaur Sr. is a 68 y.o. male who presents for a transitional care management visit.    Within 48 business hours after discharge our office contacted him via telephone to coordinate his care and needs.      I reviewed and discussed the details of that call along with the discharge summary, hospital problems, inpatient lab results, inpatient diagnostic studies, and consultation reports with Gonzales.     Current outpatient and discharge medications have been reconciled for the patient.  Reviewed by: SHAJI Lockhart          3/31/2024     2:31 PM   Date of TCM Phone Call   [facility]   Date of Admission 3/27/2024   Date of Discharge 3/31/2024   Discharge Disposition Home or Self Care     Risk for Readmission (LACE) Score: 12 (3/31/2024  6:00 AM)      History of Present Illness  68-year-old male presenting for hospital follow-up.  Urinary retention resolved.  Completed antibiotic.  Has been restricting fluids as instructed at discharge.  Taking urea packets as instructed.  Scheduled to see nephrology on 4/17.  Scheduled for nephrology labs on 4/9.  Discussed in visit.  Has been weighing self at home and has had an increase since discharge.  No changes in breathing.  No signs of swelling.  Has tried to increase protein intake by eating more eggs.     Course During Hospital Stay:  This is a 68-year-old male who recently underwent a procedure for prostate cancer and discharged with a Marcano catheter after the procedure.  He was seen at his urologist office the day prior to presentation at which time the Marcano catheter was removed.  Since then he started experiencing worsening discomfort and urinary retention.  As the discomfort worsened he presented to the hospital for further evaluation at which time he was noted to have a distended bladder as well as a sodium level of 118 and a leukocytosis of 17,000.  A Marcano catheter was reinserted which  relieved approximately 500 cc of urine.  Nephrology was consulted.  He was diagnosed with SIADH.  There was some concern that the degree of SIADH was disproportionate to his clinical presentation.  As such a CT of the chest was obtained that showed findings consistent with multifocal pneumonia.  After being initiated on IV antibiotics his white blood cell continue to decrease and his sodium levels continue to improve.  On the day of discharge he had a normal white blood cell count as well as a normal sodium level.  He was counseled extensively on the need to monitor his fluid intake due to the need for fluid restriction as well as increase his protein intake.     I have referred him to nephrology as an outpatient to ensure that his hyponatremia continues to improve/resolve.     The following portions of the patient's history were reviewed and updated as appropriate: allergies, current medications, past family history, past medical history, past social history, past surgical history, and problem list.    Review of Systems   All other systems reviewed and are negative.      Objective   Physical Exam  Vitals reviewed.   Constitutional:       Appearance: Normal appearance.   Cardiovascular:      Rate and Rhythm: Normal rate and regular rhythm.      Pulses: Normal pulses.      Heart sounds: Normal heart sounds.   Pulmonary:      Effort: Pulmonary effort is normal.      Breath sounds: Normal breath sounds.   Musculoskeletal:         General: Normal range of motion.      Cervical back: Normal range of motion.   Skin:     General: Skin is warm and dry.      Capillary Refill: Capillary refill takes less than 2 seconds.   Neurological:      General: No focal deficit present.      Mental Status: He is alert and oriented to person, place, and time.   Psychiatric:         Mood and Affect: Mood normal.         Behavior: Behavior normal.         Thought Content: Thought content normal.         Judgment: Judgment normal.          Assessment & Plan   Problems Addressed this Visit       SIADH (syndrome of inappropriate ADH production) - Primary     Other Visit Diagnoses       Hospital discharge follow-up              Diagnoses         Codes Comments    SIADH (syndrome of inappropriate ADH production)    -  Primary ICD-10-CM: E22.2  ICD-9-CM: 253.6     Hospital discharge follow-up     ICD-10-CM: Z09  ICD-9-CM: V67.59           Patient Instructions   Continue medications as prescribed. Stay active. Restrict fluids. Weigh self daily. Record and bring to nephrology appointment. Monitor for weakness, swelling and breathing. Follow-up as scheduled.       Labs on 4/9 at 1:30. Visit on 4/17 at 12:15.  Chula Cazares APRN  Nephrology  [address] [place] [address] [address]  [facility] [address]     Phone: [phone]  Fax: [phone]

## 2024-04-04 NOTE — PATIENT INSTRUCTIONS
Continue medications as prescribed. Stay active. Restrict fluids. Weigh self daily. Record and bring to nephrology appointment. Monitor for weakness, swelling and breathing. Follow-up as scheduled.       Labs on 4/9 at 1:30. Visit on 4/17 at 12:15.  Chula Cazares APRN  Nephrology  [address] [place] [address] [address]  [facility] [address]     Phone: [phone]  Fax: [phone]

## 2024-05-06 ENCOUNTER — OFFICE VISIT (OUTPATIENT)
Dept: INTERNAL MEDICINE | Facility: CLINIC | Age: 69
End: 2024-05-06
Payer: MEDICARE

## 2024-05-06 VITALS
OXYGEN SATURATION: 80 % | TEMPERATURE: 97.3 F | SYSTOLIC BLOOD PRESSURE: 126 MMHG | DIASTOLIC BLOOD PRESSURE: 64 MMHG | HEIGHT: 65 IN | BODY MASS INDEX: 27.82 KG/M2 | WEIGHT: 167 LBS | HEART RATE: 90 BPM

## 2024-05-06 DIAGNOSIS — J44.1 COPD WITH EXACERBATION: Primary | ICD-10-CM

## 2024-05-06 PROCEDURE — 3078F DIAST BP <80 MM HG: CPT

## 2024-05-06 PROCEDURE — 99213 OFFICE O/P EST LOW 20 MIN: CPT

## 2024-05-06 PROCEDURE — 1159F MED LIST DOCD IN RCRD: CPT

## 2024-05-06 PROCEDURE — 1160F RVW MEDS BY RX/DR IN RCRD: CPT

## 2024-05-06 PROCEDURE — 3074F SYST BP LT 130 MM HG: CPT

## 2024-05-06 RX ORDER — PREDNISONE 10 MG/1
TABLET ORAL
Qty: 26 TABLET | Refills: 0 | Status: SHIPPED | OUTPATIENT
Start: 2024-05-06

## 2024-05-06 RX ORDER — TAMSULOSIN HYDROCHLORIDE 0.4 MG/1
1 CAPSULE ORAL DAILY
COMMUNITY

## 2024-07-10 ENCOUNTER — OFFICE VISIT (OUTPATIENT)
Dept: INTERNAL MEDICINE | Facility: CLINIC | Age: 69
End: 2024-07-10
Payer: MEDICARE

## 2024-07-10 VITALS
OXYGEN SATURATION: 90 % | HEIGHT: 65 IN | HEART RATE: 89 BPM | DIASTOLIC BLOOD PRESSURE: 70 MMHG | BODY MASS INDEX: 29.06 KG/M2 | TEMPERATURE: 98.8 F | SYSTOLIC BLOOD PRESSURE: 134 MMHG | WEIGHT: 174.4 LBS

## 2024-07-10 DIAGNOSIS — E78.5 DYSLIPIDEMIA: ICD-10-CM

## 2024-07-10 DIAGNOSIS — I10 ESSENTIAL HYPERTENSION: ICD-10-CM

## 2024-07-10 DIAGNOSIS — J43.9 PULMONARY EMPHYSEMA, UNSPECIFIED EMPHYSEMA TYPE: Primary | Chronic | ICD-10-CM

## 2024-07-10 LAB
ALBUMIN SERPL-MCNC: 4.4 G/DL (ref 3.5–5.2)
ALBUMIN/GLOB SERPL: 1.3 G/DL
ALP SERPL-CCNC: 55 U/L (ref 39–117)
ALT SERPL-CCNC: 20 U/L (ref 1–41)
APPEARANCE UR: CLEAR
AST SERPL-CCNC: 20 U/L (ref 1–40)
BILIRUB SERPL-MCNC: 0.4 MG/DL (ref 0–1.2)
BILIRUB UR QL STRIP: NEGATIVE
BUN SERPL-MCNC: 13 MG/DL (ref 8–23)
BUN/CREAT SERPL: 16.7 (ref 7–25)
CALCIUM SERPL-MCNC: 9.4 MG/DL (ref 8.6–10.5)
CHLORIDE SERPL-SCNC: 101 MMOL/L (ref 98–107)
CHOLEST SERPL-MCNC: 176 MG/DL (ref 0–200)
CHOLEST/HDLC SERPL: 4 {RATIO}
CO2 SERPL-SCNC: 25.9 MMOL/L (ref 22–29)
COLOR UR: YELLOW
CREAT SERPL-MCNC: 0.78 MG/DL (ref 0.76–1.27)
EGFRCR SERPLBLD CKD-EPI 2021: 97.7 ML/MIN/1.73
GLOBULIN SER CALC-MCNC: 3.5 GM/DL
GLUCOSE SERPL-MCNC: 98 MG/DL (ref 65–99)
GLUCOSE UR QL STRIP: NEGATIVE
HDLC SERPL-MCNC: 44 MG/DL (ref 40–60)
HGB UR QL STRIP: NEGATIVE
KETONES UR QL STRIP: NEGATIVE
LDLC SERPL CALC-MCNC: 118 MG/DL (ref 0–100)
LEUKOCYTE ESTERASE UR QL STRIP: NEGATIVE
NITRITE UR QL STRIP: NEGATIVE
PH UR STRIP: 7.5 [PH] (ref 5–8)
POTASSIUM SERPL-SCNC: 5 MMOL/L (ref 3.5–5.2)
PROT SERPL-MCNC: 7.9 G/DL (ref 6–8.5)
PROT UR QL STRIP: NEGATIVE
SODIUM SERPL-SCNC: 137 MMOL/L (ref 136–145)
SP GR UR STRIP: 1.01 (ref 1–1.03)
TRIGL SERPL-MCNC: 77 MG/DL (ref 0–150)
UROBILINOGEN UR STRIP-MCNC: NORMAL MG/DL
VLDLC SERPL CALC-MCNC: 14 MG/DL (ref 5–40)

## 2024-07-10 PROCEDURE — 3078F DIAST BP <80 MM HG: CPT

## 2024-07-10 PROCEDURE — 3075F SYST BP GE 130 - 139MM HG: CPT

## 2024-07-10 PROCEDURE — 1160F RVW MEDS BY RX/DR IN RCRD: CPT

## 2024-07-10 PROCEDURE — 99213 OFFICE O/P EST LOW 20 MIN: CPT

## 2024-07-10 PROCEDURE — 1126F AMNT PAIN NOTED NONE PRSNT: CPT

## 2024-07-10 PROCEDURE — 1159F MED LIST DOCD IN RCRD: CPT

## 2024-07-10 NOTE — PATIENT INSTRUCTIONS
Continue medications as prescribed. Stay active. Stay hydrated with water. Wear sunscreen. Labs today. Follow-up in 6 months for medicare wellness and fasting labs.

## 2024-07-10 NOTE — PROGRESS NOTES
"Chief Complaint  COPD (6 month f/u)    Subjective        Gonzales Kaur Sr. presents to [facility]  History of Present Illness  68-year-old male presenting for COPD follow-up.  Has cataracts on his left eye that he is getting corrected later this month.  Is then going to have his right eye repaired in November.  Has CT of his chest scheduled for September.  Oxygen on room air has been 94%.  Does not feel winded or short of breath when sitting.  He is able to catch himself before he pushes himself too far too fast.  Typically we need to sit down for 2 minutes and then has more oxygen to keep going.  Family member sometimes are not as accommodating with this.  Taking medications as prescribed and tolerating well.  COPD        Objective   Vital Signs:  /70 (BP Location: Left arm, Patient Position: Sitting, Cuff Size: Large Adult)   Pulse 89   Temp 98.8 °F (37.1 °C)   Ht 165.1 cm (65\")   Wt 79.1 kg (174 lb 6.4 oz)   SpO2 90%   BMI 29.02 kg/m²   Estimated body mass index is 29.02 kg/m² as calculated from the following:    Height as of this encounter: 165.1 cm (65\").    Weight as of this encounter: 79.1 kg (174 lb 6.4 oz).       BMI is >= 25 and <30. (Overweight) The following options were offered after discussion;: exercise counseling/recommendations and nutrition counseling/recommendations      Physical Exam   Result Review :      Common labs          3/31/2024    08:47 7/9/2024    11:28 7/10/2024    11:21   Common Labs   Glucose 208   98    BUN 54   13    Creatinine 0.97   0.78    Sodium 131   137    Potassium 4.3   5.0    Chloride 94   101    Calcium 9.1   9.4    Total Protein   7.9    Albumin   4.4    Total Bilirubin   0.4    Alkaline Phosphatase   55    AST (SGOT)   20    ALT (SGPT)   20    Total Cholesterol   176    Triglycerides   77    HDL Cholesterol   44    LDL Cholesterol    118    Uric Acid  8.4           Details          This result is from an external source.      "            Current Outpatient Medications on File Prior to Visit   Medication Sig Dispense Refill    albuterol sulfate  (90 Base) MCG/ACT inhaler Inhale 2 puffs.      azelastine (ASTELIN) 0.1 % nasal spray 2 sprays into the nostril(s) as directed by provider 2 (Two) Times a Day. Use in each nostril as directed 30 mL 2    Breztri Aerosphere 160-9-4.8 MCG/ACT aerosol inhaler Inhale 2 puffs 2 (Two) Times a Day.      fluticasone (FLONASE) 50 MCG/ACT nasal spray 2 sprays into the nostril(s) as directed by provider Daily. 15.8 mL 2    lisinopril (PRINIVIL,ZESTRIL) 20 MG tablet Take 1 tablet by mouth Daily. 90 tablet 3    loratadine (CLARITIN) 10 MG tablet Take 1 tablet by mouth Daily. 30 tablet 2    multivitamin with minerals tablet tablet Take 1 tablet by mouth Daily.      tamsulosin (FLOMAX) 0.4 MG capsule 24 hr capsule Take 1 capsule by mouth Daily.       No current facility-administered medications on file prior to visit.                Assessment and Plan     Diagnoses and all orders for this visit:    1. Pulmonary emphysema, unspecified emphysema type (Primary)    2. Essential hypertension  -     Comprehensive Metabolic Panel  -     Urinalysis With Microscopic If Indicated (No Culture) - Urine, Clean Catch    3. Dyslipidemia  -     Lipid Panel With / Chol / HDL Ratio        Patient Instructions   Continue medications as prescribed. Stay active. Stay hydrated with water. Wear sunscreen. Labs today. Follow-up in 6 months for medicare wellness and fasting labs.            Follow Up     Return in about 6 months (around 1/10/2025) for Medicare Wellness.  Patient was given instructions and counseling regarding his condition or for health maintenance advice. Please see specific information pulled into the AVS if appropriate.

## 2024-07-12 NOTE — PROGRESS NOTES
Urinalysis is clear.    Metabolic panel is normal. No signs of diabetes, electrolyte imbalance, kidney injury or liver injury.    Cholesterol levels have remained the same. Recommend limiting intake of red meat, pork, fried foods and high fat dairy products. Limit intake of alcohol. Recommend at least 30 minutes of heart elevating exercises three days a week as tolerated. Recommend adding fish oil supplement of 1000 mg daily to help improve cholesterol levels.

## 2024-08-08 ENCOUNTER — TELEPHONE (OUTPATIENT)
Dept: ONCOLOGY | Facility: CLINIC | Age: 69
End: 2024-08-08

## 2024-08-08 NOTE — TELEPHONE ENCOUNTER
Caller: Gonzales Kaur Sr.    Relationship: Self    Best call back number: [phone]    What is the best time to reach you: ANYTIME    Who are you requesting to speak with (clinical staff, provider,  specific staff member): OFFICE        What was the call regarding: MISSED CALL YESTERDAY EVENING ABOUT 7:30PM NO VOICEMAIL LEFT    Is it okay if the provider responds through [facility]: YES OR CAN SEND EMAIL .       
MAR

## 2024-09-16 ENCOUNTER — HOSPITAL ENCOUNTER (OUTPATIENT)
Dept: CT IMAGING | Facility: HOSPITAL | Age: 69
Discharge: HOME | End: 2024-09-16
Admitting: NURSE PRACTITIONER
Payer: MEDICARE

## 2024-09-16 DIAGNOSIS — Z87.891 PERSONAL HISTORY OF TOBACCO USE, PRESENTING HAZARDS TO HEALTH: ICD-10-CM

## 2024-09-16 PROCEDURE — 71271 CT THORAX LUNG CANCER SCR C-: CPT

## 2024-10-15 ENCOUNTER — TRANSCRIBE ORDERS (OUTPATIENT)
Dept: ADMINISTRATIVE | Facility: HOSPITAL | Age: 69
End: 2024-10-15
Payer: MEDICARE

## 2024-10-15 DIAGNOSIS — R91.1 LUNG NODULE: Primary | ICD-10-CM

## 2024-10-18 ENCOUNTER — OFFICE VISIT (OUTPATIENT)
Dept: INTERNAL MEDICINE | Facility: CLINIC | Age: 69
End: 2024-10-18
Payer: MEDICARE

## 2024-10-18 VITALS
DIASTOLIC BLOOD PRESSURE: 55 MMHG | TEMPERATURE: 96.9 F | BODY MASS INDEX: 29.66 KG/M2 | SYSTOLIC BLOOD PRESSURE: 114 MMHG | OXYGEN SATURATION: 90 % | HEIGHT: 65 IN | WEIGHT: 178 LBS | HEART RATE: 80 BPM

## 2024-10-18 DIAGNOSIS — J06.9 UPPER RESPIRATORY TRACT INFECTION, UNSPECIFIED TYPE: ICD-10-CM

## 2024-10-18 DIAGNOSIS — J44.1 COPD WITH EXACERBATION: ICD-10-CM

## 2024-10-18 DIAGNOSIS — J30.2 SEASONAL ALLERGIES: Primary | Chronic | ICD-10-CM

## 2024-10-18 DIAGNOSIS — R05.8 ALLERGIC COUGH: ICD-10-CM

## 2024-10-18 PROCEDURE — 3074F SYST BP LT 130 MM HG: CPT

## 2024-10-18 PROCEDURE — 1160F RVW MEDS BY RX/DR IN RCRD: CPT

## 2024-10-18 PROCEDURE — 99213 OFFICE O/P EST LOW 20 MIN: CPT

## 2024-10-18 PROCEDURE — 3078F DIAST BP <80 MM HG: CPT

## 2024-10-18 PROCEDURE — 1159F MED LIST DOCD IN RCRD: CPT

## 2024-10-18 PROCEDURE — 1126F AMNT PAIN NOTED NONE PRSNT: CPT

## 2024-10-18 RX ORDER — PREDNISONE 10 MG/1
TABLET ORAL
Qty: 26 TABLET | Refills: 0 | Status: SHIPPED | OUTPATIENT
Start: 2024-10-18

## 2024-10-18 RX ORDER — AZELASTINE 1 MG/ML
2 SPRAY, METERED NASAL 2 TIMES DAILY
Qty: 30 ML | Refills: 2 | Status: SHIPPED | OUTPATIENT
Start: 2024-10-18

## 2024-10-18 RX ORDER — FLUTICASONE PROPIONATE 50 UG/1
2 SPRAY, METERED NASAL DAILY
Qty: 15.8 ML | Refills: 2 | Status: SHIPPED | OUTPATIENT
Start: 2024-10-18

## 2024-10-18 RX ORDER — AZITHROMYCIN 250 MG/1
TABLET, FILM COATED ORAL
Qty: 6 TABLET | Refills: 0 | Status: SHIPPED | OUTPATIENT
Start: 2024-10-18

## 2024-10-18 RX ORDER — SENNOSIDES 8.6 MG
650 CAPSULE ORAL
COMMUNITY

## 2024-10-18 NOTE — PROGRESS NOTES
"Chief Complaint  Cough and Sinus Problem    Subjective        Gonzales Kaur . presents to [facility]  History of Present Illness  69 old male presenting with productive cough.  For the past 3 days has had more cough and some sinus congestion.  Has had occasional wheezing.  Is taking a nebulizer daily as well as his inhalers.  Stated he wanted to come in sooner rather than later to prevent his breathing from getting worse.  Denies fever, sore throat, ear pain.  Cough    Sinus Problem  Associated symptoms include coughing.       Objective   Vital Signs:  /55 (BP Location: Left arm, Patient Position: Sitting, Cuff Size: Adult)   Pulse 80   Temp 96.9 °F (36.1 °C) (Temporal)   Ht 165.1 cm (65\")   Wt 80.7 kg (178 lb)   SpO2 90%   BMI 29.62 kg/m²   Estimated body mass index is 29.62 kg/m² as calculated from the following:    Height as of this encounter: 165.1 cm (65\").    Weight as of this encounter: 80.7 kg (178 lb).               Physical Exam  Vitals reviewed.   Constitutional:       Appearance: Normal appearance.   Cardiovascular:      Rate and Rhythm: Normal rate and regular rhythm.      Pulses: Normal pulses.      Heart sounds: Normal heart sounds.   Pulmonary:      Effort: Pulmonary effort is normal.      Breath sounds: Wheezing present.   Musculoskeletal:         General: Normal range of motion.      Cervical back: Normal range of motion.   Skin:     General: Skin is warm and dry.      Capillary Refill: Capillary refill takes less than 2 seconds.   Neurological:      General: No focal deficit present.      Mental Status: He is alert and oriented to person, place, and time.   Psychiatric:         Mood and Affect: Mood normal.         Behavior: Behavior normal.         Thought Content: Thought content normal.         Judgment: Judgment normal.        Result Review :      Common labs          7/9/2024    11:28 7/10/2024    11:21 10/8/2024    11:08   Common Labs   Glucose  " 98     BUN  13     Creatinine  0.78     Sodium  137     Potassium  5.0     Chloride  101     Calcium  9.4     Total Protein  7.9     Albumin  4.4     Total Bilirubin  0.4     Alkaline Phosphatase  55     AST (SGOT)  20     ALT (SGPT)  20     Total Cholesterol  176     Triglycerides  77     HDL Cholesterol  44     LDL Cholesterol   118     Uric Acid 8.4      8.1          Details          This result is from an external source.                 Current Outpatient Medications on File Prior to Visit   Medication Sig Dispense Refill    albuterol sulfate  (90 Base) MCG/ACT inhaler Inhale 2 puffs.      Breztri Aerosphere 160-9-4.8 MCG/ACT aerosol inhaler Inhale 2 puffs 2 (Two) Times a Day.      lisinopril (PRINIVIL,ZESTRIL) 20 MG tablet Take 1 tablet by mouth Daily. 90 tablet 3    multivitamin with minerals tablet tablet Take 1 tablet by mouth Daily.      tamsulosin (FLOMAX) 0.4 MG capsule 24 hr capsule Take 1 capsule by mouth Daily.      [DISCONTINUED] azelastine (ASTELIN) 0.1 % nasal spray 2 sprays into the nostril(s) as directed by provider 2 (Two) Times a Day. Use in each nostril as directed 30 mL 2    [DISCONTINUED] fluticasone (FLONASE) 50 MCG/ACT nasal spray 2 sprays into the nostril(s) as directed by provider Daily. 15.8 mL 2    acetaminophen (TYLENOL) 650 MG 8 hr tablet Take 1 tablet by mouth. (Patient not taking: Reported on 10/18/2024)      [DISCONTINUED] loratadine (CLARITIN) 10 MG tablet Take 1 tablet by mouth Daily. (Patient not taking: Reported on 10/18/2024) 30 tablet 2     No current facility-administered medications on file prior to visit.                Assessment and Plan     Diagnoses and all orders for this visit:    1. Seasonal allergies (Primary)    2. COPD with exacerbation  -     azelastine (ASTELIN) 0.1 % nasal spray; Administer 2 sprays into the nostril(s) as directed by provider 2 (Two) Times a Day. Use in each nostril as directed  Dispense: 30 mL; Refill: 2    3. Allergic cough  -      fluticasone (FLONASE) 50 MCG/ACT nasal spray; Administer 2 sprays into the nostril(s) as directed by provider Daily.  Dispense: 15.8 mL; Refill: 2    4. Upper respiratory tract infection, unspecified type  -     azithromycin (Zithromax Z-Derek) 250 MG tablet; Take 2 tablets by mouth on day 1, then 1 tablet daily on days 2-5  Dispense: 6 tablet; Refill: 0  -     predniSONE (DELTASONE) 10 MG tablet; Take 4 tablets by mouth on days 1-2. Take 3 tablets by mouth on days 3-4. Take 2 tablets by mouth on days 5-8. Take 1 tablet by mouth on days 9-12.  Dispense: 26 tablet; Refill: 0        Patient Instructions   Start azithromycin and take as directed. Start steroids as prescribed. Stay hydrated with water. Follow-up as needed.            Follow Up     Return if symptoms worsen or fail to improve.  Patient was given instructions and counseling regarding his condition or for health maintenance advice. Please see specific information pulled into the AVS if appropriate.

## 2024-10-18 NOTE — PATIENT INSTRUCTIONS
Start azithromycin and take as directed. Start steroids as prescribed. Stay hydrated with water. Follow-up as needed.

## 2024-11-22 ENCOUNTER — OFFICE VISIT (OUTPATIENT)
Dept: INTERNAL MEDICINE | Facility: CLINIC | Age: 69
End: 2024-11-22
Payer: MEDICARE

## 2024-11-22 VITALS
OXYGEN SATURATION: 94 % | BODY MASS INDEX: 29.32 KG/M2 | SYSTOLIC BLOOD PRESSURE: 130 MMHG | HEART RATE: 81 BPM | DIASTOLIC BLOOD PRESSURE: 70 MMHG | HEIGHT: 65 IN | TEMPERATURE: 96.9 F | WEIGHT: 176 LBS

## 2024-11-22 DIAGNOSIS — R05.1 ACUTE COUGH: ICD-10-CM

## 2024-11-22 DIAGNOSIS — J06.9 UPPER RESPIRATORY TRACT INFECTION, UNSPECIFIED TYPE: ICD-10-CM

## 2024-11-22 DIAGNOSIS — J44.1 COPD WITH EXACERBATION: Primary | ICD-10-CM

## 2024-11-22 PROCEDURE — 3075F SYST BP GE 130 - 139MM HG: CPT

## 2024-11-22 PROCEDURE — 3078F DIAST BP <80 MM HG: CPT

## 2024-11-22 PROCEDURE — 1126F AMNT PAIN NOTED NONE PRSNT: CPT

## 2024-11-22 PROCEDURE — 99214 OFFICE O/P EST MOD 30 MIN: CPT

## 2024-11-22 PROCEDURE — G2211 COMPLEX E/M VISIT ADD ON: HCPCS

## 2024-11-22 PROCEDURE — 87428 SARSCOV & INF VIR A&B AG IA: CPT

## 2024-11-22 PROCEDURE — 87880 STREP A ASSAY W/OPTIC: CPT

## 2024-11-22 RX ORDER — PREDNISONE 10 MG/1
TABLET ORAL
Qty: 26 TABLET | Refills: 0 | Status: SHIPPED | OUTPATIENT
Start: 2024-11-22

## 2024-11-22 RX ORDER — ALBUTEROL SULFATE 90 UG/1
2 INHALANT RESPIRATORY (INHALATION)
COMMUNITY

## 2024-11-22 RX ORDER — AZITHROMYCIN 250 MG/1
TABLET, FILM COATED ORAL
Qty: 6 TABLET | Refills: 0 | Status: SHIPPED | OUTPATIENT
Start: 2024-11-22

## 2024-11-22 NOTE — PROGRESS NOTES
Chief Complaint  Sore Throat (X24 hours, no fever ), Cough, and Nasal Congestion     Subjective:      History of Present Illness {CC  Problem List  Visit  Diagnosis   Encounters  Notes  Medications  Labs  Result Review Imaging  Media :23}     Gonzales Kaur  presents to [facility] for:      Sore Throat   Associated symptoms include coughing.   Cough  Associated symptoms include a sore throat.                      I have reviewed patient's medical history, any new submitted information provided by patient or medical assistant and updated medical record.      Objective:      Physical Exam  Vitals reviewed.   Constitutional:       General: He is awake. He is not in acute distress.     Appearance: Normal appearance. He is not ill-appearing, toxic-appearing or diaphoretic.   HENT:      Head: Normocephalic.      Right Ear: Hearing normal.      Left Ear: Hearing normal.      Nose: Nose normal.      Mouth/Throat:      Lips: Pink.      Mouth: Mucous membranes are moist.   Eyes:      General: Lids are normal. Vision grossly intact.      Conjunctiva/sclera: Conjunctivae normal.   Cardiovascular:      Rate and Rhythm: Normal rate and regular rhythm.      Pulses: Normal pulses.      Heart sounds: Normal heart sounds, S1 normal and S2 normal.   Pulmonary:      Effort: Pulmonary effort is normal.      Breath sounds: No stridor. Examination of the right-upper field reveals wheezing. Examination of the left-upper field reveals wheezing and rhonchi. Examination of the right-lower field reveals decreased breath sounds. Examination of the left-lower field reveals decreased breath sounds. Decreased breath sounds, wheezing and rhonchi present. No rales.   Abdominal:      General: Abdomen is flat. Bowel sounds are normal.      Palpations: Abdomen is soft.   Musculoskeletal:      Right lower leg: No edema.      Left lower leg: No edema.   Skin:     General: Skin is warm and dry.       "Capillary Refill: Capillary refill takes less than 2 seconds.   Neurological:      General: No focal deficit present.      Mental Status: He is alert and oriented to person, place, and time. Mental status is at baseline.   Psychiatric:         Attention and Perception: Attention and perception normal.         Mood and Affect: Mood and affect normal.         Speech: Speech normal.         Behavior: Behavior normal. Behavior is cooperative.         Cognition and Memory: Cognition and memory normal.         Judgment: Judgment normal.        Result Review  Data Reviewed:{ Labs  Result Review  Imaging  Med Tab  Media :23}     Results  POCT rapid strep A (11/22/2024 1:00 PM)    POCT SARS-CoV-2 Antigen MARA + Flu (11/22/2024 1:00 PM)     The following data was reviewed by: SHAJI Romero on 11/22/2024           Vital Signs:   /70 (BP Location: Left arm, Patient Position: Sitting, Cuff Size: Adult)   Pulse 81   Temp 96.9 °F (36.1 °C) (Temporal)   Ht 165.1 cm (65\")   Wt 79.8 kg (176 lb)   SpO2 94%   BMI 29.29 kg/m²                 Requested Prescriptions      No prescriptions requested or ordered in this encounter       Routine medications provided by this office will also be refilled via pharmacy request.       Current Outpatient Medications:     acetaminophen (TYLENOL) 650 MG 8 hr tablet, Take 1 tablet by mouth., Disp: , Rfl:     albuterol sulfate  (90 Base) MCG/ACT inhaler, Inhale 2 puffs., Disp: , Rfl:     azelastine (ASTELIN) 0.1 % nasal spray, Administer 2 sprays into the nostril(s) as directed by provider 2 (Two) Times a Day. Use in each nostril as directed, Disp: 30 mL, Rfl: 2    Breztri Aerosphere 160-9-4.8 MCG/ACT aerosol inhaler, Inhale 2 puffs 2 (Two) Times a Day., Disp: , Rfl:     fluticasone (FLONASE) 50 MCG/ACT nasal spray, Administer 2 sprays into the nostril(s) as directed by provider Daily., Disp: 15.8 mL, Rfl: 2    lisinopril (PRINIVIL,ZESTRIL) 20 MG tablet, Take 1 tablet by " mouth Daily., Disp: 90 tablet, Rfl: 3    multivitamin with minerals tablet tablet, Take 1 tablet by mouth Daily., Disp: , Rfl:     predniSONE (DELTASONE) 10 MG tablet, Take 4 tablets by mouth on days 1-2. Take 3 tablets by mouth on days 3-4. Take 2 tablets by mouth on days 5-8. Take 1 tablet by mouth on days 9-12., Disp: 26 tablet, Rfl: 0    tamsulosin (FLOMAX) 0.4 MG capsule 24 hr capsule, Take 1 capsule by mouth Daily., Disp: , Rfl:     albuterol sulfate  (90 Base) MCG/ACT inhaler, Inhale 2 puffs., Disp: , Rfl:     azithromycin (Zithromax Z-Derek) 250 MG tablet, Take 2 tablets by mouth on day 1, then 1 tablet daily on days 2-5, Disp: 6 tablet, Rfl: 0     Assessment and Plan:      Assessment and Plan {CC Problem List  Visit Diagnosis  ROS  Review (Popup)  Health Maintenance  Quality  BestPractice  Medications  SmartSets  SnapShot Encounters  Media :23}     Problem List Items Addressed This Visit    None                  Follow Up {Instructions Charge Capture  Follow-up Communications :23}     No follow-ups on file.      Patient was given instructions and counseling regarding his condition or for health maintenance advice. Please see specific information pulled into the AVS if appropriate.        Dragon disclaimer:   Much of this encounter note is an electronic transcription/translation of spoken language to printed text. The electronic translation of spoken language may permit erroneous, or at times, nonsensical words or phrases to be inadvertently transcribed; Although I have reviewed the note for such errors, some may still exist.     Additional Patient Counseling:       There are no Patient Instructions on file for this visit.    Patient or patient representative verbalized consent for the use of Ambient Listening during the visit with  SHAJI Romero for chart documentation. 11/22/2024  12:56 EST

## 2024-11-22 NOTE — PROGRESS NOTES
Chief Complaint  Sore Throat (X24 hours, no fever ), Cough, and Nasal Congestion     Subjective:      History of Present Illness {CC  Problem List  Visit  Diagnosis   Encounters  Notes  Medications  Labs  Result Review Imaging  Media :23}     Gonzales Kaur Sr. presents to [facility] for:  Sore Throat (X24 hours, no fever ), Cough, and Nasal Congestion    History of Present Illness          Gonzales Kaur Sr. is a patient of Christiano Bartholomew APRN and presents today with complaints of evaluation of a cough.    His symptoms began last Wednesday, including a croupy cough and a runny nose. He has not had a fever. Upon waking yesterday, he had a sore throat, which was somewhat alleviated by Mucinex and throat lozenges. He reports no wheezing.    He has a history of Chronic Obstructive Pulmonary Disease (COPD) and occasionally produces white phlegm. He uses Respimat twice daily and albuterol as needed for shortness of breath, although he has not needed it recently. He reports no palpitations or shortness of breath that is not relieved by inhalers.    He last visited the clinic over a month ago.     I have reviewed patient's medical history, any new submitted information provided by patient or medical assistant and updated medical record.      Objective:      Physical Exam  Vitals reviewed.   Constitutional:       General: He is awake. He is not in acute distress.     Appearance: Normal appearance. He is well-groomed and overweight. He is not ill-appearing, toxic-appearing or diaphoretic.   HENT:      Head: Normocephalic.      Right Ear: Hearing normal.      Left Ear: Hearing normal.      Nose: Nose normal. Congestion and rhinorrhea present. Rhinorrhea is purulent.      Mouth/Throat:      Lips: Pink.      Mouth: Mucous membranes are moist.      Pharynx: Oropharynx is clear. Uvula midline.   Eyes:      General: Lids are normal. Vision grossly intact. Allergic shiner present.       Conjunctiva/sclera: Conjunctivae normal.   Neck:      Thyroid: No thyroid mass, thyromegaly or thyroid tenderness.   Cardiovascular:      Rate and Rhythm: Normal rate and regular rhythm.      Pulses: Normal pulses.      Heart sounds: Normal heart sounds, S1 normal and S2 normal.   Pulmonary:      Effort: Pulmonary effort is normal.      Breath sounds: No stridor or transmitted upper airway sounds. Examination of the right-lower field reveals decreased breath sounds. Examination of the left-lower field reveals decreased breath sounds. Decreased breath sounds present. No wheezing, rhonchi or rales.   Abdominal:      General: Abdomen is protuberant. Bowel sounds are normal.      Palpations: Abdomen is soft.   Musculoskeletal:      Right lower leg: No edema.      Left lower leg: No edema.   Lymphadenopathy:      Head:      Right side of head: No submental, submandibular or tonsillar adenopathy.      Left side of head: No submental, submandibular or tonsillar adenopathy.      Cervical: No cervical adenopathy.      Right cervical: No superficial, deep or posterior cervical adenopathy.     Left cervical: No superficial, deep or posterior cervical adenopathy.   Skin:     General: Skin is warm and dry.      Capillary Refill: Capillary refill takes less than 2 seconds.   Neurological:      General: No focal deficit present.      Mental Status: He is alert and oriented to person, place, and time. Mental status is at baseline.   Psychiatric:         Attention and Perception: Attention and perception normal.         Mood and Affect: Mood and affect normal.         Speech: Speech normal.         Behavior: Behavior normal. Behavior is cooperative.         Thought Content: Thought content normal.         Cognition and Memory: Cognition and memory normal.         Judgment: Judgment normal.        Result Review  Data Reviewed:{ Labs  Result Review  Imaging  Med Tab  Media :23}     Results  Laboratory Studies  Strep test is  "negative.                  Vital Signs:   /70 (BP Location: Left arm, Patient Position: Sitting, Cuff Size: Adult)   Pulse 81   Temp 96.9 °F (36.1 °C) (Temporal)   Ht 165.1 cm (65\")   Wt 79.8 kg (176 lb)   SpO2 94%   BMI 29.29 kg/m²           Requested Prescriptions      No prescriptions requested or ordered in this encounter       Routine medications provided by this office will also be refilled via pharmacy request.       Current Outpatient Medications:     acetaminophen (TYLENOL) 650 MG 8 hr tablet, Take 1 tablet by mouth., Disp: , Rfl:     albuterol sulfate  (90 Base) MCG/ACT inhaler, Inhale 2 puffs., Disp: , Rfl:     azelastine (ASTELIN) 0.1 % nasal spray, Administer 2 sprays into the nostril(s) as directed by provider 2 (Two) Times a Day. Use in each nostril as directed, Disp: 30 mL, Rfl: 2    Breztri Aerosphere 160-9-4.8 MCG/ACT aerosol inhaler, Inhale 2 puffs 2 (Two) Times a Day., Disp: , Rfl:     fluticasone (FLONASE) 50 MCG/ACT nasal spray, Administer 2 sprays into the nostril(s) as directed by provider Daily., Disp: 15.8 mL, Rfl: 2    lisinopril (PRINIVIL,ZESTRIL) 20 MG tablet, Take 1 tablet by mouth Daily., Disp: 90 tablet, Rfl: 3    multivitamin with minerals tablet tablet, Take 1 tablet by mouth Daily., Disp: , Rfl:     predniSONE (DELTASONE) 10 MG tablet, Take 4 tablets by mouth on days 1-2. Take 3 tablets by mouth on days 3-4. Take 2 tablets by mouth on days 5-8. Take 1 tablet by mouth on days 9-12., Disp: 26 tablet, Rfl: 0    tamsulosin (FLOMAX) 0.4 MG capsule 24 hr capsule, Take 1 capsule by mouth Daily., Disp: , Rfl:     albuterol sulfate  (90 Base) MCG/ACT inhaler, Inhale 2 puffs., Disp: , Rfl:     azithromycin (Zithromax Z-Derek) 250 MG tablet, Take 2 tablets by mouth on day 1, then 1 tablet daily on days 2-5, Disp: 6 tablet, Rfl: 0     Assessment and Plan:      Assessment and Plan {CC Problem List  Visit Diagnosis  ROS  Review (Popup)  Health Maintenance  Quality  " BestPractice  Medications  [facility]  SnapShot Encounters  Media :23}     Problem List Items Addressed This Visit    None       1. COPD exacerbation.  He reports increased sputum production, a wet cough, and wheezing. He has been using his inhalers, including Respirate twice a day and Albuterol as needed, but has not required frequent use of Albuterol. There is no fever, and the phlegm is white, not green. A prescription for a Z-Derek and a steroid pack will be provided to manage the exacerbation and prevent hospitalization. He is advised to visit urgent care if symptoms worsen during the holiday period.     Upper Respiratory Infection     An upper respiratory infection is also called a common cold. It can affect your nose, throat, ears, and sinuses.   Common signs and symptoms include the following: Cold symptoms are usually worst for the first 3 to 5 days. You may have any of the following:  · Runny or stuffy nose  · Sneezing and coughing  · Sore throat or hoarseness  · Red, watery, and sore eyes  · Fatigue   · Chills and fever  · Headache, body aches, or sore muscles    Seek care immediately if:   · You have chest pain or trouble breathing.    Contact your healthcare provider if:   · You have a fever over 102ºF (39°C).  · Your sore throat gets worse or you see white or yellow spots in your throat.  · Your symptoms get worse after 3 to 5 days or your cold is not better in 14 days.  · You have a rash anywhere on your skin.  · You have large, tender lumps in your neck.  · You have thick, green or yellow drainage from your nose.  · You cough up thick yellow, green, or bloody mucus.  · You have vomiting for more than 24 hours and cannot keep fluids down.  · You have a bad earache.  · You have questions or concerns about your condition or care.    Treatment for a cold: There is no cure for the common cold. Colds are caused by viruses and do not get better with antibiotics. Most people get better in 7 to 14 days. You  may continue to cough for 2 to 3 weeks. The following may help decrease your symptoms:  · Decongestants help reduce nasal congestion and help you breathe more easily. If you take decongestant pills, they may make you feel restless or not able to sleep. Do not use decongestant sprays for more than a few days.    · Cough suppressants help reduce coughing. Ask your healthcare provider which type of cough medicine is best for you.     · NSAIDs , such as ibuprofen, help decrease swelling, pain, and fever. NSAIDs can cause stomach bleeding or kidney problems in certain people. If you take blood thinner medicine, always ask your healthcare provider if NSAIDs are safe for you. Always read the medicine label and follow directions.    · Acetaminophen decreases pain and fever. It is available without a doctor's order. Ask how much to take and how often to take it. Follow directions. Read the labels of all other medicines you are using to see if they also contain acetaminophen, or ask your doctor or pharmacist. Acetaminophen can cause liver damage if not taken correctly. Do not use more than 4 grams (4,000 milligrams) total of acetaminophen in one day.    Manage your cold:   · Rest as much as possible. Slowly start to do more each day.     · Drink more liquids as directed. Liquids will help thin and loosen mucus so you can cough it up. Liquids will also help prevent dehydration. Liquids that help prevent dehydration include water, fruit juice, and broth. Do not drink liquids that contain caffeine. Caffeine can increase your risk for dehydration. Ask your healthcare provider how much liquid to drink each day.     · Soothe a sore throat. Gargle with warm salt water. This helps your sore throat feel better. Make salt water by dissolving ¼ teaspoon salt in 1 cup warm water. You may also suck on hard candy or throat lozenges. You may use a sore throat spray.    · Use a humidifier or vaporizer. Use a cool mist humidifier or a  vaporizer to increase air moisture in your home. This may make it easier for you to breathe and help decrease your cough.     · Use saline nasal drops as directed. These help relieve congestion.     · Apply petroleum-based jelly around the outside of your nostrils. This can decrease irritation from blowing your nose.     · Do not smoke. Nicotine and other chemicals in cigarettes and cigars can make your symptoms worse. They can also cause infections such as bronchitis or pneumonia. Ask your healthcare provider for information if you currently smoke and need help to quit. E-cigarettes or smokeless tobacco still contain nicotine. Talk to your healthcare provider before you use these products.    Prevent spreading your cold to others:   · Try to stay away from other people during the first 2 to 3 days of your cold when it is more easily spread.   · Do not share food or drinks.   · Do not share hand towels with household members.  · Wash your hands often, especially after you blow your nose. Turn away from other people and cover your mouth and nose with a tissue when you sneeze or cough.    Please review added information under the Patient Instructions portion of your print out.    Thank you for allowing us to care for you,  SHAJI Romero    Follow Up {Instructions Charge Capture  Follow-up Communications :23}     No follow-ups on file.      Patient was given instructions and counseling regarding his condition or for health maintenance advice. Please see specific information pulled into the AVS if appropriate.        Dragon disclaimer:   Much of this encounter note is an electronic transcription/translation of spoken language to printed text. The electronic translation of spoken language may permit erroneous, or at times, nonsensical words or phrases to be inadvertently transcribed; Although I have reviewed the note for such errors, some may still exist.     Additional Patient Counseling:       There are no  Patient Instructions on file for this visit.    Patient or patient representative verbalized consent for the use of Ambient Listening during the visit with  SHAJI Romero for chart documentation. 11/22/2024  12:52 EST

## 2024-11-22 NOTE — PATIENT INSTRUCTIONS
Chronic Obstructive Pulmonary Disease Exacerbation    Chronic obstructive pulmonary disease (COPD) is a long-term (chronic) lung problem. In COPD, the flow of air from the lungs is limited.  COPD exacerbations are times that breathing gets worse and you need more than your normal treatment. Without treatment, they can be life-threatening. If they happen often, your lungs can become more damaged.  What are the causes?  Having infections that affect your airways and lungs.  Being exposed to:  Smoke.  Air pollution.  Chemical fumes.  Dust.  Things that can cause an allergic reaction (allergens).  Not taking your usual COPD medicines as told.  Having medical problems already, such as heart failure or infections not involving the lungs.  In many cases, the cause is not known.  What increases the risk?  Smoking.  Being an older adult.  Having frequent prior COPD exacerbations.  What are the signs or symptoms?  Increased coughing.  Increased mucus from your lungs.  Increased wheezing.  Increased shortness of breath.  Fast breathing and finding it hard to breathe.  Chest tightness.  Less energy than usual.  Sleep disruption from symptoms.  Confusion.  Increased sleepiness.  Often, these symptoms happen or get worse even with the use of medicines.  How is this treated?  Treatment for this condition depends on how bad it is and the cause of the symptoms. You may need to stay in the hospital for treatment. Treatment may include:  Taking medicines.  Using oxygen.  Being treated with different ways to clear your airway, such as using a mask to deliver oxygen.  Follow these instructions at home:  Medicines  Take over-the-counter and prescription medicines only as told by your doctor.  Use all inhaled medicines the correct way.  If you were prescribed an antibiotic or steroid medicine, take it as told by your doctor. Do not stop taking it even if you start to feel better.  Lifestyle  Do not smoke or use any products that contain  nicotine or tobacco. If you need help quitting, ask your doctor.  Eat healthy foods.  Exercise regularly.  Get enough sleep. Most adults need 7 or more hours per night.  Avoid tobacco smoke and other things that can bother your lungs.  Several times a day, wash your hands with soap and water for at least 20 seconds. If you cannot use soap and water, use hand . This may help keep you from getting an infection.  During flu season, avoid areas that are crowded with people.  General instructions  Drink enough fluid to keep your pee (urine) pale yellow. Do not do this if your doctor has told you not to.  Use a cool mist machine (vaporizer).  If you use oxygen or a machine that turns medicine into a mist (nebulizer), continue to use it as told.  Keep all follow-up visits.  How is this prevented?  Keep up with shots (vaccinations) as told by your doctor. Be sure to get a yearly flu (influenza) shot.  If you smoke, quit smoking. Smoking makes the problem worse.  Follow all instructions for rehabilitation. These are steps you can take to make your body work better.  Work with your doctor to develop and follow an action plan. This tells you what steps to take when you experience certain symptoms.  Contact a doctor if:  Your COPD symptoms get worse than normal.  Get help right away if:  You are short of breath and it gets worse, even when you are resting.  You have trouble talking.  You have chest pain.  You cough up blood.  You have a fever.  You keep vomiting.  You feel weak or you pass out (faint).  You feel confused.  You are not able to sleep because of your symptoms.  You have trouble doing daily activities.  These symptoms may be an emergency. Get help right away. Call your local emergency services (911 in the [place]  Do not wait to see if the symptoms will go away.  Do not drive yourself to the hospital.  Summary  COPD exacerbations are times that breathing gets worse and you need more treatment than  normal.  COPD exacerbations can be very serious and may cause your lungs to become more damaged.  Do not smoke. If you need help quitting, ask your doctor.  Stay up to date on your shots. Get a flu shot every year.  This information is not intended to replace advice given to you by your health care provider. Make sure you discuss any questions you have with your health care provider.  Document Revised: 11/10/2021 Document Reviewed: 10/26/2021  [employer]vier Patient Education © 2024 [employer]

## 2024-12-06 ENCOUNTER — CONSULT (OUTPATIENT)
Dept: ONCOLOGY | Facility: CLINIC | Age: 69
End: 2024-12-06
Payer: MEDICARE

## 2024-12-06 ENCOUNTER — LAB (OUTPATIENT)
Dept: LAB | Facility: HOSPITAL | Age: 69
End: 2024-12-06
Payer: MEDICARE

## 2024-12-06 VITALS
WEIGHT: 170.2 LBS | HEART RATE: 89 BPM | HEIGHT: 66 IN | OXYGEN SATURATION: 98 % | DIASTOLIC BLOOD PRESSURE: 69 MMHG | RESPIRATION RATE: 17 BRPM | TEMPERATURE: 97.6 F | BODY MASS INDEX: 27.35 KG/M2 | SYSTOLIC BLOOD PRESSURE: 112 MMHG

## 2024-12-06 DIAGNOSIS — D72.828 NEUTROPHILIA: ICD-10-CM

## 2024-12-06 DIAGNOSIS — D47.2 MONOCLONAL GAMMOPATHY: Primary | ICD-10-CM

## 2024-12-06 DIAGNOSIS — D75.89 MACROCYTOSIS: ICD-10-CM

## 2024-12-06 DIAGNOSIS — D47.2 MONOCLONAL GAMMOPATHY: ICD-10-CM

## 2024-12-06 LAB
ALBUMIN SERPL-MCNC: 4.1 G/DL (ref 3.5–5.2)
ALBUMIN/GLOB SERPL: 1.1 G/DL
ALP SERPL-CCNC: 52 U/L (ref 39–117)
ALT SERPL W P-5'-P-CCNC: 21 U/L (ref 1–41)
ANION GAP SERPL CALCULATED.3IONS-SCNC: 11.6 MMOL/L (ref 5–15)
AST SERPL-CCNC: 19 U/L (ref 1–40)
BASOPHILS # BLD AUTO: 0.14 10*3/MM3 (ref 0–0.2)
BASOPHILS NFR BLD AUTO: 1 % (ref 0–1.5)
BILIRUB SERPL-MCNC: 0.6 MG/DL (ref 0–1.2)
BUN SERPL-MCNC: 27 MG/DL (ref 8–23)
BUN/CREAT SERPL: 23.5 (ref 7–25)
CALCIUM SPEC-SCNC: 9.3 MG/DL (ref 8.6–10.5)
CHLORIDE SERPL-SCNC: 96 MMOL/L (ref 98–107)
CO2 SERPL-SCNC: 26.4 MMOL/L (ref 22–29)
CREAT SERPL-MCNC: 1.15 MG/DL (ref 0.76–1.27)
DEPRECATED RDW RBC AUTO: 55.1 FL (ref 37–54)
EGFRCR SERPLBLD CKD-EPI 2021: 69.3 ML/MIN/1.73
EOSINOPHIL # BLD AUTO: 0.16 10*3/MM3 (ref 0–0.4)
EOSINOPHIL NFR BLD AUTO: 1.2 % (ref 0.3–6.2)
ERYTHROCYTE [DISTWIDTH] IN BLOOD BY AUTOMATED COUNT: 14.5 % (ref 12.3–15.4)
FOLATE SERPL-MCNC: >20 NG/ML (ref 4.78–24.2)
GLOBULIN UR ELPH-MCNC: 3.9 GM/DL
GLUCOSE SERPL-MCNC: 114 MG/DL (ref 65–99)
HCT VFR BLD AUTO: 40.9 % (ref 37.5–51)
HGB BLD-MCNC: 13.4 G/DL (ref 13–17.7)
IMM GRANULOCYTES # BLD AUTO: 0.24 10*3/MM3 (ref 0–0.05)
IMM GRANULOCYTES NFR BLD AUTO: 1.8 % (ref 0–0.5)
LYMPHOCYTES # BLD AUTO: 1.37 10*3/MM3 (ref 0.7–3.1)
LYMPHOCYTES NFR BLD AUTO: 10.1 % (ref 19.6–45.3)
MCH RBC QN AUTO: 33.8 PG (ref 26.6–33)
MCHC RBC AUTO-ENTMCNC: 32.8 G/DL (ref 31.5–35.7)
MCV RBC AUTO: 103.3 FL (ref 79–97)
MONOCYTES # BLD AUTO: 1.35 10*3/MM3 (ref 0.1–0.9)
MONOCYTES NFR BLD AUTO: 9.9 % (ref 5–12)
NEUTROPHILS NFR BLD AUTO: 10.33 10*3/MM3 (ref 1.7–7)
NEUTROPHILS NFR BLD AUTO: 76 % (ref 42.7–76)
NRBC BLD AUTO-RTO: 0 /100 WBC (ref 0–0.2)
PLATELET # BLD AUTO: 177 10*3/MM3 (ref 140–450)
PMV BLD AUTO: 10.2 FL (ref 6–12)
POTASSIUM SERPL-SCNC: 4.8 MMOL/L (ref 3.5–5.2)
PROT SERPL-MCNC: 8 G/DL (ref 6–8.5)
RBC # BLD AUTO: 3.96 10*6/MM3 (ref 4.14–5.8)
SODIUM SERPL-SCNC: 134 MMOL/L (ref 136–145)
VIT B12 BLD-MCNC: 806 PG/ML (ref 211–946)
WBC NRBC COR # BLD AUTO: 13.59 10*3/MM3 (ref 3.4–10.8)

## 2024-12-06 PROCEDURE — 80053 COMPREHEN METABOLIC PANEL: CPT

## 2024-12-06 PROCEDURE — 82607 VITAMIN B-12: CPT | Performed by: INTERNAL MEDICINE

## 2024-12-06 PROCEDURE — 85025 COMPLETE CBC W/AUTO DIFF WBC: CPT

## 2024-12-06 PROCEDURE — 82746 ASSAY OF FOLIC ACID SERUM: CPT | Performed by: INTERNAL MEDICINE

## 2024-12-06 PROCEDURE — 36415 COLL VENOUS BLD VENIPUNCTURE: CPT

## 2024-12-06 NOTE — PROGRESS NOTES
Subjective     CHIEF COMPLAINT:      Chief Complaint   Patient presents with    Appointment     New PT      HISTORY OF PRESENT ILLNESS:     Gonzales Kaur Sr. is a 69 y.o. male patient who returns today for evaluation of elevated globulin level.  He was previously seen at our office for evaluation on leukocytosis.  He was hospitalized in March 2024 with pneumonia.  He had severe hyponatremia with sodium decreasing to 118.  He was seen by nephrology.  In July 2024, he had labs done that showed elevation of the gamma globulin level.  There was increase in the serum free light chains.  He continues to follow-up with nephrology.  He was found to have protein in the urine.  At the last visit, the nephrologist recommended starting Jardiance but he was unable to take it due to high copayment.    Patient reports that he developed bronchitis.  He was treated with a course of Z-Derek and steroids.  He reports improvement in his symptoms.    ROS:  Pertinent ROS is in the HPI.     Past medical, surgical, social and family history were reviewed.     MEDICATIONS:    Current Outpatient Medications:     acetaminophen (TYLENOL) 650 MG 8 hr tablet, Take 1 tablet by mouth., Disp: , Rfl:     albuterol sulfate  (90 Base) MCG/ACT inhaler, Inhale 2 puffs., Disp: , Rfl:     albuterol sulfate  (90 Base) MCG/ACT inhaler, Inhale 2 puffs., Disp: , Rfl:     azelastine (ASTELIN) 0.1 % nasal spray, Administer 2 sprays into the nostril(s) as directed by provider 2 (Two) Times a Day. Use in each nostril as directed, Disp: 30 mL, Rfl: 2    azithromycin (Zithromax Z-Derek) 250 MG tablet, Take 2 tablets by mouth on day 1, then 1 tablet daily on days 2-5, Disp: 6 tablet, Rfl: 0    Breztri Aerosphere 160-9-4.8 MCG/ACT aerosol inhaler, Inhale 2 puffs 2 (Two) Times a Day., Disp: , Rfl:     fluticasone (FLONASE) 50 MCG/ACT nasal spray, Administer 2 sprays into the nostril(s) as directed by provider Daily., Disp: 15.8 mL, Rfl: 2    lisinopril  "(PRINIVIL,ZESTRIL) 20 MG tablet, Take 1 tablet by mouth Daily., Disp: 90 tablet, Rfl: 3    multivitamin with minerals tablet tablet, Take 1 tablet by mouth Daily., Disp: , Rfl:     predniSONE (DELTASONE) 10 MG tablet, Take 4 tablets by mouth on days 1-2. Take 3 tablets by mouth on days 3-4. Take 2 tablets by mouth on days 5-8. Take 1 tablet by mouth on days 9-12., Disp: 26 tablet, Rfl: 0    tamsulosin (FLOMAX) 0.4 MG capsule 24 hr capsule, Take 1 capsule by mouth Daily., Disp: , Rfl:     Objective     VITAL SIGNS:     Vitals:    12/06/24 1116   BP: 112/69   Pulse: 89   Resp: 17   Temp: 97.6 °F (36.4 °C)   TempSrc: Oral   SpO2: 98%   Weight: 77.2 kg (170 lb 3.2 oz)   Height: 167.6 cm (66\")   PainSc: 0-No pain     Body mass index is 27.47 kg/m².     Wt Readings from Last 5 Encounters:   12/06/24 77.2 kg (170 lb 3.2 oz)   11/22/24 79.8 kg (176 lb)   10/18/24 80.7 kg (178 lb)   07/10/24 79.1 kg (174 lb 6.4 oz)   05/06/24 75.8 kg (167 lb)     PHYSICAL EXAMINATION:   GENERAL: The patient appears in good general condition, not in acute distress.   SKIN: No Ecchymosis.  EYES: No jaundice. No Pallor.  LYMPHATIC: No cervical, supraclavicular or axillar lymphadenopathy.  CHEST: Normal respiratory effort. Normal breathing sounds bilaterally. No added sounds.  CVS: Normal S1 and S2. No Murmur.  ABDOMEN: Soft. No tenderness. No Hepatomegaly. No Splenomegaly. No masses.  EXTREMITIES: No joint deformity.     DIAGNOSTIC DATA:     Results from last 7 days   Lab Units 12/06/24  1103   WBC 10*3/mm3 13.59*   NEUTROS ABS 10*3/mm3 10.33*   HEMOGLOBIN g/dL 13.4   HEMATOCRIT % 40.9   PLATELETS 10*3/mm3 177     Component      Latest Ref Rng 3/15/2024 3/27/2024 3/29/2024 12/6/2024   Neutrophils Absolute      1.70 - 7.00 10*3/mm3 11.74 (H)  14.57 (H)  12.07 (H)  10.33 (H)    Lymphocytes Absolute      0.70 - 3.10 10*3/mm3 1.30  1.12  1.45  1.37    Monocytes Absolute      0.10 - 0.90 10*3/mm3 1.59 (H)  1.32 (H)  2.01 (H)  1.35 (H)    Eosinophils " Absolute      0.00 - 0.40 10*3/mm3 0.11  0.06  0.03  0.16    Basophils Absolute      0.00 - 0.20 10*3/mm3 0.10  0.10  0.14  0.14    Immature Grans, Absolute      0.00 - 0.05 10*3/mm3 0.18 (H)  0.42 (H)  0.46 (H)  0.24 (H)    nRBC      0.0 - 0.2 /100 WBC 0.0  0.0  0.0  0.0       Results from last 7 days   Lab Units 12/06/24  1103   SODIUM mmol/L 134*   POTASSIUM mmol/L 4.8   CHLORIDE mmol/L 96*   CO2 mmol/L 26.4   BUN mg/dL 27*   CREATININE mg/dL 1.15   CALCIUM mg/dL 9.3   ALBUMIN g/dL 4.1   BILIRUBIN mg/dL 0.6   ALK PHOS U/L 52   ALT (SGPT) U/L 21   AST (SGOT) U/L 19   GLUCOSE mg/dL 114*     7/30/2024:  Component  Ref Range & Units    Total Protein  6.0 - 8.5 g/dL 7.7   Albumin  2.9 - 4.4 g/dL 3.6   Alpha-1-Globulin  0.0 - 0.4 g/dL 0.3   Alpha-2-Globulin  0.4 - 1.0 g/dL 0.8   Beta Globulin  0.7 - 1.3 g/dL 1.1   Gamma Globulin in Serum  0.4 - 1.8 g/dL 1.9 High    M-Nick  Not Observed g/dL Not Observed   Globulin  2.2 - 3.9 g/dL 4.1 High    A/G Ratio  0.7 - 1.7 0.9     Immunofixation Result, Serum Comment   Comment: No monoclonality detected.   IgG  603 - 1613 mg/dL 1,991 High    IgA  61 - 437 mg/dL 408   IgM  20 - 172 mg/dL 83     Assessment & Plan    *Monoclonal gammopathy.  Labs on 7/30/2024 revealed a total globulin level of 4.1.  Gammaglobulin was 1.9.  IgG was 1991.  IgA was 408.  IgM was 83.  Kappa FLC was 68.4.  Lambda FLC was 40.7.    Kappa/lambda ratio was minimally elevated at 1.68.  There was no M protein.  I explained to the patient that the pattern is more indicative of an inflammatory process rather than a malignant process like multiple myeloma.  I recommended repeating paraprotein studies today and in 6 months.    *Leukocytosis with neutrophilia and monocytosis.    WBC count was elevated in November 2021 when his WBC was 12,400.    WBC count increased to 13,100 on 1/7/2022.  Monocytes increased to 1410.  Immature granulocytes were 230.  Flow cytometry on 1/7/2022 revealed no evidence of leukemia or  lymphoma.   BCR-ABL on 1/7/2022 was negative.  The increase was tehrefore considered a reactive increase due to underlying inflammation or effect of steroid therapy.   WBC improved to 9,800 on 3/18/2023. Neutrophils improved. Monocytes improved but remain elevated.   4/13/2023: Neutrophils 7860 monocytes 1060.  Immature granulocytes 90.  3/27/2024: WBC increased to 17,590.  Neutrophils 14,570.  Monocytes 1320.  Immature granulocytes 420.  This was attributed to pneumonia.  12/6/2024: WBC 13,590. Neutrophils 10,330. Immature granulocytes 240.  The current increase in the count is attributed to recent bronchitis and steroid therapy.     *Macrocytosis.   MCV increased to 103 on 3/18/2022.   He does not have evidence of vitamin B12 or folate deficiency today.   He does not drink alcohol.  CT of the chest he had in August 2021 reported severe hepatic steatosis.   CT chest on 9/1/2022 revealed diffuse hepatic steatosis.  We previously recommended evaluation by liver specialist.  12/6/2024: .3.  Hemoglobin 13.4.     PLAN:     1.  Obtain SPEP SHAHRZAD FLC today.  2.  Obtain vitamin B12, folate and methylmalonic acid levels.  We will start him on vitamin replacement if he is deficient.  3.  We will see him in follow-up in 6 months.  1 week before his return visit, we will obtain CBC CMP SPEP SHAHRZAD FLC.    I spent 50 minutes caring for Gonzales on this date of service. This time includes time spent by me in the following activities: preparing for the visit, reviewing tests, performing a medically appropriate examination and/or evaluation, counseling and educating the patient/family/caregiver, documenting information in the medical record, independently interpreting results and communicating that information with the patient/family/caregiver, care coordination, and ordering test(s)       Quinton Laureano MD  12/06/24

## 2024-12-10 LAB
ALBUMIN SERPL ELPH-MCNC: 3.5 G/DL (ref 2.9–4.4)
ALBUMIN/GLOB SERPL: 0.9 {RATIO} (ref 0.7–1.7)
ALPHA1 GLOB SERPL ELPH-MCNC: 0.3 G/DL (ref 0–0.4)
ALPHA2 GLOB SERPL ELPH-MCNC: 0.9 G/DL (ref 0.4–1)
B-GLOBULIN SERPL ELPH-MCNC: 1.3 G/DL (ref 0.7–1.3)
EXPIRATION DATE: NORMAL
EXPIRATION DATE: NORMAL
FLUAV AG UPPER RESP QL IA.RAPID: NOT DETECTED
FLUBV AG UPPER RESP QL IA.RAPID: NOT DETECTED
GAMMA GLOB SERPL ELPH-MCNC: 1.8 G/DL (ref 0.4–1.8)
GLOBULIN SER-MCNC: 4.3 G/DL (ref 2.2–3.9)
IGA SERPL-MCNC: 407 MG/DL (ref 61–437)
IGG SERPL-MCNC: 1781 MG/DL (ref 603–1613)
IGM SERPL-MCNC: 91 MG/DL (ref 20–172)
INTERNAL CONTROL: NORMAL
INTERNAL CONTROL: NORMAL
INTERPRETATION SERPL IEP-IMP: ABNORMAL
KAPPA LC FREE SER-MCNC: 44.9 MG/L (ref 3.3–19.4)
KAPPA LC FREE/LAMBDA FREE SER: 1.51 {RATIO} (ref 0.26–1.65)
LABORATORY COMMENT REPORT: ABNORMAL
LAMBDA LC FREE SERPL-MCNC: 29.8 MG/L (ref 5.7–26.3)
Lab: NORMAL
Lab: NORMAL
M PROTEIN SERPL ELPH-MCNC: ABNORMAL G/DL
METHYLMALONATE SERPL-SCNC: 228 NMOL/L (ref 0–378)
PROT SERPL-MCNC: 7.8 G/DL (ref 6–8.5)
S PYO AG THROAT QL: NEGATIVE
SARS-COV-2 AG UPPER RESP QL IA.RAPID: NOT DETECTED

## 2024-12-17 ENCOUNTER — TELEPHONE (OUTPATIENT)
Dept: ONCOLOGY | Facility: CLINIC | Age: 69
End: 2024-12-17
Payer: MEDICARE

## 2024-12-17 NOTE — TELEPHONE ENCOUNTER
----- Message from Quinton Laureano sent at 12/15/2024  8:09 PM EST -----  Please inform the patient that labs showed the antibody levels to be lower which is reassuring.     -Keep plan for repeat labs and visit as scheduled.    Thank you

## 2025-01-14 ENCOUNTER — OFFICE VISIT (OUTPATIENT)
Dept: INTERNAL MEDICINE | Facility: CLINIC | Age: 70
End: 2025-01-14
Payer: MEDICARE

## 2025-01-14 VITALS
DIASTOLIC BLOOD PRESSURE: 79 MMHG | WEIGHT: 175 LBS | OXYGEN SATURATION: 92 % | HEART RATE: 92 BPM | BODY MASS INDEX: 28.12 KG/M2 | TEMPERATURE: 96.8 F | SYSTOLIC BLOOD PRESSURE: 131 MMHG | HEIGHT: 66 IN

## 2025-01-14 DIAGNOSIS — E78.5 DYSLIPIDEMIA: ICD-10-CM

## 2025-01-14 DIAGNOSIS — I10 ESSENTIAL HYPERTENSION: Primary | ICD-10-CM

## 2025-01-14 LAB
BUN SERPL-MCNC: 20 MG/DL (ref 8–23)
BUN/CREAT SERPL: 20 (ref 7–25)
CALCIUM SERPL-MCNC: 9.8 MG/DL (ref 8.6–10.5)
CHLORIDE SERPL-SCNC: 98 MMOL/L (ref 98–107)
CHOLEST SERPL-MCNC: 154 MG/DL (ref 0–200)
CHOLEST/HDLC SERPL: 4.28 {RATIO}
CO2 SERPL-SCNC: 30.4 MMOL/L (ref 22–29)
CREAT SERPL-MCNC: 1 MG/DL (ref 0.76–1.27)
EGFRCR SERPLBLD CKD-EPI 2021: 82 ML/MIN/1.73
GLUCOSE SERPL-MCNC: 123 MG/DL (ref 65–99)
HDLC SERPL-MCNC: 36 MG/DL (ref 40–60)
LDLC SERPL CALC-MCNC: 104 MG/DL (ref 0–100)
POTASSIUM SERPL-SCNC: 4.7 MMOL/L (ref 3.5–5.2)
SODIUM SERPL-SCNC: 137 MMOL/L (ref 136–145)
TRIGL SERPL-MCNC: 70 MG/DL (ref 0–150)
VLDLC SERPL CALC-MCNC: 14 MG/DL (ref 5–40)

## 2025-01-14 PROCEDURE — G0439 PPPS, SUBSEQ VISIT: HCPCS

## 2025-01-14 PROCEDURE — 3078F DIAST BP <80 MM HG: CPT

## 2025-01-14 PROCEDURE — 1170F FXNL STATUS ASSESSED: CPT

## 2025-01-14 PROCEDURE — 1126F AMNT PAIN NOTED NONE PRSNT: CPT

## 2025-01-14 PROCEDURE — 1159F MED LIST DOCD IN RCRD: CPT

## 2025-01-14 PROCEDURE — 3075F SYST BP GE 130 - 139MM HG: CPT

## 2025-01-14 PROCEDURE — 1160F RVW MEDS BY RX/DR IN RCRD: CPT

## 2025-01-14 NOTE — PROGRESS NOTES
Subjective   The ABCs of the Annual Wellness Visit  Medicare Wellness Visit      Gonzales Kaur Sr. is a 69 y.o. patient who presents for a Medicare Wellness Visit.    The following portions of the patient's history were reviewed and   updated as appropriate: allergies, current medications, past family history, past medical history, past social history, past surgical history, and problem list.    Compared to one year ago, the patient's physical   health is better.  Compared to one year ago, the patient's mental   health is the same.    Recent Hospitalizations:  This patient has had a [facility] admission record on file within the last 365 days.  Current Medical Providers:  Patient Care Team:  Christiano Bartholomew APRN as PCP - General (Nurse Practitioner)  Patience Fournier APRN (Family Medicine)  Kenny Wynn Jr., MD as Consulting Physician (Urology)  Adams Buenrostro MD as Consulting Physician (Pulmonary Disease)  Donya Lomas APRN as Referring Physician (Nurse Practitioner)  Quinton Laureano MD as Consulting Physician (Hematology and Oncology)    Outpatient Medications Prior to Visit   Medication Sig Dispense Refill    albuterol sulfate  (90 Base) MCG/ACT inhaler Inhale 2 puffs.      albuterol sulfate  (90 Base) MCG/ACT inhaler Inhale 2 puffs.      azelastine (ASTELIN) 0.1 % nasal spray Administer 2 sprays into the nostril(s) as directed by provider 2 (Two) Times a Day. Use in each nostril as directed 30 mL 2    Breztri Aerosphere 160-9-4.8 MCG/ACT aerosol inhaler Inhale 2 puffs 2 (Two) Times a Day.      lisinopril (PRINIVIL,ZESTRIL) 20 MG tablet Take 1 tablet by mouth Daily. 90 tablet 3    multivitamin with minerals tablet tablet Take 1 tablet by mouth Daily.      tamsulosin (FLOMAX) 0.4 MG capsule 24 hr capsule Take 1 capsule by mouth Daily.      acetaminophen (TYLENOL) 650 MG 8 hr tablet Take 1 tablet by mouth.      fluticasone (FLONASE) 50 MCG/ACT nasal spray Administer 2 sprays into  "the nostril(s) as directed by provider Daily. (Patient not taking: Reported on 1/14/2025) 15.8 mL 2    predniSONE (DELTASONE) 10 MG tablet Take 4 tablets by mouth on days 1-2. Take 3 tablets by mouth on days 3-4. Take 2 tablets by mouth on days 5-8. Take 1 tablet by mouth on days 9-12. 26 tablet 0    azithromycin (Zithromax Z-Derek) 250 MG tablet Take 2 tablets by mouth on day 1, then 1 tablet daily on days 2-5 6 tablet 0     No facility-administered medications prior to visit.     No opioid medication identified on active medication list. I have reviewed chart for other potential  high risk medication/s and harmful drug interactions in the elderly.      Aspirin is not on active medication list.  Aspirin use is not indicated based on review of current medical condition/s. Risk of harm outweighs potential benefits.  .    Patient Active Problem List   Diagnosis    COPD (chronic obstructive pulmonary disease) with emphysema    Benign non-nodular prostatic hyperplasia with lower urinary tract symptoms    Chronic fatigue    Dyspnea on exertion    MICHAEL (obstructive sleep apnea)    Essential hypertension    History of prostate cancer    Seasonal allergies    Healthcare maintenance    Cellulitis of right leg    Venous insufficiency of leg    Hyponatremia    Prostate cancer    Acute urinary retention    Leukocytosis    SIADH (syndrome of inappropriate ADH production)     Advance Care Planning Advance Directive is not on file.  ACP discussion was declined by the patient. Patient does not have an advance directive, declines further assistance.            Objective   Vitals:    01/14/25 0900   BP: 131/79   BP Location: Left arm   Patient Position: Sitting   Cuff Size: Adult   Pulse: 92   Temp: 96.8 °F (36 °C)   TempSrc: Temporal   SpO2: 92%   Weight: 79.4 kg (175 lb)   Height: 167.6 cm (66\")   PainSc: 0-No pain       Estimated body mass index is 28.25 kg/m² as calculated from the following:    Height as of this encounter: 167.6 cm " "(66\").    Weight as of this encounter: 79.4 kg (175 lb).                Does the patient have evidence of cognitive impairment? No                                                                                                Health  Risk Assessment    Smoking Status:  Social History     Tobacco Use   Smoking Status Former    Current packs/day: 0.00    Average packs/day: 1.5 packs/day for 41.0 years (61.5 ttl pk-yrs)    Types: Cigarettes    Start date: 1976    Quit date: 2017    Years since quittin.0    Passive exposure: Past   Smokeless Tobacco Never   Tobacco Comments    Stopped use .     Alcohol Consumption:  Social History     Substance and Sexual Activity   Alcohol Use No       Fall Risk Screen  STEADI Fall Risk Assessment was completed, and patient is at LOW risk for falls.Assessment completed on:2025    Depression Screening   Little interest or pleasure in doing things? Not at all   Feeling down, depressed, or hopeless? Not at all   PHQ-2 Total Score 0      Health Habits and Functional and Cognitive Screenin/13/2025    12:48 PM   Functional & Cognitive Status   Do you have difficulty preparing food and eating? No    Do you have difficulty bathing yourself, getting dressed or grooming yourself? No    Do you have difficulty using the toilet? No    Do you have difficulty moving around from place to place? No    Do you have trouble with steps or getting out of a bed or a chair? No    Current Diet Well Balanced Diet    Dental Exam Up to date    Eye Exam Up to date    Exercise (times per week) 1 times per week    Current Exercises Include Light Weights;Stationary Bicycling/Spin Class;Walking    Do you need help using the phone?  No    Are you deaf or do you have serious difficulty hearing?  No    Do you need help to go to places out of walking distance? No    Do you need help shopping? No    Do you need help preparing meals?  No    Do you need help with housework?  No    Do you need help " with laundry? No    Do you need help taking your medications? No    Do you need help managing money? No    Do you ever drive or ride in a car without wearing a seat belt? Yes    Have you felt unusual stress, anger or loneliness in the last month? No    Who do you live with? Spouse    If you need help, do you have trouble finding someone available to you? No    Have you been bothered in the last four weeks by sexual problems? No    Do you have difficulty concentrating, remembering or making decisions? No        Patient-reported           Age-appropriate Screening Schedule:  Refer to the list below for future screening recommendations based on patient's age, sex and/or medical conditions. Orders for these recommended tests are listed in the plan section. The patient has been provided with a written plan.    Health Maintenance List  Health Maintenance   Topic Date Due    Pneumococcal Vaccine 65+ (2 of 2 - PCV) 11/08/2022    ANNUAL WELLNESS VISIT  01/04/2025    BMI FOLLOWUP  07/10/2025    LUNG CANCER SCREENING  09/16/2025    COLORECTAL CANCER SCREENING  12/18/2025    TDAP/TD VACCINES (2 - Td or Tdap) 11/29/2032    HEPATITIS C SCREENING  Completed    COVID-19 Vaccine  Completed    INFLUENZA VACCINE  Completed    AAA SCREEN ONCE  Completed    ZOSTER VACCINE  Completed                                                                                                                                                CMS Preventative Services Quick Reference  Risk Factors Identified During Encounter  Inactivity/Sedentary: Patient was advised to exercise at least 150 minutes a week per CDC recommendations.    The above risks/problems have been discussed with the patient.  Pertinent information has been shared with the patient in the After Visit Summary.  An After Visit Summary and PPPS were made available to the patient.    Follow Up:   Next Medicare Wellness visit to be scheduled in 1 year.         Additional E&M Note during same  "encounter follows:  Patient has additional, significant, and separately identifiable condition(s)/problem(s) that require work above and beyond the Medicare Wellness Visit     Chief Complaint  Medicare Wellness-subsequent    Subjective   69-year-old male presenting for hyperlipidemia, CKD, hypertension and Medicare wellness.  Seen by nephrology in November.  Prescribed Jardiance but patient could not afford.  Provider then increase lisinopril to 40 mg daily for kidney protection.  Patient is tolerated well.  Scheduled for follow-up in May with nephrology.      Has improved diet to increase his lean meat intake and decrease red meat intake.  His increased vegetable intake.  Is still not fairly sedentary.        MELI is also being seen today for additional medical problem/s.    Review of Systems   All other systems reviewed and are negative.             Objective   Vital Signs:  /79 (BP Location: Left arm, Patient Position: Sitting, Cuff Size: Adult)   Pulse 92   Temp 96.8 °F (36 °C) (Temporal)   Ht 167.6 cm (66\")   Wt 79.4 kg (175 lb)   SpO2 92%   BMI 28.25 kg/m²   Physical Exam  Vitals reviewed.   Constitutional:       Appearance: Normal appearance.   Cardiovascular:      Rate and Rhythm: Normal rate and regular rhythm.      Pulses: Normal pulses.      Heart sounds: Normal heart sounds.   Pulmonary:      Effort: Pulmonary effort is normal.      Breath sounds: Wheezing present.   Musculoskeletal:         General: Normal range of motion.      Cervical back: Normal range of motion.   Skin:     General: Skin is warm and dry.      Capillary Refill: Capillary refill takes less than 2 seconds.   Neurological:      General: No focal deficit present.      Mental Status: He is alert and oriented to person, place, and time.   Psychiatric:         Mood and Affect: Mood normal.         Behavior: Behavior normal.         Thought Content: Thought content normal.         Judgment: Judgment normal.             Common " labs          7/10/2024    11:21 10/8/2024    11:08 2024    11:03   Common Labs   Glucose 98   114    BUN 13   27    Creatinine 0.78   1.15    Sodium 137   134    Potassium 5.0   4.8    Chloride 101   96    Calcium 9.4   9.3    Total Protein 7.9   7.8    Albumin 4.4   4.1     3.5    Total Bilirubin 0.4   0.6    Alkaline Phosphatase 55   52    AST (SGOT) 20   19    ALT (SGPT) 20   21    WBC   13.59    Hemoglobin   13.4    Hematocrit   40.9    Platelets   177    Total Cholesterol 176      Triglycerides 77      HDL Cholesterol 44      LDL Cholesterol  118      Uric Acid  8.1           Details          This result is from an external source.                     Assessment and Plan            Essential hypertension      Orders:    Lipid Panel With / Chol / HDL Ratio    Basic Metabolic Panel    Dyslipidemia    Orders:    Lipid Panel With / Chol / HDL Ratio    Basic Metabolic Panel       Patient Instructions   Continue medications as prescribed. Increase activity as tolerated. Stay hydrated with water. Labs today. Labs to determine if statin needed. Follow-up in 6 months for recheck.       Medicare Wellness  Personal Prevention Plan of Service     Date of Office Visit:    Encounter Provider:  SHAJI Lockhart  Place of Service:  [facility]  Patient Name: Gonzales Kaur .  :  1955    As part of the Medicare Wellness portion of your visit today, we are providing you with this personalized preventive plan of services (PPPS). This plan is based upon recommendations of the [place] Preventive Services Task Force (USPSTF) and the Advisory Committee on Immunization Practices (ACIP).    This lists the preventive care services that should be considered, and provides dates of when you are due. Items listed as completed are up-to-date and do not require any further intervention.    Health Maintenance   Topic Date Due    Pneumococcal Vaccine 65+ (2 of 2 - PCV) 2022    ANNUAL  WELLNESS VISIT  01/04/2025    BMI FOLLOWUP  07/10/2025    LUNG CANCER SCREENING  09/16/2025    COLORECTAL CANCER SCREENING  12/18/2025    TDAP/TD VACCINES (2 - Td or Tdap) 11/29/2032    HEPATITIS C SCREENING  Completed    COVID-19 Vaccine  Completed    INFLUENZA VACCINE  Completed    AAA SCREEN ONCE  Completed    ZOSTER VACCINE  Completed       Orders Placed This Encounter   Procedures    Lipid Panel With / Chol / HDL Ratio     Order Specific Question:   Release to patient     Answer:   Routine Release [[id number]]    Basic Metabolic Panel     Order Specific Question:   Release to patient     Answer:   Routine Release [[id number]]       Return in about 6 months (around 7/14/2025) for Recheck.                 Follow Up   No follow-ups on file.  Patient was given instructions and counseling regarding his condition or for health maintenance advice. Please see specific information pulled into the AVS if appropriate.

## 2025-01-14 NOTE — PATIENT INSTRUCTIONS
Continue medications as prescribed. Increase activity as tolerated. Stay hydrated with water. Labs today. Labs to determine if statin needed. Follow-up in 6 months for recheck.       Medicare Wellness  Personal Prevention Plan of Service     Date of Office Visit:    Encounter Provider:  SHAJI Lockhart  Place of Service:  [facility]  Patient Name: Gonzales Kaur Sr.  :  1955    As part of the Medicare Wellness portion of your visit today, we are providing you with this personalized preventive plan of services (PPPS). This plan is based upon recommendations of the [place] Preventive Services Task Force (USPSTF) and the Advisory Committee on Immunization Practices (ACIP).    This lists the preventive care services that should be considered, and provides dates of when you are due. Items listed as completed are up-to-date and do not require any further intervention.    Health Maintenance   Topic Date Due    Pneumococcal Vaccine 65+ (2 of 2 - PCV) 2022    ANNUAL WELLNESS VISIT  2025    BMI FOLLOWUP  07/10/2025    LUNG CANCER SCREENING  2025    COLORECTAL CANCER SCREENING  2025    TDAP/TD VACCINES (2 - Td or Tdap) 2032    HEPATITIS C SCREENING  Completed    COVID-19 Vaccine  Completed    INFLUENZA VACCINE  Completed    AAA SCREEN ONCE  Completed    ZOSTER VACCINE  Completed       Orders Placed This Encounter   Procedures    Lipid Panel With / Chol / HDL Ratio     Order Specific Question:   Release to patient     Answer:   Routine Release [[id number]]    Basic Metabolic Panel     Order Specific Question:   Release to patient     Answer:   Routine Release [[id number]]       Return in about 6 months (around 2025) for Recheck.

## 2025-01-15 ENCOUNTER — TELEPHONE (OUTPATIENT)
Dept: INTERNAL MEDICINE | Facility: CLINIC | Age: 70
End: 2025-01-15
Payer: MEDICARE

## 2025-01-15 NOTE — PROGRESS NOTES
Kidney function is doing well.  Sodium levels and potassium levels within normal limits.      Fasting blood sugar is elevated.  Limit intake of sweets and carbohydrates.  Will check hemoglobin A1c for diabetes at next appointment.    Cholesterol levels have improved slightly. Recommend limiting intake of red meat, pork, fried foods and high fat dairy products. Limit intake of alcohol. Recommend at least 30 minutes of heart elevating exercises three days a week as tolerated. Recommend adding fish oil supplement of 1000 mg daily to help improve cholesterol levels.  Continue to make improvements in lifestyle and continue weight loss journey.

## 2025-01-15 NOTE — TELEPHONE ENCOUNTER
"Relay     \"Kidney function is doing well.  Sodium levels and potassium levels within normal limits.      Fasting blood sugar is elevated.  Limit intake of sweets and carbohydrates.  Will check hemoglobin A1c for diabetes at next appointment.     Cholesterol levels have improved slightly. Recommend limiting intake of red meat, pork, fried foods and high fat dairy products. Limit intake of alcohol. Recommend at least 30 minutes of heart elevating exercises three days a week as tolerated. Recommend adding fish oil supplement of 1000 mg daily to help improve cholesterol levels.  Continue to make improvements in lifestyle and continue weight loss journey. \"                 "

## 2025-02-04 DIAGNOSIS — I10 ESSENTIAL HYPERTENSION: ICD-10-CM

## 2025-02-05 ENCOUNTER — OFFICE VISIT (OUTPATIENT)
Dept: INTERNAL MEDICINE | Facility: CLINIC | Age: 70
End: 2025-02-05
Payer: MEDICARE

## 2025-02-05 VITALS
SYSTOLIC BLOOD PRESSURE: 130 MMHG | BODY MASS INDEX: 27.51 KG/M2 | WEIGHT: 171.2 LBS | DIASTOLIC BLOOD PRESSURE: 62 MMHG | HEIGHT: 66 IN | OXYGEN SATURATION: 92 % | TEMPERATURE: 98 F | HEART RATE: 90 BPM

## 2025-02-05 DIAGNOSIS — J43.9 PULMONARY EMPHYSEMA, UNSPECIFIED EMPHYSEMA TYPE: Chronic | ICD-10-CM

## 2025-02-05 DIAGNOSIS — J02.9 PHARYNGITIS, UNSPECIFIED ETIOLOGY: ICD-10-CM

## 2025-02-05 DIAGNOSIS — B30.9 ACUTE VIRAL CONJUNCTIVITIS OF BOTH EYES: ICD-10-CM

## 2025-02-05 DIAGNOSIS — J06.9 UPPER RESPIRATORY TRACT INFECTION, UNSPECIFIED TYPE: Primary | ICD-10-CM

## 2025-02-05 DIAGNOSIS — R06.02 SOB (SHORTNESS OF BREATH): ICD-10-CM

## 2025-02-05 PROCEDURE — 3078F DIAST BP <80 MM HG: CPT

## 2025-02-05 PROCEDURE — 99213 OFFICE O/P EST LOW 20 MIN: CPT

## 2025-02-05 PROCEDURE — 3075F SYST BP GE 130 - 139MM HG: CPT

## 2025-02-05 PROCEDURE — G2211 COMPLEX E/M VISIT ADD ON: HCPCS

## 2025-02-05 PROCEDURE — 1126F AMNT PAIN NOTED NONE PRSNT: CPT

## 2025-02-05 RX ORDER — AZITHROMYCIN 250 MG/1
TABLET, FILM COATED ORAL
Qty: 6 TABLET | Refills: 0 | Status: SHIPPED | OUTPATIENT
Start: 2025-02-05

## 2025-02-05 RX ORDER — PREDNISONE 10 MG/1
TABLET ORAL
Qty: 26 TABLET | Refills: 0 | Status: SHIPPED | OUTPATIENT
Start: 2025-02-05

## 2025-02-05 RX ORDER — IBUPROFEN 600 MG/1
TABLET, FILM COATED ORAL
COMMUNITY
Start: 2025-02-04 | End: 2025-02-05

## 2025-02-05 RX ORDER — LISINOPRIL 20 MG/1
20 TABLET ORAL DAILY
Qty: 90 TABLET | Refills: 3 | Status: SHIPPED | OUTPATIENT
Start: 2025-02-05

## 2025-02-05 NOTE — PROGRESS NOTES
Chief Complaint  Cough, Nasal Congestion, and Shortness of Breath     Subjective:      History of Present Illness {CC  Problem List  Visit  Diagnosis   Encounters  Notes  Medications  Labs  Result Review Imaging  Media :23}     Gonzales Kaur Sr. presents to [facility] for:      History of Present Illness        The patient presents for evaluation of cough.    He reports experiencing mild bilateral eye crusting upon awakening, accompanied by a sore throat and cough. He attributes these symptoms to nasal congestion, which also results in a runny nose. His symptoms began approximately 3 to 4 days ago and have progressively worsened. He does not report any fevers, body aches, or headaches. He experiences shortness of breath during physical activity, but not at rest. He has been using albuterol as needed during exercise or walking, but not frequently at home. He has been using a nebulizer 3 to 4 times daily, which he finds beneficial. He does not report taking any over-the-counter medications for his symptoms. He does not report any recent sick contacts. He has received his influenza vaccine. He does not report any sinus or ear pain. He has a history of COPD and has an average oxygen saturation of 94 to 95 percent, although it was 92 percent today. He has previously been prescribed antibiotics and prednisone 10 mg for similar symptoms, which he tolerates well. He quit smoking 7 years ago. He produces phlegm from his COPD, but not from his current cough. The nebulizer aids in loosening the phlegm. He reports feeling hoarse when he coughs and frequently clears his throat. Exposure to cold temperatures results in nasal obstruction.    SOCIAL HISTORY  The patient stopped smoking 7 years ago.    MEDICATIONS  Current: albuterol, prednisone    IMMUNIZATIONS  The patient has had an influenza vaccine.         I have reviewed patient's medical history, any new submitted  "information provided by patient or medical assistant and updated medical record.      Objective:      Physical Exam  Vitals reviewed.   Constitutional:       Appearance: Normal appearance. He is ill-appearing.   HENT:      Head: Normocephalic.      Right Ear: Ear canal and external ear normal. A middle ear effusion is present. There is no impacted cerumen.      Left Ear: Ear canal and external ear normal. A middle ear effusion is present. There is no impacted cerumen.      Nose: Congestion present.      Mouth/Throat:      Pharynx: Posterior oropharyngeal erythema present.   Eyes:      General: Allergic shiner present.         Right eye: Discharge present.         Left eye: Discharge present.  Cardiovascular:      Rate and Rhythm: Normal rate and regular rhythm.      Pulses: Normal pulses.      Heart sounds: Normal heart sounds.   Pulmonary:      Effort: Pulmonary effort is normal.      Breath sounds: Normal breath sounds.   Lymphadenopathy:      Cervical: Cervical adenopathy present.   Skin:     General: Skin is warm and dry.      Capillary Refill: Capillary refill takes less than 2 seconds.   Neurological:      General: No focal deficit present.      Mental Status: He is alert.   Psychiatric:         Mood and Affect: Mood normal.         Behavior: Behavior normal.        Result Review  Data Reviewed:{ Labs  Result Review  Imaging  Med Tab  Media :23}     Results                    Vital Signs:   /62 (BP Location: Left arm, Patient Position: Sitting, Cuff Size: Adult)   Pulse 90   Temp 98 °F (36.7 °C) (Oral)   Ht 167.6 cm (66\")   Wt 77.7 kg (171 lb 3.2 oz)   SpO2 92%   BMI 27.63 kg/m²                 Requested Prescriptions      No prescriptions requested or ordered in this encounter       Routine medications provided by this office will also be refilled via pharmacy request.       Current Outpatient Medications:     acetaminophen (TYLENOL) 650 MG 8 hr tablet, Take 1 tablet by mouth., Disp: , Rfl:    "  albuterol sulfate  (90 Base) MCG/ACT inhaler, Inhale 2 puffs., Disp: , Rfl:     azelastine (ASTELIN) 0.1 % nasal spray, Administer 2 sprays into the nostril(s) as directed by provider 2 (Two) Times a Day. Use in each nostril as directed, Disp: 30 mL, Rfl: 2    Breztri Aerosphere 160-9-4.8 MCG/ACT aerosol inhaler, Inhale 2 puffs 2 (Two) Times a Day., Disp: , Rfl:     fluticasone (FLONASE) 50 MCG/ACT nasal spray, Administer 2 sprays into the nostril(s) as directed by provider Daily., Disp: 15.8 mL, Rfl: 2    ibuprofen (ADVIL,MOTRIN) 600 MG tablet, , Disp: , Rfl:     lisinopril (PRINIVIL,ZESTRIL) 20 MG tablet, TAKE 1 TABLET BY MOUTH DAILY, Disp: 90 tablet, Rfl: 3    multivitamin with minerals tablet tablet, Take 1 tablet by mouth Daily., Disp: , Rfl:     tamsulosin (FLOMAX) 0.4 MG capsule 24 hr capsule, Take 1 capsule by mouth Daily., Disp: , Rfl:     albuterol sulfate  (90 Base) MCG/ACT inhaler, Inhale 2 puffs. (Patient not taking: Reported on 2/5/2025), Disp: , Rfl:     predniSONE (DELTASONE) 10 MG tablet, Take 4 tablets by mouth on days 1-2. Take 3 tablets by mouth on days 3-4. Take 2 tablets by mouth on days 5-8. Take 1 tablet by mouth on days 9-12., Disp: 26 tablet, Rfl: 0     Assessment and Plan:      Assessment and Plan {CC Problem List  Visit Diagnosis  ROS  Review (Popup)  Health Maintenance  Quality  BestPractice  Medications  SmartSets  SnapShot Encounters  Media :23}     Problem List Items Addressed This Visit    None         1. Cough.  The patient's symptoms do not strongly indicate a bacterial infection necessitating antibiotic treatment. However, given his history of COPD, a more cautious approach is warranted. His throat appears slightly erythematous, but there are no white patches present, reducing the likelihood of streptococcal infection. His ears exhibit some fluid accumulation, but no signs of infection are observed. A prescription for Zithromax (azithromycin) will be  provided. Additionally, a course of prednisone will be initiated, with a dosage schedule of 4 tablets for the first two days, 3 tablets on the third and fourth days, 2 tablets from the fifth to the eighth day, and 1 tablet from the ninth to the twelfth day. He is advised to monitor his pulse oximetry readings closely. If he experiences increased shortness of breath, lack of improvement, or onset of severe fevers, he should seek immediate medical attention at the emergency room or return for radiographic examination to rule out pneumonia. If his oxygen saturation levels decrease, particularly at rest, he should go to the ER.       Patient verbalizes understanding and is comfortable with the plan of care.    Follow Up {Instructions Charge Capture  Follow-up Communications :23}     No follow-ups on file.      Patient was given instructions and counseling regarding his condition or for health maintenance advice. Please see specific information pulled into the AVS if appropriate.    Dragon disclaimer:   Much of this encounter note is an electronic transcription/translation of spoken language to printed text. The electronic translation of spoken language may permit erroneous, or at times, nonsensical words or phrases to be inadvertently transcribed; Although I have reviewed the note for such errors, some may still exist.         Additional Patient Counseling:       There are no Patient Instructions on file for this visit.    Patient or patient representative verbalized consent for the use of Ambient Listening during the visit with  SHAJI Gonzalez for chart documentation. 2/5/2025  12:51 EST

## 2025-03-10 ENCOUNTER — OFFICE VISIT (OUTPATIENT)
Dept: INTERNAL MEDICINE | Facility: CLINIC | Age: 70
End: 2025-03-10
Payer: MEDICARE

## 2025-03-10 VITALS
WEIGHT: 177 LBS | RESPIRATION RATE: 19 BRPM | OXYGEN SATURATION: 87 % | SYSTOLIC BLOOD PRESSURE: 133 MMHG | HEART RATE: 80 BPM | HEIGHT: 66 IN | TEMPERATURE: 98.2 F | DIASTOLIC BLOOD PRESSURE: 76 MMHG | BODY MASS INDEX: 28.45 KG/M2

## 2025-03-10 DIAGNOSIS — J43.9 PULMONARY EMPHYSEMA, UNSPECIFIED EMPHYSEMA TYPE: Chronic | ICD-10-CM

## 2025-03-10 DIAGNOSIS — J02.9 PHARYNGITIS, UNSPECIFIED ETIOLOGY: ICD-10-CM

## 2025-03-10 DIAGNOSIS — R06.02 SOB (SHORTNESS OF BREATH): ICD-10-CM

## 2025-03-10 DIAGNOSIS — J06.9 UPPER RESPIRATORY TRACT INFECTION, UNSPECIFIED TYPE: ICD-10-CM

## 2025-03-10 PROCEDURE — 3078F DIAST BP <80 MM HG: CPT

## 2025-03-10 PROCEDURE — 1126F AMNT PAIN NOTED NONE PRSNT: CPT

## 2025-03-10 PROCEDURE — 1159F MED LIST DOCD IN RCRD: CPT

## 2025-03-10 PROCEDURE — 99213 OFFICE O/P EST LOW 20 MIN: CPT

## 2025-03-10 PROCEDURE — 1160F RVW MEDS BY RX/DR IN RCRD: CPT

## 2025-03-10 PROCEDURE — 3075F SYST BP GE 130 - 139MM HG: CPT

## 2025-03-10 RX ORDER — AZITHROMYCIN 250 MG/1
TABLET, FILM COATED ORAL
Qty: 6 TABLET | Refills: 0 | Status: SHIPPED | OUTPATIENT
Start: 2025-03-10 | End: 2025-03-17

## 2025-03-10 RX ORDER — PREDNISONE 10 MG/1
TABLET ORAL
Qty: 26 TABLET | Refills: 0 | Status: SHIPPED | OUTPATIENT
Start: 2025-03-10 | End: 2025-03-17 | Stop reason: SDUPTHER

## 2025-03-10 NOTE — PATIENT INSTRUCTIONS
Start azithromycin and take as directed. Start steroid as prescribed. Continue medications as prescribed. Stay hydrated with water. Follow-up as needed.

## 2025-03-10 NOTE — PROGRESS NOTES
"Chief Complaint  Cough, URI, and Nasal Congestion    Subjective        Gonzales Kaur . presents to [facility]  History of Present Illness  69-year-old male presenting with cough and upper respiratory symptoms.  States that symptoms started acutely yesterday.  Denies fever, decreased appetite, neuralgia.  Has a productive cough that is producing white thick phlegm.  Denies sinus pressure or ear pain.      Objective   Vital Signs:  /76 (BP Location: Right arm, Patient Position: Sitting, Cuff Size: Small Adult)   Pulse 80   Temp 98.2 °F (36.8 °C) (Oral)   Resp 19   Ht 167.6 cm (66\")   Wt 80.3 kg (177 lb)   SpO2 (!) 87%   BMI 28.57 kg/m²   Estimated body mass index is 28.57 kg/m² as calculated from the following:    Height as of this encounter: 167.6 cm (66\").    Weight as of this encounter: 80.3 kg (177 lb).               Physical Exam  Vitals reviewed.   Constitutional:       Appearance: Normal appearance.   Cardiovascular:      Rate and Rhythm: Normal rate and regular rhythm.      Pulses: Normal pulses.      Heart sounds: Normal heart sounds.   Pulmonary:      Effort: Pulmonary effort is normal.      Breath sounds: Wheezing present.   Musculoskeletal:         General: Normal range of motion.      Cervical back: Normal range of motion.   Skin:     General: Skin is warm and dry.      Capillary Refill: Capillary refill takes less than 2 seconds.   Neurological:      General: No focal deficit present.      Mental Status: He is alert and oriented to person, place, and time.   Psychiatric:         Mood and Affect: Mood normal.         Behavior: Behavior normal.         Thought Content: Thought content normal.         Judgment: Judgment normal.        Result Review :      Common labs          10/8/2024    11:08 12/6/2024    11:03 1/14/2025    09:48   Common Labs   Glucose  114  123    BUN  27  20    Creatinine  1.15  1.00    Sodium  134  137    Potassium  4.8  4.7    Chloride  " 96  98    Calcium  9.3  9.8    Albumin  4.1     3.5     Total Bilirubin  0.6     Alkaline Phosphatase  52     AST (SGOT)  19     ALT (SGPT)  21     WBC  13.59     Hemoglobin  13.4     Hematocrit  40.9     Platelets  177     Total Cholesterol   154    Triglycerides   70    HDL Cholesterol   36    LDL Cholesterol    104    Uric Acid 8.1            Details          This result is from an external source.                 Current Outpatient Medications on File Prior to Visit   Medication Sig Dispense Refill    acetaminophen (TYLENOL) 650 MG 8 hr tablet Take 1 tablet by mouth.      albuterol sulfate  (90 Base) MCG/ACT inhaler Inhale 2 puffs.      azelastine (ASTELIN) 0.1 % nasal spray Administer 2 sprays into the nostril(s) as directed by provider 2 (Two) Times a Day. Use in each nostril as directed 30 mL 2    Breztri Aerosphere 160-9-4.8 MCG/ACT aerosol inhaler Inhale 2 puffs 2 (Two) Times a Day.      fluticasone (FLONASE) 50 MCG/ACT nasal spray Administer 2 sprays into the nostril(s) as directed by provider Daily. 15.8 mL 2    lisinopril (PRINIVIL,ZESTRIL) 20 MG tablet TAKE 1 TABLET BY MOUTH DAILY 90 tablet 3    multivitamin with minerals tablet tablet Take 1 tablet by mouth Daily.      tamsulosin (FLOMAX) 0.4 MG capsule 24 hr capsule Take 1 capsule by mouth Daily.      [DISCONTINUED] azithromycin (Zithromax Z-Derek) 250 MG tablet Take 2 tablets by mouth on day 1, then 1 tablet daily on days 2-5 (Patient not taking: Reported on 3/10/2025) 6 tablet 0    [DISCONTINUED] predniSONE (DELTASONE) 10 MG tablet Take 4 tablets by mouth on days 1-2. Take 3 tablets by mouth on days 3-4. Take 2 tablets by mouth on days 5-8. Take 1 tablet by mouth on days 9-12. (Patient not taking: Reported on 3/10/2025) 26 tablet 0     No current facility-administered medications on file prior to visit.                Assessment and Plan     Diagnoses and all orders for this visit:    1. Upper respiratory tract infection, unspecified type  -      azithromycin (Zithromax Z-Derek) 250 MG tablet; Take 2 tablets by mouth on day 1, then 1 tablet daily on days 2-5  Dispense: 6 tablet; Refill: 0  -     predniSONE (DELTASONE) 10 MG tablet; Take 4 tablets by mouth on days 1-2. Take 3 tablets by mouth on days 3-4. Take 2 tablets by mouth on days 5-8. Take 1 tablet by mouth on days 9-12.  Dispense: 26 tablet; Refill: 0    2. Pharyngitis, unspecified etiology  -     azithromycin (Zithromax Z-Derek) 250 MG tablet; Take 2 tablets by mouth on day 1, then 1 tablet daily on days 2-5  Dispense: 6 tablet; Refill: 0  -     predniSONE (DELTASONE) 10 MG tablet; Take 4 tablets by mouth on days 1-2. Take 3 tablets by mouth on days 3-4. Take 2 tablets by mouth on days 5-8. Take 1 tablet by mouth on days 9-12.  Dispense: 26 tablet; Refill: 0    3. SOB (shortness of breath)  -     predniSONE (DELTASONE) 10 MG tablet; Take 4 tablets by mouth on days 1-2. Take 3 tablets by mouth on days 3-4. Take 2 tablets by mouth on days 5-8. Take 1 tablet by mouth on days 9-12.  Dispense: 26 tablet; Refill: 0    4. Pulmonary emphysema, unspecified emphysema type  -     predniSONE (DELTASONE) 10 MG tablet; Take 4 tablets by mouth on days 1-2. Take 3 tablets by mouth on days 3-4. Take 2 tablets by mouth on days 5-8. Take 1 tablet by mouth on days 9-12.  Dispense: 26 tablet; Refill: 0        Patient Instructions   Start azithromycin and take as directed. Start steroid as prescribed. Continue medications as prescribed. Stay hydrated with water. Follow-up as needed.            Follow Up     Return if symptoms worsen or fail to improve.  Patient was given instructions and counseling regarding his condition or for health maintenance advice. Please see specific information pulled into the AVS if appropriate.

## 2025-03-17 ENCOUNTER — OFFICE VISIT (OUTPATIENT)
Dept: INTERNAL MEDICINE | Facility: CLINIC | Age: 70
End: 2025-03-17
Payer: MEDICARE

## 2025-03-17 ENCOUNTER — HOSPITAL ENCOUNTER (OUTPATIENT)
Facility: HOSPITAL | Age: 70
Discharge: HOME | End: 2025-03-17
Payer: MEDICARE

## 2025-03-17 VITALS
WEIGHT: 177 LBS | HEART RATE: 83 BPM | SYSTOLIC BLOOD PRESSURE: 152 MMHG | TEMPERATURE: 98 F | BODY MASS INDEX: 28.57 KG/M2 | DIASTOLIC BLOOD PRESSURE: 78 MMHG | OXYGEN SATURATION: 88 %

## 2025-03-17 DIAGNOSIS — R06.02 SOB (SHORTNESS OF BREATH): ICD-10-CM

## 2025-03-17 DIAGNOSIS — J06.9 UPPER RESPIRATORY TRACT INFECTION, UNSPECIFIED TYPE: ICD-10-CM

## 2025-03-17 DIAGNOSIS — J43.9 PULMONARY EMPHYSEMA, UNSPECIFIED EMPHYSEMA TYPE: Chronic | ICD-10-CM

## 2025-03-17 PROCEDURE — 71046 X-RAY EXAM CHEST 2 VIEWS: CPT

## 2025-03-17 PROCEDURE — 1160F RVW MEDS BY RX/DR IN RCRD: CPT

## 2025-03-17 PROCEDURE — 1126F AMNT PAIN NOTED NONE PRSNT: CPT

## 2025-03-17 PROCEDURE — 3078F DIAST BP <80 MM HG: CPT

## 2025-03-17 PROCEDURE — 1159F MED LIST DOCD IN RCRD: CPT

## 2025-03-17 PROCEDURE — 99213 OFFICE O/P EST LOW 20 MIN: CPT

## 2025-03-17 PROCEDURE — 3077F SYST BP >= 140 MM HG: CPT

## 2025-03-17 RX ORDER — PREDNISONE 10 MG/1
TABLET ORAL
Qty: 26 TABLET | Refills: 0 | Status: SHIPPED | OUTPATIENT
Start: 2025-03-17

## 2025-03-17 NOTE — PATIENT INSTRUCTIONS
Take 40 mg prednisone today. Start new prescription of prednisone tomorrow. Continue other medications. Monitor oxygenation at home. Chest x-ray today. If symptoms worsen, go to ER.

## 2025-03-17 NOTE — PROGRESS NOTES
"Chief Complaint  Shortness of Breath    Subjective        Gonzales Kaur . presents to [facility]  History of Present Illness  69-year-old male presenting with shortness of breath.  Seen in office last week for similar symptoms.  Completed azithromycin as prescribed.  Is currently down to last few tablets of prednisone.  Has been taking nebulizer treatments 3-4 times a day.  Had significantly poor sleep for 3 days but slept better last night.  States that he feels better but has noticeable wheeze and frequently coughing.  Denies fever, body aches, decreased appetite, chest pain.      Objective   Vital Signs:  /78 (BP Location: Left arm, Patient Position: Sitting, Cuff Size: Adult)   Pulse 83   Temp 98 °F (36.7 °C) (Oral)   Wt 80.3 kg (177 lb)   SpO2 (!) 88%   BMI 28.57 kg/m²   Estimated body mass index is 28.57 kg/m² as calculated from the following:    Height as of 3/10/25: 167.6 cm (66\").    Weight as of this encounter: 80.3 kg (177 lb).               Physical Exam  Vitals reviewed.   Constitutional:       Appearance: Normal appearance.   Cardiovascular:      Rate and Rhythm: Normal rate and regular rhythm.      Pulses: Normal pulses.      Heart sounds: Normal heart sounds.   Pulmonary:      Effort: Pulmonary effort is normal.      Breath sounds: Wheezing present.   Musculoskeletal:         General: Normal range of motion.      Cervical back: Normal range of motion.   Skin:     General: Skin is warm and dry.      Capillary Refill: Capillary refill takes less than 2 seconds.   Neurological:      General: No focal deficit present.      Mental Status: He is alert and oriented to person, place, and time.   Psychiatric:         Mood and Affect: Mood normal.         Behavior: Behavior normal.         Thought Content: Thought content normal.         Judgment: Judgment normal.        Result Review :      Common labs          10/8/2024    11:08 12/6/2024    11:03 1/14/2025    " 09:48   Common Labs   Glucose  114  123    BUN  27  20    Creatinine  1.15  1.00    Sodium  134  137    Potassium  4.8  4.7    Chloride  96  98    Calcium  9.3  9.8    Albumin  4.1     3.5     Total Bilirubin  0.6     Alkaline Phosphatase  52     AST (SGOT)  19     ALT (SGPT)  21     WBC  13.59     Hemoglobin  13.4     Hematocrit  40.9     Platelets  177     Total Cholesterol   154    Triglycerides   70    HDL Cholesterol   36    LDL Cholesterol    104    Uric Acid 8.1            Details          This result is from an external source.                 Current Outpatient Medications on File Prior to Visit   Medication Sig Dispense Refill    acetaminophen (TYLENOL) 650 MG 8 hr tablet Take 1 tablet by mouth.      albuterol sulfate  (90 Base) MCG/ACT inhaler Inhale 2 puffs.      azelastine (ASTELIN) 0.1 % nasal spray Administer 2 sprays into the nostril(s) as directed by provider 2 (Two) Times a Day. Use in each nostril as directed 30 mL 2    Breztri Aerosphere 160-9-4.8 MCG/ACT aerosol inhaler Inhale 2 puffs 2 (Two) Times a Day.      fluticasone (FLONASE) 50 MCG/ACT nasal spray Administer 2 sprays into the nostril(s) as directed by provider Daily. 15.8 mL 2    lisinopril (PRINIVIL,ZESTRIL) 20 MG tablet TAKE 1 TABLET BY MOUTH DAILY 90 tablet 3    multivitamin with minerals tablet tablet Take 1 tablet by mouth Daily.      tamsulosin (FLOMAX) 0.4 MG capsule 24 hr capsule Take 1 capsule by mouth Daily.      [DISCONTINUED] azithromycin (Zithromax Z-Derek) 250 MG tablet Take 2 tablets by mouth on day 1, then 1 tablet daily on days 2-5 6 tablet 0    [DISCONTINUED] predniSONE (DELTASONE) 10 MG tablet Take 4 tablets by mouth on days 1-2. Take 3 tablets by mouth on days 3-4. Take 2 tablets by mouth on days 5-8. Take 1 tablet by mouth on days 9-12. 26 tablet 0     No current facility-administered medications on file prior to visit.                Assessment and Plan     Diagnoses and all orders for this visit:    1. Upper  respiratory tract infection, unspecified type  -     predniSONE (DELTASONE) 10 MG tablet; Take 4 tablets by mouth on days 1-2. Take 3 tablets by mouth on days 3-4. Take 2 tablets by mouth on days 5-8. Take 1 tablet by mouth on days 9-12.  Dispense: 26 tablet; Refill: 0    2. SOB (shortness of breath)  -     predniSONE (DELTASONE) 10 MG tablet; Take 4 tablets by mouth on days 1-2. Take 3 tablets by mouth on days 3-4. Take 2 tablets by mouth on days 5-8. Take 1 tablet by mouth on days 9-12.  Dispense: 26 tablet; Refill: 0  -     XR Chest PA & Lateral    3. Pulmonary emphysema, unspecified emphysema type  -     predniSONE (DELTASONE) 10 MG tablet; Take 4 tablets by mouth on days 1-2. Take 3 tablets by mouth on days 3-4. Take 2 tablets by mouth on days 5-8. Take 1 tablet by mouth on days 9-12.  Dispense: 26 tablet; Refill: 0  -     XR Chest PA & Lateral        Patient Instructions   Take 40 mg prednisone today. Start new prescription of prednisone tomorrow. Continue other medications. Monitor oxygenation at home. Chest x-ray today. If symptoms worsen, go to ER.            Follow Up     Return if symptoms worsen or fail to improve.  Patient was given instructions and counseling regarding his condition or for health maintenance advice. Please see specific information pulled into the AVS if appropriate.

## 2025-04-16 ENCOUNTER — HOSPITAL ENCOUNTER (OUTPATIENT)
Dept: CT IMAGING | Facility: HOSPITAL | Age: 70
Discharge: HOME | End: 2025-04-16
Admitting: INTERNAL MEDICINE
Payer: MEDICARE

## 2025-04-16 DIAGNOSIS — R91.1 LUNG NODULE: ICD-10-CM

## 2025-04-16 PROCEDURE — 71250 CT THORAX DX C-: CPT

## 2025-04-23 ENCOUNTER — TRANSCRIBE ORDERS (OUTPATIENT)
Dept: ADMINISTRATIVE | Facility: HOSPITAL | Age: 70
End: 2025-04-23
Payer: MEDICARE

## 2025-04-23 DIAGNOSIS — R91.1 LUNG NODULE: Primary | ICD-10-CM

## 2025-05-29 ENCOUNTER — LAB (OUTPATIENT)
Dept: LAB | Facility: HOSPITAL | Age: 70
End: 2025-05-29
Payer: MEDICARE

## 2025-05-29 DIAGNOSIS — D72.828 NEUTROPHILIA: ICD-10-CM

## 2025-05-29 DIAGNOSIS — D75.89 MACROCYTOSIS: ICD-10-CM

## 2025-05-29 DIAGNOSIS — D47.2 MONOCLONAL GAMMOPATHY: ICD-10-CM

## 2025-05-29 LAB
ALBUMIN SERPL-MCNC: 4.3 G/DL (ref 3.5–5.2)
ALBUMIN/GLOB SERPL: 1.2 G/DL
ALP SERPL-CCNC: 52 U/L (ref 39–117)
ALT SERPL W P-5'-P-CCNC: 24 U/L (ref 1–41)
ANION GAP SERPL CALCULATED.3IONS-SCNC: 10 MMOL/L (ref 5–15)
AST SERPL-CCNC: 18 U/L (ref 1–40)
BASOPHILS # BLD AUTO: 0.1 10*3/MM3 (ref 0–0.2)
BASOPHILS NFR BLD AUTO: 0.7 % (ref 0–1.5)
BILIRUB SERPL-MCNC: 0.5 MG/DL (ref 0–1.2)
BUN SERPL-MCNC: 26.6 MG/DL (ref 8–23)
BUN/CREAT SERPL: 25.1 (ref 7–25)
CALCIUM SPEC-SCNC: 9.7 MG/DL (ref 8.6–10.5)
CHLORIDE SERPL-SCNC: 101 MMOL/L (ref 98–107)
CO2 SERPL-SCNC: 28 MMOL/L (ref 22–29)
CREAT SERPL-MCNC: 1.06 MG/DL (ref 0.76–1.27)
DEPRECATED RDW RBC AUTO: 55.8 FL (ref 37–54)
EGFRCR SERPLBLD CKD-EPI 2021: 76.4 ML/MIN/1.73
EOSINOPHIL # BLD AUTO: 0.15 10*3/MM3 (ref 0–0.4)
EOSINOPHIL NFR BLD AUTO: 1.1 % (ref 0.3–6.2)
ERYTHROCYTE [DISTWIDTH] IN BLOOD BY AUTOMATED COUNT: 15 % (ref 12.3–15.4)
GLOBULIN UR ELPH-MCNC: 3.7 GM/DL
GLUCOSE SERPL-MCNC: 130 MG/DL (ref 65–99)
HCT VFR BLD AUTO: 38.6 % (ref 37.5–51)
HGB BLD-MCNC: 12.6 G/DL (ref 13–17.7)
IMM GRANULOCYTES # BLD AUTO: 0.16 10*3/MM3 (ref 0–0.05)
IMM GRANULOCYTES NFR BLD AUTO: 1.2 % (ref 0–0.5)
LYMPHOCYTES # BLD AUTO: 1.14 10*3/MM3 (ref 0.7–3.1)
LYMPHOCYTES NFR BLD AUTO: 8.5 % (ref 19.6–45.3)
MCH RBC QN AUTO: 33.3 PG (ref 26.6–33)
MCHC RBC AUTO-ENTMCNC: 32.6 G/DL (ref 31.5–35.7)
MCV RBC AUTO: 102.1 FL (ref 79–97)
MONOCYTES # BLD AUTO: 1.05 10*3/MM3 (ref 0.1–0.9)
MONOCYTES NFR BLD AUTO: 7.8 % (ref 5–12)
NEUTROPHILS NFR BLD AUTO: 10.87 10*3/MM3 (ref 1.7–7)
NEUTROPHILS NFR BLD AUTO: 80.7 % (ref 42.7–76)
NRBC BLD AUTO-RTO: 0 /100 WBC (ref 0–0.2)
PLATELET # BLD AUTO: 201 10*3/MM3 (ref 140–450)
PMV BLD AUTO: 10 FL (ref 6–12)
POTASSIUM SERPL-SCNC: 4.9 MMOL/L (ref 3.5–5.2)
PROT SERPL-MCNC: 8 G/DL (ref 6–8.5)
RBC # BLD AUTO: 3.78 10*6/MM3 (ref 4.14–5.8)
SODIUM SERPL-SCNC: 139 MMOL/L (ref 136–145)
WBC NRBC COR # BLD AUTO: 13.47 10*3/MM3 (ref 3.4–10.8)

## 2025-05-29 PROCEDURE — 85025 COMPLETE CBC W/AUTO DIFF WBC: CPT

## 2025-05-29 PROCEDURE — 36415 COLL VENOUS BLD VENIPUNCTURE: CPT

## 2025-05-29 PROCEDURE — 80053 COMPREHEN METABOLIC PANEL: CPT

## 2025-06-02 LAB
ALBUMIN SERPL ELPH-MCNC: 3.7 G/DL (ref 2.9–4.4)
ALBUMIN/GLOB SERPL: 1 {RATIO} (ref 0.7–1.7)
ALPHA1 GLOB SERPL ELPH-MCNC: 0.2 G/DL (ref 0–0.4)
ALPHA2 GLOB SERPL ELPH-MCNC: 0.8 G/DL (ref 0.4–1)
B-GLOBULIN SERPL ELPH-MCNC: 1.1 G/DL (ref 0.7–1.3)
GAMMA GLOB SERPL ELPH-MCNC: 1.9 G/DL (ref 0.4–1.8)
GLOBULIN SER-MCNC: 4 G/DL (ref 2.2–3.9)
IGA SERPL-MCNC: 404 MG/DL (ref 61–437)
IGG SERPL-MCNC: 1888 MG/DL (ref 603–1613)
IGM SERPL-MCNC: 85 MG/DL (ref 20–172)
INTERPRETATION SERPL IEP-IMP: ABNORMAL
KAPPA LC FREE SER-MCNC: 42.8 MG/L (ref 3.3–19.4)
KAPPA LC FREE/LAMBDA FREE SER: 1.15 {RATIO} (ref 0.26–1.65)
LABORATORY COMMENT REPORT: ABNORMAL
LAMBDA LC FREE SERPL-MCNC: 37.2 MG/L (ref 5.7–26.3)
M PROTEIN SERPL ELPH-MCNC: ABNORMAL G/DL
PROT SERPL-MCNC: 7.7 G/DL (ref 6–8.5)

## 2025-06-05 ENCOUNTER — OFFICE VISIT (OUTPATIENT)
Dept: ONCOLOGY | Facility: CLINIC | Age: 70
End: 2025-06-05
Payer: MEDICARE

## 2025-06-05 VITALS
HEART RATE: 74 BPM | BODY MASS INDEX: 28.88 KG/M2 | WEIGHT: 179.7 LBS | SYSTOLIC BLOOD PRESSURE: 146 MMHG | HEIGHT: 66 IN | DIASTOLIC BLOOD PRESSURE: 73 MMHG | OXYGEN SATURATION: 93 % | TEMPERATURE: 97.9 F

## 2025-06-05 DIAGNOSIS — D75.89 MACROCYTOSIS: ICD-10-CM

## 2025-06-05 DIAGNOSIS — D72.828 NEUTROPHILIA: ICD-10-CM

## 2025-06-05 DIAGNOSIS — D47.2 MONOCLONAL GAMMOPATHY: Primary | ICD-10-CM

## 2025-06-05 NOTE — PROGRESS NOTES
Subjective     CHIEF COMPLAINT:      Chief Complaint   Patient presents with    Follow-up     Lab review     HISTORY OF PRESENT ILLNESS:     Gonzales Kaur Sr. is a 69 y.o. 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  He returns to the office today for 6-month follow-up and review.  He underwent labs last week.  He reports he has been on steroids for recent bronchitis.  Reports he is overall feeling well.  His breathing is improved on recent steroids.  He has no fevers or chills.  He has no new pain.    ROS:  Pertinent ROS is in the HPI.     Past medical, surgical, social and family history were reviewed.     MEDICATIONS:    Current Outpatient Medications:     acetaminophen (TYLENOL) 650 MG 8 hr tablet, Take 1 tablet by mouth., Disp: , Rfl:     albuterol sulfate  (90 Base) MCG/ACT inhaler, Inhale 2 puffs., Disp: , Rfl:     azelastine (ASTELIN) 0.1 % nasal spray, Administer 2 sprays into the nostril(s) as directed by provider 2 (Two) Times a Day. Use in each nostril as directed, Disp: 30 mL, Rfl: 2    Breztri Aerosphere 160-9-4.8 MCG/ACT aerosol inhaler, Inhale 2 puffs 2 (Two) Times a Day., Disp: , Rfl:     multivitamin with minerals tablet tablet, Take 1 tablet by mouth Daily., Disp: , Rfl:     predniSONE (DELTASONE) 10 MG tablet, Take 4 tablets by mouth on days 1-2. Take 3 tablets by mouth on days 3-4. Take 2 tablets by mouth on days 5-8. Take 1 tablet by mouth on days 9-12., Disp: 26 tablet, Rfl: 0    tamsulosin (FLOMAX) 0.4 MG capsule 24 hr capsule, Take 1 capsule by mouth Daily., Disp: , Rfl:     fluticasone (FLONASE) 50 MCG/ACT nasal spray, Administer 2 sprays into the nostril(s) as directed by provider Daily. (Patient not taking: Reported on 6/5/2025), Disp: 15.8 mL, Rfl: 2    lisinopril (PRINIVIL,ZESTRIL) 20 MG tablet, TAKE 1 TABLET BY MOUTH DAILY (Patient not taking: Reported on 6/5/2025), Disp: 90 tablet, Rfl: 3    Objective     VITAL SIGNS:     Vitals:    06/05/25 1127   BP: 146/73  "  Pulse: 74   Temp: 97.9 °F (36.6 °C)   TempSrc: Oral   SpO2: 93%   Weight: 81.5 kg (179 lb 11.2 oz)   Height: 167.6 cm (65.98\")   PainSc: 0-No pain     Body mass index is 29.02 kg/m².     Wt Readings from Last 5 Encounters:   06/05/25 81.5 kg (179 lb 11.2 oz)   03/17/25 80.3 kg (177 lb)   03/10/25 80.3 kg (177 lb)   02/05/25 77.7 kg (171 lb 3.2 oz)   01/14/25 79.4 kg (175 lb)     PHYSICAL EXAMINATION:   GENERAL: The patient appears in good general condition, not in acute distress.   SKIN: No Ecchymosis.  EYES: No jaundice. No Pallor.  LYMPHATIC: No cervical, supraclavicular lymphadenopathy.  CHEST: Normal respiratory effort. Normal breathing sounds bilaterally. No added sounds.  CVS: Normal S1 and S2. No Murmur.  ABDOMEN: Soft. No tenderness. No masses.  EXTREMITIES: No joint deformity.     DIAGNOSTIC DATA:   SHAHRZAD, PE & Free LT Chains, Ser (05/29/2025 10:00)  Comprehensive Metabolic Panel (05/29/2025 10:00)  CBC & Differential (05/29/2025 10:00)    Assessment & Plan    *Monoclonal gammopathy.  Labs on 7/30/2024 revealed a total globulin level of 4.1.  Gammaglobulin was 1.9.  IgG was 1991.  IgA was 408.  IgM was 83.  Kappa FLC was 68.4.  Lambda FLC was 40.7.    Kappa/lambda ratio was minimally elevated at 1.68.  There was no M protein.  Dr. Laureano explained to the patient that the pattern is more indicative of an inflammatory process rather than a malignant process like multiple myeloma.  5/29/2025 IgG 1888, M spike not observed, kappa free light chain 42.8, lambda free light chain 37.2    *Leukocytosis with neutrophilia and monocytosis.    WBC count was elevated in November 2021 when his WBC was 12,400.    WBC count increased to 13,100 on 1/7/2022.  Monocytes increased to 1410.  Immature granulocytes were 230.  Flow cytometry on 1/7/2022 revealed no evidence of leukemia or lymphoma.   BCR-ABL on 1/7/2022 was negative.  The increase was tehrefore considered a reactive increase due to underlying inflammation or " effect of steroid therapy.   WBC improved to 9,800 on 3/18/2023. Neutrophils improved. Monocytes improved but remain elevated.   4/13/2023: Neutrophils 7860 monocytes 1060.  Immature granulocytes 90.  3/27/2024: WBC increased to 17,590.  Neutrophils 14,570.  Monocytes 1320.  Immature granulocytes 420.  This was attributed to pneumonia.  12/6/2024: WBC 13,590. Neutrophils 10,330. Immature granulocytes 240.  6/5/2025 WBC 13.47, ANC 10.87.  The patient reports he is currently on steroids secondary to bronchitis.     *Macrocytosis.   MCV increased to 103 on 3/18/2022.   He does not have evidence of vitamin B12 or folate deficiency today.   He does not drink alcohol.  CT of the chest he had in August 2021 reported severe hepatic steatosis.   CT chest on 9/1/2022 revealed diffuse hepatic steatosis.  We previously recommended evaluation by liver specialist.  12/6/2024: .3.  Hemoglobin 13.4.  Folate normal greater than 20, B12 806  6/5/2025 MCV is overall stable at 102.1     PLAN:  Protein studies reviewed with the patient today with no M spike observed, stable IgG monoclonal gammopathy  Patient does continue on prednisone for bronchitis explaining his leukocytosis, he is without infectious symptoms  6-month follow-up with Dr. Laureano, 1 week before his return visit, we will obtain CBC CMP SPEP SHAHRZAD FLC.    Fany Hare, SHAJI  06/05/25

## 2025-07-09 ENCOUNTER — TELEPHONE (OUTPATIENT)
Dept: INTERNAL MEDICINE | Facility: CLINIC | Age: 70
End: 2025-07-09

## 2025-07-09 ENCOUNTER — OFFICE VISIT (OUTPATIENT)
Dept: INTERNAL MEDICINE | Facility: CLINIC | Age: 70
End: 2025-07-09
Payer: MEDICARE

## 2025-07-09 VITALS
TEMPERATURE: 97.7 F | SYSTOLIC BLOOD PRESSURE: 147 MMHG | HEIGHT: 66 IN | OXYGEN SATURATION: 90 % | BODY MASS INDEX: 29.67 KG/M2 | DIASTOLIC BLOOD PRESSURE: 69 MMHG | HEART RATE: 99 BPM | WEIGHT: 184.6 LBS

## 2025-07-09 DIAGNOSIS — J43.9 PULMONARY EMPHYSEMA, UNSPECIFIED EMPHYSEMA TYPE: Chronic | ICD-10-CM

## 2025-07-09 DIAGNOSIS — R06.02 SOB (SHORTNESS OF BREATH): Primary | ICD-10-CM

## 2025-07-09 DIAGNOSIS — R05.1 ACUTE COUGH: Primary | ICD-10-CM

## 2025-07-09 PROCEDURE — 1159F MED LIST DOCD IN RCRD: CPT

## 2025-07-09 PROCEDURE — 99213 OFFICE O/P EST LOW 20 MIN: CPT

## 2025-07-09 PROCEDURE — 3077F SYST BP >= 140 MM HG: CPT

## 2025-07-09 PROCEDURE — 3078F DIAST BP <80 MM HG: CPT

## 2025-07-09 PROCEDURE — 1126F AMNT PAIN NOTED NONE PRSNT: CPT

## 2025-07-09 PROCEDURE — 1160F RVW MEDS BY RX/DR IN RCRD: CPT

## 2025-07-09 RX ORDER — PREDNISONE 10 MG/1
TABLET ORAL
Qty: 26 TABLET | Refills: 0 | Status: SHIPPED | OUTPATIENT
Start: 2025-07-09

## 2025-07-09 RX ORDER — DEXTROMETHORPHAN HYDROBROMIDE AND PROMETHAZINE HYDROCHLORIDE 15; 6.25 MG/5ML; MG/5ML
5 SYRUP ORAL 4 TIMES DAILY PRN
Qty: 180 ML | Refills: 1 | Status: SHIPPED | OUTPATIENT
Start: 2025-07-09

## 2025-07-09 NOTE — PATIENT INSTRUCTIONS
Take prednisone as prescribed. Take nebulizers every 4 hours. Stay hydrated with water. Follow-up as needed.    Monitor for worsening symptoms and go to ER if needed.

## 2025-07-09 NOTE — PROGRESS NOTES
"Chief Complaint  Cough and Nasal Congestion    Subjective        Gonzales Kaur . presents to [facility]  History of Present Illness  69-year-old male presenting with COPD exacerbation.  States that symptoms started last night.  Need to start taking nebulizer treatment.  Did not take 1 this morning.  Took albuterol rescue inhaler about 30 minutes ago.  Is having difficulty deep breathing at this time.  Oxygenation 90%.  Has runny nose with a cough.  Denies any fever, productive cough, chest pain.  Cough      Objective   Vital Signs:  /69 (BP Location: Left arm, Patient Position: Sitting, Cuff Size: Large Adult)   Pulse 99   Temp 97.7 °F (36.5 °C) (Temporal)   Ht 167.6 cm (65.98\")   Wt 83.7 kg (184 lb 9.6 oz)   SpO2 90%   BMI 29.81 kg/m²   Estimated body mass index is 29.81 kg/m² as calculated from the following:    Height as of this encounter: 167.6 cm (65.98\").    Weight as of this encounter: 83.7 kg (184 lb 9.6 oz).               Physical Exam  Vitals reviewed.   Constitutional:       Appearance: Normal appearance.   Cardiovascular:      Pulses: Normal pulses.      Heart sounds: Normal heart sounds.   Pulmonary:      Effort: Pulmonary effort is normal.      Breath sounds: Wheezing present.      Comments: Tripod breathing  Musculoskeletal:         General: Normal range of motion.      Cervical back: Normal range of motion.   Skin:     General: Skin is warm and dry.      Capillary Refill: Capillary refill takes less than 2 seconds.   Neurological:      General: No focal deficit present.      Mental Status: He is alert and oriented to person, place, and time.   Psychiatric:         Mood and Affect: Mood normal.         Behavior: Behavior normal.         Thought Content: Thought content normal.         Judgment: Judgment normal.        Result Review :      Common labs          1/14/2025    09:48 5/14/2025    15:36 5/29/2025    10:00   Common Labs   Glucose 123   130    BUN " 20   26.6    Creatinine 1.00   1.06    Sodium 137   139    Potassium 4.7   4.9    Chloride 98   101    Calcium 9.8   9.7    Albumin   4.3     3.7    Total Bilirubin   0.5    Alkaline Phosphatase   52    AST (SGOT)   18    ALT (SGPT)   24    WBC   13.47    Hemoglobin   12.6    Hematocrit   38.6    Platelets   201    Total Cholesterol 154      Triglycerides 70      HDL Cholesterol 36      LDL Cholesterol  104      Uric Acid  7.6           Details          This result is from an external source.                 Current Outpatient Medications on File Prior to Visit   Medication Sig Dispense Refill    acetaminophen (TYLENOL) 650 MG 8 hr tablet Take 1 tablet by mouth.      albuterol sulfate  (90 Base) MCG/ACT inhaler Inhale 2 puffs.      azelastine (ASTELIN) 0.1 % nasal spray Administer 2 sprays into the nostril(s) as directed by provider 2 (Two) Times a Day. Use in each nostril as directed 30 mL 2    Breztri Aerosphere 160-9-4.8 MCG/ACT aerosol inhaler Inhale 2 puffs 2 (Two) Times a Day.      lisinopril (PRINIVIL,ZESTRIL) 20 MG tablet TAKE 1 TABLET BY MOUTH DAILY 90 tablet 3    multivitamin with minerals tablet tablet Take 1 tablet by mouth Daily.      tamsulosin (FLOMAX) 0.4 MG capsule 24 hr capsule Take 1 capsule by mouth Daily.      [DISCONTINUED] predniSONE (DELTASONE) 10 MG tablet Take 4 tablets by mouth on days 1-2. Take 3 tablets by mouth on days 3-4. Take 2 tablets by mouth on days 5-8. Take 1 tablet by mouth on days 9-12. 26 tablet 0    fluticasone (FLONASE) 50 MCG/ACT nasal spray Administer 2 sprays into the nostril(s) as directed by provider Daily. (Patient not taking: Reported on 7/9/2025) 15.8 mL 2     No current facility-administered medications on file prior to visit.                Assessment and Plan     Diagnoses and all orders for this visit:    1. SOB (shortness of breath) (Primary)  -     predniSONE (DELTASONE) 10 MG tablet; Take 4 tablets by mouth on days 1-2. Take 3 tablets by mouth on days  3-4. Take 2 tablets by mouth on days 5-8. Take 1 tablet by mouth on days 9-12.  Dispense: 26 tablet; Refill: 0    2. Pulmonary emphysema, unspecified emphysema type        Patient Instructions   Take prednisone as prescribed. Take nebulizers every 4 hours. Stay hydrated with water. Follow-up as needed.    Monitor for worsening symptoms and go to ER if needed.            Follow Up     Return if symptoms worsen or fail to improve.  Patient was given instructions and counseling regarding his condition or for health maintenance advice. Please see specific information pulled into the AVS if appropriate.

## 2025-07-09 NOTE — TELEPHONE ENCOUNTER
"  Caller: Gonzales Kaur Sr. \"MELI\"    Relationship: Self    Best call back number: [phone]     What medication are you requesting: COUGH MEDICATION    What are your current symptoms: COUGHING AND RUNNY NOSE     How long have you been experiencing symptoms: SINCE YESTERDAY     If a prescription is needed, what is your preferred pharmacy and phone number:  [facility] [place], [place] - [address] [facility] [phone] [facility] [phone] FX     "

## 2025-07-11 ENCOUNTER — TELEPHONE (OUTPATIENT)
Dept: INTERNAL MEDICINE | Facility: CLINIC | Age: 70
End: 2025-07-11
Payer: MEDICARE

## 2025-07-11 DIAGNOSIS — J06.9 UPPER RESPIRATORY TRACT INFECTION, UNSPECIFIED TYPE: Primary | ICD-10-CM

## 2025-07-11 RX ORDER — AZITHROMYCIN 250 MG/1
TABLET, FILM COATED ORAL
Qty: 6 TABLET | Refills: 0 | Status: SHIPPED | OUTPATIENT
Start: 2025-07-11

## 2025-07-11 NOTE — TELEPHONE ENCOUNTER
Caller: Dominique Kaur    Relationship: Emergency Contact    Best call back number: [phone]     What medication are you requesting: ANTIBIOTIC     What are your current symptoms: COUGH, MUCUS HAS GONE DARK GREEN/BROWN     Have you had these symptoms before:                                  [x] Yes  [] No     Have you been treated for these symptoms before:              [x] Yes  [] No     If a prescription is needed, what is your preferred pharmacy and phone number: [facility] [address] [facility] [phone] [facility] [phone] FX      Additional notes: PATIENT STATES THAT HE SAW [facility] MERRITT EARLIER IN THE WEEK FOR SYMPTOMS, AND WAS PRESCRIBED MEDICATIONS, BUT NOW SYMPTOMS HAVE GOTTEN WORSE. REQUESTS A PRESCRIPTION FOR AN ANTIBIOTIC AT THIS TIME FOR FURTHER TREATMENT.       PATIENTS WIFE WOULD LIKE A CALLBACK IF [facility] GIRARD ISN'T GOING TO CALL ANYTHING IN

## 2025-07-11 NOTE — TELEPHONE ENCOUNTER
Called patients wife to inform her that [facility] has sent azithromycin to pharmacy on file. Patients wife voiced understanding

## 2025-07-11 NOTE — TELEPHONE ENCOUNTER
"Caller: Gonzales Kaur Sr. \"MELI\"    Relationship: Self    Best call back number: [phone]     What medication are you requesting: ANTIBIOTIC    What are your current symptoms: COUGH, MUCUS HAS GONE DARK GREEN/BROWN    Have you had these symptoms before:    [x] Yes  [] No    Have you been treated for these symptoms before:   [x] Yes  [] No    If a prescription is needed, what is your preferred pharmacy and phone number: [facility] [address] [facility] [phone] [facility] [phone]      Additional notes: PATIENT STATES THAT HE SAW CHRISTIAN MERRITT EARLIER IN THE WEEK FOR SYMPTOMS, AND WAS PRESCRIBED MEDICATIONS, BUT NOW SYMPTOMS HAVE GOTTEN WORSE. REQUESTS A PRESCRIPTION FOR AN ANTIBIOTIC AT THIS TIME FOR FURTHER TREATMENT.  "

## 2025-07-15 ENCOUNTER — APPOINTMENT (OUTPATIENT)
Dept: GENERAL RADIOLOGY | Facility: HOSPITAL | Age: 70
DRG: 189 | End: 2025-07-15
Payer: MEDICARE

## 2025-07-15 ENCOUNTER — HOSPITAL ENCOUNTER (INPATIENT)
Facility: HOSPITAL | Age: 70
LOS: 3 days | Discharge: HOME | DRG: 189 | End: 2025-07-19
Attending: EMERGENCY MEDICINE | Admitting: INTERNAL MEDICINE
Payer: MEDICARE

## 2025-07-15 DIAGNOSIS — R09.02 HYPOXIA: ICD-10-CM

## 2025-07-15 DIAGNOSIS — J44.1 COPD WITH EXACERBATION: ICD-10-CM

## 2025-07-15 DIAGNOSIS — G47.33 OBSTRUCTIVE SLEEP APNEA SYNDROME: ICD-10-CM

## 2025-07-15 DIAGNOSIS — J44.1 ACUTE EXACERBATION OF CHRONIC OBSTRUCTIVE PULMONARY DISEASE (COPD): Primary | ICD-10-CM

## 2025-07-15 DIAGNOSIS — J96.21 ACUTE ON CHRONIC RESPIRATORY FAILURE WITH HYPOXIA: ICD-10-CM

## 2025-07-15 DIAGNOSIS — J20.4 PARAINFLUENZA VIRUS BRONCHITIS: ICD-10-CM

## 2025-07-15 PROBLEM — H91.90 HEARING LOSS: Status: ACTIVE | Noted: 2023-06-29

## 2025-07-15 PROBLEM — C61 MALIGNANT NEOPLASM OF PROSTATE: Status: ACTIVE | Noted: 2023-06-29

## 2025-07-15 PROBLEM — Z87.442 HISTORY OF URINARY STONE: Status: ACTIVE | Noted: 2024-04-18

## 2025-07-15 PROBLEM — R33.9 INCOMPLETE BLADDER EMPTYING: Status: ACTIVE | Noted: 2024-04-18

## 2025-07-15 PROBLEM — D22.9 MELANOCYTIC NEVUS: Status: ACTIVE | Noted: 2024-03-08

## 2025-07-15 PROBLEM — L71.9 ROSACEA: Status: ACTIVE | Noted: 2023-06-28

## 2025-07-15 PROBLEM — L82.0 INFLAMED SEBORRHEIC KERATOSIS: Status: ACTIVE | Noted: 2024-03-08

## 2025-07-15 PROBLEM — R80.9 PROTEINURIA: Status: ACTIVE | Noted: 2025-05-29

## 2025-07-15 PROBLEM — E87.1 HYPONATREMIA: Status: ACTIVE | Noted: 2024-04-18

## 2025-07-15 PROBLEM — E29.1 HYPOGONADISM IN MALE: Status: ACTIVE | Noted: 2024-04-18

## 2025-07-15 PROBLEM — I10 ESSENTIAL (PRIMARY) HYPERTENSION: Chronic | Status: ACTIVE | Noted: 2024-04-18

## 2025-07-15 PROBLEM — N40.0 BENIGN PROSTATIC HYPERPLASIA: Status: ACTIVE | Noted: 2023-06-29

## 2025-07-15 PROBLEM — D69.2 NONTHROMBOCYTOPENIC PURPURA: Status: ACTIVE | Noted: 2023-10-11

## 2025-07-15 PROBLEM — N18.2 CHRONIC KIDNEY DISEASE, STAGE 2 (MILD): Chronic | Status: ACTIVE | Noted: 2025-05-29

## 2025-07-15 PROBLEM — Z85.46 PERSONAL HISTORY OF PROSTATE CANCER: Status: ACTIVE | Noted: 2024-04-18

## 2025-07-15 PROBLEM — Z76.89 INITIAL PATIENT ENCOUNTER: Status: ACTIVE | Noted: 2023-10-11

## 2025-07-15 PROBLEM — L81.9 DISORDER OF PIGMENTATION: Status: ACTIVE | Noted: 2024-03-08

## 2025-07-15 PROBLEM — C44.91 BASAL CELL CARCINOMA (BCC): Status: ACTIVE | Noted: 2023-06-29

## 2025-07-15 PROBLEM — L57.0 ACTINIC KERATOSIS: Status: ACTIVE | Noted: 2023-06-28

## 2025-07-15 PROBLEM — L57.9 SKIN CHANGES DUE TO CHRONIC EXPOSURE TO NONIONIZING RADIATION: Status: ACTIVE | Noted: 2024-05-08

## 2025-07-15 PROBLEM — J44.9 CHRONIC OBSTRUCTIVE PULMONARY DISEASE: Status: ACTIVE | Noted: 2023-06-29

## 2025-07-15 PROBLEM — I78.9 CAPILLARY DISEASE: Status: ACTIVE | Noted: 2024-05-08

## 2025-07-15 PROBLEM — K42.9 UMBILICAL HERNIA: Status: ACTIVE | Noted: 2024-03-08

## 2025-07-15 LAB
ALBUMIN SERPL-MCNC: 4.2 G/DL (ref 3.5–5.2)
ALBUMIN/GLOB SERPL: 1.1 G/DL
ALP SERPL-CCNC: 44 U/L (ref 39–117)
ALT SERPL W P-5'-P-CCNC: 39 U/L (ref 1–41)
ANION GAP SERPL CALCULATED.3IONS-SCNC: 10.3 MMOL/L (ref 5–15)
AST SERPL-CCNC: 27 U/L (ref 1–40)
B PARAPERT DNA SPEC QL NAA+PROBE: NOT DETECTED
B PERT DNA SPEC QL NAA+PROBE: NOT DETECTED
BASOPHILS # BLD AUTO: 0.07 10*3/MM3 (ref 0–0.2)
BASOPHILS NFR BLD AUTO: 0.5 % (ref 0–1.5)
BILIRUB SERPL-MCNC: 0.3 MG/DL (ref 0–1.2)
BUN SERPL-MCNC: 21 MG/DL (ref 8–23)
BUN/CREAT SERPL: 23.1 (ref 7–25)
C PNEUM DNA NPH QL NAA+NON-PROBE: NOT DETECTED
CALCIUM SPEC-SCNC: 9.5 MG/DL (ref 8.6–10.5)
CHLORIDE SERPL-SCNC: 98 MMOL/L (ref 98–107)
CO2 SERPL-SCNC: 29.7 MMOL/L (ref 22–29)
CREAT SERPL-MCNC: 0.91 MG/DL (ref 0.76–1.27)
DEPRECATED RDW RBC AUTO: 48.3 FL (ref 37–54)
EGFRCR SERPLBLD CKD-EPI 2021: 91.8 ML/MIN/1.73
EOSINOPHIL # BLD AUTO: 0.02 10*3/MM3 (ref 0–0.4)
EOSINOPHIL NFR BLD AUTO: 0.2 % (ref 0.3–6.2)
ERYTHROCYTE [DISTWIDTH] IN BLOOD BY AUTOMATED COUNT: 13.3 % (ref 12.3–15.4)
FLUAV SUBTYP SPEC NAA+PROBE: NOT DETECTED
FLUBV RNA NPH QL NAA+NON-PROBE: NOT DETECTED
GEN 5 1HR TROPONIN T REFLEX: 24 NG/L
GLOBULIN UR ELPH-MCNC: 3.9 GM/DL
GLUCOSE SERPL-MCNC: 110 MG/DL (ref 65–99)
HADV DNA SPEC NAA+PROBE: NOT DETECTED
HCOV 229E RNA SPEC QL NAA+PROBE: NOT DETECTED
HCOV HKU1 RNA SPEC QL NAA+PROBE: NOT DETECTED
HCOV NL63 RNA SPEC QL NAA+PROBE: NOT DETECTED
HCOV OC43 RNA SPEC QL NAA+PROBE: NOT DETECTED
HCT VFR BLD AUTO: 38.2 % (ref 37.5–51)
HGB BLD-MCNC: 13.1 G/DL (ref 13–17.7)
HMPV RNA NPH QL NAA+NON-PROBE: NOT DETECTED
HPIV1 RNA ISLT QL NAA+PROBE: NOT DETECTED
HPIV2 RNA SPEC QL NAA+PROBE: NOT DETECTED
HPIV3 RNA NPH QL NAA+PROBE: DETECTED
HPIV4 P GENE NPH QL NAA+PROBE: NOT DETECTED
IMM GRANULOCYTES # BLD AUTO: 0.32 10*3/MM3 (ref 0–0.05)
IMM GRANULOCYTES NFR BLD AUTO: 2.4 % (ref 0–0.5)
LYMPHOCYTES # BLD AUTO: 1.37 10*3/MM3 (ref 0.7–3.1)
LYMPHOCYTES NFR BLD AUTO: 10.3 % (ref 19.6–45.3)
M PNEUMO IGG SER IA-ACNC: NOT DETECTED
MCH RBC QN AUTO: 34.5 PG (ref 26.6–33)
MCHC RBC AUTO-ENTMCNC: 34.3 G/DL (ref 31.5–35.7)
MCV RBC AUTO: 100.5 FL (ref 79–97)
MONOCYTES # BLD AUTO: 0.99 10*3/MM3 (ref 0.1–0.9)
MONOCYTES NFR BLD AUTO: 7.4 % (ref 5–12)
NEUTROPHILS NFR BLD AUTO: 10.53 10*3/MM3 (ref 1.7–7)
NEUTROPHILS NFR BLD AUTO: 79.2 % (ref 42.7–76)
NRBC BLD AUTO-RTO: 0 /100 WBC (ref 0–0.2)
NT-PROBNP SERPL-MCNC: 177 PG/ML (ref 0–900)
PLATELET # BLD AUTO: 234 10*3/MM3 (ref 140–450)
PMV BLD AUTO: 10.2 FL (ref 6–12)
POTASSIUM SERPL-SCNC: 4.5 MMOL/L (ref 3.5–5.2)
PROCALCITONIN SERPL-MCNC: 0.03 NG/ML (ref 0–0.25)
PROT SERPL-MCNC: 8.1 G/DL (ref 6–8.5)
QT INTERVAL: 357 MS
QTC INTERVAL: 423 MS
RBC # BLD AUTO: 3.8 10*6/MM3 (ref 4.14–5.8)
RHINOVIRUS RNA SPEC NAA+PROBE: NOT DETECTED
RSV RNA NPH QL NAA+NON-PROBE: NOT DETECTED
SARS-COV-2 RNA NPH QL NAA+NON-PROBE: NOT DETECTED
SODIUM SERPL-SCNC: 138 MMOL/L (ref 136–145)
TROPONIN T % DELTA: 4
TROPONIN T NUMERIC DELTA: 1 NG/L
TROPONIN T SERPL HS-MCNC: 23 NG/L
WBC NRBC COR # BLD AUTO: 13.3 10*3/MM3 (ref 3.4–10.8)

## 2025-07-15 PROCEDURE — G0378 HOSPITAL OBSERVATION PER HR: HCPCS

## 2025-07-15 PROCEDURE — 71045 X-RAY EXAM CHEST 1 VIEW: CPT

## 2025-07-15 PROCEDURE — 94640 AIRWAY INHALATION TREATMENT: CPT

## 2025-07-15 PROCEDURE — 93010 ELECTROCARDIOGRAM REPORT: CPT | Performed by: INTERNAL MEDICINE

## 2025-07-15 PROCEDURE — 85025 COMPLETE CBC W/AUTO DIFF WBC: CPT | Performed by: EMERGENCY MEDICINE

## 2025-07-15 PROCEDURE — 94799 UNLISTED PULMONARY SVC/PX: CPT

## 2025-07-15 PROCEDURE — 99291 CRITICAL CARE FIRST HOUR: CPT

## 2025-07-15 PROCEDURE — 93005 ELECTROCARDIOGRAM TRACING: CPT | Performed by: EMERGENCY MEDICINE

## 2025-07-15 PROCEDURE — 0202U NFCT DS 22 TRGT SARS-COV-2: CPT | Performed by: EMERGENCY MEDICINE

## 2025-07-15 PROCEDURE — 36415 COLL VENOUS BLD VENIPUNCTURE: CPT

## 2025-07-15 PROCEDURE — 84484 ASSAY OF TROPONIN QUANT: CPT | Performed by: EMERGENCY MEDICINE

## 2025-07-15 PROCEDURE — 94761 N-INVAS EAR/PLS OXIMETRY MLT: CPT

## 2025-07-15 PROCEDURE — 80053 COMPREHEN METABOLIC PANEL: CPT | Performed by: EMERGENCY MEDICINE

## 2025-07-15 PROCEDURE — 25010000002 METHYLPREDNISOLONE PER 125 MG: Performed by: EMERGENCY MEDICINE

## 2025-07-15 PROCEDURE — 83880 ASSAY OF NATRIURETIC PEPTIDE: CPT | Performed by: EMERGENCY MEDICINE

## 2025-07-15 PROCEDURE — 84145 PROCALCITONIN (PCT): CPT | Performed by: EMERGENCY MEDICINE

## 2025-07-15 RX ORDER — AMOXICILLIN 250 MG
2 CAPSULE ORAL 2 TIMES DAILY PRN
Status: DISCONTINUED | OUTPATIENT
Start: 2025-07-15 | End: 2025-07-19 | Stop reason: HOSPADM

## 2025-07-15 RX ORDER — IPRATROPIUM BROMIDE AND ALBUTEROL SULFATE 2.5; .5 MG/3ML; MG/3ML
3 SOLUTION RESPIRATORY (INHALATION)
Status: DISCONTINUED | OUTPATIENT
Start: 2025-07-16 | End: 2025-07-19 | Stop reason: HOSPADM

## 2025-07-15 RX ORDER — ALBUTEROL SULFATE 0.83 MG/ML
2.5 SOLUTION RESPIRATORY (INHALATION) EVERY 4 HOURS PRN
Status: DISCONTINUED | OUTPATIENT
Start: 2025-07-15 | End: 2025-07-19 | Stop reason: HOSPADM

## 2025-07-15 RX ORDER — BISACODYL 10 MG
10 SUPPOSITORY, RECTAL RECTAL DAILY PRN
Status: DISCONTINUED | OUTPATIENT
Start: 2025-07-15 | End: 2025-07-19 | Stop reason: HOSPADM

## 2025-07-15 RX ORDER — LISINOPRIL 20 MG/1
20 TABLET ORAL DAILY
Status: DISCONTINUED | OUTPATIENT
Start: 2025-07-16 | End: 2025-07-19 | Stop reason: HOSPADM

## 2025-07-15 RX ORDER — FAMOTIDINE 20 MG/1
20 TABLET, FILM COATED ORAL 2 TIMES DAILY PRN
Status: DISCONTINUED | OUTPATIENT
Start: 2025-07-15 | End: 2025-07-19 | Stop reason: HOSPADM

## 2025-07-15 RX ORDER — SODIUM CHLORIDE 0.9 % (FLUSH) 0.9 %
10 SYRINGE (ML) INJECTION EVERY 12 HOURS SCHEDULED
Status: DISCONTINUED | OUTPATIENT
Start: 2025-07-15 | End: 2025-07-19 | Stop reason: HOSPADM

## 2025-07-15 RX ORDER — MULTIPLE VITAMINS W/ MINERALS TAB 9MG-400MCG
1 TAB ORAL DAILY
Status: DISCONTINUED | OUTPATIENT
Start: 2025-07-16 | End: 2025-07-19 | Stop reason: HOSPADM

## 2025-07-15 RX ORDER — SODIUM CHLORIDE 9 MG/ML
40 INJECTION, SOLUTION INTRAVENOUS AS NEEDED
Status: DISCONTINUED | OUTPATIENT
Start: 2025-07-15 | End: 2025-07-19 | Stop reason: HOSPADM

## 2025-07-15 RX ORDER — TAMSULOSIN HYDROCHLORIDE 0.4 MG/1
0.4 CAPSULE ORAL DAILY
Status: DISCONTINUED | OUTPATIENT
Start: 2025-07-16 | End: 2025-07-19 | Stop reason: HOSPADM

## 2025-07-15 RX ORDER — BUDESONIDE 0.5 MG/2ML
0.5 INHALANT ORAL
Status: DISCONTINUED | OUTPATIENT
Start: 2025-07-16 | End: 2025-07-19 | Stop reason: HOSPADM

## 2025-07-15 RX ORDER — METHYLPREDNISOLONE SODIUM SUCCINATE 125 MG/2ML
62.5 INJECTION, POWDER, LYOPHILIZED, FOR SOLUTION INTRAMUSCULAR; INTRAVENOUS DAILY
Status: DISCONTINUED | OUTPATIENT
Start: 2025-07-16 | End: 2025-07-16

## 2025-07-15 RX ORDER — ARFORMOTEROL TARTRATE 15 UG/2ML
15 SOLUTION RESPIRATORY (INHALATION)
Status: DISCONTINUED | OUTPATIENT
Start: 2025-07-16 | End: 2025-07-19 | Stop reason: HOSPADM

## 2025-07-15 RX ORDER — ACETAMINOPHEN 650 MG/1
650 SUPPOSITORY RECTAL EVERY 4 HOURS PRN
Status: DISCONTINUED | OUTPATIENT
Start: 2025-07-15 | End: 2025-07-19 | Stop reason: HOSPADM

## 2025-07-15 RX ORDER — AZELASTINE 1 MG/ML
2 SPRAY, METERED NASAL 2 TIMES DAILY
Status: DISCONTINUED | OUTPATIENT
Start: 2025-07-16 | End: 2025-07-19 | Stop reason: HOSPADM

## 2025-07-15 RX ORDER — METHYLPREDNISOLONE SODIUM SUCCINATE 125 MG/2ML
125 INJECTION, POWDER, LYOPHILIZED, FOR SOLUTION INTRAMUSCULAR; INTRAVENOUS ONCE
Status: COMPLETED | OUTPATIENT
Start: 2025-07-15 | End: 2025-07-15

## 2025-07-15 RX ORDER — ALBUTEROL SULFATE 0.83 MG/ML
2.5 SOLUTION RESPIRATORY (INHALATION)
Status: COMPLETED | OUTPATIENT
Start: 2025-07-15 | End: 2025-07-15

## 2025-07-15 RX ORDER — SODIUM CHLORIDE 0.9 % (FLUSH) 0.9 %
10 SYRINGE (ML) INJECTION AS NEEDED
Status: DISCONTINUED | OUTPATIENT
Start: 2025-07-15 | End: 2025-07-19 | Stop reason: HOSPADM

## 2025-07-15 RX ORDER — NITROGLYCERIN 0.4 MG/1
0.4 TABLET SUBLINGUAL
Status: DISCONTINUED | OUTPATIENT
Start: 2025-07-15 | End: 2025-07-19 | Stop reason: HOSPADM

## 2025-07-15 RX ORDER — ACETAMINOPHEN 160 MG/5ML
650 SOLUTION ORAL EVERY 4 HOURS PRN
Status: DISCONTINUED | OUTPATIENT
Start: 2025-07-15 | End: 2025-07-19 | Stop reason: HOSPADM

## 2025-07-15 RX ORDER — ONDANSETRON 2 MG/ML
4 INJECTION INTRAMUSCULAR; INTRAVENOUS EVERY 6 HOURS PRN
Status: DISCONTINUED | OUTPATIENT
Start: 2025-07-15 | End: 2025-07-19 | Stop reason: HOSPADM

## 2025-07-15 RX ORDER — GUAIFENESIN 600 MG/1
600 TABLET, EXTENDED RELEASE ORAL EVERY 12 HOURS SCHEDULED
Status: DISCONTINUED | OUTPATIENT
Start: 2025-07-15 | End: 2025-07-19 | Stop reason: HOSPADM

## 2025-07-15 RX ORDER — POLYETHYLENE GLYCOL 3350 17 G/17G
17 POWDER, FOR SOLUTION ORAL DAILY PRN
Status: DISCONTINUED | OUTPATIENT
Start: 2025-07-15 | End: 2025-07-19 | Stop reason: HOSPADM

## 2025-07-15 RX ORDER — BENZONATATE 100 MG/1
200 CAPSULE ORAL 3 TIMES DAILY PRN
Status: DISCONTINUED | OUTPATIENT
Start: 2025-07-15 | End: 2025-07-19 | Stop reason: HOSPADM

## 2025-07-15 RX ORDER — BISACODYL 5 MG/1
5 TABLET, DELAYED RELEASE ORAL DAILY PRN
Status: DISCONTINUED | OUTPATIENT
Start: 2025-07-15 | End: 2025-07-19 | Stop reason: HOSPADM

## 2025-07-15 RX ORDER — ACETAMINOPHEN 325 MG/1
650 TABLET ORAL EVERY 4 HOURS PRN
Status: DISCONTINUED | OUTPATIENT
Start: 2025-07-15 | End: 2025-07-19 | Stop reason: HOSPADM

## 2025-07-15 RX ADMIN — IPRATROPIUM BROMIDE AND ALBUTEROL SULFATE 3 ML: .5; 3 SOLUTION RESPIRATORY (INHALATION) at 23:24

## 2025-07-15 RX ADMIN — ALBUTEROL SULFATE 2.5 MG: 2.5 SOLUTION RESPIRATORY (INHALATION) at 20:09

## 2025-07-15 RX ADMIN — ALBUTEROL SULFATE 2.5 MG: 2.5 SOLUTION RESPIRATORY (INHALATION) at 20:20

## 2025-07-15 RX ADMIN — ALBUTEROL SULFATE 2.5 MG: 2.5 SOLUTION RESPIRATORY (INHALATION) at 20:19

## 2025-07-15 RX ADMIN — METHYLPREDNISOLONE SODIUM SUCCINATE 125 MG: 125 INJECTION, POWDER, FOR SOLUTION INTRAMUSCULAR; INTRAVENOUS at 19:58

## 2025-07-15 NOTE — ED PROVIDER NOTES
EMERGENCY DEPARTMENT ENCOUNTER  Room Number:  19/19  PCP: Christiano Bartholomew APRN  Independent Historians: Patient and Family      HPI:  Chief Complaint: had concerns including Shortness of Breath.       Context: Gonzales Kaur Sr. is a 69 y.o. male with a medical history of COPD, urinary retention, basal cell carcinoma, hyponatremia who presents to the ED c/o acute shortness of breath.  The patient reports he has a history of COPD.  He reports a week ago he saw his primary care doctor and they put him on steroid taper as well as cough medicine and then later antibiotics.  He reports that he felt better for couple of days and then started having more shortness of breath.  He went to the urgent care center today and was found to be hypoxic.  He has no history of hypoxia.  He reports cough.  He reports sinus drainage.  He reports that heat tends to bother his breathing.  He denies chest pain.  He denies fevers.        Review of prior external notes (non-ED) -and- Review of prior external test results outside of this encounter: Laboratory evaluation 5/29/2025 shows a CMP with a glucose of 130    Prescription drug monitoring program review:         PAST MEDICAL HISTORY  Active Ambulatory Problems     Diagnosis Date Noted    COPD (chronic obstructive pulmonary disease) with emphysema 10/28/2016    Benign non-nodular prostatic hyperplasia with lower urinary tract symptoms 10/28/2016    Chronic fatigue 06/07/2018    Dyspnea on exertion 06/07/2018    MICHAEL (obstructive sleep apnea) 06/07/2018    Essential hypertension 02/28/2019    History of prostate cancer 05/04/2021    Seasonal allergies 05/04/2021    Healthcare maintenance 11/08/2021    Cellulitis of right leg 08/01/2014    Venous insufficiency of leg 08/01/2014    Hyponatremia 03/27/2024    Prostate cancer 03/27/2024    Acute urinary retention 03/27/2024    Leukocytosis 03/27/2024    SIADH (syndrome of inappropriate ADH production) 04/04/2024    Other benign neoplasm of  skin, unspecified 08/21/2012    Hypogonadism in male 04/18/2024    Other seborrheic keratosis 08/21/2012    Initial patient encounter 10/11/2023    Actinic keratosis 06/28/2023    Basal cell carcinoma (BCC) 06/29/2023    Capillary disease 05/08/2024    Chronic kidney disease, stage 2 (mild) 05/29/2025    Disorder of pigmentation 03/08/2024    Hearing loss 06/29/2023    Hemangioma of skin and subcutaneous tissue 08/21/2012    History of urinary stone 04/18/2024    Incomplete bladder emptying 04/18/2024    Inflamed seborrheic keratosis 03/08/2024    Melanocytic nevus 03/08/2024    Nonthrombocytopenic purpura 10/11/2023    Proteinuria 05/29/2025    Rosacea 06/28/2023    Skin changes due to chronic exposure to nonionizing radiation 05/08/2024    Umbilical hernia 03/08/2024    Benign prostatic hyperplasia 06/29/2023    Chronic obstructive pulmonary disease 06/29/2023    Essential (primary) hypertension 04/18/2024    Personal history of prostate cancer 04/18/2024    Hyponatremia 04/18/2024    Obstructive sleep apnea syndrome 04/18/2024    Malignant neoplasm of prostate 06/29/2023     Resolved Ambulatory Problems     Diagnosis Date Noted    Acute non-recurrent maxillary sinusitis 01/23/2017    Acute pharyngitis 04/18/2017    Preoperative cardiovascular examination 06/08/2020     Past Medical History:   Diagnosis Date    Allergic     BPH (benign prostatic hyperplasia)     Chronic venous hypertension with ulcer     COPD (chronic obstructive pulmonary disease)     History of cellulitis 2014    History of kidney stones     Hypertension     Kidney stones     Tobacco use disorder     Urinary tract infection Dont know    Varicose veins          PAST SURGICAL HISTORY  Past Surgical History:   Procedure Laterality Date    COLONOSCOPY      approx 2013    KIDNEY STONE SURGERY      Lithotripsy    PROSTATE SURGERY  06/2020    None         FAMILY HISTORY  Family History   Problem Relation Age of Onset    Diabetes Mother     Arthritis  Mother     Hypertension Mother     Breast cancer Mother 74    Dementia Father     Hypertension Father     Liver disease Sister     Liver disease Brother          SOCIAL HISTORY  Social History     Socioeconomic History    Marital status:      Spouse name: Dominique   Tobacco Use    Smoking status: Former     Current packs/day: 0.00     Average packs/day: 1.5 packs/day for 41.0 years (61.5 ttl pk-yrs)     Types: Cigarettes     Start date: 1976     Quit date: 2017     Years since quittin.5     Passive exposure: Past    Smokeless tobacco: Never    Tobacco comments:     Stopped use .   Vaping Use    Vaping status: Never Used   Substance and Sexual Activity    Alcohol use: No    Drug use: No    Sexual activity: Not Currently     Partners: Female     Birth control/protection: None       Chronic or social conditions impacting patient care (Social Determinants of Health):  Social Drivers of Health     Tobacco Use: Medium Risk (7/15/2025)    Patient History     Smoking Tobacco Use: Former     Smokeless Tobacco Use: Never     Passive Exposure: Past   Alcohol Use: Not At Risk (3/27/2024)    AUDIT-C     Frequency of Alcohol Consumption: Never     Average Number of Drinks: Patient does not drink     Frequency of Binge Drinking: Never   Financial Resource Strain: Not on file   Food Insecurity: No Food Insecurity (3/29/2024)    Hunger Vital Sign     Worried About Running Out of Food in the Last Year: Never true     Ran Out of Food in the Last Year: Never true   Transportation Needs: No Transportation Needs (3/29/2024)    PRAPARE - Transportation     Lack of Transportation (Medical): No     Lack of Transportation (Non-Medical): No   Physical Activity: Not on file   Stress: Not on file   Social Connections: Not on file   Interpersonal Safety: Not At Risk (7/15/2025)    Abuse Screen     Unsafe at Home or Work/School: no     Feels Threatened by Someone?: no     Does Anyone Keep You from Contacting Others or Doint Things  Outside the Home?: no     Physical Sign of Abuse Present: no   Depression: Not at risk (6/5/2025)    PHQ-2     PHQ-2 Score: 0   Housing Stability: Not At Risk (3/29/2024)    Housing Stability     Current Living Arrangements: home     Potentially Unsafe Housing Conditions: none   Utilities: Not At Risk (3/29/2024)    [facility] Utilities     Threatened with loss of utilities: No   Health Literacy: Not on file   Employment: Not on file   Disabilities: Not At Risk (3/27/2024)    Disabilities     Concentrating, Remembering, or Making Decisions Difficulty: no     Doing Errands Independently Difficulty: no       ALLERGIES  Patient has no known allergies.      REVIEW OF SYSTEMS  Review of Systems  Included in HPI  All systems reviewed and negative except for those discussed in HPI.      PHYSICAL EXAM    I have reviewed the triage vital signs and nursing notes.    ED Triage Vitals   Temp Heart Rate Resp BP SpO2   07/15/25 1938 07/15/25 1933 07/15/25 1938 -- 07/15/25 1933   98.1 °F (36.7 °C) 95 22  (!) 85 %      Temp src Heart Rate Source Patient Position BP Location FiO2 (%)   07/15/25 1938 07/15/25 1933 -- -- --   Oral Monitor          Physical Exam  GENERAL: Awake, alert, no acute distress  SKIN: Warm, dry  HENT: Normocephalic, atraumatic  EYES: no scleral icterus  CV: regular rhythm, regular rate  RESPIRATORY: Increased effort, lungs diminished with wheezes  ABDOMEN: soft, nontender, nondistended  MUSCULOSKELETAL: no deformity, no calf tenderness or swelling  NEURO: alert, moves all extremities, follows commands            LAB RESULTS  Recent Results (from the past 24 hours)   Comprehensive Metabolic Panel    Collection Time: 07/15/25  7:41 PM    Specimen: Blood   Result Value Ref Range    Glucose 110 (H) 65 - 99 mg/dL    BUN 21.0 8.0 - 23.0 mg/dL    Creatinine 0.91 0.76 - 1.27 mg/dL    Sodium 138 136 - 145 mmol/L    Potassium 4.5 3.5 - 5.2 mmol/L    Chloride 98 98 - 107 mmol/L    CO2 29.7 (H) 22.0 - 29.0 mmol/L    Calcium 9.5  8.6 - 10.5 mg/dL    Total Protein 8.1 6.0 - 8.5 g/dL    Albumin 4.2 3.5 - 5.2 g/dL    ALT (SGPT) 39 1 - 41 U/L    AST (SGOT) 27 1 - 40 U/L    Alkaline Phosphatase 44 39 - 117 U/L    Total Bilirubin 0.3 0.0 - 1.2 mg/dL    Globulin 3.9 gm/dL    A/G Ratio 1.1 g/dL    BUN/Creatinine Ratio 23.1 7.0 - 25.0    Anion Gap 10.3 5.0 - 15.0 mmol/L    eGFR 91.8 >60.0 mL/min/1.73   BNP    Collection Time: 07/15/25  7:41 PM    Specimen: Blood   Result Value Ref Range    proBNP 177.0 0.0 - 900.0 pg/mL   High Sensitivity Troponin T    Collection Time: 07/15/25  7:41 PM    Specimen: Blood   Result Value Ref Range    HS Troponin T 23 (H) <22 ng/L   CBC Auto Differential    Collection Time: 07/15/25  7:41 PM    Specimen: Blood   Result Value Ref Range    WBC 13.30 (H) 3.40 - 10.80 10*3/mm3    RBC 3.80 (L) 4.14 - 5.80 10*6/mm3    Hemoglobin 13.1 13.0 - 17.7 g/dL    Hematocrit 38.2 37.5 - 51.0 %    .5 (H) 79.0 - 97.0 fL    MCH 34.5 (H) 26.6 - 33.0 pg    MCHC 34.3 31.5 - 35.7 g/dL    RDW 13.3 12.3 - 15.4 %    RDW-SD 48.3 37.0 - 54.0 fl    MPV 10.2 6.0 - 12.0 fL    Platelets 234 140 - 450 10*3/mm3    Neutrophil % 79.2 (H) 42.7 - 76.0 %    Lymphocyte % 10.3 (L) 19.6 - 45.3 %    Monocyte % 7.4 5.0 - 12.0 %    Eosinophil % 0.2 (L) 0.3 - 6.2 %    Basophil % 0.5 0.0 - 1.5 %    Immature Grans % 2.4 (H) 0.0 - 0.5 %    Neutrophils, Absolute 10.53 (H) 1.70 - 7.00 10*3/mm3    Lymphocytes, Absolute 1.37 0.70 - 3.10 10*3/mm3    Monocytes, Absolute 0.99 (H) 0.10 - 0.90 10*3/mm3    Eosinophils, Absolute 0.02 0.00 - 0.40 10*3/mm3    Basophils, Absolute 0.07 0.00 - 0.20 10*3/mm3    Immature Grans, Absolute 0.32 (H) 0.00 - 0.05 10*3/mm3    nRBC 0.0 0.0 - 0.2 /100 WBC   Procalcitonin    Collection Time: 07/15/25  7:41 PM    Specimen: Blood   Result Value Ref Range    Procalcitonin 0.03 0.00 - 0.25 ng/mL   Respiratory Panel PCR w/COVID-19(SARS-CoV-2) [facility] In-House, NP Swab in UTM/VTM, 2 HR TAT - Swab, Nasopharynx    Collection Time:  07/15/25  8:01 PM    Specimen: Nasopharynx; Swab   Result Value Ref Range    ADENOVIRUS, PCR Not Detected Not Detected    Coronavirus 229E Not Detected Not Detected    Coronavirus HKU1 Not Detected Not Detected    Coronavirus NL63 Not Detected Not Detected    Coronavirus OC43 Not Detected Not Detected    COVID19 Not Detected Not Detected - Ref. Range    Human Metapneumovirus Not Detected Not Detected    Human Rhinovirus/Enterovirus Not Detected Not Detected    Influenza A PCR Not Detected Not Detected    Influenza B PCR Not Detected Not Detected    Parainfluenza Virus 1 Not Detected Not Detected    Parainfluenza Virus 2 Not Detected Not Detected    Parainfluenza Virus 3 Detected (A) Not Detected    Parainfluenza Virus 4 Not Detected Not Detected    RSV, PCR Not Detected Not Detected    Bordetella pertussis pcr Not Detected Not Detected    Bordetella parapertussis PCR Not Detected Not Detected    Chlamydophila pneumoniae PCR Not Detected Not Detected    Mycoplasma pneumo by PCR Not Detected Not Detected   ECG 12 Lead Dyspnea    Collection Time: 07/15/25  8:03 PM   Result Value Ref Range    QT Interval 357 ms    QTC Interval 423 ms   High Sensitivity Troponin T 1Hr    Collection Time: 07/15/25  8:52 PM    Specimen: Blood   Result Value Ref Range    HS Troponin T 24 (H) <22 ng/L    Troponin T Numeric Delta 1 ng/L    Troponin T % Delta 4 Abnormal if >/= 20%         RADIOLOGY  XR Chest 1 View  Result Date: 7/15/2025  AP CHEST  HISTORY: Shortness of breath  COMPARISON: 3/17/2025  FINDINGS: There is curvilinear atelectasis or scar in the left lung base. Stable scarring in the lung apices. Heart size stable. No evidence of pneumothorax. No acute bony abnormality      Atelectasis or scarring in the left lung base    This report was finalized on 7/15/2025 8:13 PM by Dr. Marciano Stroud M.D on Workstation: [id number]          MEDICATIONS GIVEN IN ER  Medications   albuterol (PROVENTIL) nebulizer solution 0.083% 2.5 mg/3mL  (2.5 mg Nebulization Given 7/15/25 2020)   methylPREDNISolone sodium succinate (SOLU-Medrol) injection 125 mg (125 mg Intravenous Given 7/15/25 1958)         ORDERS PLACED DURING THIS VISIT:  Orders Placed This Encounter   Procedures    Critical Care    Respiratory Panel PCR w/COVID-19(SARS-CoV-2) [facility]LLY/[facility]KO/[facility] In-House, NP Swab in UTM/VTM, 2 HR TAT - Swab, Nasopharynx    XR Chest 1 View    Comprehensive Metabolic Panel    BNP    High Sensitivity Troponin T    CBC Auto Differential    Procalcitonin    High Sensitivity Troponin T 1Hr    [facility] (on-call MD unless specified) Details    ECG 12 Lead Dyspnea    Initiate Observation Status    CBC & Differential         OUTPATIENT MEDICATION MANAGEMENT:  No current Epic-ordered facility-administered medications on file.     Current Outpatient Medications Ordered in Epic   Medication Sig Dispense Refill    acetaminophen (TYLENOL) 650 MG 8 hr tablet Take 1 tablet by mouth.      albuterol sulfate  (90 Base) MCG/ACT inhaler Inhale 2 puffs.      azelastine (ASTELIN) 0.1 % nasal spray Administer 2 sprays into the nostril(s) as directed by provider 2 (Two) Times a Day. Use in each nostril as directed 30 mL 2    azithromycin (Zithromax Z-Derek) 250 MG tablet Take 2 tablets by mouth on day 1, then 1 tablet daily on days 2-5 6 tablet 0    Breztri Aerosphere 160-9-4.8 MCG/ACT aerosol inhaler Inhale 2 puffs 2 (Two) Times a Day.      fluticasone (FLONASE) 50 MCG/ACT nasal spray Administer 2 sprays into the nostril(s) as directed by provider Daily. (Patient not taking: Reported on 7/9/2025) 15.8 mL 2    lisinopril (PRINIVIL,ZESTRIL) 20 MG tablet TAKE 1 TABLET BY MOUTH DAILY 90 tablet 3    multivitamin with minerals tablet tablet Take 1 tablet by mouth Daily.      predniSONE (DELTASONE) 10 MG tablet Take 4 tablets by mouth on days 1-2. Take 3 tablets by mouth on days 3-4. Take 2 tablets by mouth on days 5-8. Take 1 tablet by mouth on days 9-12. 26 tablet 0     promethazine-dextromethorphan (PROMETHAZINE-DM) 6.25-15 MG/5ML syrup Take 5 mL by mouth 4 (Four) Times a Day As Needed for Cough. 180 mL 1    tamsulosin (FLOMAX) 0.4 MG capsule 24 hr capsule Take 1 capsule by mouth Daily.           PROCEDURES  Critical Care    Performed by: Jeffery Chacon MD  Authorized by: Jeffery Chacon MD    Critical care provider statement:     Critical care time (minutes):  34    Critical care time was exclusive of:  Separately billable procedures and treating other patients    Critical care was necessary to treat or prevent imminent or life-threatening deterioration of the following conditions:  Respiratory failure    Critical care was time spent personally by me on the following activities:  Blood draw for specimens, development of treatment plan with patient or surrogate, evaluation of patient's response to treatment, examination of patient, obtaining history from patient or surrogate, ordering and performing treatments and interventions, ordering and review of laboratory studies, ordering and review of radiographic studies, pulse oximetry, re-evaluation of patient's condition, review of old charts and discussions with consultants              PROGRESS, DATA ANALYSIS, CONSULTS, AND MEDICAL DECISION MAKING  All labs have been independently interpreted by me.  All radiology studies have been reviewed by me. All EKG's have been independently viewed and interpreted by me.  Discussion below represents my analysis of pertinent findings related to patient's condition, differential diagnosis, treatment plan and final disposition.    Differential diagnosis includes but is not limited to COPD exacerbation, pneumonia, viral syndrome, hypoxia, PE, respiratory failure.    Clinical Scores:                                       ED Course as of 07/15/25 2208   Tue Jul 15, 2025   1934 SpO2(!): 85 % [TR]   2006 Hemoglobin: 13.1 [TR]   2011 XR Chest 1 View  My independent interpretation of the imaging  study is no dense consolidation [TR]   2028 HS Troponin T(!): 23 [TR]   2028 CO2(!): 29.7 [TR]   2028 proBNP: 177.0 [TR]   2028 EKG PROCEDURE    EKG time: 2003  Rhythm/Rate: Normal sinus, rate 84  P waves and OK: Normal P, normal OK  QRS, axis: Narrow QRS, no axis  ST and T waves: No acute    Independently Interpreted by me  Resolved right bundle branch block changed compared to prior 3/15/2024   [TR]   2105 Parainfluenza Virus 3(!): Detected [TR]   2106 The patient has a COPD exacerbation, hypoxia, parainfluenza virus infection.  Plan admission for further management given his new hypoxia. [TR]   2107 I reviewed the workup and findings with the patient at the bedside.  Answered all questions.  Plan admission.  He is agreeable. [TR]   2137 HS Troponin T(!): 24 [TR]   2207 Discussing with Dr. Tyler with [facility].  He agrees to admit. [TR]      ED Course User Index  [TR] Jeffery Chacon MD             AS OF 22:08 EDT VITALS:    BP - 109/93  HR - 82  TEMP - 98.1 °F (36.7 °C) (Oral)  O2 SATS - 97%    COMPLEXITY OF CARE  The patient requires admission.      DIAGNOSIS  Final diagnoses:   Acute exacerbation of chronic obstructive pulmonary disease (COPD)   Parainfluenza virus bronchitis   Hypoxia         DISPOSITION  ED Disposition       ED Disposition   Decision to Admit    Condition   --    Comment   Level of Care: Telemetry [5]   Diagnosis: Acute exacerbation of chronic obstructive pulmonary disease (COPD) [939002]   Admitting Physician: TUNDE TYLER [[id number]]   Attending Physician: TUNDE TYLER [[id number]]   Is patient appropriate for Inpatient Observation Unit?: Yes [1]                  Please note that portions of this document were completed with a voice recognition program.    Note Disclaimer: At [facility], we believe that sharing information builds trust and better relationships. You are receiving this note because you recently visited [facility]. It is possible you will see health information before a  provider has talked with you about it. This kind of information can be easy to misunderstand. To help you fully understand what it means for your health, we urge you to discuss this note with your provider.         Jeffery Chacon MD  07/15/25 9406

## 2025-07-16 LAB
ALBUMIN SERPL-MCNC: 3.9 G/DL (ref 3.5–5.2)
ALBUMIN/GLOB SERPL: 1 G/DL
ALP SERPL-CCNC: 40 U/L (ref 39–117)
ALT SERPL W P-5'-P-CCNC: 35 U/L (ref 1–41)
ANION GAP SERPL CALCULATED.3IONS-SCNC: 9 MMOL/L (ref 5–15)
AST SERPL-CCNC: 26 U/L (ref 1–40)
BILIRUB SERPL-MCNC: 0.3 MG/DL (ref 0–1.2)
BUN SERPL-MCNC: 20 MG/DL (ref 8–23)
BUN/CREAT SERPL: 25 (ref 7–25)
CALCIUM SPEC-SCNC: 9.1 MG/DL (ref 8.6–10.5)
CHLORIDE SERPL-SCNC: 97 MMOL/L (ref 98–107)
CO2 SERPL-SCNC: 29 MMOL/L (ref 22–29)
CREAT SERPL-MCNC: 0.8 MG/DL (ref 0.76–1.27)
DEPRECATED RDW RBC AUTO: 48.2 FL (ref 37–54)
EGFRCR SERPLBLD CKD-EPI 2021: 96.4 ML/MIN/1.73
ERYTHROCYTE [DISTWIDTH] IN BLOOD BY AUTOMATED COUNT: 12.8 % (ref 12.3–15.4)
GLOBULIN UR ELPH-MCNC: 3.9 GM/DL
GLUCOSE SERPL-MCNC: 182 MG/DL (ref 65–99)
HCT VFR BLD AUTO: 36.8 % (ref 37.5–51)
HGB BLD-MCNC: 12.3 G/DL (ref 13–17.7)
MCH RBC QN AUTO: 34.1 PG (ref 26.6–33)
MCHC RBC AUTO-ENTMCNC: 33.4 G/DL (ref 31.5–35.7)
MCV RBC AUTO: 101.9 FL (ref 79–97)
PLATELET # BLD AUTO: 224 10*3/MM3 (ref 140–450)
PMV BLD AUTO: 10.5 FL (ref 6–12)
POTASSIUM SERPL-SCNC: 5.3 MMOL/L (ref 3.5–5.2)
PROCALCITONIN SERPL-MCNC: 0.03 NG/ML (ref 0–0.25)
PROT SERPL-MCNC: 7.8 G/DL (ref 6–8.5)
RBC # BLD AUTO: 3.61 10*6/MM3 (ref 4.14–5.8)
SODIUM SERPL-SCNC: 135 MMOL/L (ref 136–145)
WBC NRBC COR # BLD AUTO: 15.93 10*3/MM3 (ref 3.4–10.8)

## 2025-07-16 PROCEDURE — 36415 COLL VENOUS BLD VENIPUNCTURE: CPT | Performed by: NURSE PRACTITIONER

## 2025-07-16 PROCEDURE — 80053 COMPREHEN METABOLIC PANEL: CPT | Performed by: NURSE PRACTITIONER

## 2025-07-16 PROCEDURE — 25010000002 METHYLPREDNISOLONE PER 125 MG: Performed by: NURSE PRACTITIONER

## 2025-07-16 PROCEDURE — 94664 DEMO&/EVAL PT USE INHALER: CPT

## 2025-07-16 PROCEDURE — 85027 COMPLETE CBC AUTOMATED: CPT | Performed by: NURSE PRACTITIONER

## 2025-07-16 PROCEDURE — 94799 UNLISTED PULMONARY SVC/PX: CPT

## 2025-07-16 PROCEDURE — 84145 PROCALCITONIN (PCT): CPT | Performed by: INTERNAL MEDICINE

## 2025-07-16 PROCEDURE — 94761 N-INVAS EAR/PLS OXIMETRY MLT: CPT

## 2025-07-16 PROCEDURE — 94760 N-INVAS EAR/PLS OXIMETRY 1: CPT

## 2025-07-16 PROCEDURE — 25010000002 METHYLPREDNISOLONE PER 125 MG: Performed by: INTERNAL MEDICINE

## 2025-07-16 RX ORDER — METHYLPREDNISOLONE SODIUM SUCCINATE 125 MG/2ML
62.5 INJECTION, POWDER, LYOPHILIZED, FOR SOLUTION INTRAMUSCULAR; INTRAVENOUS EVERY 12 HOURS
Status: DISCONTINUED | OUTPATIENT
Start: 2025-07-16 | End: 2025-07-18

## 2025-07-16 RX ADMIN — BUDESONIDE 0.5 MG: 0.5 SUSPENSION RESPIRATORY (INHALATION) at 19:59

## 2025-07-16 RX ADMIN — AZELASTINE HYDROCHLORIDE 2 SPRAY: 137 SPRAY, METERED NASAL at 04:03

## 2025-07-16 RX ADMIN — ARFORMOTEROL TARTRATE 15 MCG: 15 SOLUTION RESPIRATORY (INHALATION) at 06:30

## 2025-07-16 RX ADMIN — Medication 5 MG: at 00:42

## 2025-07-16 RX ADMIN — ALBUTEROL SULFATE 2.5 MG: 2.5 SOLUTION RESPIRATORY (INHALATION) at 17:45

## 2025-07-16 RX ADMIN — TAMSULOSIN HYDROCHLORIDE 0.4 MG: 0.4 CAPSULE ORAL at 10:03

## 2025-07-16 RX ADMIN — ARFORMOTEROL TARTRATE 15 MCG: 15 SOLUTION RESPIRATORY (INHALATION) at 19:59

## 2025-07-16 RX ADMIN — Medication 10 ML: at 00:58

## 2025-07-16 RX ADMIN — IPRATROPIUM BROMIDE AND ALBUTEROL SULFATE 3 ML: .5; 3 SOLUTION RESPIRATORY (INHALATION) at 13:13

## 2025-07-16 RX ADMIN — Medication 10 ML: at 21:15

## 2025-07-16 RX ADMIN — IPRATROPIUM BROMIDE AND ALBUTEROL SULFATE 3 ML: .5; 3 SOLUTION RESPIRATORY (INHALATION) at 06:28

## 2025-07-16 RX ADMIN — METHYLPREDNISOLONE SODIUM SUCCINATE 62.5 MG: 125 INJECTION INTRAMUSCULAR; INTRAVENOUS at 20:57

## 2025-07-16 RX ADMIN — ACETAMINOPHEN 650 MG: 325 TABLET, FILM COATED ORAL at 11:48

## 2025-07-16 RX ADMIN — IPRATROPIUM BROMIDE AND ALBUTEROL SULFATE 3 ML: .5; 3 SOLUTION RESPIRATORY (INHALATION) at 23:34

## 2025-07-16 RX ADMIN — Medication 10 ML: at 10:03

## 2025-07-16 RX ADMIN — LISINOPRIL 20 MG: 20 TABLET ORAL at 10:03

## 2025-07-16 RX ADMIN — GUAIFENESIN 600 MG: 600 TABLET, EXTENDED RELEASE ORAL at 10:03

## 2025-07-16 RX ADMIN — Medication 1 TABLET: at 10:03

## 2025-07-16 RX ADMIN — AZELASTINE HYDROCHLORIDE 2 SPRAY: 137 SPRAY, METERED NASAL at 10:03

## 2025-07-16 RX ADMIN — Medication 5 MG: at 20:58

## 2025-07-16 RX ADMIN — METHYLPREDNISOLONE SODIUM SUCCINATE 62.5 MG: 125 INJECTION INTRAMUSCULAR; INTRAVENOUS at 10:03

## 2025-07-16 RX ADMIN — BUDESONIDE 0.5 MG: 0.5 SUSPENSION RESPIRATORY (INHALATION) at 06:31

## 2025-07-16 RX ADMIN — GUAIFENESIN 600 MG: 600 TABLET, EXTENDED RELEASE ORAL at 00:42

## 2025-07-16 RX ADMIN — AZELASTINE HYDROCHLORIDE 2 SPRAY: 137 SPRAY, METERED NASAL at 21:14

## 2025-07-16 RX ADMIN — GUAIFENESIN 600 MG: 600 TABLET, EXTENDED RELEASE ORAL at 20:58

## 2025-07-16 NOTE — PLAN OF CARE
Problem: Adult Inpatient Plan of Care  Goal: Optimal Comfort and Wellbeing  Outcome: Progressing  Intervention: Provide Person-Centered Care  Recent Flowsheet Documentation  Taken 7/16/2025 0023 by Antonio Broderick RN  Trust Relationship/Rapport:   care explained   choices provided   Goal Outcome Evaluation:      Patient was admitted tonight with Acute exacerbation of COPD.  The ER had the patient on 3L O2.  Upon admission to the floor he was increased to 6L after the patient reported he had a hard time breathing.  The patient requires a stand by assist.  The patient has also tested positive for parainfluenza.  Droplet precautions sign is posted on the door.

## 2025-07-16 NOTE — CASE MANAGEMENT/SOCIAL WORK
Discharge Planning Assessment  [facility]     Patient Name: Gonzales Kaur Sr.  MRN: [id number]  Today's Date: 7/16/2025    Admit Date: 7/15/2025    Plan: Home with spouse   Discharge Needs Assessment       Row Name 07/16/25 1455       Living Environment    People in Home spouse    Current Living Arrangements home    Potentially Unsafe Housing Conditions none    Primary Care Provided by self    Provides Primary Care For no one    Family Caregiver if Needed spouse    Quality of Family Relationships helpful;involved;supportive    Able to Return to Prior Arrangements yes       Resource/Environmental Concerns    Resource/Environmental Concerns none    Transportation Concerns none       Transition Planning    Patient/Family Anticipates Transition to home with family    Patient/Family Anticipated Services at Transition none    Transportation Anticipated family or friend will provide       Discharge Needs Assessment    Readmission Within the Last 30 Days no previous admission in last 30 days    Equipment Currently Used at Home cpap;nebulizer    Anticipated Changes Related to Illness none    Equipment Needed After Discharge none                   Discharge Plan       Row Name 07/16/25 1453       Plan    Plan Home with spouse    Patient/Family in Agreement with Plan yes    Provided Post Acute Provider List? N/A  Plan is home    Provided Post Acute Provider Quality & Resource List? N/A  Plan is home    Plan Comments CCP met with the patient at bedside. Introduced self and explained role of CCP. Patient confirmed the information on her face sheet is accurate. Patients PCP is Christiano Bartholomew. Patients’ pharmacy is [facility]. Patient lives at home with spouse. He is independent at home. Has a CPAP and a nebulizer. Patient has no HH history. Patient plans home with spouse at discharge. Spouse to transport. CCP following for needs.                  Continued Care and Services - Admitted Since 7/15/2025    No active  coordination exists.          Demographic Summary       Row Name 07/16/25 1454       General Information    Admission Type inpatient    Arrived From emergency department    Referral Source admission list    Reason for Consult discharge planning    Preferred Language English                   Functional Status       Row Name 07/16/25 1454       Functional Status    Usual Activity Tolerance moderate    Current Activity Tolerance moderate       Functional Status, IADL    Medications independent    Meal Preparation independent    Housekeeping independent    Laundry independent    Shopping independent       Mental Status    General Appearance WDL WDL       Mental Status Summary    Recent Changes in Mental Status/Cognitive Functioning no changes       Employment/    Employment Status retired                   Psychosocial    No documentation.                  Abuse/Neglect    No documentation.                  Legal    No documentation.                  Substance Abuse    No documentation.                  Patient Forms    No documentation.

## 2025-07-16 NOTE — PROGRESS NOTES
Name: Gonzalse Kaur . ADMIT: 7/15/2025   : 1955  PCP: Christiano Bartholomew APRN    MRN: [id number] LOS: 0 days   AGE/SEX: 69 y.o. male  ROOM: [facility]     Subjective   Subjective   Patient reports that the shortness of breath/cough productive of green sputum/wheezing are better.  No hemoptysis.  No chest pain.  No PND orthopnea.  No ankle edema.  No palpitation.  No fever or chills    Review of Systems  GI.  No abdominal pain or nausea or vomiting.  .  No dysuria or hematuria.     Objective   Objective   Vital Signs  Temp:  [97.5 °F (36.4 °C)-98.1 °F (36.7 °C)] 97.5 °F (36.4 °C)  Heart Rate:  [74-99] 86  Resp:  [18-24] 18  BP: (109-197)/(70-93) 149/78  SpO2:  [85 %-98 %] 97 %  on  Flow (L/min) (Oxygen Therapy):  [3-6] 4;   Device (Oxygen Therapy): nasal cannula  No intake or output data in the 24 hours ending 25 1239  Body mass index is 27.94 kg/m².      07/15/25  1938 25  0655   Weight: 80.4 kg (177 lb 4 oz) 78.5 kg (173 lb)     Physical Exam  General.  Middle-aged to elderly gentleman.  Obese.  Alert and oriented x 4.  In no apparent pain/distress/diaphoresis.  Normal mood and affect.  Eyes.  Pupils equal round and reactive.  Intact extraocular musculature.  No pallor or jaundice.  Oral cavity.  Moist mucous membrane.  Neck.  Supple.  No JVD.  No lymphadenopathy or thyromegaly.  Cardio.  Vascular.  Regular rate and rhythm with grade 2 systolic murmur  Chest.  Poor bilateral air entry with scattered bilateral expiratory wheeze.  Abdomen.  Soft lax.  No tenderness.  No organomegaly.  No guarding or rebound.  Extremities.  No clubbing/cyanosis/edema  CNS.  No acute focal neurological deficits    Results Review:      Results from last 7 days   Lab Units 25  0238 07/15/25  1941   SODIUM mmol/L 135* 138   POTASSIUM mmol/L 5.3* 4.5   CHLORIDE mmol/L 97* 98   CO2 mmol/L 29.0 29.7*   BUN mg/dL 20.0 21.0   CREATININE mg/dL 0.80 0.91   GLUCOSE mg/dL 182* 110*   CALCIUM mg/dL 9.1 9.5   AST (SGOT)  "U/L 26 27   ALT (SGPT) U/L 35 39     Estimated Creatinine Clearance: 87.1 mL/min (by C-G formula based on SCr of 0.8 mg/dL).          Results from last 7 days   Lab Units 07/15/25  2052 07/15/25  1941   HSTROP T ng/L 24* 23*     Results from last 7 days   Lab Units 07/15/25  1941   PROBNP pg/mL 177.0                   Invalid input(s): \"LDLCALC\"  Results from last 7 days   Lab Units 07/16/25  0238 07/15/25  1941   WBC 10*3/mm3 15.93* 13.30*   HEMOGLOBIN g/dL 12.3* 13.1   HEMATOCRIT % 36.8* 38.2   PLATELETS 10*3/mm3 224 234   MCV fL 101.9* 100.5*   MCH pg 34.1* 34.5*   MCHC g/dL 33.4 34.3   RDW % 12.8 13.3   RDW-SD fl 48.2 48.3   MPV fL 10.5 10.2   NEUTROPHIL % %  --  79.2*   LYMPHOCYTE % %  --  10.3*   MONOCYTES % %  --  7.4   EOSINOPHIL % %  --  0.2*   BASOPHIL % %  --  0.5   IMM GRAN % %  --  2.4*   NEUTROS ABS 10*3/mm3  --  10.53*   LYMPHS ABS 10*3/mm3  --  1.37   MONOS ABS 10*3/mm3  --  0.99*   EOS ABS 10*3/mm3  --  0.02   BASOS ABS 10*3/mm3  --  0.07   IMMATURE GRANS (ABS) 10*3/mm3  --  0.32*   NRBC /100 WBC  --  0.0             Results from last 7 days   Lab Units 07/16/25  0238 07/15/25  1941   PROCALCITONIN ng/mL 0.03 0.03                 Results from last 7 days   Lab Units 07/15/25  2001   ADENOVIRUS DETECTION BY PCR  Not Detected   CORONAVIRUS 229E  Not Detected   CORONAVIRUS HKU1  Not Detected   CORONAVIRUS NL63  Not Detected   CORONAVIRUS OC43  Not Detected   HUMAN METAPNEUMOVIRUS  Not Detected   HUMAN RHINOVIRUS/ENTEROVIRUS  Not Detected   INFLUENZA B PCR  Not Detected   PARAINFLUENZA 1  Not Detected   PARAINFLUENZA VIRUS 2  Not Detected   PARAINFLUENZA VIRUS 3  Detected*   PARAINFLUENZA VIRUS 4  Not Detected   BORDETELLA PERTUSSIS PCR  Not Detected   CHLAMYDOPHILA PNEUMONIAE PCR  Not Detected   MYCOPLAMA PNEUMO PCR  Not Detected   INFLUENZA A PCR  Not Detected   RSV, PCR  Not Detected               Imaging:  Imaging Results (Last 24 Hours)       Procedure Component Value Units Date/Time    XR Chest 1 " View [[id number]] Collected: 07/15/25 2012     Updated: 07/15/25 2016    Narrative:      AP CHEST     HISTORY: Shortness of breath     COMPARISON: 3/17/2025     FINDINGS: There is curvilinear atelectasis or scar in the left lung  base. Stable scarring in the lung apices. Heart size stable. No evidence  of pneumothorax. No acute bony abnormality       Impression:      Atelectasis or scarring in the left lung base           This report was finalized on 7/15/2025 8:13 PM by Dr. Marciano Stroud M.D on Workstation: [id number]                  I reviewed the patient's new clinical results / labs / tests / procedures      Assessment/Plan     Active Hospital Problems    Diagnosis  POA    **Acute exacerbation of chronic obstructive pulmonary disease (COPD) [J44.1]  Yes    Acute on chronic respiratory failure with hypoxia [J96.21]  Yes    Chronic kidney disease, stage 2 (mild) [N18.2]  Yes    Benign prostatic hyperplasia [N40.0]  Yes    History of prostate cancer [Z85.46]  Not Applicable    Essential hypertension [I10]  Yes    MICHAEL (obstructive sleep apnea) [G47.33]  Yes      Resolved Hospital Problems   No resolved problems to display.           Acute hypoxemic respiratory failure secondary to COPD exacerbation because of parainfluenza 3 lower respiratory infection in a patient history of obstructive sleep apnea.  Chest x-ray with no pneumonia.  Clinically improving on IV Solu-Medrol/Mucinex/DuoNeb//Pulmicort mini nebs.  Continue patient's [employer].  Add incentive spirometry.  Procalcitonin is negative.  Doubt bacterial infection.  Start weaning off oxygen  Mild leukocytosis.  Secondary to steroids.  No fever.  The procalcitonin is negative.  Hypertension.  Good blood pressure control with no evidence of angina or congestive heart failure on lisinopril.  Continue to monitor.  Stage III chronic renal failure.  Patient is euvolemic.  Renal function is normal.  Continue to monitor kidney function.  VTE prophylaxis.   Sequential compression devices      Discussed my findings and plan of treatment with the patient/nurses at multidisciplinary rounds.  Disposition.  Eventually home.  Anticipate discharge in 2 days.            Fernanda Kaplan MD  [facility]  07/16/25  12:39 EDT

## 2025-07-16 NOTE — ED NOTES
Nursing report ED to floor  Gonzales Kaur .  69 y.o.  male    HPI :  HPI  Stated Reason for Visit: Pt to ED via PV w/ spouse. Pt reports he has been short of breath for a few days and was seen at [facility] today and was told to come to the ED d/t not being able to keep his oxygen level up. Pt was given an abx, steroid, and cough syrup last week for cough and soa. Pt and spouse reports his soa has been worsening since. Pt O2 level 85% on room air in triage. Pt placed on 3L NC w/ O2 saturation level 92%. Hx COPD. Pt reports he does not use oxygen at home.  History Obtained From: patient, family  Precipitating Event(s): recent illness  Onset of Symptoms: gradual  Duration (Weeks): 1    Chief Complaint  Chief Complaint   Patient presents with    Shortness of Breath       Admitting doctor:   Mg Tyler MD    Admitting diagnosis:   The primary encounter diagnosis was Acute exacerbation of chronic obstructive pulmonary disease (COPD). Diagnoses of Parainfluenza virus bronchitis and Hypoxia were also pertinent to this visit.    Code status:   Current Code Status       Date Active Code Status Order ID Comments User Context       7/15/2025 2233 CPR (Attempt to Resuscitate) [id number]  Fany Kapoor, APRN ED        Question Answer    Code Status (Patient has no pulse and is not breathing) CPR (Attempt to Resuscitate)    Medical Interventions (Patient has pulse or is breathing) Full Support                    Allergies:   Patient has no known allergies.    Isolation:   No active isolations    Intake and Output  No intake or output data in the 24 hours ending 07/15/25 2248    Weight:       07/15/25  1938   Weight: 80.4 kg (177 lb 4 oz)       Most recent vitals:   Vitals:    07/15/25 2009 07/15/25 2020 07/15/25 2200 07/15/25 2230   BP:   159/82 159/80   BP Location:    Left arm   Patient Position:    Sitting   Pulse: 85 82 88 74   Resp:    19   Temp:       TempSrc:       SpO2: 96% 97% 94% 94%   Weight:       Height:            Active LDAs/IV Access:   Lines, Drains & Airways       Active LDAs       Name Placement date Placement time Site Days    Peripheral IV 03/27/24 0921 Anterior;Right Forearm 03/27/24 0921  Forearm  475    Peripheral IV 07/15/25 1941 18 G Right Antecubital 07/15/25  1941  Antecubital  less than 1                    Labs (abnormal labs have a star):   Labs Reviewed   RESPIRATORY PANEL PCR W/ COVID-19 (SARS-COV-2), NP SWAB IN UTM/VTP, 2 HR TAT - Abnormal; Notable for the following components:       Result Value    Parainfluenza Virus 3 Detected (*)     All other components within normal limits    Narrative:     In the setting of a positive respiratory panel with a viral infection PLUS a negative procalcitonin without other underlying concern for bacterial infection, consider observing off antibiotics or discontinuation of antibiotics and continue supportive care. If the respiratory panel is positive for atypical bacterial infection (Bordetella pertussis, Chlamydophila pneumoniae, or Mycoplasma pneumoniae), consider antibiotic de-escalation to target atypical bacterial infection.   COMPREHENSIVE METABOLIC PANEL - Abnormal; Notable for the following components:    Glucose 110 (*)     CO2 29.7 (*)     All other components within normal limits    Narrative:     GFR Categories in Chronic Kidney Disease (CKD)              GFR Category          GFR (mL/min/1.73)    Interpretation  G1                    90 or greater        Normal or high (1)  G2                    60-89                Mild decrease (1)  G3a                   45-59                Mild to moderate decrease  G3b                   30-44                Moderate to severe decrease  G4                    15-29                Severe decrease  G5                    14 or less           Kidney failure    (1)In the absence of evidence of kidney disease, neither GFR category G1 or G2 fulfill the criteria for CKD.    eGFR calculation 2021 CKD-EPI creatinine equation,  which does not include race as a factor   TROPONIN - Abnormal; Notable for the following components:    HS Troponin T 23 (*)     All other components within normal limits    Narrative:     High Sensitive Troponin T Reference Range:  <14.0 ng/L- Negative Female for AMI  <22.0 ng/L- Negative Male for AMI  >=14 - Abnormal Female indicating possible myocardial injury.  >=22 - Abnormal Male indicating possible myocardial injury.   Clinicians would have to utilize clinical acumen, EKG, Troponin, and serial changes to determine if it is an Acute Myocardial Infarction or myocardial injury due to an underlying chronic condition.        CBC WITH AUTO DIFFERENTIAL - Abnormal; Notable for the following components:    WBC 13.30 (*)     RBC 3.80 (*)     .5 (*)     MCH 34.5 (*)     Neutrophil % 79.2 (*)     Lymphocyte % 10.3 (*)     Eosinophil % 0.2 (*)     Immature Grans % 2.4 (*)     Neutrophils, Absolute 10.53 (*)     Monocytes, Absolute 0.99 (*)     Immature Grans, Absolute 0.32 (*)     All other components within normal limits   HIGH SENSITIVITIY TROPONIN T 1HR - Abnormal; Notable for the following components:    HS Troponin T 24 (*)     All other components within normal limits    Narrative:     High Sensitive Troponin T Reference Range:  <14.0 ng/L- Negative Female for AMI  <22.0 ng/L- Negative Male for AMI  >=14 - Abnormal Female indicating possible myocardial injury.  >=22 - Abnormal Male indicating possible myocardial injury.   Clinicians would have to utilize clinical acumen, EKG, Troponin, and serial changes to determine if it is an Acute Myocardial Infarction or myocardial injury due to an underlying chronic condition.        BNP (IN-HOUSE) - Normal    Narrative:     This assay is used as an aid in the diagnosis of individuals suspected of having heart failure. It can be used as an aid in the diagnosis of acute decompensated heart failure (ADHF) in patients presenting with signs and symptoms of ADHF to the  "emergency department (ED). In addition, NT-proBNP of <300 pg/mL indicates ADHF is not likely.    Age Range Result Interpretation  NT-proBNP Concentration (pg/mL:      <50             Positive            >450                   Gray                 300-450                    Negative             <300    50-75           Positive            >900                  Gray                300-900                  Negative            <300      >75             Positive            >1800                  Gray                300-1800                  Negative            <300   PROCALCITONIN - Normal    Narrative:     As a Marker for Sepsis (Non-Neonates):    1. <0.5 ng/mL represents a low risk of severe sepsis and/or septic shock.  2. >2 ng/mL represents a high risk of severe sepsis and/or septic shock.    As a Marker for Lower Respiratory Tract Infections that require antibiotic therapy:    PCT on Admission    Antibiotic Therapy       6-12 Hrs later    >0.5                Strongly Recommended  >0.25 - <0.5        Recommended   0.1 - 0.25          Discouraged              Remeasure/reassess PCT  <0.1                Strongly Discouraged     Remeasure/reassess PCT    As 28 day mortality risk marker: \"Change in Procalcitonin Result\" (>80% or <=80%) if Day 0 (or Day 1) and Day 4 values are available. Refer to http://www.Prospers-pct-calculator.com    Change in PCT <=80%  A decrease of PCT levels below or equal to 80% defines a positive change in PCT test result representing a higher risk for 28-day all-cause mortality of patients diagnosed with severe sepsis for septic shock.    Change in PCT >80%  A decrease of PCT levels of more than 80% defines a negative change in PCT result representing a lower risk for 28-day all-cause mortality of patients diagnosed with severe sepsis or septic shock.      CBC (NO DIFF)   COMPREHENSIVE METABOLIC PANEL   CBC AND DIFFERENTIAL    Narrative:     The following orders were created for panel order CBC & " Differential.  Procedure                               Abnormality         Status                     ---------                               -----------         ------                     CBC Auto Differential[[id number]]        Abnormal            Final result                 Please view results for these tests on the individual orders.       EKG:   ECG 12 Lead Dyspnea   Final Result   HEART RATE=84  bpm   RR Dosiqafx=949  ms   TN Cqfclzre=470  ms   P Horizontal Axis=  deg   P Front Axis=74  deg   QRSD Interval=93  ms   QT Exgpwrim=219  ms   YLpO=938  ms   QRS Axis=-24  deg   T Wave Axis=16  deg   - OTHERWISE NORMAL ECG -   Sinus rhythm   Borderline  left axis deviation   Abnormal R-wave progression, early transition   SINCE PREVIOUS TRACING, RBBB is no longer present   Electronically Signed By: Jeffery Lopez ([facility] [place]) 2025-07-15 21:52:36   Date and Time of Study:2025-07-15 20:03:31          Meds given in ED:   Medications   nitroglycerin (NITROSTAT) SL tablet 0.4 mg (has no administration in time range)   sodium chloride 0.9 % flush 10 mL (has no administration in time range)   sodium chloride 0.9 % flush 10 mL (has no administration in time range)   sodium chloride 0.9 % infusion 40 mL (has no administration in time range)   acetaminophen (TYLENOL) tablet 650 mg (has no administration in time range)     Or   acetaminophen (TYLENOL) 160 MG/5ML oral solution 650 mg (has no administration in time range)     Or   acetaminophen (TYLENOL) suppository 650 mg (has no administration in time range)   famotidine (PEPCID) tablet 20 mg (has no administration in time range)   sennosides-docusate (PERICOLACE) 8.6-50 MG per tablet 2 tablet (has no administration in time range)     And   polyethylene glycol (MIRALAX) packet 17 g (has no administration in time range)     And   bisacodyl (DULCOLAX) EC tablet 5 mg (has no administration in time range)     And   bisacodyl (DULCOLAX) suppository 10 mg (has no administration in time  range)   ondansetron (ZOFRAN) injection 4 mg (has no administration in time range)   melatonin tablet 5 mg (has no administration in time range)   benzonatate (TESSALON) capsule 200 mg (has no administration in time range)   guaiFENesin (MUCINEX) 12 hr tablet 600 mg (has no administration in time range)   ipratropium-albuterol (DUO-NEB) nebulizer solution 3 mL (has no administration in time range)   albuterol (PROVENTIL) nebulizer solution 0.083% 2.5 mg/3mL (has no administration in time range)   methylPREDNISolone sodium succinate (SOLU-Medrol) injection 62.5 mg (has no administration in time range)   albuterol (PROVENTIL) nebulizer solution 0.083% 2.5 mg/3mL (2.5 mg Nebulization Given 7/15/25 2020)   methylPREDNISolone sodium succinate (SOLU-Medrol) injection 125 mg (125 mg Intravenous Given 7/15/25 1958)       Imaging results:  XR Chest 1 View  Result Date: 7/15/2025  Atelectasis or scarring in the left lung base    This report was finalized on 7/15/2025 8:13 PM by Dr. Marciano Stroud M.D on Workstation: [id number]        Ambulatory status:   - Stand By    Social issues:   Social History     Socioeconomic History    Marital status:      Spouse name: Dominique   Tobacco Use    Smoking status: Former     Current packs/day: 0.00     Average packs/day: 1.5 packs/day for 41.0 years (61.5 ttl pk-yrs)     Types: Cigarettes     Start date: 1976     Quit date: 2017     Years since quittin.5     Passive exposure: Past    Smokeless tobacco: Never    Tobacco comments:     Stopped use .   Vaping Use    Vaping status: Never Used   Substance and Sexual Activity    Alcohol use: No    Drug use: No    Sexual activity: Not Currently     Partners: Female     Birth control/protection: None       Peripheral Neurovascular       Neuro Cognitive       Learning       Respiratory  Respiratory WDL  Cough Frequency: no cough  Breath Sounds  All Lung Fields Breath Sounds: All Fields  All Lung Fields Breath Sounds: Anterior:,  wheezes, expiratory    Abdominal Pain       Pain Assessments  Pain (Adult)  (0-10) Pain Rating: Rest: 0  (0-10) Pain Rating: Activity: 0    NIH Stroke Scale       Mg Hwang RN  07/15/25 22:48 EDT

## 2025-07-17 LAB
ANION GAP SERPL CALCULATED.3IONS-SCNC: 8.4 MMOL/L (ref 5–15)
BASOPHILS # BLD AUTO: 0.05 10*3/MM3 (ref 0–0.2)
BASOPHILS NFR BLD AUTO: 0.3 % (ref 0–1.5)
BUN SERPL-MCNC: 25 MG/DL (ref 8–23)
BUN/CREAT SERPL: 25.8 (ref 7–25)
CALCIUM SPEC-SCNC: 9.6 MG/DL (ref 8.6–10.5)
CHLORIDE SERPL-SCNC: 93 MMOL/L (ref 98–107)
CO2 SERPL-SCNC: 33.6 MMOL/L (ref 22–29)
CREAT SERPL-MCNC: 0.97 MG/DL (ref 0.76–1.27)
DEPRECATED RDW RBC AUTO: 45.8 FL (ref 37–54)
EGFRCR SERPLBLD CKD-EPI 2021: 85 ML/MIN/1.73
EOSINOPHIL # BLD AUTO: 0 10*3/MM3 (ref 0–0.4)
EOSINOPHIL NFR BLD AUTO: 0 % (ref 0.3–6.2)
ERYTHROCYTE [DISTWIDTH] IN BLOOD BY AUTOMATED COUNT: 12.4 % (ref 12.3–15.4)
GLUCOSE SERPL-MCNC: 213 MG/DL (ref 65–99)
HCT VFR BLD AUTO: 38 % (ref 37.5–51)
HGB BLD-MCNC: 12.5 G/DL (ref 13–17.7)
IMM GRANULOCYTES # BLD AUTO: 0.43 10*3/MM3 (ref 0–0.05)
IMM GRANULOCYTES NFR BLD AUTO: 2.3 % (ref 0–0.5)
LYMPHOCYTES # BLD AUTO: 0.75 10*3/MM3 (ref 0.7–3.1)
LYMPHOCYTES NFR BLD AUTO: 4 % (ref 19.6–45.3)
MCH RBC QN AUTO: 33.4 PG (ref 26.6–33)
MCHC RBC AUTO-ENTMCNC: 32.9 G/DL (ref 31.5–35.7)
MCV RBC AUTO: 101.6 FL (ref 79–97)
MONOCYTES # BLD AUTO: 0.51 10*3/MM3 (ref 0.1–0.9)
MONOCYTES NFR BLD AUTO: 2.7 % (ref 5–12)
NEUTROPHILS NFR BLD AUTO: 17.21 10*3/MM3 (ref 1.7–7)
NEUTROPHILS NFR BLD AUTO: 90.7 % (ref 42.7–76)
NRBC BLD AUTO-RTO: 0 /100 WBC (ref 0–0.2)
PLATELET # BLD AUTO: 243 10*3/MM3 (ref 140–450)
PMV BLD AUTO: 10.5 FL (ref 6–12)
POTASSIUM SERPL-SCNC: 4.9 MMOL/L (ref 3.5–5.2)
POTASSIUM SERPL-SCNC: 5.3 MMOL/L (ref 3.5–5.2)
RBC # BLD AUTO: 3.74 10*6/MM3 (ref 4.14–5.8)
SODIUM SERPL-SCNC: 135 MMOL/L (ref 136–145)
WBC NRBC COR # BLD AUTO: 18.95 10*3/MM3 (ref 3.4–10.8)

## 2025-07-17 PROCEDURE — 80048 BASIC METABOLIC PNL TOTAL CA: CPT | Performed by: INTERNAL MEDICINE

## 2025-07-17 PROCEDURE — 85025 COMPLETE CBC W/AUTO DIFF WBC: CPT | Performed by: INTERNAL MEDICINE

## 2025-07-17 PROCEDURE — 94799 UNLISTED PULMONARY SVC/PX: CPT

## 2025-07-17 PROCEDURE — 84132 ASSAY OF SERUM POTASSIUM: CPT | Performed by: INTERNAL MEDICINE

## 2025-07-17 PROCEDURE — 94761 N-INVAS EAR/PLS OXIMETRY MLT: CPT

## 2025-07-17 PROCEDURE — 25010000002 METHYLPREDNISOLONE PER 125 MG: Performed by: INTERNAL MEDICINE

## 2025-07-17 PROCEDURE — 94664 DEMO&/EVAL PT USE INHALER: CPT

## 2025-07-17 PROCEDURE — 94760 N-INVAS EAR/PLS OXIMETRY 1: CPT

## 2025-07-17 RX ORDER — ROFLUMILAST 500 UG/1
500 TABLET ORAL DAILY
Status: DISCONTINUED | OUTPATIENT
Start: 2025-07-17 | End: 2025-07-19 | Stop reason: HOSPADM

## 2025-07-17 RX ADMIN — SODIUM ZIRCONIUM CYCLOSILICATE 10 G: 10 POWDER, FOR SUSPENSION ORAL at 09:47

## 2025-07-17 RX ADMIN — IPRATROPIUM BROMIDE AND ALBUTEROL SULFATE 3 ML: .5; 3 SOLUTION RESPIRATORY (INHALATION) at 19:05

## 2025-07-17 RX ADMIN — Medication 5 MG: at 21:58

## 2025-07-17 RX ADMIN — AZELASTINE HYDROCHLORIDE 2 SPRAY: 137 SPRAY, METERED NASAL at 08:42

## 2025-07-17 RX ADMIN — Medication 10 ML: at 21:58

## 2025-07-17 RX ADMIN — ROFLUMILAST 500 MCG: 500 TABLET ORAL at 18:41

## 2025-07-17 RX ADMIN — GUAIFENESIN 600 MG: 600 TABLET, EXTENDED RELEASE ORAL at 08:42

## 2025-07-17 RX ADMIN — ARFORMOTEROL TARTRATE 15 MCG: 15 SOLUTION RESPIRATORY (INHALATION) at 20:25

## 2025-07-17 RX ADMIN — AZELASTINE HYDROCHLORIDE 2 SPRAY: 137 SPRAY, METERED NASAL at 21:58

## 2025-07-17 RX ADMIN — Medication 1 TABLET: at 08:41

## 2025-07-17 RX ADMIN — METHYLPREDNISOLONE SODIUM SUCCINATE 62.5 MG: 125 INJECTION INTRAMUSCULAR; INTRAVENOUS at 08:41

## 2025-07-17 RX ADMIN — GUAIFENESIN 600 MG: 600 TABLET, EXTENDED RELEASE ORAL at 21:58

## 2025-07-17 RX ADMIN — IPRATROPIUM BROMIDE AND ALBUTEROL SULFATE 3 ML: .5; 3 SOLUTION RESPIRATORY (INHALATION) at 07:16

## 2025-07-17 RX ADMIN — LISINOPRIL 20 MG: 20 TABLET ORAL at 08:42

## 2025-07-17 RX ADMIN — ARFORMOTEROL TARTRATE 15 MCG: 15 SOLUTION RESPIRATORY (INHALATION) at 07:18

## 2025-07-17 RX ADMIN — BUDESONIDE 0.5 MG: 0.5 SUSPENSION RESPIRATORY (INHALATION) at 20:25

## 2025-07-17 RX ADMIN — METHYLPREDNISOLONE SODIUM SUCCINATE 62.5 MG: 125 INJECTION INTRAMUSCULAR; INTRAVENOUS at 21:58

## 2025-07-17 RX ADMIN — Medication 10 ML: at 08:42

## 2025-07-17 RX ADMIN — BUDESONIDE 0.5 MG: 0.5 SUSPENSION RESPIRATORY (INHALATION) at 07:18

## 2025-07-17 RX ADMIN — TAMSULOSIN HYDROCHLORIDE 0.4 MG: 0.4 CAPSULE ORAL at 08:42

## 2025-07-17 RX ADMIN — IPRATROPIUM BROMIDE AND ALBUTEROL SULFATE 3 ML: .5; 3 SOLUTION RESPIRATORY (INHALATION) at 12:26

## 2025-07-17 NOTE — PLAN OF CARE
Problem: Adult Inpatient Plan of Care  Goal: Optimal Comfort and Wellbeing  Outcome: Progressing   Goal Outcome Evaluation:   Patient's O2 increased from 2L to 3L. Maintaining at sat in low 90s. Patient reported getting up and his pants sticking to his leg.  There is a wound below his lower left glute. Cleansed with normal saline and covered with mepilex.

## 2025-07-17 NOTE — NURSING NOTE
07/17/25 1403   Wound 07/16/25 2334 Left  gluteal Atypical   Placement Date/Time: 07/16/25 2334   Present on Original Admission: (c)   Side: Left  Orientation: (c)   Location: gluteal  Primary Wound Type: Atypical   Dressing Appearance intact  (optifoam)   Base red;dry  (peeling skin)   Periwound   (fading redness)   Wound Length (cm) 1.5 cm   Wound Width (cm) 1.5 cm   Wound Depth (cm) 0 cm   Wound Surface Area (cm^2) 1.77 cm^2   Wound Volume (cm^3) 0 cm^3   Drainage Amount none     Wound/ostomy - consult received regarding a skin issue to L gluteal. Patient is sitting on EOB, easily stood for assessment, noted a reddened area, no drainage, dried skin, slightly scabbed, to the gluteal fold. Patient reports he noticed it yesterday. Unclear etiology but is not consistent with pressure, not over a bony prominence and patient is ambulatory and does not sit for long periods of time. Surrounding redness is decreased from when the photo was taken. Patient denies pain to the area but it was irritating and rubbing on pants, reports optifoam is soothing to the site. No other treatment necessary other than protective, if site becomes reddened, painful or drainage present, notify provider.

## 2025-07-17 NOTE — PROGRESS NOTES
Name: Gonzales Kaur . ADMIT: 7/15/2025   : 1955  PCP: Christiano Bartholomew APRN    MRN: [id number] LOS: 1 days   AGE/SEX: 69 y.o. male  ROOM: [facility]/     Subjective   Subjective   Patient reports that the shortness of breath are slightly better.  Cough has improved but it is dry right now.  No hemoptysis.  No chest pain.  No PND orthopnea.  No ankle edema.  No palpitation.  No fever or chills    Review of Systems  GI.  No abdominal pain or nausea or vomiting.  No bowel movement x 2 days.  .  No dysuria or hematuria   Objective   Objective   Vital Signs  Temp:  [97.7 °F (36.5 °C)-98.1 °F (36.7 °C)] 97.7 °F (36.5 °C)  Heart Rate:  [72-99] 99  Resp:  [16-20] 20  BP: (128-163)/(60-70) 163/65  SpO2:  [88 %-96 %] 88 %  on  Flow (L/min) (Oxygen Therapy):  [2-4] 2;   Device (Oxygen Therapy): nasal cannula    Intake/Output Summary (Last 24 hours) at 2025 1529  Last data filed at 2025 0322  Gross per 24 hour   Intake 0 ml   Output --   Net 0 ml     Body mass index is 27.94 kg/m².      07/15/25  1938 25  0655   Weight: 80.4 kg (177 lb 4 oz) 78.5 kg (173 lb)     Physical Exam  General.  Middle-aged to elderly gentleman.  Obese.  Alert and oriented x 4.  In no apparent pain/distress/diaphoresis.  Normal mood and affect.  Eyes.  Pupils equal round and reactive.  Intact extraocular musculature.  No pallor or jaundice.  Oral cavity.  Moist mucous membrane.  Neck.  Supple.  No JVD.  No lymphadenopathy or thyromegaly.  Cardio.  Vascular.  Regular rate and rhythm with grade 2 systolic murmur  Chest.  Poor bilateral air entry with scattered bilateral expiratory wheeze.  Improved air entry from yesterday.  Abdomen.  Soft lax.  No tenderness.  No organomegaly.  No guarding or rebound.  Extremities.  No clubbing/cyanosis/edema  CNS.  No acute focal neurological deficits    Results Review:      Results from last 7 days   Lab Units 25  0405 25  0238 07/15/25  1941   SODIUM mmol/L 135* 135* 138  "  POTASSIUM mmol/L 5.3* 5.3* 4.5   CHLORIDE mmol/L 93* 97* 98   CO2 mmol/L 33.6* 29.0 29.7*   BUN mg/dL 25.0* 20.0 21.0   CREATININE mg/dL 0.97 0.80 0.91   GLUCOSE mg/dL 213* 182* 110*   CALCIUM mg/dL 9.6 9.1 9.5   AST (SGOT) U/L  --  26 27   ALT (SGPT) U/L  --  35 39     Estimated Creatinine Clearance: 71.9 mL/min (by C-G formula based on SCr of 0.97 mg/dL).          Results from last 7 days   Lab Units 07/15/25  2052 07/15/25  1941   HSTROP T ng/L 24* 23*     Results from last 7 days   Lab Units 07/15/25  1941   PROBNP pg/mL 177.0                   Invalid input(s): \"LDLCALC\"  Results from last 7 days   Lab Units 07/17/25  0405 07/16/25  0238 07/15/25  1941   WBC 10*3/mm3 18.95* 15.93* 13.30*   HEMOGLOBIN g/dL 12.5* 12.3* 13.1   HEMATOCRIT % 38.0 36.8* 38.2   PLATELETS 10*3/mm3 243 224 234   MCV fL 101.6* 101.9* 100.5*   MCH pg 33.4* 34.1* 34.5*   MCHC g/dL 32.9 33.4 34.3   RDW % 12.4 12.8 13.3   RDW-SD fl 45.8 48.2 48.3   MPV fL 10.5 10.5 10.2   NEUTROPHIL % % 90.7*  --  79.2*   LYMPHOCYTE % % 4.0*  --  10.3*   MONOCYTES % % 2.7*  --  7.4   EOSINOPHIL % % 0.0*  --  0.2*   BASOPHIL % % 0.3  --  0.5   IMM GRAN % % 2.3*  --  2.4*   NEUTROS ABS 10*3/mm3 17.21*  --  10.53*   LYMPHS ABS 10*3/mm3 0.75  --  1.37   MONOS ABS 10*3/mm3 0.51  --  0.99*   EOS ABS 10*3/mm3 0.00  --  0.02   BASOS ABS 10*3/mm3 0.05  --  0.07   IMMATURE GRANS (ABS) 10*3/mm3 0.43*  --  0.32*   NRBC /100 WBC 0.0  --  0.0             Results from last 7 days   Lab Units 07/16/25  0238 07/15/25  1941   PROCALCITONIN ng/mL 0.03 0.03                 Results from last 7 days   Lab Units 07/15/25  2001   ADENOVIRUS DETECTION BY PCR  Not Detected   CORONAVIRUS 229E  Not Detected   CORONAVIRUS HKU1  Not Detected   CORONAVIRUS NL63  Not Detected   CORONAVIRUS OC43  Not Detected   HUMAN METAPNEUMOVIRUS  Not Detected   HUMAN RHINOVIRUS/ENTEROVIRUS  Not Detected   INFLUENZA B PCR  Not Detected   PARAINFLUENZA 1  Not Detected   PARAINFLUENZA VIRUS 2  Not " Detected   PARAINFLUENZA VIRUS 3  Detected*   PARAINFLUENZA VIRUS 4  Not Detected   BORDETELLA PERTUSSIS PCR  Not Detected   CHLAMYDOPHILA PNEUMONIAE PCR  Not Detected   MYCOPLAMA PNEUMO PCR  Not Detected   INFLUENZA A PCR  Not Detected   RSV, PCR  Not Detected               Imaging:  Imaging Results (Last 24 Hours)       ** No results found for the last 24 hours. **               I reviewed the patient's new clinical results / labs / tests / procedures      Assessment/Plan     Active Hospital Problems    Diagnosis  POA    **Acute exacerbation of chronic obstructive pulmonary disease (COPD) [J44.1]  Yes    Acute on chronic respiratory failure with hypoxia [J96.21]  Yes    Chronic kidney disease, stage 2 (mild) [N18.2]  Yes    Benign prostatic hyperplasia [N40.0]  Yes    History of prostate cancer [Z85.46]  Not Applicable    Essential hypertension [I10]  Yes    MICHAEL (obstructive sleep apnea) [G47.33]  Yes      Resolved Hospital Problems   No resolved problems to display.           Acute hypoxemic respiratory failure secondary to COPD exacerbation because of parainfluenza 3 lower respiratory infection in a patient history of obstructive sleep apnea.  Chest x-ray with no pneumonia.  Clinically improving on IV Solu-Medrol/Mucinex/DuoNeb//Pulmicort mini nebs.  Continue  incentive spirometry.  Procalcitonin is negative.  Doubt bacterial infection.  Improving FiO2 demands.  Continues to require oxygen.   leukocytosis.  Secondary to steroids.  No fever.  The procalcitonin is negative.  Chest x-ray is negative for infiltrate.  Patient is not septic  Hyperglycemia.  Mostly steroid-induced.  Will check A1c.  Hypertension.  Good blood pressure control with no evidence of angina or congestive heart failure on lisinopril.  Continue Zestoretic.  Continue to monitor.  Mild hyperkalemia will give a single dose of Lokelma.  If persistent will stop lisinopril.  Stage III chronic renal failure.  Patient is euvolemic.  Renal function is  normal at stage II continue to monitor kidney function.  VTE prophylaxis.  Sequential compression devices      Discussed my findings and plan of treatment with the patient/nurses at multidisciplinary rounds.  Disposition.  Eventually home.  Anticipate discharge in 2 days.            Fernanda Kaplan MD  [facility]  07/17/25  15:29 EDT

## 2025-07-18 PROBLEM — E11.9 DM2 (DIABETES MELLITUS, TYPE 2): Status: ACTIVE | Noted: 2025-07-18

## 2025-07-18 LAB
ANION GAP SERPL CALCULATED.3IONS-SCNC: 8.4 MMOL/L (ref 5–15)
BASOPHILS # BLD AUTO: 0.09 10*3/MM3 (ref 0–0.2)
BASOPHILS NFR BLD AUTO: 0.4 % (ref 0–1.5)
BUN SERPL-MCNC: 22 MG/DL (ref 8–23)
BUN/CREAT SERPL: 20.6 (ref 7–25)
CALCIUM SPEC-SCNC: 9.6 MG/DL (ref 8.6–10.5)
CHLORIDE SERPL-SCNC: 93 MMOL/L (ref 98–107)
CO2 SERPL-SCNC: 34.6 MMOL/L (ref 22–29)
CREAT SERPL-MCNC: 1.07 MG/DL (ref 0.76–1.27)
DEPRECATED RDW RBC AUTO: 50.2 FL (ref 37–54)
EGFRCR SERPLBLD CKD-EPI 2021: 75.6 ML/MIN/1.73
EOSINOPHIL # BLD AUTO: 0 10*3/MM3 (ref 0–0.4)
EOSINOPHIL NFR BLD AUTO: 0 % (ref 0.3–6.2)
ERYTHROCYTE [DISTWIDTH] IN BLOOD BY AUTOMATED COUNT: 13.3 % (ref 12.3–15.4)
FOLATE SERPL-MCNC: >20 NG/ML (ref 4.78–24.2)
GLUCOSE SERPL-MCNC: 220 MG/DL (ref 65–99)
HBA1C MFR BLD: 7 % (ref 4.8–5.6)
HCT VFR BLD AUTO: 39.4 % (ref 37.5–51)
HGB BLD-MCNC: 12.7 G/DL (ref 13–17.7)
IMM GRANULOCYTES # BLD AUTO: 0.56 10*3/MM3 (ref 0–0.05)
IMM GRANULOCYTES NFR BLD AUTO: 2.7 % (ref 0–0.5)
LYMPHOCYTES # BLD AUTO: 0.66 10*3/MM3 (ref 0.7–3.1)
LYMPHOCYTES NFR BLD AUTO: 3.2 % (ref 19.6–45.3)
MCH RBC QN AUTO: 33.4 PG (ref 26.6–33)
MCHC RBC AUTO-ENTMCNC: 32.2 G/DL (ref 31.5–35.7)
MCV RBC AUTO: 103.7 FL (ref 79–97)
MONOCYTES # BLD AUTO: 0.51 10*3/MM3 (ref 0.1–0.9)
MONOCYTES NFR BLD AUTO: 2.5 % (ref 5–12)
NEUTROPHILS NFR BLD AUTO: 18.62 10*3/MM3 (ref 1.7–7)
NEUTROPHILS NFR BLD AUTO: 91.2 % (ref 42.7–76)
NRBC BLD AUTO-RTO: 0 /100 WBC (ref 0–0.2)
PLATELET # BLD AUTO: 245 10*3/MM3 (ref 140–450)
PMV BLD AUTO: 10.4 FL (ref 6–12)
POTASSIUM SERPL-SCNC: 4.8 MMOL/L (ref 3.5–5.2)
RBC # BLD AUTO: 3.8 10*6/MM3 (ref 4.14–5.8)
SODIUM SERPL-SCNC: 136 MMOL/L (ref 136–145)
VIT B12 BLD-MCNC: 1133 PG/ML (ref 211–946)
WBC NRBC COR # BLD AUTO: 20.44 10*3/MM3 (ref 3.4–10.8)

## 2025-07-18 PROCEDURE — 94761 N-INVAS EAR/PLS OXIMETRY MLT: CPT

## 2025-07-18 PROCEDURE — 94664 DEMO&/EVAL PT USE INHALER: CPT

## 2025-07-18 PROCEDURE — 85025 COMPLETE CBC W/AUTO DIFF WBC: CPT | Performed by: INTERNAL MEDICINE

## 2025-07-18 PROCEDURE — 94799 UNLISTED PULMONARY SVC/PX: CPT

## 2025-07-18 PROCEDURE — 82746 ASSAY OF FOLIC ACID SERUM: CPT | Performed by: INTERNAL MEDICINE

## 2025-07-18 PROCEDURE — 94760 N-INVAS EAR/PLS OXIMETRY 1: CPT

## 2025-07-18 PROCEDURE — 80048 BASIC METABOLIC PNL TOTAL CA: CPT | Performed by: INTERNAL MEDICINE

## 2025-07-18 PROCEDURE — 83036 HEMOGLOBIN GLYCOSYLATED A1C: CPT | Performed by: INTERNAL MEDICINE

## 2025-07-18 PROCEDURE — 82607 VITAMIN B-12: CPT | Performed by: INTERNAL MEDICINE

## 2025-07-18 PROCEDURE — 25010000002 METHYLPREDNISOLONE PER 125 MG: Performed by: INTERNAL MEDICINE

## 2025-07-18 RX ORDER — METFORMIN HYDROCHLORIDE 500 MG/1
500 TABLET, EXTENDED RELEASE ORAL
Status: DISCONTINUED | OUTPATIENT
Start: 2025-07-18 | End: 2025-07-19 | Stop reason: HOSPADM

## 2025-07-18 RX ORDER — METHYLPREDNISOLONE SODIUM SUCCINATE 125 MG/2ML
62.5 INJECTION, POWDER, LYOPHILIZED, FOR SOLUTION INTRAMUSCULAR; INTRAVENOUS DAILY
Status: DISCONTINUED | OUTPATIENT
Start: 2025-07-19 | End: 2025-07-19 | Stop reason: HOSPADM

## 2025-07-18 RX ADMIN — AZELASTINE HYDROCHLORIDE 2 SPRAY: 137 SPRAY, METERED NASAL at 10:02

## 2025-07-18 RX ADMIN — IPRATROPIUM BROMIDE AND ALBUTEROL SULFATE 3 ML: .5; 3 SOLUTION RESPIRATORY (INHALATION) at 07:09

## 2025-07-18 RX ADMIN — AZELASTINE HYDROCHLORIDE 2 SPRAY: 137 SPRAY, METERED NASAL at 22:03

## 2025-07-18 RX ADMIN — ROFLUMILAST 500 MCG: 500 TABLET ORAL at 08:46

## 2025-07-18 RX ADMIN — GUAIFENESIN 600 MG: 600 TABLET, EXTENDED RELEASE ORAL at 08:46

## 2025-07-18 RX ADMIN — Medication 1 TABLET: at 08:46

## 2025-07-18 RX ADMIN — BUDESONIDE 0.5 MG: 0.5 SUSPENSION RESPIRATORY (INHALATION) at 07:12

## 2025-07-18 RX ADMIN — Medication 10 ML: at 23:06

## 2025-07-18 RX ADMIN — METFORMIN HYDROCHLORIDE 500 MG: 500 TABLET, EXTENDED RELEASE ORAL at 11:47

## 2025-07-18 RX ADMIN — IPRATROPIUM BROMIDE AND ALBUTEROL SULFATE 3 ML: .5; 3 SOLUTION RESPIRATORY (INHALATION) at 23:49

## 2025-07-18 RX ADMIN — GUAIFENESIN 600 MG: 600 TABLET, EXTENDED RELEASE ORAL at 22:03

## 2025-07-18 RX ADMIN — BUDESONIDE 0.5 MG: 0.5 SUSPENSION RESPIRATORY (INHALATION) at 19:13

## 2025-07-18 RX ADMIN — IPRATROPIUM BROMIDE AND ALBUTEROL SULFATE 3 ML: .5; 3 SOLUTION RESPIRATORY (INHALATION) at 00:12

## 2025-07-18 RX ADMIN — METHYLPREDNISOLONE SODIUM SUCCINATE 62.5 MG: 125 INJECTION INTRAMUSCULAR; INTRAVENOUS at 08:45

## 2025-07-18 RX ADMIN — ARFORMOTEROL TARTRATE 15 MCG: 15 SOLUTION RESPIRATORY (INHALATION) at 07:15

## 2025-07-18 RX ADMIN — IPRATROPIUM BROMIDE AND ALBUTEROL SULFATE 3 ML: .5; 3 SOLUTION RESPIRATORY (INHALATION) at 12:40

## 2025-07-18 RX ADMIN — Medication 10 ML: at 08:46

## 2025-07-18 RX ADMIN — Medication 5 MG: at 22:03

## 2025-07-18 RX ADMIN — LISINOPRIL 20 MG: 20 TABLET ORAL at 08:46

## 2025-07-18 RX ADMIN — TAMSULOSIN HYDROCHLORIDE 0.4 MG: 0.4 CAPSULE ORAL at 08:46

## 2025-07-18 RX ADMIN — ACETAMINOPHEN 650 MG: 325 TABLET, FILM COATED ORAL at 20:06

## 2025-07-18 RX ADMIN — ARFORMOTEROL TARTRATE 15 MCG: 15 SOLUTION RESPIRATORY (INHALATION) at 19:15

## 2025-07-18 NOTE — PROGRESS NOTES
Name: Gonzales Kaur . ADMIT: 7/15/2025   : 1955  PCP: Christiano Bartholomew APRN    MRN: [id number] LOS: 2 days   AGE/SEX: 69 y.o. male  ROOM: [facility]     Subjective   Subjective   Patient reports a slow improvement of his shortness of breath.  Wheezing and dry cough also improving..   No hemoptysis.  No chest pain.  No PND orthopnea.  No ankle edema.  No palpitation.  No fever or chills    Review of Systems  GI.  No abdominal pain or nausea or vomiting.  .  No dysuria or hematuria   Objective   Objective   Vital Signs  Temp:  [97.3 °F (36.3 °C)-97.9 °F (36.6 °C)] 97.3 °F (36.3 °C)  Heart Rate:  [] 99  Resp:  [18-20] 18  BP: (102-163)/(65-74) 102/74  SpO2:  [88 %-97 %] 93 %  on  Flow (L/min) (Oxygen Therapy):  [2-4] 2;   Device (Oxygen Therapy): nasal cannula    Intake/Output Summary (Last 24 hours) at 2025 0938  Last data filed at 2025 0316  Gross per 24 hour   Intake 320 ml   Output --   Net 320 ml     Body mass index is 27.94 kg/m².      07/15/25  1938 25  0655   Weight: 80.4 kg (177 lb 4 oz) 78.5 kg (173 lb)     Physical Exam  General.  Middle-aged to elderly gentleman.  Obese.  Alert and oriented x 4.  In no apparent pain/distress/diaphoresis.  Normal mood and affect.  Eyes.  Pupils equal round and reactive.  Intact extraocular musculature.  No pallor or jaundice.  Oral cavity.  Moist mucous membrane.  Neck.  Supple.  No JVD.  No lymphadenopathy or thyromegaly.  CardioVascular.  Regular rate and rhythm with grade 2 systolic murmur  Chest.  Poor bilateral air entry with scattered bilateral expiratory wheeze.  Improved from yesterday  Abdomen.  Soft lax.  No tenderness.  No organomegaly.  No guarding or rebound.  Extremities.  No clubbing/cyanosis/edema  CNS.  No acute focal neurological deficits    Results Review:      Results from last 7 days   Lab Units 25  0338 25  1509 25  0405 25  0238 07/15/25  1941   SODIUM mmol/L 136  --  135* 135* 138   POTASSIUM  "mmol/L 4.8 4.9 5.3* 5.3* 4.5   CHLORIDE mmol/L 93*  --  93* 97* 98   CO2 mmol/L 34.6*  --  33.6* 29.0 29.7*   BUN mg/dL 22.0  --  25.0* 20.0 21.0   CREATININE mg/dL 1.07  --  0.97 0.80 0.91   GLUCOSE mg/dL 220*  --  213* 182* 110*   CALCIUM mg/dL 9.6  --  9.6 9.1 9.5   AST (SGOT) U/L  --   --   --  26 27   ALT (SGPT) U/L  --   --   --  35 39     Estimated Creatinine Clearance: 65.1 mL/min (by C-G formula based on SCr of 1.07 mg/dL).  Results from last 7 days   Lab Units 07/18/25 0338   HEMOGLOBIN A1C % 7.00*         Results from last 7 days   Lab Units 07/15/25  2052 07/15/25  1941   HSTROP T ng/L 24* 23*     Results from last 7 days   Lab Units 07/15/25  1941   PROBNP pg/mL 177.0                   Invalid input(s): \"LDLCALC\"  Results from last 7 days   Lab Units 07/18/25  0338 07/17/25  0405 07/16/25  0238 07/15/25  1941   WBC 10*3/mm3 20.44* 18.95* 15.93* 13.30*   HEMOGLOBIN g/dL 12.7* 12.5* 12.3* 13.1   HEMATOCRIT % 39.4 38.0 36.8* 38.2   PLATELETS 10*3/mm3 245 243 224 234   MCV fL 103.7* 101.6* 101.9* 100.5*   MCH pg 33.4* 33.4* 34.1* 34.5*   MCHC g/dL 32.2 32.9 33.4 34.3   RDW % 13.3 12.4 12.8 13.3   RDW-SD fl 50.2 45.8 48.2 48.3   MPV fL 10.4 10.5 10.5 10.2   NEUTROPHIL % % 91.2* 90.7*  --  79.2*   LYMPHOCYTE % % 3.2* 4.0*  --  10.3*   MONOCYTES % % 2.5* 2.7*  --  7.4   EOSINOPHIL % % 0.0* 0.0*  --  0.2*   BASOPHIL % % 0.4 0.3  --  0.5   IMM GRAN % % 2.7* 2.3*  --  2.4*   NEUTROS ABS 10*3/mm3 18.62* 17.21*  --  10.53*   LYMPHS ABS 10*3/mm3 0.66* 0.75  --  1.37   MONOS ABS 10*3/mm3 0.51 0.51  --  0.99*   EOS ABS 10*3/mm3 0.00 0.00  --  0.02   BASOS ABS 10*3/mm3 0.09 0.05  --  0.07   IMMATURE GRANS (ABS) 10*3/mm3 0.56* 0.43*  --  0.32*   NRBC /100 WBC 0.0 0.0  --  0.0             Results from last 7 days   Lab Units 07/16/25  0238 07/15/25  1941   PROCALCITONIN ng/mL 0.03 0.03                 Results from last 7 days   Lab Units 07/15/25  2001   ADENOVIRUS DETECTION BY PCR  Not Detected   CORONAVIRUS 229E  Not " Detected   CORONAVIRUS HKU1  Not Detected   CORONAVIRUS NL63  Not Detected   CORONAVIRUS OC43  Not Detected   HUMAN METAPNEUMOVIRUS  Not Detected   HUMAN RHINOVIRUS/ENTEROVIRUS  Not Detected   INFLUENZA B PCR  Not Detected   PARAINFLUENZA 1  Not Detected   PARAINFLUENZA VIRUS 2  Not Detected   PARAINFLUENZA VIRUS 3  Detected*   PARAINFLUENZA VIRUS 4  Not Detected   BORDETELLA PERTUSSIS PCR  Not Detected   CHLAMYDOPHILA PNEUMONIAE PCR  Not Detected   MYCOPLAMA PNEUMO PCR  Not Detected   INFLUENZA A PCR  Not Detected   RSV, PCR  Not Detected               Imaging:  Imaging Results (Last 24 Hours)       ** No results found for the last 24 hours. **               I reviewed the patient's new clinical results / labs / tests / procedures      Assessment/Plan     Active Hospital Problems    Diagnosis  POA    **Acute exacerbation of chronic obstructive pulmonary disease (COPD) [J44.1]  Yes    DM2 (diabetes mellitus, type 2) [E11.9]  Yes    Acute on chronic respiratory failure with hypoxia [J96.21]  Yes    Chronic kidney disease, stage 2 (mild) [N18.2]  Yes    Benign prostatic hyperplasia [N40.0]  Yes    History of prostate cancer [Z85.46]  Not Applicable    Essential hypertension [I10]  Yes    MICHAEL (obstructive sleep apnea) [G47.33]  Yes      Resolved Hospital Problems   No resolved problems to display.           Acute hypoxemic respiratory failure secondary to COPD exacerbation because of parainfluenza 3 lower respiratory infection in a patient history of obstructive sleep apnea.  Chest x-ray with no pneumonia.  Clinically improving on IV Solu-Medrol/Mucinex/DuoNeb//Pulmicort mini nebs/Daliresp.  Continue  incentive spirometry.  Procalcitonin is negative.  Doubt bacterial infection.  Improving FiO2 demands.  Continues to require oxygen.  Will continue to wean off oxygen.  Will do a walking pulse ox tomorrow.  Will decrease IV steroids   leukocytosis.  Secondary to steroids.  No fever.  The procalcitonin is negative.  Chest  x-ray is negative for infiltrate.  Patient is not septic  Diabetes  (New diagnosis).  A1c is 7.  Counseled about diet.  Will initiate metformin  Hypertension.  Good blood pressure control with no evidence of angina or congestive heart failure on lisinopril.  Continue Zestril.  Continue to monitor.  Mild hyperkalemia resolved after a single dose of Lokelma.  Continue to monitor.    History of stage III chronic renal failure.  Patient is euvolemic.  Renal function is normal at stage II continue to monitor kidney function.  VTE prophylaxis.  Sequential compression devices      Discussed my findings and plan of treatment with the patient/nurses at multidisciplinary rounds.  Disposition.  Anticipate discharge home tomorrow if continues to improve and after walking pulse oximetry            Fernanda Kaplan MD  [facility]  07/18/25  09:38 EDT

## 2025-07-18 NOTE — CASE MANAGEMENT/SOCIAL WORK
Continued Stay Note  [facility]     Patient Name: Gonzales Kaur Sr.  MRN: [id number]  Today's Date: 7/18/2025    Admit Date: 7/15/2025    Plan: Home with spouse   Discharge Plan       Row Name 07/18/25 2425       Plan    Plan Home with spouse    Patient/Family in Agreement with Plan yes    Plan Comments Plan remains home with spouse. Discussed walking ox that will be done tomorrow. Patient said if he qualifies for home oxygen he would like to use [employer] becuase that is where his Cpap is from. CCP made a referral to [employer]. Spouse to transport.                   Discharge Codes    No documentation.                 Expected Discharge Date and Time       Expected Discharge Date Expected Discharge Time    Jul 18, 2025

## 2025-07-18 NOTE — DISCHARGE PLACEMENT REQUEST
"Gonzales Kaur Sr. \"MELI\" (69 y.o. Male)       Date of Birth   1955    Social Security Number       Address   [address] [facility] [address]    Home Phone   [phone]    MRN   [id number]       [facility]    Marital Status                               Admission Date   7/15/2025    Admission Type   Emergency    Admitting Provider   Mg Tyler MD    Attending Provider   Fernanda Kaplan MD    Department, Room/Bed   [address], [address]       Discharge Date       Discharge Disposition       Discharge Destination                                 Attending Provider: Fernanda Kaplan MD    Allergies: No Known Allergies    Isolation: None   Infection: None   Code Status: CPR    Ht: 167.6 cm (65.98\")   Wt: 78.5 kg (173 lb)    Admission Cmt: None   Principal Problem: Acute exacerbation of chronic obstructive pulmonary disease (COPD) [J44.1]                   Active Insurance as of 7/15/2025       Primary Coverage       Payor Plan Insurance Group Employer/Plan Group    MEDICARE RAILROAD MEDICARE        Payor Plan Address Payor Plan Phone Number Payor Plan Fax Number Effective Dates    [address] [phone]  9/1/2021 - None Entered    [facility] [id number]         Subscriber Name Subscriber Birth Date Member ID       GONZALES KAUR SR. 1955 [id number]               Secondary Coverage       Payor Plan Insurance Group Employer/Plan Group    [facility] SUPP [id number]       Payor Plan Address Payor Plan Phone Number Payor Plan Fax Number Effective Dates    [address]   9/1/2021 - None Entered    [facility] [id number]         Subscriber Name Subscriber Birth Date Member ID       GONZALES KAUR SR. 1955 [id number]                     Emergency Contacts        (Rel.) Home Phone Work Phone Mobile Phone    Dominique Kaur (Spouse) -- -- [phone]       "

## 2025-07-18 NOTE — PLAN OF CARE
Goal Outcome Evaluation:              Outcome Evaluation: O2 SAT STABLE ON 3 L/M N/C. WBC INCREASED TO 20,000. RESTED WELL OVERNIGHT WITHOUT C/O'S.

## 2025-07-18 NOTE — PLAN OF CARE
Problem: Gas Exchange Impaired  Goal: Optimal Gas Exchange  Outcome: Progressing   Goal Outcome Evaluation:  Plan of Care Reviewed With: patient        Progress: improving     Pt Aox4. Have encouraged ambulation this shift. Pt walked around nurse's station x2 rounds, and on 2 different occasions. Tolerating well, but does have some soa upon returning to bed. Has remained on 3L NC to keep sats above 88%. Inspiratory/expiratory wheezing throughout all lobes. Pt inquired about recent diabetes mellitus diagnosis-informed pt it is based on A1c, and is reversible with proper diet, and exercise, with initiation of metformin. VSS. Safety maintained.

## 2025-07-19 VITALS
WEIGHT: 173 LBS | SYSTOLIC BLOOD PRESSURE: 147 MMHG | HEART RATE: 91 BPM | OXYGEN SATURATION: 95 % | RESPIRATION RATE: 18 BRPM | TEMPERATURE: 97.5 F | HEIGHT: 66 IN | DIASTOLIC BLOOD PRESSURE: 81 MMHG | BODY MASS INDEX: 27.8 KG/M2

## 2025-07-19 LAB
ANION GAP SERPL CALCULATED.3IONS-SCNC: 9.9 MMOL/L (ref 5–15)
BASOPHILS # BLD AUTO: 0.04 10*3/MM3 (ref 0–0.2)
BASOPHILS NFR BLD AUTO: 0.2 % (ref 0–1.5)
BUN SERPL-MCNC: 28 MG/DL (ref 8–23)
BUN/CREAT SERPL: 33.7 (ref 7–25)
CALCIUM SPEC-SCNC: 9.4 MG/DL (ref 8.6–10.5)
CHLORIDE SERPL-SCNC: 88 MMOL/L (ref 98–107)
CO2 SERPL-SCNC: 34.1 MMOL/L (ref 22–29)
CORTIS SERPL-MCNC: 4.05 MCG/DL
CREAT SERPL-MCNC: 0.83 MG/DL (ref 0.76–1.27)
DEPRECATED RDW RBC AUTO: 45.6 FL (ref 37–54)
EGFRCR SERPLBLD CKD-EPI 2021: 95.3 ML/MIN/1.73
EOSINOPHIL # BLD AUTO: 0.04 10*3/MM3 (ref 0–0.4)
EOSINOPHIL NFR BLD AUTO: 0.2 % (ref 0.3–6.2)
ERYTHROCYTE [DISTWIDTH] IN BLOOD BY AUTOMATED COUNT: 12.4 % (ref 12.3–15.4)
FERRITIN SERPL-MCNC: 299 NG/ML (ref 30–400)
GLUCOSE SERPL-MCNC: 111 MG/DL (ref 65–99)
HAPTOGLOB SERPL-MCNC: 265 MG/DL (ref 30–200)
HCT VFR BLD AUTO: 39.7 % (ref 37.5–51)
HGB BLD-MCNC: 13.4 G/DL (ref 13–17.7)
IMM GRANULOCYTES # BLD AUTO: 0.33 10*3/MM3 (ref 0–0.05)
IMM GRANULOCYTES NFR BLD AUTO: 1.7 % (ref 0–0.5)
IRON 24H UR-MRATE: 140 MCG/DL (ref 59–158)
IRON SATN MFR SERPL: 42 % (ref 20–50)
LDH SERPL-CCNC: 186 U/L (ref 135–225)
LYMPHOCYTES # BLD AUTO: 2.21 10*3/MM3 (ref 0.7–3.1)
LYMPHOCYTES NFR BLD AUTO: 11.6 % (ref 19.6–45.3)
MCH RBC QN AUTO: 33.8 PG (ref 26.6–33)
MCHC RBC AUTO-ENTMCNC: 33.8 G/DL (ref 31.5–35.7)
MCV RBC AUTO: 100.3 FL (ref 79–97)
MONOCYTES # BLD AUTO: 2.25 10*3/MM3 (ref 0.1–0.9)
MONOCYTES NFR BLD AUTO: 11.8 % (ref 5–12)
NEUTROPHILS NFR BLD AUTO: 14.15 10*3/MM3 (ref 1.7–7)
NEUTROPHILS NFR BLD AUTO: 74.5 % (ref 42.7–76)
NRBC BLD AUTO-RTO: 0 /100 WBC (ref 0–0.2)
PLATELET # BLD AUTO: 251 10*3/MM3 (ref 140–450)
PMV BLD AUTO: 10.4 FL (ref 6–12)
POTASSIUM SERPL-SCNC: 3.9 MMOL/L (ref 3.5–5.2)
RBC # BLD AUTO: 3.96 10*6/MM3 (ref 4.14–5.8)
RETICS # AUTO: 0.07 10*6/MM3 (ref 0.02–0.13)
RETICS/RBC NFR AUTO: 1.85 % (ref 0.7–1.9)
SODIUM SERPL-SCNC: 132 MMOL/L (ref 136–145)
TIBC SERPL-MCNC: 332 MCG/DL (ref 298–536)
TRANSFERRIN SERPL-MCNC: 223 MG/DL (ref 200–360)
TSH SERPL DL<=0.05 MIU/L-ACNC: 0.69 UIU/ML (ref 0.27–4.2)
WBC NRBC COR # BLD AUTO: 19.02 10*3/MM3 (ref 3.4–10.8)

## 2025-07-19 PROCEDURE — 94799 UNLISTED PULMONARY SVC/PX: CPT

## 2025-07-19 PROCEDURE — 84443 ASSAY THYROID STIM HORMONE: CPT | Performed by: INTERNAL MEDICINE

## 2025-07-19 PROCEDURE — 82533 TOTAL CORTISOL: CPT | Performed by: INTERNAL MEDICINE

## 2025-07-19 PROCEDURE — 80048 BASIC METABOLIC PNL TOTAL CA: CPT | Performed by: INTERNAL MEDICINE

## 2025-07-19 PROCEDURE — 82728 ASSAY OF FERRITIN: CPT | Performed by: INTERNAL MEDICINE

## 2025-07-19 PROCEDURE — 25010000002 METHYLPREDNISOLONE PER 125 MG: Performed by: INTERNAL MEDICINE

## 2025-07-19 PROCEDURE — 83615 LACTATE (LD) (LDH) ENZYME: CPT | Performed by: INTERNAL MEDICINE

## 2025-07-19 PROCEDURE — 84466 ASSAY OF TRANSFERRIN: CPT | Performed by: INTERNAL MEDICINE

## 2025-07-19 PROCEDURE — 94664 DEMO&/EVAL PT USE INHALER: CPT

## 2025-07-19 PROCEDURE — 85045 AUTOMATED RETICULOCYTE COUNT: CPT | Performed by: INTERNAL MEDICINE

## 2025-07-19 PROCEDURE — 85025 COMPLETE CBC W/AUTO DIFF WBC: CPT | Performed by: INTERNAL MEDICINE

## 2025-07-19 PROCEDURE — 94760 N-INVAS EAR/PLS OXIMETRY 1: CPT

## 2025-07-19 PROCEDURE — 83010 ASSAY OF HAPTOGLOBIN QUANT: CPT | Performed by: INTERNAL MEDICINE

## 2025-07-19 PROCEDURE — 94618 PULMONARY STRESS TESTING: CPT

## 2025-07-19 PROCEDURE — 83540 ASSAY OF IRON: CPT | Performed by: INTERNAL MEDICINE

## 2025-07-19 RX ORDER — METFORMIN HYDROCHLORIDE 500 MG/1
500 TABLET, EXTENDED RELEASE ORAL
Qty: 30 TABLET | Refills: 3 | Status: SHIPPED | OUTPATIENT
Start: 2025-07-19

## 2025-07-19 RX ORDER — GUAIFENESIN 600 MG/1
600 TABLET, EXTENDED RELEASE ORAL EVERY 12 HOURS SCHEDULED
Qty: 60 TABLET | Refills: 3 | Status: SHIPPED | OUTPATIENT
Start: 2025-07-19

## 2025-07-19 RX ORDER — AZELASTINE 1 MG/ML
2 SPRAY, METERED NASAL 2 TIMES DAILY
Qty: 1 EACH | Refills: 3 | Status: SHIPPED | OUTPATIENT
Start: 2025-07-19

## 2025-07-19 RX ORDER — PREDNISONE 10 MG/1
40 TABLET ORAL DAILY
Qty: 20 TABLET | Refills: 0 | Status: SHIPPED | OUTPATIENT
Start: 2025-07-19 | End: 2025-07-24

## 2025-07-19 RX ORDER — ROFLUMILAST 500 UG/1
500 TABLET ORAL DAILY
Qty: 30 TABLET | Refills: 3 | Status: SHIPPED | OUTPATIENT
Start: 2025-07-19

## 2025-07-19 RX ADMIN — GUAIFENESIN 600 MG: 600 TABLET, EXTENDED RELEASE ORAL at 08:58

## 2025-07-19 RX ADMIN — ROFLUMILAST 500 MCG: 500 TABLET ORAL at 08:58

## 2025-07-19 RX ADMIN — Medication 10 ML: at 08:59

## 2025-07-19 RX ADMIN — ARFORMOTEROL TARTRATE 15 MCG: 15 SOLUTION RESPIRATORY (INHALATION) at 07:09

## 2025-07-19 RX ADMIN — Medication 1 TABLET: at 08:58

## 2025-07-19 RX ADMIN — TAMSULOSIN HYDROCHLORIDE 0.4 MG: 0.4 CAPSULE ORAL at 08:58

## 2025-07-19 RX ADMIN — IPRATROPIUM BROMIDE AND ALBUTEROL SULFATE 3 ML: .5; 3 SOLUTION RESPIRATORY (INHALATION) at 07:08

## 2025-07-19 RX ADMIN — IPRATROPIUM BROMIDE AND ALBUTEROL SULFATE 3 ML: .5; 3 SOLUTION RESPIRATORY (INHALATION) at 11:32

## 2025-07-19 RX ADMIN — LISINOPRIL 20 MG: 20 TABLET ORAL at 08:58

## 2025-07-19 RX ADMIN — AZELASTINE HYDROCHLORIDE 2 SPRAY: 137 SPRAY, METERED NASAL at 09:09

## 2025-07-19 RX ADMIN — BUDESONIDE 0.5 MG: 0.5 SUSPENSION RESPIRATORY (INHALATION) at 07:09

## 2025-07-19 RX ADMIN — METFORMIN HYDROCHLORIDE 500 MG: 500 TABLET, EXTENDED RELEASE ORAL at 08:58

## 2025-07-19 RX ADMIN — METHYLPREDNISOLONE SODIUM SUCCINATE 62.5 MG: 125 INJECTION, POWDER, FOR SOLUTION INTRAMUSCULAR; INTRAVENOUS at 08:58

## 2025-07-19 NOTE — DISCHARGE SUMMARY
Patient Name: Gonzales Kaur Sr.  : 1955  MRN: [id number]    Date of Admission: 7/15/2025  Date of Discharge:  2025  Primary Care Physician: Christiano Bartholomew APRN      Discharge Diagnoses     Active Hospital Problems    Diagnosis  POA    **Acute exacerbation of chronic obstructive pulmonary disease (COPD) [J44.1]  Yes    DM2 (diabetes mellitus, type 2) [E11.9]  Yes    Acute on chronic respiratory failure with hypoxia [J96.21]  Yes    Chronic kidney disease, stage 2 (mild) [N18.2]  Yes    Leukocytosis [D72.829]  Yes    Benign prostatic hyperplasia [N40.0]  Yes    History of prostate cancer [Z85.46]  Not Applicable    Essential hypertension [I10]  Yes    MICHAEL (obstructive sleep apnea) [G47.33]  Yes      Resolved Hospital Problems   No resolved problems to display.        Hospital Course     Brief admission history and physical.  Please refer to the H&P for full details.  A pleasant 69 years old gentleman with a past history of COPD/obstructive sleep apnea/enlarged prostate/hypertension/prostate cancer/stage II-III chronic renal failure who presented to the emergency department with progressive shortness of breath/worsening cough/wheezing.  Physical examination on admission included an afebrile patient with stable vital signs and a pulse ox of 97% on 3 L.  Rest of the examination revealed poor bilateral air entry with bilateral expiratory wheeze.  Hospital course.  Initial ER evaluation included a CMP that was normal except for glucose of 110 and CO2 of 29.7.  proBNP was normal.  Troponin elevated at 523.  CBC normal except a white count of 13.3 and MCV of 100.5.  Procalcitonin was normal.  Respiratory PCR panel was positive for parainfluenza virus.  Chest x-ray with atelectasis left lung base.  Patient's last 2D echo in 2018 revealed a normal ejection fraction.  EKG with normal sinus rhythm and left axis deviation with poor R wave progression and early transition in the chest leads.  Hospital course  will be summarized in a problem-oriented manner as below.  1.  Acute hypoxemic respiratory failure secondary to COPD exacerbation because of parainfluenza 3 lower respiratory tract infection in a patient with a history of COPD and obstructive sleep apnea.  Patient was admitted placed on oxygen/IV Solu-Medrol/Mucinex/DuoNeb/Pulmicort mini nebs and subsequently Daliresp was added.  He was maintained on incentive spirometry.  There was no evidence of bacterial infection with normal procalcitonin and no fever.  FiO2 demand and respiratory status gradually improved.  At the time of discharge his resting pulse ox on room air was 90-92.  Patient dropped after 10 minutes to 82% on room air with exertion the oxygen was prescribed at the time of discharge with continuation of the patient CPAP.  Oxygen and is continuous by nasal cannula during the day and lead into the CPAP nightly.  He was discharged on his original home bronchodilator in addition to Mucinex/Daliresp/oxygen/p.o. steroid for the next 5 days..  2.  Steroid-induced leukocytosis.  Patient leukocytosis worsened while he is on IV steroids.  There was no fever or clinical focus of infection.  Procalcitonin was negative.  No antibiotics were employed.  This needs to be followed up as an outpatient.  3.  Newly diagnosed type 2 diabetes.  Patient was noted to be hyperglycemic during this admission.  A1c was 7 indicating early type 2 diabetes.  That counseled about diabetes and diabetic diet and metformin initiated at the time of discharge.  Follow-up as an outpatient with primary MD.  4.  Hypertension.  Blood pressure remained under good control during this admission with no evidence of angina or congestive heart failure while on lisinopril.  Troponin mildly elevated but plateaued.  proBNP was normal.  5.  Mild hyperkalemia.  This has resolved with a single dose of Lokelma without any recurrence.  6.  History of stage II-3 chronic renal failure.  Patient remained  euvolemic during this admission.  Renal function remained stable at the level of stage II during this admission.  Follow-up as an outpatient.    At the time of discharge patient shortness of breath/cough/wheezing were back to baseline.  He was afebrile with stable vital signs.  He is to follow-up with primary MD and pulmonary.  Consultants     Consult Orders (all) (From admission, onward)       Start     Ordered    07/15/25 2107  [facility] (on-call MD unless specified) Details  Once        Specialty:  Hospitalist  Provider:  (Not yet assigned)    07/15/25 2106                  Procedures     Imaging Results (All)       Procedure Component Value Units Date/Time    XR Chest 1 View [[id number]] Collected: 07/15/25 2012     Updated: 07/15/25 2016    Narrative:      AP CHEST     HISTORY: Shortness of breath     COMPARISON: 3/17/2025     FINDINGS: There is curvilinear atelectasis or scar in the left lung  base. Stable scarring in the lung apices. Heart size stable. No evidence  of pneumothorax. No acute bony abnormality       Impression:      Atelectasis or scarring in the left lung base           This report was finalized on 7/15/2025 8:13 PM by Dr. Marciano Stroud M.D on Workstation: [id number]               Pertinent Labs     Results from last 7 days   Lab Units 07/19/25  0432 07/18/25  0338 07/17/25  0405 07/16/25  0238   WBC 10*3/mm3 19.02* 20.44* 18.95* 15.93*   HEMOGLOBIN g/dL 13.4 12.7* 12.5* 12.3*   PLATELETS 10*3/mm3 251 245 243 224     Results from last 7 days   Lab Units 07/19/25  0432 07/18/25  0338 07/17/25  1509 07/17/25  0405 07/16/25  0238   SODIUM mmol/L 132* 136  --  135* 135*   POTASSIUM mmol/L 3.9 4.8 4.9 5.3* 5.3*   CHLORIDE mmol/L 88* 93*  --  93* 97*   CO2 mmol/L 34.1* 34.6*  --  33.6* 29.0   BUN mg/dL 28.0* 22.0  --  25.0* 20.0   CREATININE mg/dL 0.83 1.07  --  0.97 0.80   GLUCOSE mg/dL 111* 220*  --  213* 182*   Estimated Creatinine Clearance: 84 mL/min (by C-G formula based on SCr of 0.83  "mg/dL).  Results from last 7 days   Lab Units 07/16/25  0238 07/15/25  1941   ALBUMIN g/dL 3.9 4.2   BILIRUBIN mg/dL 0.3 0.3   ALK PHOS U/L 40 44   AST (SGOT) U/L 26 27   ALT (SGPT) U/L 35 39     Results from last 7 days   Lab Units 07/19/25  0432 07/18/25  0338 07/17/25  0405 07/16/25  0238 07/15/25  1941   CALCIUM mg/dL 9.4 9.6 9.6 9.1 9.5   ALBUMIN g/dL  --   --   --  3.9 4.2       Results from last 7 days   Lab Units 07/15/25  2052 07/15/25  1941   HSTROP T ng/L 24* 23*   PROBNP pg/mL  --  177.0           Invalid input(s): \"LDLCALC\"      Imaging Results (Last 24 Hours)       ** No results found for the last 24 hours. **            Test Results Pending at Discharge         Discharge Exam   Physical Exam  Vitals.  Temperature 97.5 a pulse of 85 respirate rate of 16 and blood pressure 147/81 and an O2 sats of 95 on 2 L.  And 82% on room air.  General.  Middle-aged to elderly gentleman.  Obese.  Alert and oriented x 4.  In no apparent pain/distress/diaphoresis.  Normal mood and affect.  Eyes.  Pupils equal round and reactive.  Intact extraocular musculature.  No pallor or jaundice.  Oral cavity.  Moist mucous membrane.  Neck.  Supple.  No JVD.  No lymphadenopathy or thyromegaly.  CardioVascular.  Regular rate and rhythm with grade 2 systolic murmur  Chest.  Poor bilateral air entry with scattered bilateral expiratory wheeze.  Improved since admission  Abdomen.  Soft lax.  No tenderness.  No organomegaly.  No guarding or rebound.  Extremities.  No clubbing/cyanosis/edema  CNS.  No acute focal neurological deficits  Discharge Details        Discharge Medications        New Medications        Instructions Start Date   guaiFENesin 600 MG 12 hr tablet  Commonly known as: MUCINEX   600 mg, Oral, Every 12 Hours Scheduled      metFORMIN  MG 24 hr tablet  Commonly known as: GLUCOPHAGE-XR   500 mg, Oral, Daily With Breakfast      roflumilast 500 MCG tablet tablet  Commonly known as: DALIRESP   500 mcg, Oral, Daily    "          Changes to Medications        Instructions Start Date   azelastine 0.1 % nasal spray  Commonly known as: ASTELIN  What changed:   when to take this  reasons to take this   2 sprays, Nasal, 2 Times Daily, Use in each nostril as directed      predniSONE 10 MG tablet  Commonly known as: DELTASONE  What changed:   how much to take  how to take this  when to take this  additional instructions   40 mg, Oral, Daily      promethazine-dextromethorphan 6.25-15 MG/5ML syrup  Commonly known as: PROMETHAZINE-DM   5 mL, Oral, 4 Times Daily PRN             Continue These Medications        Instructions Start Date   acetaminophen 650 MG 8 hr tablet  Commonly known as: TYLENOL   650 mg, Oral, As Needed      albuterol sulfate  (90 Base) MCG/ACT inhaler  Commonly known as: PROVENTIL HFA;VENTOLIN HFA;PROAIR HFA   2 puffs, As Needed      Breztri Aerosphere 160-9-4.8 MCG/ACT aerosol inhaler  Generic drug: Budeson-Glycopyrrol-Formoterol   2 puffs, 2 Times Daily      lisinopril 20 MG tablet  Commonly known as: PRINIVIL,ZESTRIL   20 mg, Oral, Daily      multivitamin with minerals tablet tablet   1 tablet, Daily      tamsulosin 0.4 MG capsule 24 hr capsule  Commonly known as: FLOMAX   1 capsule, Oral, 2 Times Daily, PT THINKS IT'S 10MG               No Known Allergies      Discharge Disposition:  Condition: Stable    Diet:   Diet Order   Procedures    Diet: Cardiac; Healthy Heart (2-3 Na+); Fluid Consistency: Thin (IDDSI 0)       Activity:   Activity Instructions       Activity as Tolerated                CODE STATUS:    Code Status and Medical Interventions: CPR (Attempt to Resuscitate); Full Support   Ordered at: 07/15/25 5510     Code Status (Patient has no pulse and is not breathing):    CPR (Attempt to Resuscitate)     Medical Interventions (Patient has pulse or is breathing):    Full Support       Future Appointments   Date Time Provider Department Center   10/20/2025  9:45 AM [facility]  [facility]   12/8/2025 12:00 PM  LAB CHA[facility] 4 [facility]  [facility]   12/15/2025  3:00 PM [facility]  [facility]   12/15/2025  3:20 PM Quinton Laureano MD [facility]     Additional Instructions for the Follow-ups that You Need to Schedule       Call MD With Problems / Concerns   As directed      Instructions: Call MD or return to ER worsening shortness of breath/worsening cough/worsening wheeze/hemoptysis/fever chills/chest pain/nausea or vomiting/lower extremity edema/palpitation    Order Comments: Instructions: Call MD or return to ER worsening shortness of breath/worsening cough/worsening wheeze/hemoptysis/fever chills/chest pain/nausea or vomiting/lower extremity edema/palpitation         Discharge Follow-up with PCP   As directed       Currently Documented PCP:    Christiano Bartholomew APRN    PCP Phone Number:    [phone]     Follow Up Details: 1 week.  Acute hypoxemic respiratory failure secondary to COPD exacerbation/parainfluenza virus/obstructive sleep apnea/steroid-induced leukocytosis/newly diagnosed type 2 diabetes/hypertension/stage II chronic renal failure        Discharge Follow-up with Specified Provider: Pulmonary.  2 weeks as scheduled.   As directed      To: Pulmonary.  2 weeks as scheduled.   Follow Up Details: Acute hypoxemic respiratory failure secondary to COPD exacerbation because of parainfluenza virus infection               Follow-up Information       Christiano Bartholomew APRN .    Specialty: Nurse Practitioner  Why: 1 week.  Acute hypoxemic respiratory failure secondary to COPD exacerbation/parainfluenza virus/obstructive sleep apnea/steroid-induced leukocytosis/newly diagnosed type 2 diabetes/hypertension/stage II chronic renal failure  Contact information:  [address] [facility]  [address] [place] [address]  [phone]                               Time Spent on Discharge:  Greater than 30 minutes      Fernanda Kaplan MD  [facility]  07/19/25  08:40 EDT

## 2025-07-19 NOTE — PROGRESS NOTES
Exercise Oximetry    Patient Name:Gonzales Kaur Sr.   MRN: [id number]   Date: 07/19/25             ROOM AIR BASELINE   SpO2%  82   Heart Rate 113   Blood Pressure      EXERCISE ON ROOM AIR SpO2% EXERCISE ON O2 @  LPM SpO2% 2   1 MINUTE 82 1 MINUTE 88   2 MINUTES  2 MINUTES 3 l 93   3 MINUTES  3 MINUTES 94   4 MINUTES  4 MINUTES 92   5 MINUTES  5 MINUTES 93   6 MINUTES  6 MINUTES 93              Distance Walked   Distance Walked   Dyspnea (Andry Scale)   Dyspnea (Andry Scale)   Fatigue (Andry Scale)   Fatigue (Andry Scale)   SpO2% Post Exercise   SpO2% Post Exercise 95   HR Post Exercise   HR Post Exercise 113   Time to Recovery   Time to Recovery 3 min     Comments: o2 sats on room air 82% put on 2l with ambulation sats 88% increased to 3l o2 sats 92% to 94%.  Patient c/o sob .  Dominique Gaspar RRT

## 2025-07-19 NOTE — PLAN OF CARE
Goal Outcome Evaluation:              Outcome Evaluation: O2 SAT STABLE ON 3 L/M N/C. RESTED WELL OVERNIGHT WITHOUT C/O'S.

## 2025-07-19 NOTE — CASE MANAGEMENT/SOCIAL WORK
Continued Stay Note  [facility]     Patient Name: Gonzales Kaur Sr.  MRN: [id number]  Today's Date: 7/19/2025    Admit Date: 7/15/2025    Plan: Home w/ Spouse   Discharge Plan       Row Name 07/19/25 1225       Plan    Plan Home w/ Spouse    Plan Comments CCP notified that pt is discharging today and needs home oxygen set up.Call placed to pt who reports [facility]. Becky/Harry contacted and home oxygen set up for patient. RN updated. Per Becky unable to get the oxygen set up to bleed through the CPAP until Monday. Pt away and RN updated                   Discharge Codes    No documentation.                 Expected Discharge Date and Time       Expected Discharge Date Expected Discharge Time    Jul 19, 2025               Angelina Marr RN

## 2025-07-19 NOTE — DISCHARGE PLACEMENT REQUEST
"Gonzales Kaur Sr. \"MELI\" (69 y.o. Male)       Date of Birth   1955    Social Security Number       Address   [address] [facility] [address]    Home Phone   [phone]    MRN   [id number]       [facility]    Marital Status                               Admission Date   7/15/2025    Admission Type   Emergency    Admitting Provider   Mg Tyler MD    Attending Provider   Fernanda Kaplan MD    Department, Room/Bed   [address], [address]/1       Discharge Date       Discharge Disposition   Home or Self Care    Discharge Destination                                 Attending Provider: Fernanda Kaplan MD    Allergies: No Known Allergies    Isolation: None   Infection: None   Code Status: CPR    Ht: 167.6 cm (65.98\")   Wt: 78.5 kg (173 lb)    Admission Cmt: None   Principal Problem: Acute exacerbation of chronic obstructive pulmonary disease (COPD) [J44.1]                   Active Insurance as of 7/15/2025       Primary Coverage       Payor Plan Insurance Group Employer/Plan Group    MEDICARE RAILROAD MEDICARE        Payor Plan Address Payor Plan Phone Number Payor Plan Fax Number Effective Dates    [address] [phone]  9/1/2021 - None Entered    [facility] [id number]         Subscriber Name Subscriber Birth Date Member ID       GONZALES KAUR SR. 1955 [id number]               Secondary Coverage       Payor Plan Insurance Group Employer/Plan Group    [facility] SUPP [id number]       Payor Plan Address Payor Plan Phone Number Payor Plan Fax Number Effective Dates    [address]   9/1/2021 - None Entered    [facility] [id number]         Subscriber Name Subscriber Birth Date Member ID       GONZALES KAUR SR. 1955 [id number]                     Emergency Contacts        (Rel.) Home Phone Work Phone Mobile Phone    Dominique Kaur (Spouse) -- -- [phone]                "

## 2025-07-20 NOTE — CASE MANAGEMENT/SOCIAL WORK
Case Management Discharge Note      Final Note: Home, with oxygen arranged, family to transport    Provided Post Acute Provider List?: N/A (Plan is home)  Provided Post Acute Provider Quality & Resource List?: N/A (Plan is home)    Selected Continued Care - Discharged on 7/19/2025 Admission date: 7/15/2025 - Discharge disposition: Home or Self Care      Destination    No services have been selected for the patient.                Durable Medical Equipment    No services have been selected for the patient.                Dialysis/Infusion    No services have been selected for the patient.                Home Medical Care    No services have been selected for the patient.                Therapy    No services have been selected for the patient.                Community Resources    No services have been selected for the patient.                Community & DME    No services have been selected for the patient.                    Transportation Services  Transportation: Private Transportation  Private: Car    Final Discharge Disposition Code: 01 - home or self-care

## 2025-07-21 ENCOUNTER — READMISSION MANAGEMENT (OUTPATIENT)
Dept: CALL CENTER | Facility: HOSPITAL | Age: 70
End: 2025-07-21
Payer: MEDICARE

## 2025-07-21 ENCOUNTER — TRANSITIONAL CARE MANAGEMENT TELEPHONE ENCOUNTER (OUTPATIENT)
Dept: CALL CENTER | Facility: HOSPITAL | Age: 70
End: 2025-07-21
Payer: MEDICARE

## 2025-07-21 NOTE — OUTREACH NOTE
Prep Survey      Flowsheet Row Responses   [facility] patient discharged from? [facility]   Is LACE score < 7 ? No   Eligibility [facility]   Date of Admission 07/15/25   Date of Discharge 07/19/25   Discharge Disposition Home or Self Care   Discharge diagnosis Acute exacerbation of chronic obstructive pulmonary disease (COPD)   Does the patient have one of the following disease processes/diagnoses(primary or secondary)? COPD   Does the patient have Home health ordered? No   Is there a DME ordered? Yes   What DME was ordered? Oxygen Therapy---[facility]   Medication alerts for this patient see AVS   Prep survey completed? Yes            Chula PICKENS - Registered Nurse

## 2025-07-21 NOTE — OUTREACH NOTE
Call Center TCM Note      Flowsheet Row Responses   [facility] patient discharged from? [place]   Does the patient have one of the following disease processes/diagnoses(primary or secondary)? COPD   TCM attempt successful? Yes   Call start time 0845   Call end time 0850   Discharge diagnosis Acute exacerbation of chronic obstructive pulmonary disease (COPD)   Meds reviewed with patient/caregiver? Yes   Is the patient having any side effects they believe may be caused by any medication additions or changes? No   Does the patient have all medications ordered at discharge? Yes   Prescription comments Plans to  from pharmacy today (7/21/25)   Is the patient taking all medications as directed (includes completed medication regime)? Yes   Comments Hosp dc fu apt on 7/29/25   Does the patient have an appointment with their PCP within 7-14 days of discharge? Yes   Has home health visited the patient within 72 hours of discharge? N/A   DME comments Oxygen   Pulse Ox monitoring Intermittent   Pulse Ox device source Patient   O2 Sat comments Pulse ox reads 98% with 3L O2 at all times   O2 Sat: education provided Sat levels, Monitoring frequency, When to seek care   Did the patient receive a copy of their discharge instructions? Yes   Nursing interventions Reviewed instructions with patient   What is the patient's perception of their health status since discharge? Improving   If the patient is a current smoker, are they able to teach back resources for cessation? Not a smoker   Is the patient/caregiver able to teach back the hierarchy of who to call/visit for symptoms/problems? PCP, Specialist, Home health nurse, Urgent Care, ED, 911 Yes   Is the patient able to teach back COPD zones? Yes   Nursing interventions Education provided on various zones   Patient reports what zone on this call? Green Zone   Green Zone Reports doing well, Breathing without shortness of breath, Usual activity and exercise level, Usual  amounts of cough and phlegm/mucous, Slept well last night, Appetite is good   Green Zone interventions: Take daily medications, Use oxygen as prescribed, Continue regular exercise/diet plan, At all times avoid cigarette smoking, vaping and inhaled irritants   TCM call completed? Yes   Call end time 0850            Purnima CARTER - Registered Nurse    7/21/2025, 08:51 EDT

## 2025-07-22 ENCOUNTER — TELEPHONE (OUTPATIENT)
Dept: INTERNAL MEDICINE | Facility: CLINIC | Age: 70
End: 2025-07-22
Payer: MEDICARE

## 2025-07-22 NOTE — TELEPHONE ENCOUNTER
Caller: Dominique Kaur    Relationship to patient: Emergency Contact      Best call back number: [phone]    Provider: CHRISTIAN MERRITT     Medication PA needed:     roflumilast (DALIRESP) 500 MCG tablet tablet       Reason for call/Prior Auth: INSURANCE    PATIENT WAS GIVEN THIS WHILE IN THE HOSPITAL AND ISNT SURE IF CHRISTIAN OR HIS LUNG DOCTOR WOULD NEED TO DO THIS AUTHORIZATION.

## 2025-07-29 ENCOUNTER — OFFICE VISIT (OUTPATIENT)
Dept: INTERNAL MEDICINE | Facility: CLINIC | Age: 70
End: 2025-07-29
Payer: MEDICARE

## 2025-07-29 VITALS
OXYGEN SATURATION: 96 % | WEIGHT: 168.6 LBS | BODY MASS INDEX: 27.1 KG/M2 | HEIGHT: 66 IN | HEART RATE: 109 BPM | DIASTOLIC BLOOD PRESSURE: 84 MMHG | SYSTOLIC BLOOD PRESSURE: 126 MMHG

## 2025-07-29 DIAGNOSIS — E11.9 TYPE 2 DIABETES MELLITUS WITHOUT COMPLICATION, WITHOUT LONG-TERM CURRENT USE OF INSULIN: ICD-10-CM

## 2025-07-29 DIAGNOSIS — Z09 HOSPITAL DISCHARGE FOLLOW-UP: ICD-10-CM

## 2025-07-29 DIAGNOSIS — R06.09 DYSPNEA ON EXERTION: Primary | ICD-10-CM

## 2025-07-29 DIAGNOSIS — J43.9 PULMONARY EMPHYSEMA, UNSPECIFIED EMPHYSEMA TYPE: Chronic | ICD-10-CM

## 2025-07-29 DIAGNOSIS — I10 ESSENTIAL HYPERTENSION: Chronic | ICD-10-CM

## 2025-07-29 PROCEDURE — 1126F AMNT PAIN NOTED NONE PRSNT: CPT

## 2025-07-29 PROCEDURE — 1159F MED LIST DOCD IN RCRD: CPT

## 2025-07-29 PROCEDURE — 1111F DSCHRG MED/CURRENT MED MERGE: CPT

## 2025-07-29 PROCEDURE — 3079F DIAST BP 80-89 MM HG: CPT

## 2025-07-29 PROCEDURE — 3051F HG A1C>EQUAL 7.0%<8.0%: CPT

## 2025-07-29 PROCEDURE — 1160F RVW MEDS BY RX/DR IN RCRD: CPT

## 2025-07-29 PROCEDURE — 3074F SYST BP LT 130 MM HG: CPT

## 2025-07-29 PROCEDURE — 99495 TRANSJ CARE MGMT MOD F2F 14D: CPT

## 2025-07-29 NOTE — PATIENT INSTRUCTIONS
Continue medications as prescribed. Recommend deep breathing exercises throughout the day. Increase activity as tolerated. Stay hydrated with water. Limit sweets and carbohydrates. Follow-up in 3 months for recheck.

## 2025-07-29 NOTE — PROGRESS NOTES
Transitional Care Follow Up Visit  Subjective     Gonzales Kaur Sr. is a 69 y.o. male who presents for a transitional care management visit.    Within 48 business hours after discharge our office contacted him via telephone to coordinate his care and needs.      I reviewed and discussed the details of that call along with the discharge summary, hospital problems, inpatient lab results, inpatient diagnostic studies, and consultation reports with Gonzales.     Current outpatient and discharge medications have been reconciled for the patient.  Reviewed by: SHAJI Lockhart          7/21/2025     5:12 AM   Date of TCM Phone Call   [facility]   Date of Admission 7/15/2025   Date of Discharge 7/19/2025   Discharge Disposition Home or Self Care     Risk for Readmission (LACE) Score: 13 (7/19/2025  6:00 AM)      History of Present Illness  69-year-old male presenting with type 2 diabetes, COPD and hospital follow-up.  Recently admitted to the hospital with a COPD exacerbation with parainfluenza.  On oxygen currently during visit.  Oxygenation hovering between 92 to 98% with supplemental oxygen.  Patient is unable to tolerate physical exertion without supplemental oxygenation.    Started on metformin for hemoglobin A1c of 7.0.  This is most likely steroid-induced diabetes.  But since patient is going to be frequently on steroids, recommend continuing metformin.  Discussed lifestyle changes with eating habits.    Discussed risk prevention of viruses.  Patient does not use mask correctly and is frequently in large crowds for social events and [employer] services.     Course During Hospital Stay:  Brief admission history and physical.  Please refer to the H&P for full details.  A pleasant 69 years old gentleman with a past history of COPD/obstructive sleep apnea/enlarged prostate/hypertension/prostate cancer/stage II-III chronic renal failure who presented to the emergency department with progressive  shortness of breath/worsening cough/wheezing.  Physical examination on admission included an afebrile patient with stable vital signs and a pulse ox of 97% on 3 L.  Rest of the examination revealed poor bilateral air entry with bilateral expiratory wheeze.  Hospital course.  Initial ER evaluation included a CMP that was normal except for glucose of 110 and CO2 of 29.7.  proBNP was normal.  Troponin elevated at 523.  CBC normal except a white count of 13.3 and MCV of 100.5.  Procalcitonin was normal.  Respiratory PCR panel was positive for parainfluenza virus.  Chest x-ray with atelectasis left lung base.  Patient's last 2D echo in 2018 revealed a normal ejection fraction.  EKG with normal sinus rhythm and left axis deviation with poor R wave progression and early transition in the chest leads.  Hospital course will be summarized in a problem-oriented manner as below.  1.  Acute hypoxemic respiratory failure secondary to COPD exacerbation because of parainfluenza 3 lower respiratory tract infection in a patient with a history of COPD and obstructive sleep apnea.  Patient was admitted placed on oxygen/IV Solu-Medrol/Mucinex/DuoNeb/Pulmicort mini nebs and subsequently Daliresp was added.  He was maintained on incentive spirometry.  There was no evidence of bacterial infection with normal procalcitonin and no fever.  FiO2 demand and respiratory status gradually improved.  At the time of discharge his resting pulse ox on room air was 90-92.  Patient dropped after 10 minutes to 82% on room air with exertion the oxygen was prescribed at the time of discharge with continuation of the patient CPAP.  Oxygen and is continuous by nasal cannula during the day and lead into the CPAP nightly.  He was discharged on his original home bronchodilator in addition to Mucinex/Daliresp/oxygen/p.o. steroid for the next 5 days..  2.  Steroid-induced leukocytosis.  Patient leukocytosis worsened while he is on IV steroids.  There was no fever  "or clinical focus of infection.  Procalcitonin was negative.  No antibiotics were employed.  This needs to be followed up as an outpatient.  3.  Newly diagnosed type 2 diabetes.  Patient was noted to be hyperglycemic during this admission.  A1c was 7 indicating early type 2 diabetes.  That counseled about diabetes and diabetic diet and metformin initiated at the time of discharge.  Follow-up as an outpatient with primary MD.  4.  Hypertension.  Blood pressure remained under good control during this admission with no evidence of angina or congestive heart failure while on lisinopril.  Troponin mildly elevated but plateaued.  proBNP was normal.  5.  Mild hyperkalemia.  This has resolved with a single dose of Lokelma without any recurrence.  6.  History of stage II-3 chronic renal failure.  Patient remained euvolemic during this admission.  Renal function remained stable at the level of stage II during this admission.  Follow-up as an outpatient.     The following portions of the patient's history were reviewed and updated as appropriate: allergies, current medications, past family history, past medical history, past social history, past surgical history, and problem list.    Review of Systems   All other systems reviewed and are negative.      Objective   /84 (Patient Position: Sitting, Cuff Size: Adult)   Pulse 109   Ht 167.6 cm (65.98\")   Wt 76.5 kg (168 lb 9.6 oz)   SpO2 96%   BMI 27.23 kg/m²   Physical Exam  Vitals reviewed.   Constitutional:       Appearance: Normal appearance.   Cardiovascular:      Rate and Rhythm: Normal rate and regular rhythm.      Pulses: Normal pulses.      Heart sounds: Normal heart sounds.   Pulmonary:      Effort: Pulmonary effort is normal.      Breath sounds: Normal breath sounds.   Musculoskeletal:         General: Normal range of motion.      Cervical back: Normal range of motion.   Skin:     General: Skin is warm and dry.      Capillary Refill: Capillary refill takes " less than 2 seconds.   Neurological:      General: No focal deficit present.      Mental Status: He is alert and oriented to person, place, and time.   Psychiatric:         Mood and Affect: Mood normal.         Behavior: Behavior normal.         Thought Content: Thought content normal.         Judgment: Judgment normal.         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)    Dyspnea on exertion - Primary    Essential hypertension (Chronic)    DM2 (diabetes mellitus, type 2)     Other Visit Diagnoses         Hospital discharge follow-up              Diagnoses         Codes Comments      Dyspnea on exertion    -  Primary ICD-10-CM: R06.09  ICD-9-CM: 786.09       Essential hypertension     ICD-10-CM: I10  ICD-9-CM: 401.9       Type 2 diabetes mellitus without complication, without long-term current use of insulin     ICD-10-CM: E11.9  ICD-9-CM: 250.00       Pulmonary emphysema, unspecified emphysema type     ICD-10-CM: J43.9  ICD-9-CM: 492.8       Hospital discharge follow-up     ICD-10-CM: Z09  ICD-9-CM: V67.59           Patient Instructions   Continue medications as prescribed. Recommend deep breathing exercises throughout the day. Increase activity as tolerated. Stay hydrated with water. Limit sweets and carbohydrates. Follow-up in 3 months for recheck.